# Patient Record
Sex: FEMALE | Race: WHITE | Employment: OTHER | URBAN - METROPOLITAN AREA
[De-identification: names, ages, dates, MRNs, and addresses within clinical notes are randomized per-mention and may not be internally consistent; named-entity substitution may affect disease eponyms.]

---

## 2017-02-21 DIAGNOSIS — E78.2 MIXED HYPERLIPIDEMIA: ICD-10-CM

## 2017-02-22 RX ORDER — ATORVASTATIN CALCIUM 40 MG/1
TABLET, FILM COATED ORAL
Qty: 90 TABLET | Refills: 1 | Status: SHIPPED | OUTPATIENT
Start: 2017-02-22 | End: 2017-09-02 | Stop reason: SDUPTHER

## 2017-04-06 ENCOUNTER — OFFICE VISIT (OUTPATIENT)
Dept: FAMILY MEDICINE CLINIC | Age: 67
End: 2017-04-06

## 2017-04-06 VITALS
TEMPERATURE: 98 F | RESPIRATION RATE: 16 BRPM | OXYGEN SATURATION: 96 % | HEART RATE: 75 BPM | SYSTOLIC BLOOD PRESSURE: 122 MMHG | HEIGHT: 67 IN | WEIGHT: 226 LBS | DIASTOLIC BLOOD PRESSURE: 80 MMHG | BODY MASS INDEX: 35.47 KG/M2

## 2017-04-06 DIAGNOSIS — I10 ESSENTIAL HYPERTENSION: ICD-10-CM

## 2017-04-06 DIAGNOSIS — D64.9 ANEMIA, UNSPECIFIED TYPE: ICD-10-CM

## 2017-04-06 DIAGNOSIS — F51.05 INSOMNIA DUE TO OTHER MENTAL DISORDER: ICD-10-CM

## 2017-04-06 DIAGNOSIS — E03.9 ACQUIRED HYPOTHYROIDISM: ICD-10-CM

## 2017-04-06 DIAGNOSIS — E11.40 TYPE 2 DIABETES MELLITUS WITH DIABETIC NEUROPATHY, WITHOUT LONG-TERM CURRENT USE OF INSULIN (HCC): ICD-10-CM

## 2017-04-06 DIAGNOSIS — F99 INSOMNIA DUE TO OTHER MENTAL DISORDER: ICD-10-CM

## 2017-04-06 DIAGNOSIS — E78.2 MIXED HYPERLIPIDEMIA: ICD-10-CM

## 2017-04-06 DIAGNOSIS — R73.9 HYPERGLYCEMIA: ICD-10-CM

## 2017-04-06 DIAGNOSIS — R53.83 FATIGUE, UNSPECIFIED TYPE: Primary | ICD-10-CM

## 2017-04-06 LAB
A/G RATIO: 1.8 (ref 1.1–2.2)
ALBUMIN SERPL-MCNC: 4.2 G/DL (ref 3.4–5)
ALP BLD-CCNC: 59 U/L (ref 40–129)
ALT SERPL-CCNC: 13 U/L (ref 10–40)
ANION GAP SERPL CALCULATED.3IONS-SCNC: 12 MMOL/L (ref 3–16)
AST SERPL-CCNC: 16 U/L (ref 15–37)
BILIRUB SERPL-MCNC: <0.2 MG/DL (ref 0–1)
BUN BLDV-MCNC: 14 MG/DL (ref 7–20)
CALCIUM SERPL-MCNC: 8.8 MG/DL (ref 8.3–10.6)
CHLORIDE BLD-SCNC: 100 MMOL/L (ref 99–110)
CHOLESTEROL, TOTAL: 148 MG/DL (ref 0–199)
CO2: 25 MMOL/L (ref 21–32)
CREAT SERPL-MCNC: 0.7 MG/DL (ref 0.6–1.2)
GFR AFRICAN AMERICAN: >60
GFR NON-AFRICAN AMERICAN: >60
GLOBULIN: 2.4 G/DL
GLUCOSE BLD-MCNC: 280 MG/DL (ref 70–99)
HBA1C MFR BLD: 9.7 %
HCT VFR BLD CALC: 35.1 % (ref 36–48)
HDLC SERPL-MCNC: 53 MG/DL (ref 40–60)
HEMOGLOBIN: 10.7 G/DL (ref 12–16)
LDL CHOLESTEROL CALCULATED: 62 MG/DL
MCH RBC QN AUTO: 21.6 PG (ref 26–34)
MCHC RBC AUTO-ENTMCNC: 30.5 G/DL (ref 31–36)
MCV RBC AUTO: 71 FL (ref 80–100)
PDW BLD-RTO: 16.5 % (ref 12.4–15.4)
PLATELET # BLD: 299 K/UL (ref 135–450)
PMV BLD AUTO: 9.3 FL (ref 5–10.5)
POTASSIUM SERPL-SCNC: 5.4 MMOL/L (ref 3.5–5.1)
RBC # BLD: 4.94 M/UL (ref 4–5.2)
SODIUM BLD-SCNC: 137 MMOL/L (ref 136–145)
T4 FREE: 1.1 NG/DL (ref 0.9–1.8)
TOTAL PROTEIN: 6.6 G/DL (ref 6.4–8.2)
TRIGL SERPL-MCNC: 164 MG/DL (ref 0–150)
TSH SERPL DL<=0.05 MIU/L-ACNC: 2.73 UIU/ML (ref 0.27–4.2)
VLDLC SERPL CALC-MCNC: 33 MG/DL
WBC # BLD: 6.5 K/UL (ref 4–11)

## 2017-04-06 PROCEDURE — 99214 OFFICE O/P EST MOD 30 MIN: CPT | Performed by: FAMILY MEDICINE

## 2017-04-06 PROCEDURE — 83036 HEMOGLOBIN GLYCOSYLATED A1C: CPT | Performed by: FAMILY MEDICINE

## 2017-04-06 PROCEDURE — 36415 COLL VENOUS BLD VENIPUNCTURE: CPT | Performed by: FAMILY MEDICINE

## 2017-04-06 RX ORDER — UBIDECARENONE 75 MG
50 CAPSULE ORAL DAILY
COMMUNITY
End: 2018-03-15 | Stop reason: ALTCHOICE

## 2017-04-06 RX ORDER — MAGNESIUM 30 MG
30 TABLET ORAL DAILY
Status: ON HOLD | COMMUNITY
End: 2021-01-01 | Stop reason: HOSPADM

## 2017-04-06 RX ORDER — LANCETS
EACH MISCELLANEOUS
Qty: 200 EACH | Refills: 5 | Status: ON HOLD | OUTPATIENT
Start: 2017-04-06 | End: 2021-01-01 | Stop reason: HOSPADM

## 2017-04-06 RX ORDER — ZOLPIDEM TARTRATE 10 MG/1
10 TABLET ORAL NIGHTLY PRN
Qty: 90 TABLET | Refills: 0 | Status: SHIPPED | OUTPATIENT
Start: 2017-04-06 | End: 2017-09-18

## 2017-04-06 ASSESSMENT — ENCOUNTER SYMPTOMS
ALLERGIC/IMMUNOLOGIC NEGATIVE: 1
RESPIRATORY NEGATIVE: 1
GASTROINTESTINAL NEGATIVE: 1
EYES NEGATIVE: 1
BACK PAIN: 0

## 2017-04-11 ENCOUNTER — TELEPHONE (OUTPATIENT)
Dept: FAMILY MEDICINE CLINIC | Age: 67
End: 2017-04-11

## 2017-04-11 DIAGNOSIS — D64.9 ANEMIA, UNSPECIFIED TYPE: Primary | ICD-10-CM

## 2017-04-12 ENCOUNTER — TELEPHONE (OUTPATIENT)
Dept: FAMILY MEDICINE CLINIC | Age: 67
End: 2017-04-12

## 2017-05-04 DIAGNOSIS — M85.80 OSTEOPENIA: ICD-10-CM

## 2017-05-04 RX ORDER — ALENDRONATE SODIUM 70 MG/1
TABLET ORAL
Qty: 12 TABLET | Refills: 1 | Status: SHIPPED | OUTPATIENT
Start: 2017-05-04 | End: 2017-10-13 | Stop reason: SDUPTHER

## 2017-05-23 ENCOUNTER — OFFICE VISIT (OUTPATIENT)
Dept: FAMILY MEDICINE CLINIC | Age: 67
End: 2017-05-23

## 2017-05-23 VITALS
TEMPERATURE: 98.5 F | BODY MASS INDEX: 35.47 KG/M2 | OXYGEN SATURATION: 98 % | HEART RATE: 84 BPM | SYSTOLIC BLOOD PRESSURE: 120 MMHG | DIASTOLIC BLOOD PRESSURE: 70 MMHG | RESPIRATION RATE: 16 BRPM | HEIGHT: 67 IN | WEIGHT: 226 LBS

## 2017-05-23 DIAGNOSIS — J06.9 UPPER RESPIRATORY TRACT INFECTION, UNSPECIFIED TYPE: Primary | ICD-10-CM

## 2017-05-23 DIAGNOSIS — H66.91 OM (OTITIS MEDIA), RECURRENT, RIGHT: ICD-10-CM

## 2017-05-23 PROCEDURE — 99213 OFFICE O/P EST LOW 20 MIN: CPT | Performed by: FAMILY MEDICINE

## 2017-05-23 RX ORDER — BENZONATATE 100 MG/1
100 CAPSULE ORAL 3 TIMES DAILY PRN
Qty: 60 CAPSULE | Refills: 1 | Status: SHIPPED | OUTPATIENT
Start: 2017-05-23 | End: 2017-09-18

## 2017-05-23 RX ORDER — CIPROFLOXACIN 500 MG/1
500 TABLET, FILM COATED ORAL 2 TIMES DAILY
Qty: 20 TABLET | Refills: 0 | Status: SHIPPED | OUTPATIENT
Start: 2017-05-23 | End: 2017-06-02

## 2017-05-23 RX ORDER — FLUTICASONE PROPIONATE 50 MCG
SPRAY, SUSPENSION (ML) NASAL
Qty: 1 BOTTLE | Refills: 1 | Status: SHIPPED | OUTPATIENT
Start: 2017-05-23 | End: 2017-09-18

## 2017-05-23 ASSESSMENT — ENCOUNTER SYMPTOMS
STRIDOR: 0
SINUS PRESSURE: 1
RHINORRHEA: 1
WHEEZING: 0
SORE THROAT: 1
APNEA: 0
SHORTNESS OF BREATH: 1
EYES NEGATIVE: 1
COUGH: 1
CHEST TIGHTNESS: 1
CHOKING: 0

## 2017-06-03 DIAGNOSIS — I10 ESSENTIAL HYPERTENSION: ICD-10-CM

## 2017-06-03 DIAGNOSIS — F41.9 ANXIETY AND DEPRESSION: ICD-10-CM

## 2017-06-03 DIAGNOSIS — F32.A ANXIETY AND DEPRESSION: ICD-10-CM

## 2017-06-05 RX ORDER — LISINOPRIL 5 MG/1
TABLET ORAL
Qty: 90 TABLET | Refills: 1 | Status: SHIPPED | OUTPATIENT
Start: 2017-06-05 | End: 2017-10-13 | Stop reason: SDUPTHER

## 2017-06-05 RX ORDER — BUPROPION HYDROCHLORIDE 150 MG/1
TABLET ORAL
Qty: 90 TABLET | Refills: 1 | Status: SHIPPED | OUTPATIENT
Start: 2017-06-05 | End: 2017-10-13 | Stop reason: SDUPTHER

## 2017-06-05 RX ORDER — GLIMEPIRIDE 4 MG/1
TABLET ORAL
Qty: 180 TABLET | Refills: 1 | Status: SHIPPED | OUTPATIENT
Start: 2017-06-05 | End: 2017-10-20 | Stop reason: SDUPTHER

## 2017-07-02 DIAGNOSIS — R25.1 OCCASIONAL TREMORS: ICD-10-CM

## 2017-07-03 RX ORDER — PRIMIDONE 50 MG/1
TABLET ORAL
Qty: 180 TABLET | Refills: 1 | Status: SHIPPED | OUTPATIENT
Start: 2017-07-03 | End: 2017-10-13 | Stop reason: SDUPTHER

## 2017-07-26 DIAGNOSIS — E11.9 TYPE 2 DIABETES MELLITUS WITHOUT COMPLICATION, WITHOUT LONG-TERM CURRENT USE OF INSULIN (HCC): ICD-10-CM

## 2017-07-27 RX ORDER — METFORMIN HYDROCHLORIDE 500 MG/1
TABLET, EXTENDED RELEASE ORAL
Qty: 180 TABLET | Refills: 1 | Status: SHIPPED | OUTPATIENT
Start: 2017-07-27 | End: 2017-10-20 | Stop reason: SDUPTHER

## 2017-08-22 DIAGNOSIS — E03.9 ACQUIRED HYPOTHYROIDISM: ICD-10-CM

## 2017-08-22 DIAGNOSIS — I10 ESSENTIAL HYPERTENSION: ICD-10-CM

## 2017-08-22 RX ORDER — LEVOTHYROXINE SODIUM 0.07 MG/1
TABLET ORAL
Qty: 90 TABLET | Refills: 1 | Status: SHIPPED | OUTPATIENT
Start: 2017-08-22 | End: 2017-10-20 | Stop reason: SDUPTHER

## 2017-09-02 DIAGNOSIS — E78.2 MIXED HYPERLIPIDEMIA: ICD-10-CM

## 2017-09-05 RX ORDER — ATORVASTATIN CALCIUM 40 MG/1
TABLET, FILM COATED ORAL
Qty: 90 TABLET | Refills: 1 | Status: SHIPPED | OUTPATIENT
Start: 2017-09-05 | End: 2017-10-20 | Stop reason: SDUPTHER

## 2017-09-18 ENCOUNTER — OFFICE VISIT (OUTPATIENT)
Dept: FAMILY MEDICINE CLINIC | Age: 67
End: 2017-09-18

## 2017-09-18 VITALS
BODY MASS INDEX: 33.74 KG/M2 | HEIGHT: 67 IN | RESPIRATION RATE: 16 BRPM | OXYGEN SATURATION: 97 % | HEART RATE: 83 BPM | SYSTOLIC BLOOD PRESSURE: 116 MMHG | WEIGHT: 215 LBS | TEMPERATURE: 98.9 F | DIASTOLIC BLOOD PRESSURE: 74 MMHG

## 2017-09-18 DIAGNOSIS — E11.65 TYPE 2 DIABETES MELLITUS WITH HYPERGLYCEMIA, WITHOUT LONG-TERM CURRENT USE OF INSULIN (HCC): Primary | ICD-10-CM

## 2017-09-18 DIAGNOSIS — I73.9 PAD (PERIPHERAL ARTERY DISEASE) (HCC): ICD-10-CM

## 2017-09-18 DIAGNOSIS — E03.9 HYPOTHYROIDISM, UNSPECIFIED TYPE: ICD-10-CM

## 2017-09-18 DIAGNOSIS — I10 ESSENTIAL HYPERTENSION: ICD-10-CM

## 2017-09-18 DIAGNOSIS — I25.10 CORONARY ARTERY DISEASE INVOLVING NATIVE CORONARY ARTERY OF NATIVE HEART WITHOUT ANGINA PECTORIS: ICD-10-CM

## 2017-09-18 DIAGNOSIS — F41.9 ANXIETY: ICD-10-CM

## 2017-09-18 DIAGNOSIS — I63.9 CEREBROVASCULAR ACCIDENT (CVA), UNSPECIFIED MECHANISM (HCC): ICD-10-CM

## 2017-09-18 DIAGNOSIS — E78.2 MIXED HYPERLIPIDEMIA: ICD-10-CM

## 2017-09-18 DIAGNOSIS — M85.89 OSTEOPENIA OF MULTIPLE SITES: ICD-10-CM

## 2017-09-18 DIAGNOSIS — D64.9 ANEMIA, UNSPECIFIED TYPE: ICD-10-CM

## 2017-09-18 LAB
A/G RATIO: 1.4 (ref 1.1–2.2)
ALBUMIN SERPL-MCNC: 4.4 G/DL (ref 3.4–5)
ALP BLD-CCNC: 73 U/L (ref 40–129)
ALT SERPL-CCNC: 21 U/L (ref 10–40)
ANION GAP SERPL CALCULATED.3IONS-SCNC: 20 MMOL/L (ref 3–16)
AST SERPL-CCNC: 22 U/L (ref 15–37)
BILIRUB SERPL-MCNC: 0.4 MG/DL (ref 0–1)
BUN BLDV-MCNC: 16 MG/DL (ref 7–20)
CALCIUM SERPL-MCNC: 9.7 MG/DL (ref 8.3–10.6)
CHLORIDE BLD-SCNC: 96 MMOL/L (ref 99–110)
CHOLESTEROL, TOTAL: 148 MG/DL (ref 0–199)
CO2: 22 MMOL/L (ref 21–32)
CREAT SERPL-MCNC: 0.7 MG/DL (ref 0.6–1.2)
CREATININE URINE: 148.5 MG/DL (ref 28–259)
GFR AFRICAN AMERICAN: >60
GFR NON-AFRICAN AMERICAN: >60
GLOBULIN: 3.1 G/DL
GLUCOSE BLD-MCNC: 309 MG/DL (ref 70–99)
HBA1C MFR BLD: 11.5 %
HCT VFR BLD CALC: 40.4 % (ref 36–48)
HDLC SERPL-MCNC: 56 MG/DL (ref 40–60)
HEMOGLOBIN: 12.5 G/DL (ref 12–16)
LDL CHOLESTEROL CALCULATED: 60 MG/DL
MCH RBC QN AUTO: 22.3 PG (ref 26–34)
MCHC RBC AUTO-ENTMCNC: 30.9 G/DL (ref 31–36)
MCV RBC AUTO: 72.1 FL (ref 80–100)
MICROALBUMIN UR-MCNC: 1.4 MG/DL
MICROALBUMIN/CREAT UR-RTO: 9.4 MG/G (ref 0–30)
PDW BLD-RTO: 16.8 % (ref 12.4–15.4)
PLATELET # BLD: 366 K/UL (ref 135–450)
PMV BLD AUTO: 9.3 FL (ref 5–10.5)
POTASSIUM SERPL-SCNC: 5.1 MMOL/L (ref 3.5–5.1)
RBC # BLD: 5.61 M/UL (ref 4–5.2)
SODIUM BLD-SCNC: 138 MMOL/L (ref 136–145)
T4 FREE: 1.2 NG/DL (ref 0.9–1.8)
TOTAL PROTEIN: 7.5 G/DL (ref 6.4–8.2)
TRIGL SERPL-MCNC: 158 MG/DL (ref 0–150)
TSH SERPL DL<=0.05 MIU/L-ACNC: 3.63 UIU/ML (ref 0.27–4.2)
VLDLC SERPL CALC-MCNC: 32 MG/DL
WBC # BLD: 9 K/UL (ref 4–11)

## 2017-09-18 PROCEDURE — 83036 HEMOGLOBIN GLYCOSYLATED A1C: CPT | Performed by: FAMILY MEDICINE

## 2017-09-18 PROCEDURE — 36415 COLL VENOUS BLD VENIPUNCTURE: CPT | Performed by: FAMILY MEDICINE

## 2017-09-18 PROCEDURE — 99214 OFFICE O/P EST MOD 30 MIN: CPT | Performed by: FAMILY MEDICINE

## 2017-09-20 ENCOUNTER — HOSPITAL ENCOUNTER (OUTPATIENT)
Dept: VASCULAR LAB | Age: 67
Discharge: HOME | End: 2017-09-20
Attending: FAMILY MEDICINE | Admitting: FAMILY MEDICINE

## 2017-10-13 DIAGNOSIS — F32.A ANXIETY AND DEPRESSION: ICD-10-CM

## 2017-10-13 DIAGNOSIS — M85.80 OSTEOPENIA: ICD-10-CM

## 2017-10-13 DIAGNOSIS — I10 ESSENTIAL HYPERTENSION: ICD-10-CM

## 2017-10-13 DIAGNOSIS — F41.9 ANXIETY AND DEPRESSION: ICD-10-CM

## 2017-10-13 DIAGNOSIS — R25.1 OCCASIONAL TREMORS: ICD-10-CM

## 2017-10-16 RX ORDER — BUPROPION HYDROCHLORIDE 150 MG/1
TABLET ORAL
Qty: 90 TABLET | Refills: 1 | Status: SHIPPED | OUTPATIENT
Start: 2017-10-16 | End: 2017-10-20 | Stop reason: SDUPTHER

## 2017-10-16 RX ORDER — ALENDRONATE SODIUM 70 MG/1
TABLET ORAL
Qty: 12 TABLET | Refills: 1 | Status: SHIPPED | OUTPATIENT
Start: 2017-10-16 | End: 2017-10-20 | Stop reason: SDUPTHER

## 2017-10-16 RX ORDER — DULOXETIN HYDROCHLORIDE 60 MG/1
CAPSULE, DELAYED RELEASE ORAL
Qty: 180 CAPSULE | Refills: 1 | Status: SHIPPED | OUTPATIENT
Start: 2017-10-16 | End: 2017-10-20 | Stop reason: SDUPTHER

## 2017-10-16 RX ORDER — PRIMIDONE 50 MG/1
TABLET ORAL
Qty: 180 TABLET | Refills: 1 | Status: SHIPPED | OUTPATIENT
Start: 2017-10-16 | End: 2017-10-20 | Stop reason: SDUPTHER

## 2017-10-16 RX ORDER — LISINOPRIL 5 MG/1
TABLET ORAL
Qty: 90 TABLET | Refills: 1 | Status: SHIPPED | OUTPATIENT
Start: 2017-10-16 | End: 2017-10-20 | Stop reason: SDUPTHER

## 2017-10-20 ENCOUNTER — OFFICE VISIT (OUTPATIENT)
Dept: FAMILY MEDICINE CLINIC | Age: 67
End: 2017-10-20

## 2017-10-20 VITALS
OXYGEN SATURATION: 97 % | DIASTOLIC BLOOD PRESSURE: 68 MMHG | HEART RATE: 76 BPM | RESPIRATION RATE: 18 BRPM | SYSTOLIC BLOOD PRESSURE: 122 MMHG | HEIGHT: 67 IN | BODY MASS INDEX: 34.37 KG/M2 | WEIGHT: 219 LBS | TEMPERATURE: 98.5 F

## 2017-10-20 DIAGNOSIS — I10 ESSENTIAL HYPERTENSION: ICD-10-CM

## 2017-10-20 DIAGNOSIS — F32.A ANXIETY AND DEPRESSION: ICD-10-CM

## 2017-10-20 DIAGNOSIS — I63.9 CEREBROVASCULAR ACCIDENT (CVA), UNSPECIFIED MECHANISM (HCC): Primary | ICD-10-CM

## 2017-10-20 DIAGNOSIS — E78.2 MIXED HYPERLIPIDEMIA: ICD-10-CM

## 2017-10-20 DIAGNOSIS — M85.80 OSTEOPENIA, UNSPECIFIED LOCATION: ICD-10-CM

## 2017-10-20 DIAGNOSIS — E11.9 TYPE 2 DIABETES MELLITUS WITHOUT COMPLICATION, WITHOUT LONG-TERM CURRENT USE OF INSULIN (HCC): ICD-10-CM

## 2017-10-20 DIAGNOSIS — E03.9 ACQUIRED HYPOTHYROIDISM: ICD-10-CM

## 2017-10-20 DIAGNOSIS — R25.1 OCCASIONAL TREMORS: ICD-10-CM

## 2017-10-20 DIAGNOSIS — F41.9 ANXIETY AND DEPRESSION: ICD-10-CM

## 2017-10-20 PROCEDURE — 99214 OFFICE O/P EST MOD 30 MIN: CPT | Performed by: FAMILY MEDICINE

## 2017-10-20 RX ORDER — DULOXETIN HYDROCHLORIDE 60 MG/1
CAPSULE, DELAYED RELEASE ORAL
Qty: 180 CAPSULE | Refills: 2 | Status: SHIPPED | OUTPATIENT
Start: 2017-10-20 | End: 2018-03-15 | Stop reason: SDUPTHER

## 2017-10-20 RX ORDER — ALENDRONATE SODIUM 70 MG/1
TABLET ORAL
Qty: 12 TABLET | Refills: 3 | Status: SHIPPED | OUTPATIENT
Start: 2017-10-20 | End: 2018-03-15 | Stop reason: SDUPTHER

## 2017-10-20 RX ORDER — METFORMIN HYDROCHLORIDE 500 MG/1
TABLET, EXTENDED RELEASE ORAL
Qty: 180 TABLET | Refills: 2 | Status: SHIPPED | OUTPATIENT
Start: 2017-10-20 | End: 2018-03-12 | Stop reason: SDUPTHER

## 2017-10-20 RX ORDER — ATORVASTATIN CALCIUM 40 MG/1
TABLET, FILM COATED ORAL
Qty: 90 TABLET | Refills: 2 | Status: SHIPPED | OUTPATIENT
Start: 2017-10-20 | End: 2018-12-11 | Stop reason: SDUPTHER

## 2017-10-20 RX ORDER — LEVOTHYROXINE SODIUM 0.07 MG/1
TABLET ORAL
Qty: 90 TABLET | Refills: 2 | Status: SHIPPED | OUTPATIENT
Start: 2017-10-20 | End: 2018-10-30 | Stop reason: SDUPTHER

## 2017-10-20 RX ORDER — LISINOPRIL 5 MG/1
TABLET ORAL
Qty: 90 TABLET | Refills: 2 | Status: SHIPPED | OUTPATIENT
Start: 2017-10-20 | End: 2018-10-30 | Stop reason: SDUPTHER

## 2017-10-20 RX ORDER — BUPROPION HYDROCHLORIDE 150 MG/1
TABLET ORAL
Qty: 90 TABLET | Refills: 2 | Status: SHIPPED | OUTPATIENT
Start: 2017-10-20 | End: 2018-10-30 | Stop reason: SDUPTHER

## 2017-10-20 RX ORDER — FUROSEMIDE 40 MG/1
40 TABLET ORAL DAILY
Qty: 90 TABLET | Refills: 2 | Status: SHIPPED | OUTPATIENT
Start: 2017-10-20 | End: 2018-08-23 | Stop reason: SDUPTHER

## 2017-10-20 RX ORDER — GLIMEPIRIDE 4 MG/1
TABLET ORAL
Qty: 180 TABLET | Refills: 2 | Status: SHIPPED | OUTPATIENT
Start: 2017-10-20 | End: 2018-03-20 | Stop reason: ALTCHOICE

## 2017-10-20 RX ORDER — PRIMIDONE 50 MG/1
TABLET ORAL
Qty: 180 TABLET | Refills: 2 | Status: SHIPPED | OUTPATIENT
Start: 2017-10-20 | End: 2018-10-08 | Stop reason: SDUPTHER

## 2017-10-20 NOTE — PROGRESS NOTES
1 tablet by mouth daily     Dispense:  90 tablet     Refill:  2    DULoxetine (CYMBALTA) 60 MG extended release capsule     Sig: TAKE 2 CAPSULES EVERY DAY     Dispense:  180 capsule     Refill:  2    buPROPion (WELLBUTRIN XL) 150 MG extended release tablet     Sig: TAKE 1 TABLET EVERY MORNING     Dispense:  90 tablet     Refill:  2    atorvastatin (LIPITOR) 40 MG tablet     Sig: TAKE 1 TABLET EVERY DAY     Dispense:  90 tablet     Refill:  2    alendronate (FOSAMAX) 70 MG tablet     Sig: TAKE 1 TABLET EVERY 7 (SEVEN) DAYS     Dispense:  12 tablet     Refill:  3   Better BS  Control discussed  FMLA form  Completed.

## 2017-11-27 ENCOUNTER — HOSPITAL ENCOUNTER (OUTPATIENT)
Dept: MAMMOGRAPHY | Age: 67
Discharge: HOME | End: 2017-11-27
Attending: OBSTETRICS & GYNECOLOGY | Admitting: OBSTETRICS & GYNECOLOGY

## 2017-11-27 DIAGNOSIS — Z12.31 ENCOUNTER FOR SCREENING MAMMOGRAM FOR BREAST CANCER: ICD-10-CM

## 2018-03-09 NOTE — TELEPHONE ENCOUNTER
Pt only has enough Metformin until this Saturday and wants to see if she can get it refilled? Pt was a Dr. Suellen Lopez pt and has apt to est with deepthi on 3/15/18. I talked to Ijeoma/she said to see if Dr. Luba aLne would see her? Please advise.

## 2018-03-12 DIAGNOSIS — E11.9 TYPE 2 DIABETES MELLITUS WITHOUT COMPLICATION, WITHOUT LONG-TERM CURRENT USE OF INSULIN (HCC): ICD-10-CM

## 2018-03-12 RX ORDER — METFORMIN HYDROCHLORIDE 500 MG/1
TABLET, EXTENDED RELEASE ORAL
Qty: 180 TABLET | Refills: 2 | Status: SHIPPED | OUTPATIENT
Start: 2018-03-12 | End: 2018-03-15 | Stop reason: SDUPTHER

## 2018-03-15 ENCOUNTER — OFFICE VISIT (OUTPATIENT)
Dept: FAMILY MEDICINE CLINIC | Age: 68
End: 2018-03-15

## 2018-03-15 VITALS
TEMPERATURE: 98.4 F | HEART RATE: 76 BPM | BODY MASS INDEX: 33.05 KG/M2 | WEIGHT: 211 LBS | SYSTOLIC BLOOD PRESSURE: 128 MMHG | DIASTOLIC BLOOD PRESSURE: 72 MMHG

## 2018-03-15 DIAGNOSIS — M85.80 OSTEOPENIA, UNSPECIFIED LOCATION: ICD-10-CM

## 2018-03-15 DIAGNOSIS — F32.A ANXIETY AND DEPRESSION: ICD-10-CM

## 2018-03-15 DIAGNOSIS — I50.32 CHRONIC DIASTOLIC HEART FAILURE (HCC): ICD-10-CM

## 2018-03-15 DIAGNOSIS — E55.9 VITAMIN D DEFICIENCY: ICD-10-CM

## 2018-03-15 DIAGNOSIS — F41.9 ANXIETY AND DEPRESSION: ICD-10-CM

## 2018-03-15 DIAGNOSIS — E11.9 TYPE 2 DIABETES MELLITUS WITHOUT COMPLICATION, WITHOUT LONG-TERM CURRENT USE OF INSULIN (HCC): Primary | ICD-10-CM

## 2018-03-15 DIAGNOSIS — D50.9 IRON DEFICIENCY ANEMIA, UNSPECIFIED IRON DEFICIENCY ANEMIA TYPE: ICD-10-CM

## 2018-03-15 LAB
A/G RATIO: 1.7 (ref 1.1–2.2)
ALBUMIN SERPL-MCNC: 4.4 G/DL (ref 3.4–5)
ALP BLD-CCNC: 70 U/L (ref 40–129)
ALT SERPL-CCNC: 16 U/L (ref 10–40)
ANION GAP SERPL CALCULATED.3IONS-SCNC: 16 MMOL/L (ref 3–16)
ANISOCYTOSIS: ABNORMAL
AST SERPL-CCNC: 19 U/L (ref 15–37)
BASOPHILS ABSOLUTE: 0.1 K/UL (ref 0–0.2)
BASOPHILS RELATIVE PERCENT: 1 %
BILIRUB SERPL-MCNC: 0.7 MG/DL (ref 0–1)
BUN BLDV-MCNC: 13 MG/DL (ref 7–20)
BURR CELLS: ABNORMAL
CALCIUM SERPL-MCNC: 9.1 MG/DL (ref 8.3–10.6)
CHLORIDE BLD-SCNC: 95 MMOL/L (ref 99–110)
CO2: 24 MMOL/L (ref 21–32)
CREAT SERPL-MCNC: 0.6 MG/DL (ref 0.6–1.2)
EOSINOPHILS ABSOLUTE: 0.2 K/UL (ref 0–0.6)
EOSINOPHILS RELATIVE PERCENT: 2.6 %
GFR AFRICAN AMERICAN: >60
GFR NON-AFRICAN AMERICAN: >60
GLOBULIN: 2.6 G/DL
GLUCOSE BLD-MCNC: 352 MG/DL (ref 70–99)
HCT VFR BLD CALC: 36.7 % (ref 36–48)
HEMATOLOGY PATH CONSULT: YES
HEMOGLOBIN: 11.6 G/DL (ref 12–16)
LYMPHOCYTES ABSOLUTE: 2.3 K/UL (ref 1–5.1)
LYMPHOCYTES RELATIVE PERCENT: 29.9 %
MCH RBC QN AUTO: 21.9 PG (ref 26–34)
MCHC RBC AUTO-ENTMCNC: 31.5 G/DL (ref 31–36)
MCV RBC AUTO: 69.6 FL (ref 80–100)
MICROCYTES: ABNORMAL
MONOCYTES ABSOLUTE: 0.5 K/UL (ref 0–1.3)
MONOCYTES RELATIVE PERCENT: 6.5 %
NEUTROPHILS ABSOLUTE: 4.5 K/UL (ref 1.7–7.7)
NEUTROPHILS RELATIVE PERCENT: 60 %
OVALOCYTES: ABNORMAL
PDW BLD-RTO: 17.6 % (ref 12.4–15.4)
PLATELET # BLD: 366 K/UL (ref 135–450)
PLATELET SLIDE REVIEW: ADEQUATE
PMV BLD AUTO: 9.3 FL (ref 5–10.5)
POTASSIUM SERPL-SCNC: 4.9 MMOL/L (ref 3.5–5.1)
RBC # BLD: 5.27 M/UL (ref 4–5.2)
SLIDE REVIEW: ABNORMAL
SODIUM BLD-SCNC: 135 MMOL/L (ref 136–145)
TOTAL PROTEIN: 7 G/DL (ref 6.4–8.2)
VITAMIN D 25-HYDROXY: 52.4 NG/ML
WBC # BLD: 7.6 K/UL (ref 4–11)

## 2018-03-15 PROCEDURE — 99214 OFFICE O/P EST MOD 30 MIN: CPT | Performed by: NURSE PRACTITIONER

## 2018-03-15 PROCEDURE — 36415 COLL VENOUS BLD VENIPUNCTURE: CPT | Performed by: NURSE PRACTITIONER

## 2018-03-15 RX ORDER — METFORMIN HYDROCHLORIDE 500 MG/1
TABLET, EXTENDED RELEASE ORAL
Qty: 30 TABLET | Refills: 0 | Status: SHIPPED | OUTPATIENT
Start: 2018-03-15 | End: 2019-06-03 | Stop reason: SDUPTHER

## 2018-03-15 RX ORDER — DULOXETIN HYDROCHLORIDE 60 MG/1
CAPSULE, DELAYED RELEASE ORAL
Qty: 180 CAPSULE | Refills: 2 | Status: SHIPPED | OUTPATIENT
Start: 2018-03-15 | End: 2018-10-19 | Stop reason: SDUPTHER

## 2018-03-15 RX ORDER — BLOOD-GLUCOSE METER
1 EACH MISCELLANEOUS 2 TIMES DAILY
Qty: 1 KIT | Refills: 0 | Status: ON HOLD | OUTPATIENT
Start: 2018-03-15 | End: 2021-01-01 | Stop reason: HOSPADM

## 2018-03-15 RX ORDER — METFORMIN HYDROCHLORIDE 500 MG/1
TABLET, EXTENDED RELEASE ORAL
Qty: 180 TABLET | Refills: 2 | Status: SHIPPED | OUTPATIENT
Start: 2018-03-15 | End: 2018-03-15 | Stop reason: SDUPTHER

## 2018-03-15 RX ORDER — ALENDRONATE SODIUM 70 MG/1
TABLET ORAL
Qty: 12 TABLET | Refills: 3 | Status: SHIPPED | OUTPATIENT
Start: 2018-03-15 | End: 2019-02-25 | Stop reason: SDUPTHER

## 2018-03-15 RX ORDER — BLACK COHOSH ROOT EXTRACT 80 MG
1 CAPSULE ORAL DAILY
COMMUNITY
End: 2019-10-21 | Stop reason: ALTCHOICE

## 2018-03-15 RX ORDER — QUINIDINE SULFATE 200 MG
1 TABLET ORAL 2 TIMES DAILY
COMMUNITY
End: 2018-08-23

## 2018-03-15 ASSESSMENT — ENCOUNTER SYMPTOMS
COUGH: 0
SHORTNESS OF BREATH: 0

## 2018-03-15 NOTE — PROGRESS NOTES
each by In Vitro route 4 times daily As needed. 400 each 5    primidone (MYSOLINE) 50 MG tablet TAKE 2 TABLETS EVERY NIGHT 180 tablet 2    metoprolol tartrate (LOPRESSOR) 25 MG tablet TAKE 1 TABLET TWICE DAILY 180 tablet 2    lisinopril (PRINIVIL;ZESTRIL) 5 MG tablet TAKE 1 TABLET EVERY DAY 90 tablet 2    levothyroxine (SYNTHROID) 75 MCG tablet TAKE 1 TABLET EVERY DAY 90 tablet 2    glimepiride (AMARYL) 4 MG tablet TAKE 1 TABLET TWICE DAILY 180 tablet 2    furosemide (LASIX) 40 MG tablet Take 1 tablet by mouth daily (Patient taking differently: Take 40 mg by mouth daily prn) 90 tablet 2    buPROPion (WELLBUTRIN XL) 150 MG extended release tablet TAKE 1 TABLET EVERY MORNING 90 tablet 2    atorvastatin (LIPITOR) 40 MG tablet TAKE 1 TABLET EVERY DAY 90 tablet 2    magnesium 30 MG tablet Take 30 mg by mouth daily      NONFORMULARY 1 tablet daily Digestive advantage 2      glucose blood VI test strips (ONE TOUCH ULTRA TEST) strip Test blood sugar 2 times a day      One touch Glucometer #1     For BS testing 200 strip 1    ONE TOUCH ULTRASOFT LANCETS MISC Test blood sugar 2 times a day 200 each 5    glucose blood VI test strips (ONE TOUCH ULTRA TEST) strip Test blood sugar 5 times a day 450 each 2    aspirin 325 MG tablet Take 1 tablet by mouth daily. 30 tablet 3     No current facility-administered medications for this visit. Patient's past medical history, surgical history, family history, medications,  and allergies  were all reviewed and updated as appropriate today. Review of Systems   Constitutional: Negative for chills and fever. Respiratory: Negative for cough and shortness of breath. Cardiovascular: Negative for chest pain, palpitations and leg swelling. Neurological: Negative for headaches. Dizziness: has felt light headed recently with elevated BS. Psychiatric/Behavioral: Positive for depression. Negative for substance abuse. The patient does not have insomnia. 12 tablet; Refill: 3    4. Vitamin D deficiency    - Vitamin D 25 Hydroxy    5. Iron deficiency anemia, unspecified iron deficiency anemia type    - CBC Auto Differential    6.  Chronic diastolic heart failure (HCC)    - Randi Hazel MD

## 2018-03-16 DIAGNOSIS — D50.9 IRON DEFICIENCY ANEMIA, UNSPECIFIED IRON DEFICIENCY ANEMIA TYPE: Primary | ICD-10-CM

## 2018-03-16 DIAGNOSIS — D64.9 ANEMIA, UNSPECIFIED TYPE: Primary | ICD-10-CM

## 2018-03-16 LAB
ESTIMATED AVERAGE GLUCOSE: 317.8 MG/DL
FERRITIN: 14.8 NG/ML (ref 15–150)
HBA1C MFR BLD: 12.7 %
HEMATOLOGY PATH CONSULT: NORMAL
IRON SATURATION: 11 % (ref 15–50)
IRON: 45 UG/DL (ref 37–145)
TOTAL IRON BINDING CAPACITY: 406 UG/DL (ref 260–445)

## 2018-03-16 RX ORDER — FERROUS SULFATE 325(65) MG
325 TABLET ORAL
Qty: 30 TABLET | Refills: 3 | Status: SHIPPED | OUTPATIENT
Start: 2018-03-16 | End: 2018-03-19 | Stop reason: SDUPTHER

## 2018-03-19 ENCOUNTER — TELEPHONE (OUTPATIENT)
Dept: FAMILY MEDICINE CLINIC | Age: 68
End: 2018-03-19

## 2018-03-19 DIAGNOSIS — D50.9 IRON DEFICIENCY ANEMIA, UNSPECIFIED IRON DEFICIENCY ANEMIA TYPE: ICD-10-CM

## 2018-03-19 RX ORDER — FERROUS SULFATE 325(65) MG
325 TABLET ORAL
Qty: 30 TABLET | Refills: 3 | Status: CANCELLED | OUTPATIENT
Start: 2018-03-19

## 2018-03-19 RX ORDER — FERROUS SULFATE 325(65) MG
325 TABLET ORAL
Qty: 30 TABLET | Refills: 3 | Status: SHIPPED | OUTPATIENT
Start: 2018-03-19 | End: 2019-10-21 | Stop reason: ALTCHOICE

## 2018-03-19 NOTE — TELEPHONE ENCOUNTER
Pt was told to get an iron supplement but it was sent to [facility] and she would like to get it at [facility] in [facility]. Please notify pt when it is switched, she will go to get it.

## 2018-03-20 ENCOUNTER — OFFICE VISIT (OUTPATIENT)
Dept: FAMILY MEDICINE CLINIC | Age: 68
End: 2018-03-20

## 2018-03-20 VITALS
OXYGEN SATURATION: 97 % | DIASTOLIC BLOOD PRESSURE: 60 MMHG | BODY MASS INDEX: 33.05 KG/M2 | WEIGHT: 211 LBS | TEMPERATURE: 98.1 F | SYSTOLIC BLOOD PRESSURE: 124 MMHG | HEART RATE: 76 BPM

## 2018-03-20 DIAGNOSIS — M54.50 ACUTE RIGHT-SIDED LOW BACK PAIN WITHOUT SCIATICA: ICD-10-CM

## 2018-03-20 DIAGNOSIS — E11.65 TYPE 2 DIABETES MELLITUS WITH HYPERGLYCEMIA, WITHOUT LONG-TERM CURRENT USE OF INSULIN (HCC): Primary | ICD-10-CM

## 2018-03-20 PROCEDURE — 99214 OFFICE O/P EST MOD 30 MIN: CPT | Performed by: NURSE PRACTITIONER

## 2018-03-20 RX ORDER — TIZANIDINE 2 MG/1
2 TABLET ORAL NIGHTLY PRN
Qty: 30 TABLET | Refills: 0 | Status: SHIPPED | OUTPATIENT
Start: 2018-03-20 | End: 2018-04-10 | Stop reason: SDUPTHER

## 2018-03-20 ASSESSMENT — ENCOUNTER SYMPTOMS: SHORTNESS OF BREATH: 0

## 2018-04-10 DIAGNOSIS — M54.50 ACUTE RIGHT-SIDED LOW BACK PAIN WITHOUT SCIATICA: ICD-10-CM

## 2018-04-10 RX ORDER — TIZANIDINE 2 MG/1
2 TABLET ORAL NIGHTLY PRN
Qty: 90 TABLET | Refills: 0 | Status: SHIPPED | OUTPATIENT
Start: 2018-04-10 | End: 2018-06-28 | Stop reason: SDUPTHER

## 2018-05-31 ENCOUNTER — OFFICE VISIT (OUTPATIENT)
Dept: FAMILY MEDICINE CLINIC | Age: 68
End: 2018-05-31

## 2018-05-31 VITALS
SYSTOLIC BLOOD PRESSURE: 116 MMHG | TEMPERATURE: 98.4 F | WEIGHT: 220 LBS | DIASTOLIC BLOOD PRESSURE: 68 MMHG | HEART RATE: 78 BPM | OXYGEN SATURATION: 98 % | BODY MASS INDEX: 34.46 KG/M2

## 2018-05-31 DIAGNOSIS — D50.9 IRON DEFICIENCY ANEMIA, UNSPECIFIED IRON DEFICIENCY ANEMIA TYPE: ICD-10-CM

## 2018-05-31 DIAGNOSIS — M54.42 CHRONIC MIDLINE LOW BACK PAIN WITH LEFT-SIDED SCIATICA: Primary | ICD-10-CM

## 2018-05-31 DIAGNOSIS — E03.9 HYPOTHYROIDISM, UNSPECIFIED TYPE: ICD-10-CM

## 2018-05-31 DIAGNOSIS — M79.644 PAIN OF FINGER OF RIGHT HAND: ICD-10-CM

## 2018-05-31 DIAGNOSIS — Z23 NEED FOR PNEUMOCOCCAL VACCINATION: ICD-10-CM

## 2018-05-31 DIAGNOSIS — E11.9 TYPE 2 DIABETES MELLITUS WITHOUT COMPLICATION, WITHOUT LONG-TERM CURRENT USE OF INSULIN (HCC): ICD-10-CM

## 2018-05-31 DIAGNOSIS — E78.2 MIXED HYPERLIPIDEMIA: ICD-10-CM

## 2018-05-31 DIAGNOSIS — G89.29 CHRONIC MIDLINE LOW BACK PAIN WITH LEFT-SIDED SCIATICA: Primary | ICD-10-CM

## 2018-05-31 LAB
A/G RATIO: 1.6 (ref 1.1–2.2)
ALBUMIN SERPL-MCNC: 4.1 G/DL (ref 3.4–5)
ALP BLD-CCNC: 58 U/L (ref 40–129)
ALT SERPL-CCNC: 14 U/L (ref 10–40)
ANION GAP SERPL CALCULATED.3IONS-SCNC: 11 MMOL/L (ref 3–16)
AST SERPL-CCNC: 16 U/L (ref 15–37)
BASOPHILS ABSOLUTE: 0.1 K/UL (ref 0–0.2)
BASOPHILS RELATIVE PERCENT: 1.1 %
BILIRUB SERPL-MCNC: 0.3 MG/DL (ref 0–1)
BUN BLDV-MCNC: 9 MG/DL (ref 7–20)
CALCIUM SERPL-MCNC: 8.7 MG/DL (ref 8.3–10.6)
CHLORIDE BLD-SCNC: 103 MMOL/L (ref 99–110)
CHOLESTEROL, TOTAL: 146 MG/DL (ref 0–199)
CO2: 26 MMOL/L (ref 21–32)
CREAT SERPL-MCNC: 0.6 MG/DL (ref 0.6–1.2)
EOSINOPHILS ABSOLUTE: 0.3 K/UL (ref 0–0.6)
EOSINOPHILS RELATIVE PERCENT: 5.9 %
FERRITIN: 19.5 NG/ML (ref 15–150)
GFR AFRICAN AMERICAN: >60
GFR NON-AFRICAN AMERICAN: >60
GLOBULIN: 2.5 G/DL
GLUCOSE BLD-MCNC: 108 MG/DL (ref 70–99)
HCT VFR BLD CALC: 41.2 % (ref 36–48)
HDLC SERPL-MCNC: 57 MG/DL (ref 40–60)
HEMOGLOBIN: 13.3 G/DL (ref 12–16)
IRON SATURATION: 13 % (ref 15–50)
IRON: 50 UG/DL (ref 37–145)
LDL CHOLESTEROL CALCULATED: 67 MG/DL
LYMPHOCYTES ABSOLUTE: 2.1 K/UL (ref 1–5.1)
LYMPHOCYTES RELATIVE PERCENT: 38.2 %
MCH RBC QN AUTO: 25.5 PG (ref 26–34)
MCHC RBC AUTO-ENTMCNC: 32.3 G/DL (ref 31–36)
MCV RBC AUTO: 78.9 FL (ref 80–100)
MONOCYTES ABSOLUTE: 0.4 K/UL (ref 0–1.3)
MONOCYTES RELATIVE PERCENT: 8.1 %
NEUTROPHILS ABSOLUTE: 2.5 K/UL (ref 1.7–7.7)
NEUTROPHILS RELATIVE PERCENT: 46.7 %
PDW BLD-RTO: 19.7 % (ref 12.4–15.4)
PLATELET # BLD: 279 K/UL (ref 135–450)
PMV BLD AUTO: 9 FL (ref 5–10.5)
POTASSIUM SERPL-SCNC: 4.6 MMOL/L (ref 3.5–5.1)
RBC # BLD: 5.22 M/UL (ref 4–5.2)
SODIUM BLD-SCNC: 140 MMOL/L (ref 136–145)
T4 FREE: 1.1 NG/DL (ref 0.9–1.8)
TOTAL IRON BINDING CAPACITY: 387 UG/DL (ref 260–445)
TOTAL PROTEIN: 6.6 G/DL (ref 6.4–8.2)
TRIGL SERPL-MCNC: 111 MG/DL (ref 0–150)
TSH SERPL DL<=0.05 MIU/L-ACNC: 3.34 UIU/ML (ref 0.27–4.2)
VLDLC SERPL CALC-MCNC: 22 MG/DL
WBC # BLD: 5.4 K/UL (ref 4–11)

## 2018-05-31 PROCEDURE — 90732 PPSV23 VACC 2 YRS+ SUBQ/IM: CPT | Performed by: NURSE PRACTITIONER

## 2018-05-31 PROCEDURE — 99214 OFFICE O/P EST MOD 30 MIN: CPT | Performed by: NURSE PRACTITIONER

## 2018-05-31 PROCEDURE — 36415 COLL VENOUS BLD VENIPUNCTURE: CPT | Performed by: NURSE PRACTITIONER

## 2018-05-31 PROCEDURE — 90471 IMMUNIZATION ADMIN: CPT | Performed by: NURSE PRACTITIONER

## 2018-05-31 RX ORDER — GLIMEPIRIDE 4 MG/1
TABLET ORAL
Qty: 180 TABLET | Refills: 2 | Status: SHIPPED | OUTPATIENT
Start: 2018-05-31 | End: 2019-06-01 | Stop reason: SDUPTHER

## 2018-05-31 ASSESSMENT — ENCOUNTER SYMPTOMS
SHORTNESS OF BREATH: 0
BACK PAIN: 1

## 2018-06-01 LAB
ESTIMATED AVERAGE GLUCOSE: 185.8 MG/DL
HBA1C MFR BLD: 8.1 %

## 2018-06-28 DIAGNOSIS — M54.50 ACUTE RIGHT-SIDED LOW BACK PAIN WITHOUT SCIATICA: ICD-10-CM

## 2018-06-28 RX ORDER — TIZANIDINE 2 MG/1
TABLET ORAL
Qty: 90 TABLET | Refills: 0 | Status: SHIPPED | OUTPATIENT
Start: 2018-06-28 | End: 2018-09-09 | Stop reason: SDUPTHER

## 2018-08-21 ENCOUNTER — TELEPHONE (OUTPATIENT)
Dept: FAMILY MEDICINE CLINIC | Age: 68
End: 2018-08-21

## 2018-08-23 ENCOUNTER — OFFICE VISIT (OUTPATIENT)
Dept: FAMILY MEDICINE CLINIC | Age: 68
End: 2018-08-23

## 2018-08-23 VITALS
HEART RATE: 78 BPM | DIASTOLIC BLOOD PRESSURE: 70 MMHG | SYSTOLIC BLOOD PRESSURE: 116 MMHG | OXYGEN SATURATION: 99 % | WEIGHT: 220.8 LBS | BODY MASS INDEX: 34.58 KG/M2

## 2018-08-23 DIAGNOSIS — E11.65 TYPE 2 DIABETES MELLITUS WITH HYPERGLYCEMIA, WITHOUT LONG-TERM CURRENT USE OF INSULIN (HCC): ICD-10-CM

## 2018-08-23 DIAGNOSIS — I10 ESSENTIAL HYPERTENSION: ICD-10-CM

## 2018-08-23 DIAGNOSIS — Z13.31 POSITIVE DEPRESSION SCREENING: ICD-10-CM

## 2018-08-23 DIAGNOSIS — Z91.81 AT HIGH RISK FOR FALLS: ICD-10-CM

## 2018-08-23 PROCEDURE — G0444 DEPRESSION SCREEN ANNUAL: HCPCS | Performed by: FAMILY MEDICINE

## 2018-08-23 PROCEDURE — G8431 POS CLIN DEPRES SCRN F/U DOC: HCPCS | Performed by: FAMILY MEDICINE

## 2018-08-23 PROCEDURE — 99214 OFFICE O/P EST MOD 30 MIN: CPT | Performed by: FAMILY MEDICINE

## 2018-08-23 ASSESSMENT — PATIENT HEALTH QUESTIONNAIRE - PHQ9
1. LITTLE INTEREST OR PLEASURE IN DOING THINGS: 3
6. FEELING BAD ABOUT YOURSELF - OR THAT YOU ARE A FAILURE OR HAVE LET YOURSELF OR YOUR FAMILY DOWN: 0
9. THOUGHTS THAT YOU WOULD BE BETTER OFF DEAD, OR OF HURTING YOURSELF: 0
SUM OF ALL RESPONSES TO PHQ9 QUESTIONS 1 & 2: 6
SUM OF ALL RESPONSES TO PHQ QUESTIONS 1-9: 16
4. FEELING TIRED OR HAVING LITTLE ENERGY: 3
3. TROUBLE FALLING OR STAYING ASLEEP: 2
8. MOVING OR SPEAKING SO SLOWLY THAT OTHER PEOPLE COULD HAVE NOTICED. OR THE OPPOSITE, BEING SO FIGETY OR RESTLESS THAT YOU HAVE BEEN MOVING AROUND A LOT MORE THAN USUAL: 0
5. POOR APPETITE OR OVEREATING: 2
SUM OF ALL RESPONSES TO PHQ QUESTIONS 1-9: 16
10. IF YOU CHECKED OFF ANY PROBLEMS, HOW DIFFICULT HAVE THESE PROBLEMS MADE IT FOR YOU TO DO YOUR WORK, TAKE CARE OF THINGS AT HOME, OR GET ALONG WITH OTHER PEOPLE: 2
2. FEELING DOWN, DEPRESSED OR HOPELESS: 3
7. TROUBLE CONCENTRATING ON THINGS, SUCH AS READING THE NEWSPAPER OR WATCHING TELEVISION: 3

## 2018-08-30 ENCOUNTER — TELEPHONE (OUTPATIENT)
Dept: FAMILY MEDICINE CLINIC | Age: 68
End: 2018-08-30

## 2018-09-02 RX ORDER — FUROSEMIDE 40 MG/1
40 TABLET ORAL DAILY
Qty: 270 TABLET | Refills: 1 | Status: SHIPPED | OUTPATIENT
Start: 2018-09-02 | End: 2019-08-07 | Stop reason: SDUPTHER

## 2018-09-02 NOTE — PROGRESS NOTES
(MYSOLINE) 50 MG tablet TAKE 2 TABLETS EVERY NIGHT Yes Louisa Vasques MD   metoprolol tartrate (LOPRESSOR) 25 MG tablet TAKE 1 TABLET TWICE DAILY Yes Louisa Vasques MD   lisinopril (PRINIVIL;ZESTRIL) 5 MG tablet TAKE 1 TABLET EVERY DAY Yes Louisa Vasques MD   levothyroxine (SYNTHROID) 75 MCG tablet TAKE 1 TABLET EVERY DAY Yes Louisa Vasques MD   furosemide (LASIX) 40 MG tablet Take 1 tablet by mouth daily  Patient taking differently: Take 40 mg by mouth daily prn Yes Louisa Vasques MD   buPROPion (WELLBUTRIN XL) 150 MG extended release tablet TAKE 1 TABLET EVERY MORNING Yes Louisa Vasques MD   atorvastatin (LIPITOR) 40 MG tablet TAKE 1 TABLET EVERY DAY Yes Louisa Vasques MD   magnesium 30 MG tablet Take 30 mg by mouth daily Yes Historical Provider, MD   NONFORMULARY 1 tablet daily Digestive advantage 2 Yes Historical Provider, MD   glucose blood VI test strips (ONE TOUCH ULTRA TEST) strip Test blood sugar 2 times a day      One touch Glucometer #1     For BS testing Yes Louisa Vasques MD   ONE TOUCH ULTRASOFT LANCETS MISC Test blood sugar 2 times a day Yes Louisa Vasques MD   aspirin 325 MG tablet Take 1 tablet by mouth daily. Yes Jordana Carrillo, DO   Blood Glucose Monitoring Suppl (MM EASY TOUCH GLUCOSE METER) w/Device KIT 1 kit by Does not apply route 2 times daily  Lynnette Hawk, APRN - CNP        Social History   Substance Use Topics    Smoking status: Former Smoker     Packs/day: 0.25     Years: 2.00     Types: Cigarettes     Quit date: 4/14/1991    Smokeless tobacco: Never Used    Alcohol use No        Vitals:    08/23/18 1358   BP: 116/70   Pulse: 78   SpO2: 99%   Weight: 220 lb 12.8 oz (100.2 kg)     Estimated body mass index is 34.58 kg/m² as calculated from the following:    Height as of 10/20/17: 5' 7\" (1.702 m). Weight as of this encounter: 220 lb 12.8 oz (100.2 kg). Physical Exam   Constitutional: She is oriented to person, place, and time.  She appears well-developed and well-nourished. HENT:   Head: Normocephalic and atraumatic. Right Ear: External ear normal.   Left Ear: External ear normal.   Nose: Nose normal.   Mouth/Throat: Oropharynx is clear and moist.   Eyes: Pupils are equal, round, and reactive to light. Conjunctivae are normal.   Neck: Normal range of motion. Neck supple. Cardiovascular: Normal rate, regular rhythm, normal heart sounds and intact distal pulses. Pulmonary/Chest: Effort normal and breath sounds normal.   Abdominal: Soft. Bowel sounds are normal.   Musculoskeletal: Normal range of motion. Neurological: She is alert and oriented to person, place, and time. She has normal reflexes. Skin: Skin is dry. Psychiatric: She has a normal mood and affect. Her behavior is normal. Judgment and thought content normal.   Nursing note and vitals reviewed. ASSESSMENT/PLAN:  1. Essential hypertension    - furosemide (LASIX) 40 MG tablet; Take 1 tablet by mouth daily prn  Dispense: 270 tablet; Refill: 1    2. Type 2 diabetes mellitus with hyperglycemia, without long-term current use of insulin ([facility])    - Insulin Pen Needle 31G X 8 MM MISC; 1 each by Does not apply route daily  Dispense: 100 each; Refill: 3      No Follow-up on file. An electronic signature was used to authenticate this note. --Deborah Del Angel MD on 9/2/2018 at 10:54 AM      On the basis of positive PHQ-9 screening (PHQ-9 Total Score: 16), the following plan was implemented: patient declines further evaluation/treatment for depression. Patient will follow-up in 1 month(s) with PCP. On the basis of positive falls risk screening, assessment and plan is as follows: home safety tips provided.

## 2018-09-09 DIAGNOSIS — M54.50 ACUTE RIGHT-SIDED LOW BACK PAIN WITHOUT SCIATICA: ICD-10-CM

## 2018-09-10 RX ORDER — TIZANIDINE 2 MG/1
TABLET ORAL
Qty: 90 TABLET | Refills: 0 | Status: SHIPPED | OUTPATIENT
Start: 2018-09-10 | End: 2018-11-23 | Stop reason: SDUPTHER

## 2018-10-08 DIAGNOSIS — R25.1 OCCASIONAL TREMORS: ICD-10-CM

## 2018-10-08 RX ORDER — PRIMIDONE 50 MG/1
TABLET ORAL
Qty: 180 TABLET | Refills: 2 | Status: SHIPPED | OUTPATIENT
Start: 2018-10-08 | End: 2018-12-11 | Stop reason: SDUPTHER

## 2018-10-17 ENCOUNTER — HOSPITAL ENCOUNTER (OUTPATIENT)
Dept: OCCUPATIONAL THERAPY | Age: 68
Setting detail: THERAPIES SERIES
Discharge: HOME | End: 2018-10-17
Payer: COMMERCIAL

## 2018-10-17 ENCOUNTER — OFFICE VISIT (OUTPATIENT)
Dept: ORTHOPEDIC SURGERY | Age: 68
End: 2018-10-17
Payer: COMMERCIAL

## 2018-10-17 VITALS — HEIGHT: 67 IN | WEIGHT: 220 LBS | BODY MASS INDEX: 34.53 KG/M2

## 2018-10-17 DIAGNOSIS — M19.049 ARTHRITIS OF FINGER: ICD-10-CM

## 2018-10-17 DIAGNOSIS — M79.641 RIGHT HAND PAIN: Primary | ICD-10-CM

## 2018-10-17 PROCEDURE — 99243 OFF/OP CNSLTJ NEW/EST LOW 30: CPT | Performed by: ORTHOPAEDIC SURGERY

## 2018-10-17 PROCEDURE — L3933 FO W/O JOINTS CF: HCPCS | Performed by: OCCUPATIONAL THERAPIST

## 2018-10-17 NOTE — PROGRESS NOTES
DAILY 180 tablet 2    lisinopril (PRINIVIL;ZESTRIL) 5 MG tablet TAKE 1 TABLET EVERY DAY 90 tablet 2    levothyroxine (SYNTHROID) 75 MCG tablet TAKE 1 TABLET EVERY DAY 90 tablet 2    buPROPion (WELLBUTRIN XL) 150 MG extended release tablet TAKE 1 TABLET EVERY MORNING 90 tablet 2    atorvastatin (LIPITOR) 40 MG tablet TAKE 1 TABLET EVERY DAY 90 tablet 2    magnesium 30 MG tablet Take 30 mg by mouth daily      NONFORMULARY 1 tablet daily Digestive advantage 2      glucose blood VI test strips (ONE TOUCH ULTRA TEST) strip Test blood sugar 2 times a day      One touch Glucometer #1     For BS testing 200 strip 1    ONE TOUCH ULTRASOFT LANCETS MISC Test blood sugar 2 times a day 200 each 5    aspirin 325 MG tablet Take 1 tablet by mouth daily. 30 tablet 3     No current facility-administered medications on file prior to visit. Allergies   Allergen Reactions    Codeine     Ultram [Tramadol]      vomiting    Percocet [Oxycodone-Acetaminophen] Nausea And Vomiting     [unfilled]    Family History   Problem Relation Age of Onset    Diabetes Mother     Stroke Mother     Heart Disease Father     Cancer Father         prostate    Heart Disease Brother         multiple stents    Heart Disease Brother     Heart Disease Sister         carotid artery surgery    Heart Disease Brother     Heart Attack Brother     Heart Disease Brother     Asthma Neg Hx     Emphysema Neg Hx     Heart Failure Neg Hx        Physical Exam  Constitutional:  Patient is well-nourished and demonstrates normal hygiene. Mental Status:  Patient is alert and oriented to person, place and time. Skin:  Intact, no rashes or lesions. Hand Examination: She has obvious ulnar deviation deformity of about 10° through the middle finger PIP joint. She lacks 20-30° of extension and only has about 25° of active flexion. The joint is moderately tender to the touch. Neurologic exam shows negative Tinel's and Phalen's.     Vascular exam shows

## 2018-10-17 NOTE — PLAN OF CARE
[facility], [address] [place] [address]  Phone: [phone]  Fax [phone]    Patient: Wanda Merchant   : 1950  MRN: [id number]  Referring Physician:  Dr. 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 Insurance requires the referring physician to review the treatment plan. Please review the attached evaluation and/or summary of the patient's plan of care, and verify that you agree by signing the attached document and sending it back to our office. Plan of Care/Treatment to date:  [x] Fabrication of custom orthosis-  finger orthosis  [x] Instruction on splint use, care and wearing schedule      [] Follow up as needed for splint modifications          Frequency/Duration:  [x] One time visit for splint fabrication and instructions. Follow up as needed for splint modifcations      [] Splint fabricated, patient to return for full evaluation. 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 Thea Grady       Next MD Appointment:PRN    Subjective  History of Injury/ Mechanism of Injury: Progression of pain in swelling in right middle PIP joint.   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

## 2018-10-19 DIAGNOSIS — F32.A ANXIETY AND DEPRESSION: ICD-10-CM

## 2018-10-19 DIAGNOSIS — F41.9 ANXIETY AND DEPRESSION: ICD-10-CM

## 2018-10-19 RX ORDER — DULOXETIN HYDROCHLORIDE 60 MG/1
CAPSULE, DELAYED RELEASE ORAL
Qty: 180 CAPSULE | Refills: 2 | Status: SHIPPED | OUTPATIENT
Start: 2018-10-19 | End: 2019-09-26 | Stop reason: SDUPTHER

## 2018-10-30 DIAGNOSIS — F32.A ANXIETY AND DEPRESSION: ICD-10-CM

## 2018-10-30 DIAGNOSIS — E03.9 ACQUIRED HYPOTHYROIDISM: ICD-10-CM

## 2018-10-30 DIAGNOSIS — F41.9 ANXIETY AND DEPRESSION: ICD-10-CM

## 2018-10-30 DIAGNOSIS — I10 ESSENTIAL HYPERTENSION: ICD-10-CM

## 2018-10-30 RX ORDER — LISINOPRIL 5 MG/1
TABLET ORAL
Qty: 90 TABLET | Refills: 1 | Status: SHIPPED | OUTPATIENT
Start: 2018-10-30 | End: 2019-04-17 | Stop reason: SDUPTHER

## 2018-10-30 RX ORDER — BUPROPION HYDROCHLORIDE 150 MG/1
TABLET ORAL
Qty: 90 TABLET | Refills: 1 | Status: SHIPPED | OUTPATIENT
Start: 2018-10-30 | End: 2019-04-17 | Stop reason: SDUPTHER

## 2018-10-30 RX ORDER — LEVOTHYROXINE SODIUM 0.07 MG/1
TABLET ORAL
Qty: 90 TABLET | Refills: 1 | Status: SHIPPED | OUTPATIENT
Start: 2018-10-30 | End: 2019-04-17 | Stop reason: SDUPTHER

## 2018-11-01 ENCOUNTER — HOSPITAL ENCOUNTER (OUTPATIENT)
Age: 68
Discharge: HOME | End: 2018-11-01
Payer: COMMERCIAL

## 2018-11-01 LAB
BASOPHILS ABSOLUTE: 0.1 K/UL (ref 0–0.2)
BASOPHILS RELATIVE PERCENT: 0.8 %
EOSINOPHILS ABSOLUTE: 0.2 K/UL (ref 0–0.6)
EOSINOPHILS RELATIVE PERCENT: 2.1 %
HCT VFR BLD CALC: 41.3 % (ref 36–48)
HEMOGLOBIN: 13.6 G/DL (ref 12–16)
LYMPHOCYTES ABSOLUTE: 2.7 K/UL (ref 1–5.1)
LYMPHOCYTES RELATIVE PERCENT: 33.5 %
MCH RBC QN AUTO: 27.4 PG (ref 26–34)
MCHC RBC AUTO-ENTMCNC: 33.1 G/DL (ref 31–36)
MCV RBC AUTO: 82.8 FL (ref 80–100)
MONOCYTES ABSOLUTE: 0.5 K/UL (ref 0–1.3)
MONOCYTES RELATIVE PERCENT: 6.3 %
NEUTROPHILS ABSOLUTE: 4.7 K/UL (ref 1.7–7.7)
NEUTROPHILS RELATIVE PERCENT: 57.3 %
PDW BLD-RTO: 13.3 % (ref 12.4–15.4)
PLATELET # BLD: 268 K/UL (ref 135–450)
PMV BLD AUTO: 9.4 FL (ref 5–10.5)
RBC # BLD: 4.99 M/UL (ref 4–5.2)
RHEUMATOID FACTOR: <10 IU/ML
SEDIMENTATION RATE, ERYTHROCYTE: 22 MM/HR (ref 0–30)
URIC ACID, SERUM: 3.8 MG/DL (ref 2.6–6)
WBC # BLD: 8.2 K/UL (ref 4–11)

## 2018-11-01 PROCEDURE — 84550 ASSAY OF BLOOD/URIC ACID: CPT

## 2018-11-01 PROCEDURE — 36415 COLL VENOUS BLD VENIPUNCTURE: CPT

## 2018-11-01 PROCEDURE — 85025 COMPLETE CBC W/AUTO DIFF WBC: CPT

## 2018-11-01 PROCEDURE — 86038 ANTINUCLEAR ANTIBODIES: CPT

## 2018-11-01 PROCEDURE — 85652 RBC SED RATE AUTOMATED: CPT

## 2018-11-01 PROCEDURE — 86039 ANTINUCLEAR ANTIBODIES (ANA): CPT

## 2018-11-01 PROCEDURE — 86431 RHEUMATOID FACTOR QUANT: CPT

## 2018-11-02 LAB
ANA INTERPRETATION: ABNORMAL
ANA PATTERN: ABNORMAL
ANA TITER: ABNORMAL
ANTI-NUCLEAR ANTIBODY (ANA): POSITIVE

## 2018-11-05 ENCOUNTER — TELEPHONE (OUTPATIENT)
Dept: FAMILY MEDICINE CLINIC | Age: 68
End: 2018-11-05

## 2018-11-05 NOTE — TELEPHONE ENCOUNTER
Pt had 2 steroid shots last week and it has caused her BS to go up. Around 2:00 today her reading was 460, after taking glymeperide it came down to 412. Pt increased her toujeo last night from 50 units to 503 units. Pt wants to know if she should increase toujeo again for tonights dose or is there something else she should do. Please call pt.

## 2018-11-23 DIAGNOSIS — M54.50 ACUTE RIGHT-SIDED LOW BACK PAIN WITHOUT SCIATICA: ICD-10-CM

## 2018-11-26 RX ORDER — TIZANIDINE 2 MG/1
TABLET ORAL
Qty: 90 TABLET | Refills: 0 | Status: SHIPPED | OUTPATIENT
Start: 2018-11-26 | End: 2019-02-06 | Stop reason: SDUPTHER

## 2018-11-27 DIAGNOSIS — E11.9 TYPE 2 DIABETES MELLITUS WITHOUT COMPLICATION, WITHOUT LONG-TERM CURRENT USE OF INSULIN (HCC): ICD-10-CM

## 2018-11-27 RX ORDER — METFORMIN HYDROCHLORIDE 500 MG/1
TABLET, EXTENDED RELEASE ORAL
Qty: 180 TABLET | Refills: 0 | Status: SHIPPED | OUTPATIENT
Start: 2018-11-27 | End: 2019-06-01 | Stop reason: SDUPTHER

## 2018-12-11 ENCOUNTER — OFFICE VISIT (OUTPATIENT)
Dept: FAMILY MEDICINE CLINIC | Age: 68
End: 2018-12-11
Payer: COMMERCIAL

## 2018-12-11 VITALS
SYSTOLIC BLOOD PRESSURE: 118 MMHG | DIASTOLIC BLOOD PRESSURE: 70 MMHG | HEART RATE: 90 BPM | BODY MASS INDEX: 35.87 KG/M2 | WEIGHT: 229 LBS | OXYGEN SATURATION: 97 %

## 2018-12-11 DIAGNOSIS — E78.2 MIXED HYPERLIPIDEMIA: Primary | ICD-10-CM

## 2018-12-11 DIAGNOSIS — I50.30 DIASTOLIC HEART FAILURE, UNSPECIFIED HF CHRONICITY (HCC): ICD-10-CM

## 2018-12-11 DIAGNOSIS — R25.1 OCCASIONAL TREMORS: ICD-10-CM

## 2018-12-11 DIAGNOSIS — E03.9 ACQUIRED HYPOTHYROIDISM: ICD-10-CM

## 2018-12-11 DIAGNOSIS — F32.A ANXIETY AND DEPRESSION: ICD-10-CM

## 2018-12-11 DIAGNOSIS — F41.9 ANXIETY AND DEPRESSION: ICD-10-CM

## 2018-12-11 DIAGNOSIS — E11.40 TYPE 2 DIABETES MELLITUS WITH DIABETIC NEUROPATHY, WITHOUT LONG-TERM CURRENT USE OF INSULIN (HCC): ICD-10-CM

## 2018-12-11 DIAGNOSIS — I10 ESSENTIAL HYPERTENSION: ICD-10-CM

## 2018-12-11 LAB
CREATININE URINE: 27.5 MG/DL (ref 28–259)
MICROALBUMIN UR-MCNC: <1.2 MG/DL
MICROALBUMIN/CREAT UR-RTO: ABNORMAL MG/G (ref 0–30)

## 2018-12-11 PROCEDURE — 99214 OFFICE O/P EST MOD 30 MIN: CPT | Performed by: FAMILY MEDICINE

## 2018-12-11 RX ORDER — PRIMIDONE 50 MG/1
TABLET ORAL
Qty: 180 TABLET | Refills: 2 | Status: SHIPPED | OUTPATIENT
Start: 2018-12-11 | End: 2019-06-26

## 2018-12-11 RX ORDER — ATORVASTATIN CALCIUM 40 MG/1
TABLET, FILM COATED ORAL
Qty: 90 TABLET | Refills: 1 | Status: SHIPPED | OUTPATIENT
Start: 2018-12-11 | End: 2019-06-01 | Stop reason: SDUPTHER

## 2018-12-11 ASSESSMENT — ENCOUNTER SYMPTOMS: SHORTNESS OF BREATH: 0

## 2018-12-11 NOTE — PROGRESS NOTES
[address] [place]  -     Hemoglobin A1C    Acquired hypothyroidism  Continue current regimen    Anxiety and depression  Continue current regimen    Essential hypertension-controlled  Continue current regimen    Diastolic heart failure, unspecified HF chronicity ([facility] [address]  She would like to establish care with cardiology previously saw Dr. Dylan Martin but he is since changed to [facility]. -     Adi Cleaning MD, Cardiology, [facility]    Occasional tremors  Advised to take 50 mg in the morning and 100 mg at night and see if she notices any improvement in her tremor  -     primidone (MYSOLINE) 50 MG tablet; Take 1 table in the morning and take take 2 tablets every night. Plan:   See orders and medications filed with this encounter. Return in about 3 months (around 3/11/2019).

## 2018-12-12 LAB
ESTIMATED AVERAGE GLUCOSE: 226 MG/DL
HBA1C MFR BLD: 9.5 %

## 2019-02-06 DIAGNOSIS — M54.50 ACUTE RIGHT-SIDED LOW BACK PAIN WITHOUT SCIATICA: ICD-10-CM

## 2019-02-07 RX ORDER — TIZANIDINE 2 MG/1
TABLET ORAL
Qty: 90 TABLET | Refills: 0 | Status: SHIPPED | OUTPATIENT
Start: 2019-02-07 | End: 2019-04-17 | Stop reason: SDUPTHER

## 2019-02-25 DIAGNOSIS — M85.80 OSTEOPENIA, UNSPECIFIED LOCATION: ICD-10-CM

## 2019-02-25 RX ORDER — ALENDRONATE SODIUM 70 MG/1
TABLET ORAL
Qty: 12 TABLET | Refills: 3 | Status: SHIPPED | OUTPATIENT
Start: 2019-02-25 | End: 2019-11-25 | Stop reason: SDUPTHER

## 2019-03-11 ENCOUNTER — OFFICE VISIT (OUTPATIENT)
Dept: FAMILY MEDICINE CLINIC | Age: 69
End: 2019-03-11
Payer: COMMERCIAL

## 2019-03-11 VITALS
SYSTOLIC BLOOD PRESSURE: 128 MMHG | BODY MASS INDEX: 35.87 KG/M2 | HEART RATE: 87 BPM | WEIGHT: 229 LBS | DIASTOLIC BLOOD PRESSURE: 60 MMHG | OXYGEN SATURATION: 96 % | TEMPERATURE: 97.9 F

## 2019-03-11 DIAGNOSIS — J01.00 ACUTE NON-RECURRENT MAXILLARY SINUSITIS: Primary | ICD-10-CM

## 2019-03-11 PROCEDURE — 99213 OFFICE O/P EST LOW 20 MIN: CPT | Performed by: NURSE PRACTITIONER

## 2019-03-11 RX ORDER — AMOXICILLIN AND CLAVULANATE POTASSIUM 875; 125 MG/1; MG/1
1 TABLET, FILM COATED ORAL 2 TIMES DAILY
Qty: 20 TABLET | Refills: 0 | Status: SHIPPED | OUTPATIENT
Start: 2019-03-11 | End: 2019-03-21

## 2019-03-11 ASSESSMENT — ENCOUNTER SYMPTOMS
SORE THROAT: 0
SINUS PRESSURE: 1
COUGH: 1
WHEEZING: 0
SHORTNESS OF BREATH: 0

## 2019-04-17 DIAGNOSIS — I10 ESSENTIAL HYPERTENSION: ICD-10-CM

## 2019-04-17 DIAGNOSIS — F32.A ANXIETY AND DEPRESSION: ICD-10-CM

## 2019-04-17 DIAGNOSIS — F41.9 ANXIETY AND DEPRESSION: ICD-10-CM

## 2019-04-17 DIAGNOSIS — E03.9 ACQUIRED HYPOTHYROIDISM: ICD-10-CM

## 2019-04-17 DIAGNOSIS — M54.50 ACUTE RIGHT-SIDED LOW BACK PAIN WITHOUT SCIATICA: ICD-10-CM

## 2019-04-17 RX ORDER — TIZANIDINE 2 MG/1
TABLET ORAL
Qty: 90 TABLET | Refills: 0 | Status: SHIPPED | OUTPATIENT
Start: 2019-04-17 | End: 2019-07-29 | Stop reason: SDUPTHER

## 2019-04-17 NOTE — TELEPHONE ENCOUNTER
LOV 03/11/2019  Last refill: Lisinopril 5mg 10/30/2018                  Levothyroxine 75mg 10/30/2018                  BuPropion 150mg 10/30/2018

## 2019-04-18 RX ORDER — LEVOTHYROXINE SODIUM 0.07 MG/1
TABLET ORAL
Qty: 90 TABLET | Refills: 1 | Status: SHIPPED | OUTPATIENT
Start: 2019-04-18 | End: 2019-08-16 | Stop reason: SDUPTHER

## 2019-04-18 RX ORDER — LISINOPRIL 5 MG/1
TABLET ORAL
Qty: 90 TABLET | Refills: 1 | Status: SHIPPED | OUTPATIENT
Start: 2019-04-18 | End: 2019-09-06 | Stop reason: SDUPTHER

## 2019-04-18 RX ORDER — BUPROPION HYDROCHLORIDE 150 MG/1
TABLET ORAL
Qty: 90 TABLET | Refills: 1 | Status: SHIPPED | OUTPATIENT
Start: 2019-04-18 | End: 2019-09-06 | Stop reason: SDUPTHER

## 2019-06-01 DIAGNOSIS — E78.2 MIXED HYPERLIPIDEMIA: ICD-10-CM

## 2019-06-01 DIAGNOSIS — E11.9 TYPE 2 DIABETES MELLITUS WITHOUT COMPLICATION, WITHOUT LONG-TERM CURRENT USE OF INSULIN (HCC): ICD-10-CM

## 2019-06-03 RX ORDER — GLIMEPIRIDE 4 MG/1
TABLET ORAL
Qty: 180 TABLET | Refills: 2 | Status: SHIPPED | OUTPATIENT
Start: 2019-06-03 | End: 2020-01-01 | Stop reason: SDUPTHER

## 2019-06-03 RX ORDER — METFORMIN HYDROCHLORIDE 500 MG/1
TABLET, EXTENDED RELEASE ORAL
Qty: 180 TABLET | Refills: 0 | Status: SHIPPED | OUTPATIENT
Start: 2019-06-03 | End: 2019-09-05 | Stop reason: SDUPTHER

## 2019-06-03 RX ORDER — ATORVASTATIN CALCIUM 40 MG/1
TABLET, FILM COATED ORAL
Qty: 90 TABLET | Refills: 1 | Status: SHIPPED | OUTPATIENT
Start: 2019-06-03 | End: 2019-09-05 | Stop reason: SDUPTHER

## 2019-06-26 ENCOUNTER — OFFICE VISIT (OUTPATIENT)
Dept: FAMILY MEDICINE CLINIC | Age: 69
End: 2019-06-26
Payer: COMMERCIAL

## 2019-06-26 ENCOUNTER — HOSPITAL ENCOUNTER (OUTPATIENT)
Dept: GENERAL RADIOLOGY | Age: 69
Discharge: HOME | End: 2019-06-26
Payer: COMMERCIAL

## 2019-06-26 VITALS
SYSTOLIC BLOOD PRESSURE: 102 MMHG | BODY MASS INDEX: 35.99 KG/M2 | DIASTOLIC BLOOD PRESSURE: 82 MMHG | RESPIRATION RATE: 16 BRPM | WEIGHT: 229.8 LBS | OXYGEN SATURATION: 95 % | HEART RATE: 74 BPM

## 2019-06-26 DIAGNOSIS — E78.2 MIXED HYPERLIPIDEMIA: ICD-10-CM

## 2019-06-26 DIAGNOSIS — E11.9 TYPE 2 DIABETES MELLITUS WITHOUT COMPLICATION, WITHOUT LONG-TERM CURRENT USE OF INSULIN (HCC): ICD-10-CM

## 2019-06-26 DIAGNOSIS — L08.9 SKIN INFECTION: ICD-10-CM

## 2019-06-26 DIAGNOSIS — R07.81 RIB PAIN ON LEFT SIDE: ICD-10-CM

## 2019-06-26 DIAGNOSIS — G25.0 ESSENTIAL TREMOR: ICD-10-CM
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
## 2019-06-26 DIAGNOSIS — Z00.00 ANNUAL PHYSICAL EXAM: Primary | ICD-10-CM

## 2019-06-26 DIAGNOSIS — I10 ESSENTIAL HYPERTENSION: ICD-10-CM

## 2019-06-26 DIAGNOSIS — E03.9 ACQUIRED HYPOTHYROIDISM: ICD-10-CM

## 2019-06-26 LAB
A/G RATIO: 1.7 (ref 1.1–2.2)
ALBUMIN SERPL-MCNC: 4.4 G/DL (ref 3.4–5)
ALP BLD-CCNC: 54 U/L (ref 40–129)
ALT SERPL-CCNC: 22 U/L (ref 10–40)
ANION GAP SERPL CALCULATED.3IONS-SCNC: 16 MMOL/L (ref 3–16)
AST SERPL-CCNC: 16 U/L (ref 15–37)
BILIRUB SERPL-MCNC: <0.2 MG/DL (ref 0–1)
BUN BLDV-MCNC: 19 MG/DL (ref 7–20)
CALCIUM SERPL-MCNC: 9.6 MG/DL (ref 8.3–10.6)
CHLORIDE BLD-SCNC: 104 MMOL/L (ref 99–110)
CHOLESTEROL, TOTAL: 169 MG/DL (ref 0–199)
CO2: 26 MMOL/L (ref 21–32)
CREAT SERPL-MCNC: 0.8 MG/DL (ref 0.6–1.2)
GFR AFRICAN AMERICAN: >60
GFR NON-AFRICAN AMERICAN: >60
GLOBULIN: 2.6 G/DL
GLUCOSE BLD-MCNC: 112 MG/DL (ref 70–99)
HDLC SERPL-MCNC: 73 MG/DL (ref 40–60)
LDL CHOLESTEROL CALCULATED: 72 MG/DL
POTASSIUM SERPL-SCNC: 4.9 MMOL/L (ref 3.5–5.1)
SODIUM BLD-SCNC: 146 MMOL/L (ref 136–145)
TOTAL PROTEIN: 7 G/DL (ref 6.4–8.2)
TRIGL SERPL-MCNC: 118 MG/DL (ref 0–150)
TSH SERPL DL<=0.05 MIU/L-ACNC: 3 UIU/ML (ref 0.27–4.2)
VLDLC SERPL CALC-MCNC: 24 MG/DL

## 2019-06-26 PROCEDURE — 99397 PER PM REEVAL EST PAT 65+ YR: CPT | Performed by: FAMILY MEDICINE

## 2019-06-26 PROCEDURE — 71100 X-RAY EXAM RIBS UNI 2 VIEWS: CPT

## 2019-06-26 PROCEDURE — 99214 OFFICE O/P EST MOD 30 MIN: CPT | Performed by: FAMILY MEDICINE

## 2019-06-26 PROCEDURE — 36415 COLL VENOUS BLD VENIPUNCTURE: CPT | Performed by: FAMILY MEDICINE

## 2019-06-26 RX ORDER — PRIMIDONE 50 MG/1
TABLET ORAL
Qty: 180 TABLET | Refills: 2
Start: 2019-06-26 | End: 2019-09-26 | Stop reason: ALTCHOICE

## 2019-06-26 RX ORDER — SULFAMETHOXAZOLE AND TRIMETHOPRIM 800; 160 MG/1; MG/1
1 TABLET ORAL 2 TIMES DAILY
Qty: 20 TABLET | Refills: 0 | Status: SHIPPED | OUTPATIENT
Start: 2019-06-26 | End: 2019-07-06

## 2019-06-26 ASSESSMENT — PATIENT HEALTH QUESTIONNAIRE - PHQ9
SUM OF ALL RESPONSES TO PHQ QUESTIONS 1-9: 0
SUM OF ALL RESPONSES TO PHQ9 QUESTIONS 1 & 2: 0
SUM OF ALL RESPONSES TO PHQ QUESTIONS 1-9: 0
2. FEELING DOWN, DEPRESSED OR HOPELESS: 0
1. LITTLE INTEREST OR PLEASURE IN DOING THINGS: 0

## 2019-06-26 ASSESSMENT — ENCOUNTER SYMPTOMS: SHORTNESS OF BREATH: 0

## 2019-06-26 NOTE — PATIENT INSTRUCTIONS
Advised to check blood sugars twice per day. Fasting and 2 hours after largest meal and bring readings to next visit. Patient Education        Well Visit, Over 72: Care Instructions  Your Care Instructions    Physical exams can help you stay healthy. Your doctor has checked your overall health and may have suggested ways to take good care of yourself. He or she also may have recommended tests. At home, you can help prevent illness with healthy eating, regular exercise, and other steps. Follow-up care is a key part of your treatment and safety. Be sure to make and go to all appointments, and call your doctor if you are having problems. It's also a good idea to know your test results and keep a list of the medicines you take. How can you care for yourself at home? · Reach and stay at a healthy weight. This will lower your risk for many problems, such as obesity, diabetes, heart disease, and high blood pressure. · Get at least 30 minutes of exercise on most days of the week. Walking is a good choice. You also may want to do other activities, such as running, swimming, cycling, or playing tennis or team sports. · Do not smoke. Smoking can make health problems worse. If you need help quitting, talk to your doctor about stop-smoking programs and medicines. These can increase your chances of quitting for good. · Protect your skin from too much sun. When you're outdoors from 10 a.m. to 4 p.m., stay in the shade or cover up with clothing and a hat with a wide brim. Wear sunglasses that block UV rays. Even when it's cloudy, put broad-spectrum sunscreen (SPF 30 or higher) on any exposed skin. · See a dentist one or two times a year for checkups and to have your teeth cleaned. · Wear a seat belt in the car. · Limit alcohol to 2 drinks a day for men and 1 drink a day for women. Too much alcohol can cause health problems. Follow your doctor's advice about when to have certain tests.  These tests can spot problems early.  For men and women  · Cholesterol. Your doctor will tell you how often to have this done based on your overall health and other things that can increase your risk for heart attack and stroke. · Blood pressure. Have your blood pressure checked during a routine doctor visit. Your doctor will tell you how often to check your blood pressure based on your age, your blood pressure results, and other factors. · Diabetes. Ask your doctor whether you should have tests for diabetes. · Vision. Experts recommend that you have yearly exams for glaucoma and other age-related eye problems. · Hearing. Tell your doctor if you notice any change in your hearing. You can have tests to find out how well you hear. · Colon cancer tests. Keep having colon cancer tests as your doctor recommends. You can have one of several types of tests. · Heart attack and stroke risk. At least every 4 to 6 years, you should have your risk for heart attack and stroke assessed. Your doctor uses factors such as your age, blood pressure, cholesterol, and whether you smoke or have diabetes to show what your risk for a heart attack or stroke is over the next 10 years. · Osteoporosis. Talk to your doctor about whether you should have a bone density test to find out whether you have thinning bones. Also ask your doctor about whether you should take calcium and vitamin D supplements. For women  · Pap test and pelvic exam. You may no longer need a Pap test. Talk with your doctor about whether to stop or continue to have Pap tests. · Breast exam and mammogram. Ask how often you should have a mammogram, which is an X-ray of your breasts. A mammogram can spot breast cancer before it can be felt and when it is easiest to treat. · Thyroid disease. Talk to your doctor about whether to have your thyroid checked as part of a regular physical exam. Women have an increased chance of a thyroid problem.   For men  · Prostate exam. Talk to your doctor about

## 2019-06-26 NOTE — PROGRESS NOTES
Chief Complaint   Patient presents with    Annual Exam         HPI      77 y.o. female presents today for annual exam and follow up of chronic medical conditions. Exercise: no regular exercise but does gardening  Diet: working on improving    Hx DM on metformin 500mg BID. Did not tolerate 1000mg. She is also on Amaryl 4 mg twice and Toujeo 50 units daily at bedtime. Patient states she has not been checking her blood sugars. Last A1c 9.5 12/2018       Hx of HLD reports compliance of medication without side effects     Hx of HTN was compliance of medication without side effects     Hx of osteopenia on Fosamax started in 2016. Reports compliance without medication side effects.     Hx of hypothyroidism reports compliance of medication without side effects. Last TSH 5/2018 within normal limits.     Hx of essential tremor on primidone. Feels like the tremor is getting worse over the years she has been on primidone for the past 15 years. Denies any medication side effects. At last office visit primidone was increased patient did not tolerate the increased dose and currently only takes 100 mg at night.     Hx of fibromyalgia and depression on cymbalta and wellbutrin. Doing well denies SI/HI or medication SE. Patient states she sustained a fall on Sunday 6/23/19. She states that it occurred while trying to go to a seated position on the ground she lost her balance and fell forward on her left side she is since having left rib pain. Patient states initially she had pain with breathing but that has improved. Patient also scraped her left lower leg.       Patient Active Problem List   Diagnosis    CAD (coronary artery disease)    Sleep apnea    Hyperlipidemia    Fatigue    Nonalcoholic steatohepatitis    Rectocele    Diabetes mellitus ([facility] [address]    HTN (hypertension)    Cerebral infarction (HCC)    Right shoulder pain    Insomnia    SUNIL (obstructive sleep apnea)    Coarse tremor     Past Medical History:   Diagnosis Date    Anemia     Anesthesia     PONV    Arthritis     CAD (coronary artery disease)     CVA (cerebral infarction)     Diabetes mellitus (HCC)     Diastolic heart failure (HCC)     Fibromyalgia     Hyperlipidemia     Hypertension     Hypothyroidism     LFT's abnormal     CHAMBERS (nonalcoholic steatohepatitis)     Nausea & vomiting     Pancreatic cyst     Pneumonia     Rectocele     Sleep apnea     does not use c-pap    Tremor     Vitamin D deficiency        Past Surgical History:   Procedure Laterality Date    CHOLECYSTECTOMY      COLONOSCOPY  01/10/2018    DIAGNOSTIC CARDIAC CATH LAB PROCEDURE      ECTOPIC PREGNANCY SURGERY  1985    EYE SURGERY Right     cataract    OTHER SURGICAL HISTORY  11-4-2011    Posterior repair    POSTERIOR CRUCIATE LIGAMENT REPAIR       Most Recent Immunizations   Administered Date(s) Administered    Influenza Virus Vaccine 10/22/2016    Pneumococcal Conjugate 13-valent ([id number]) 10/25/2016    Pneumococcal Polysaccharide ([id number]) 05/31/2018    Tdap (Boostrix, Adacel) 10/25/2016        Current Outpatient Medications   Medication Sig Dispense Refill    primidone (MYSOLINE) 50 MG tablet Take  2 tablets every night.  180 tablet 2    sulfamethoxazole-trimethoprim (BACTRIM DS) 800-160 MG per tablet Take 1 tablet by mouth 2 times daily for 10 days 20 tablet 0    atorvastatin (LIPITOR) 40 MG tablet TAKE 1 TABLET DAILY 90 tablet 1    glimepiride (AMARYL) 4 MG tablet TAKE 1 TABLET TWICE A  tablet 2    metFORMIN (GLUCOPHAGE-XR) 500 MG extended release tablet TAKE 1 TABLET TWICE A  tablet 0    buPROPion (WELLBUTRIN XL) 150 MG extended release tablet TAKE 1 TABLET EVERY MORNING 90 tablet 1    levothyroxine (SYNTHROID) 75 MCG tablet TAKE 1 TABLET DAILY 90 tablet 1    lisinopril (PRINIVIL;ZESTRIL) 5 MG tablet TAKE 1 TABLET DAILY 90 tablet 1    tiZANidine (ZANAFLEX) 2 MG tablet TAKE 1 TABLET NIGHTLY AS NEEDED FOR MUSCLE SPASMS 90 tablet 0    alendronate (FOSAMAX) 70 MG tablet TAKE 1 TABLET EVERY 7 DAYS 12 tablet 3    diclofenac sodium 1 % GEL       blood glucose test strips (ONE TOUCH ULTRA TEST) strip Test blood sugar 2 times a day      One touch Glucometer #1     For BS testing 200 strip 2    metoprolol tartrate (LOPRESSOR) 25 MG tablet TAKE 1 TABLET TWICE DAILY 180 tablet 1    DULoxetine (CYMBALTA) 60 MG extended release capsule TAKE 2 CAPSULES DAILY 180 capsule 2    insulin glargine (TOUJEO SOLOSTAR) 300 UNIT/ML injection pen Inject 50 Units into the skin nightly 15 pen 1    furosemide (LASIX) 40 MG tablet Take 1 tablet by mouth daily prn 270 tablet 1    Insulin Pen Needle 31G X 8 MM MISC 1 each by Does not apply route daily 100 each 3    Blood Glucose Monitoring Suppl (MM EASY TOUCH GLUCOSE METER) w/Device KIT 1 kit by Does not apply route 2 times daily 1 kit 0    vitamin D (CHOLECALCIFEROL) 1000 UNIT TABS tablet Take 4,000 Units by mouth daily      magnesium 30 MG tablet Take 30 mg by mouth daily      NONFORMULARY 1 tablet daily Digestive advantage 2      ONE TOUCH ULTRASOFT LANCETS MISC Test blood sugar 2 times a day 200 each 5    aspirin 325 MG tablet Take 1 tablet by mouth daily. 30 tablet 3    ferrous sulfate (TORI-DOMO) 325 (65 Fe) MG tablet Take 1 tablet by mouth daily (with breakfast) 30 tablet 3    Omega 3-6-9 Fatty Acids (OMEGA 3-6-9 COMPLEX) CAPS Take 1 capsule by mouth daily       No current facility-administered medications for this visit.       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   Social Needs    Financial resource strain: None    Food insecurity: Worry: None     Inability: None    Transportation needs:     Medical: None     Non-medical: None   Tobacco Use    Smoking status: Former Smoker     Packs/day: 0.25     Years: 2.00     Pack years: 0.50     Types: Cigarettes     Last attempt to quit: 1991     Years since quittin.2    Smokeless tobacco: Never Used   Substance and Sexual Activity    Alcohol use: No     Alcohol/week: 0.0 oz    Drug use: No    Sexual activity: Yes     Partners: Male     Comment:    Lifestyle    Physical activity:     Days per week: None     Minutes per session: None    Stress: None   Relationships    Social connections:     Talks on phone: None     Gets together: None     Attends Buddhist service: None     Active member of club or organization: None     Attends meetings of clubs or organizations: None     Relationship status: None    Intimate partner violence:     Fear of current or ex partner: None     Emotionally abused: None     Physically abused: None     Forced sexual activity: None   Other Topics Concern    None   Social History Narrative    None     Family History   Problem Relation Age of Onset    Diabetes Mother     Stroke Mother     Heart Disease Father     Cancer Father         prostate    Heart Disease Brother         multiple stents    Heart Disease Brother     Heart Disease Sister         carotid artery surgery    Heart Disease Brother     Heart Attack Brother     Heart Disease Brother     Asthma Neg Hx     Emphysema Neg Hx     Heart Failure Neg Hx                               Review Of Systems    Review of Systems   Constitutional: Negative for chills and fever. Respiratory: Negative for shortness of breath. Cardiovascular: Negative for chest pain. PHYSICAL EXAMINATION:    /82   Pulse 74   Resp 16   Wt 229 lb 12.8 oz (104.2 kg)   LMP  (LMP Unknown)   SpO2 95%   BMI 35.99 kg/m²      Physical Exam   Constitutional: She is oriented to person, place, and time. tablet by mouth 2 times daily for 10 days    Fall, initial encounter  -     XR RIBS LEFT (2 VIEWS); Future    Rib pain on left side  -     XR RIBS LEFT (2 VIEWS); Future          Plan:   See orders and medications filed with this encounter. The patient is advised to return for routine annual checkups. Encouraged healthy diet, regular exercise. Labs checked per orders. Optho visit q 1-2 years. Watch for skin mole changes. Colonoscopy if over age 48 or if family history was discussed. Vaccines ordered today per orders. Return in about 3 months (around 9/26/2019).

## 2019-06-27 LAB
ESTIMATED AVERAGE GLUCOSE: 180 MG/DL
HBA1C MFR BLD: 7.9 %

## 2019-07-29 DIAGNOSIS — R25.1 OCCASIONAL TREMORS: ICD-10-CM

## 2019-07-29 DIAGNOSIS — M54.50 ACUTE RIGHT-SIDED LOW BACK PAIN WITHOUT SCIATICA: ICD-10-CM

## 2019-07-29 DIAGNOSIS — E11.40 TYPE 2 DIABETES MELLITUS WITH DIABETIC NEUROPATHY, WITHOUT LONG-TERM CURRENT USE OF INSULIN (HCC): ICD-10-CM

## 2019-07-29 DIAGNOSIS — E11.65 TYPE 2 DIABETES MELLITUS WITH HYPERGLYCEMIA, WITHOUT LONG-TERM CURRENT USE OF INSULIN (HCC): ICD-10-CM

## 2019-07-30 RX ORDER — PRIMIDONE 50 MG/1
TABLET ORAL
Qty: 180 TABLET | Refills: 2 | Status: SHIPPED | OUTPATIENT
Start: 2019-07-30 | End: 2019-11-25 | Stop reason: SDUPTHER

## 2019-07-30 RX ORDER — TIZANIDINE 2 MG/1
TABLET ORAL
Qty: 90 TABLET | Refills: 0 | Status: SHIPPED | OUTPATIENT
Start: 2019-07-30 | End: 2019-10-21 | Stop reason: SDUPTHER

## 2019-07-30 RX ORDER — PEN NEEDLE, DIABETIC 31 GX5/16"
NEEDLE, DISPOSABLE MISCELLANEOUS
Qty: 90 EACH | Refills: 3 | Status: SHIPPED | OUTPATIENT
Start: 2019-07-30 | End: 2019-09-05 | Stop reason: SDUPTHER

## 2019-08-07 DIAGNOSIS — I10 ESSENTIAL HYPERTENSION: ICD-10-CM

## 2019-08-07 RX ORDER — FUROSEMIDE 40 MG/1
40 TABLET ORAL DAILY
Qty: 270 TABLET | Refills: 1 | Status: SHIPPED | OUTPATIENT
Start: 2019-08-07 | End: 2020-01-01 | Stop reason: ALTCHOICE

## 2019-08-16 DIAGNOSIS — E03.9 ACQUIRED HYPOTHYROIDISM: ICD-10-CM

## 2019-08-16 RX ORDER — LEVOTHYROXINE SODIUM 0.07 MG/1
TABLET ORAL
Qty: 90 TABLET | Refills: 1 | Status: SHIPPED | OUTPATIENT
Start: 2019-08-16 | End: 2019-12-03 | Stop reason: SDUPTHER

## 2019-09-05 ENCOUNTER — TELEPHONE (OUTPATIENT)
Dept: FAMILY MEDICINE CLINIC | Age: 69
End: 2019-09-05

## 2019-09-05 DIAGNOSIS — I10 ESSENTIAL HYPERTENSION: ICD-10-CM

## 2019-09-05 DIAGNOSIS — E11.65 TYPE 2 DIABETES MELLITUS WITH HYPERGLYCEMIA, WITHOUT LONG-TERM CURRENT USE OF INSULIN (HCC): ICD-10-CM

## 2019-09-05 DIAGNOSIS — E78.2 MIXED HYPERLIPIDEMIA: ICD-10-CM

## 2019-09-05 DIAGNOSIS — E11.9 TYPE 2 DIABETES MELLITUS WITHOUT COMPLICATION, WITHOUT LONG-TERM CURRENT USE OF INSULIN (HCC): ICD-10-CM

## 2019-09-05 RX ORDER — ATORVASTATIN CALCIUM 40 MG/1
TABLET, FILM COATED ORAL
Qty: 90 TABLET | Refills: 1 | Status: SHIPPED | OUTPATIENT
Start: 2019-09-05 | End: 2020-01-07 | Stop reason: SDUPTHER

## 2019-09-05 RX ORDER — METFORMIN HYDROCHLORIDE 500 MG/1
TABLET, EXTENDED RELEASE ORAL
Qty: 180 TABLET | Refills: 1 | Status: SHIPPED | OUTPATIENT
Start: 2019-09-05 | End: 2020-01-10

## 2019-09-05 NOTE — TELEPHONE ENCOUNTER
Pt requesting Refills. Send To:  [address] [place] [address] [facility] [phone] Poppy Cruz [phone]    Last Seen 6/26/19    B-D ULTRAFINE III SHORT PEN 31G X 8 MM MISC   metoprolol tartrate (LOPRESSOR) 25 MG tablet   metFORMIN (GLUCOPHAGE-XR) 500 MG extended release tablet

## 2019-09-06 DIAGNOSIS — F41.9 ANXIETY AND DEPRESSION: ICD-10-CM

## 2019-09-06 DIAGNOSIS — F32.A ANXIETY AND DEPRESSION: ICD-10-CM

## 2019-09-06 DIAGNOSIS — I10 ESSENTIAL HYPERTENSION: ICD-10-CM

## 2019-09-06 RX ORDER — BUPROPION HYDROCHLORIDE 150 MG/1
TABLET ORAL
Qty: 90 TABLET | Refills: 1 | Status: SHIPPED | OUTPATIENT
Start: 2019-09-06 | End: 2020-01-07 | Stop reason: SDUPTHER

## 2019-09-06 RX ORDER — LISINOPRIL 5 MG/1
TABLET ORAL
Qty: 90 TABLET | Refills: 1 | Status: SHIPPED | OUTPATIENT
Start: 2019-09-06 | End: 2020-03-04 | Stop reason: SDUPTHER

## 2019-09-26 ENCOUNTER — OFFICE VISIT (OUTPATIENT)
Dept: FAMILY MEDICINE CLINIC | Age: 69
End: 2019-09-26
Payer: COMMERCIAL

## 2019-09-26 VITALS
SYSTOLIC BLOOD PRESSURE: 118 MMHG | BODY MASS INDEX: 34.93 KG/M2 | HEART RATE: 67 BPM | TEMPERATURE: 98.4 F | WEIGHT: 223 LBS | DIASTOLIC BLOOD PRESSURE: 64 MMHG | OXYGEN SATURATION: 97 %

## 2019-09-26 DIAGNOSIS — E11.65 TYPE 2 DIABETES MELLITUS WITH HYPERGLYCEMIA, WITHOUT LONG-TERM CURRENT USE OF INSULIN (HCC): Primary | ICD-10-CM

## 2019-09-26 DIAGNOSIS — F32.A ANXIETY AND DEPRESSION: ICD-10-CM

## 2019-09-26 DIAGNOSIS — J01.11 ACUTE RECURRENT FRONTAL SINUSITIS: ICD-10-CM

## 2019-09-26 DIAGNOSIS — F41.9 ANXIETY AND DEPRESSION: ICD-10-CM

## 2019-09-26 LAB — HBA1C MFR BLD: 10.2 %

## 2019-09-26 PROCEDURE — 83036 HEMOGLOBIN GLYCOSYLATED A1C: CPT | Performed by: NURSE PRACTITIONER

## 2019-09-26 PROCEDURE — 99214 OFFICE O/P EST MOD 30 MIN: CPT | Performed by: NURSE PRACTITIONER

## 2019-09-26 RX ORDER — DULOXETIN HYDROCHLORIDE 60 MG/1
CAPSULE, DELAYED RELEASE ORAL
Qty: 90 CAPSULE | Refills: 2 | Status: SHIPPED | OUTPATIENT
Start: 2019-09-26 | End: 2020-03-17

## 2019-09-26 RX ORDER — AMOXICILLIN AND CLAVULANATE POTASSIUM 875; 125 MG/1; MG/1
1 TABLET, FILM COATED ORAL 2 TIMES DAILY
Qty: 20 TABLET | Refills: 0 | Status: SHIPPED | OUTPATIENT
Start: 2019-09-26 | End: 2019-10-06

## 2019-09-26 ASSESSMENT — ENCOUNTER SYMPTOMS
SINUS PRESSURE: 1
SINUS PAIN: 1

## 2019-09-30 ENCOUNTER — TELEPHONE (OUTPATIENT)
Dept: FAMILY MEDICINE CLINIC | Age: 69
End: 2019-09-30

## 2019-09-30 RX ORDER — ONDANSETRON 4 MG/1
4 TABLET, FILM COATED ORAL DAILY PRN
Qty: 30 TABLET | Refills: 0 | Status: SHIPPED | OUTPATIENT
Start: 2019-09-30 | End: 2021-01-01

## 2019-10-21 ENCOUNTER — OFFICE VISIT (OUTPATIENT)
Dept: FAMILY MEDICINE CLINIC | Age: 69
End: 2019-10-21
Payer: COMMERCIAL

## 2019-10-21 VITALS
DIASTOLIC BLOOD PRESSURE: 84 MMHG | OXYGEN SATURATION: 98 % | HEART RATE: 84 BPM | SYSTOLIC BLOOD PRESSURE: 138 MMHG | WEIGHT: 223.6 LBS | HEIGHT: 67 IN | TEMPERATURE: 98.5 F | BODY MASS INDEX: 35.09 KG/M2

## 2019-10-21 DIAGNOSIS — M54.50 ACUTE RIGHT-SIDED LOW BACK PAIN WITHOUT SCIATICA: ICD-10-CM

## 2019-10-21 DIAGNOSIS — E11.65 TYPE 2 DIABETES MELLITUS WITH HYPERGLYCEMIA, WITHOUT LONG-TERM CURRENT USE OF INSULIN (HCC): Primary | ICD-10-CM

## 2019-10-21 DIAGNOSIS — Z12.39 BREAST CANCER SCREENING: ICD-10-CM

## 2019-10-21 DIAGNOSIS — F41.9 ANXIETY AND DEPRESSION: ICD-10-CM

## 2019-10-21 DIAGNOSIS — Z91.81 AT HIGH RISK FOR FALLS: ICD-10-CM

## 2019-10-21 DIAGNOSIS — F32.A ANXIETY AND DEPRESSION: ICD-10-CM

## 2019-10-21 DIAGNOSIS — Z23 NEED FOR VACCINATION: ICD-10-CM

## 2019-10-21 DIAGNOSIS — E03.9 ACQUIRED HYPOTHYROIDISM: ICD-10-CM

## 2019-10-21 PROCEDURE — 99214 OFFICE O/P EST MOD 30 MIN: CPT | Performed by: FAMILY MEDICINE

## 2019-10-21 PROCEDURE — 90471 IMMUNIZATION ADMIN: CPT | Performed by: FAMILY MEDICINE

## 2019-10-21 PROCEDURE — 90653 IIV ADJUVANT VACCINE IM: CPT | Performed by: FAMILY MEDICINE

## 2019-10-21 RX ORDER — TIZANIDINE 2 MG/1
TABLET ORAL
Qty: 90 TABLET | Refills: 1 | Status: SHIPPED | OUTPATIENT
Start: 2019-10-21 | End: 2020-01-23

## 2019-10-22 ENCOUNTER — CARE COORDINATION (OUTPATIENT)
Dept: CARE COORDINATION | Age: 69
End: 2019-10-22

## 2019-10-22 LAB — TSH SERPL DL<=0.05 MIU/L-ACNC: 2.86 UIU/ML (ref 0.27–4.2)

## 2019-10-25 ENCOUNTER — TELEPHONE (OUTPATIENT)
Dept: FAMILY MEDICINE CLINIC | Age: 69
End: 2019-10-25

## 2019-10-25 NOTE — TELEPHONE ENCOUNTER
Patient is requesting a referral for a Neurologist for her tremors. Patient stopped at [facility] to  [facility] paperwork. There were no form at . Please call patient when forms are ready to be picked up.

## 2019-10-28 NOTE — TELEPHONE ENCOUNTER
Referral placed.   [id number] [facility] - [facility], [address], [address] [facility], [address] [facility]  Phone- [phone]    Form will be ready for pickup today after 4pm.

## 2019-10-29 ENCOUNTER — CARE COORDINATION (OUTPATIENT)
Dept: CARE COORDINATION | Age: 69
End: 2019-10-29

## 2019-11-05 ENCOUNTER — CARE COORDINATION (OUTPATIENT)
Dept: CARE COORDINATION | Age: 69
End: 2019-11-05

## 2019-11-06 ENCOUNTER — OFFICE VISIT (OUTPATIENT)
Dept: CARDIOLOGY CLINIC | Age: 69
End: 2019-11-06
Payer: COMMERCIAL

## 2019-11-06 ENCOUNTER — TELEPHONE (OUTPATIENT)
Dept: CARDIOLOGY CLINIC | Age: 69
End: 2019-11-06

## 2019-11-06 ENCOUNTER — TELEPHONE (OUTPATIENT)
Dept: FAMILY MEDICINE CLINIC | Age: 69
End: 2019-11-06

## 2019-11-06 ENCOUNTER — HOSPITAL ENCOUNTER (OUTPATIENT)
Age: 69
Discharge: HOME | End: 2019-11-06
Payer: COMMERCIAL

## 2019-11-06 VITALS
HEIGHT: 67 IN | DIASTOLIC BLOOD PRESSURE: 78 MMHG | RESPIRATION RATE: 16 BRPM | WEIGHT: 223.6 LBS | OXYGEN SATURATION: 95 % | HEART RATE: 84 BPM | SYSTOLIC BLOOD PRESSURE: 114 MMHG | BODY MASS INDEX: 35.09 KG/M2

## 2019-11-06 DIAGNOSIS — R00.2 PALPITATIONS: ICD-10-CM

## 2019-11-06 DIAGNOSIS — R53.83 FATIGUE, UNSPECIFIED TYPE: ICD-10-CM

## 2019-11-06 DIAGNOSIS — R42 DIZZINESS: ICD-10-CM

## 2019-11-06 DIAGNOSIS — I25.10 CORONARY ARTERY DISEASE INVOLVING NATIVE CORONARY ARTERY OF NATIVE HEART WITHOUT ANGINA PECTORIS: Primary | ICD-10-CM

## 2019-11-06 DIAGNOSIS — I25.10 CORONARY ARTERY DISEASE INVOLVING NATIVE CORONARY ARTERY OF NATIVE HEART WITHOUT ANGINA PECTORIS: ICD-10-CM

## 2019-11-06 DIAGNOSIS — Z86.73 HISTORY OF CVA (CEREBROVASCULAR ACCIDENT): ICD-10-CM

## 2019-11-06 LAB
C-REACTIVE PROTEIN: 3 MG/L (ref 0–5.1)
SEDIMENTATION RATE, ERYTHROCYTE: 22 MM/HR (ref 0–30)

## 2019-11-06 PROCEDURE — 85652 RBC SED RATE AUTOMATED: CPT

## 2019-11-06 PROCEDURE — 86038 ANTINUCLEAR ANTIBODIES: CPT

## 2019-11-06 PROCEDURE — 93000 ELECTROCARDIOGRAM COMPLETE: CPT | Performed by: INTERNAL MEDICINE

## 2019-11-06 PROCEDURE — 86140 C-REACTIVE PROTEIN: CPT

## 2019-11-06 PROCEDURE — 86255 FLUORESCENT ANTIBODY SCREEN: CPT

## 2019-11-06 PROCEDURE — 99204 OFFICE O/P NEW MOD 45 MIN: CPT | Performed by: INTERNAL MEDICINE

## 2019-11-06 PROCEDURE — 36415 COLL VENOUS BLD VENIPUNCTURE: CPT

## 2019-11-07 LAB — ANTI-NUCLEAR ANTIBODY (ANA): NEGATIVE

## 2019-11-08 ENCOUNTER — CARE COORDINATION (OUTPATIENT)
Dept: CARE COORDINATION | Age: 69
End: 2019-11-08

## 2019-11-08 ENCOUNTER — TELEPHONE (OUTPATIENT)
Dept: CARDIOLOGY CLINIC | Age: 69
End: 2019-11-08

## 2019-11-09 LAB — ANCA IFA: NORMAL

## 2019-11-11 ENCOUNTER — CARE COORDINATION (OUTPATIENT)
Dept: CARE COORDINATION | Age: 69
End: 2019-11-11

## 2019-11-12 ENCOUNTER — CARE COORDINATION (OUTPATIENT)
Dept: CARE COORDINATION | Age: 69
End: 2019-11-12

## 2019-11-13 ENCOUNTER — INITIAL CONSULT (OUTPATIENT)
Dept: NEUROLOGY | Age: 69
End: 2019-11-13
Payer: COMMERCIAL

## 2019-11-13 ENCOUNTER — TELEPHONE (OUTPATIENT)
Dept: CARDIOLOGY CLINIC | Age: 69
End: 2019-11-13

## 2019-11-13 VITALS
HEART RATE: 78 BPM | SYSTOLIC BLOOD PRESSURE: 141 MMHG | WEIGHT: 226 LBS | BODY MASS INDEX: 35.47 KG/M2 | DIASTOLIC BLOOD PRESSURE: 87 MMHG | OXYGEN SATURATION: 94 % | HEIGHT: 67 IN

## 2019-11-13 DIAGNOSIS — G25.0 ESSENTIAL TREMOR: Primary | ICD-10-CM

## 2019-11-13 DIAGNOSIS — I10 HTN (HYPERTENSION), BENIGN: ICD-10-CM

## 2019-11-13 DIAGNOSIS — E78.5 DYSLIPIDEMIA: ICD-10-CM

## 2019-11-13 PROCEDURE — 99204 OFFICE O/P NEW MOD 45 MIN: CPT | Performed by: PSYCHIATRY & NEUROLOGY

## 2019-11-14 ENCOUNTER — CARE COORDINATION (OUTPATIENT)
Dept: CARE COORDINATION | Age: 69
End: 2019-11-14

## 2019-11-18 ENCOUNTER — HOSPITAL ENCOUNTER (OUTPATIENT)
Dept: MRI IMAGING | Age: 69
Discharge: HOME | End: 2019-11-18
Payer: COMMERCIAL

## 2019-11-18 DIAGNOSIS — K86.2 CYST AND PSEUDOCYST OF PANCREAS: ICD-10-CM

## 2019-11-18 DIAGNOSIS — K86.3 CYST AND PSEUDOCYST OF PANCREAS: ICD-10-CM

## 2019-11-18 PROCEDURE — A9579 GAD-BASE MR CONTRAST NOS,1ML: HCPCS | Performed by: INTERNAL MEDICINE

## 2019-11-18 PROCEDURE — 6360000004 HC RX CONTRAST MEDICATION: Performed by: INTERNAL MEDICINE

## 2019-11-18 PROCEDURE — 74183 MRI ABD W/O CNTR FLWD CNTR: CPT

## 2019-11-18 RX ADMIN — GADOTERIDOL 20 ML: 279.3 INJECTION, SOLUTION INTRAVENOUS at 15:08

## 2019-11-20 ENCOUNTER — HOSPITAL ENCOUNTER (OUTPATIENT)
Dept: NON INVASIVE DIAGNOSTICS | Age: 69
Discharge: HOME | End: 2019-11-20
Payer: COMMERCIAL

## 2019-11-20 DIAGNOSIS — I25.10 CORONARY ARTERY DISEASE INVOLVING NATIVE CORONARY ARTERY OF NATIVE HEART WITHOUT ANGINA PECTORIS: ICD-10-CM

## 2019-11-20 DIAGNOSIS — R00.2 PALPITATIONS: ICD-10-CM

## 2019-11-20 DIAGNOSIS — R42 DIZZINESS: ICD-10-CM

## 2019-11-20 DIAGNOSIS — R53.83 FATIGUE, UNSPECIFIED TYPE: ICD-10-CM

## 2019-11-20 LAB
LV EF: 55 %
LVEF MODALITY: NORMAL

## 2019-11-20 PROCEDURE — C8929 TTE W OR WO FOL WCON,DOPPLER: HCPCS

## 2019-11-20 PROCEDURE — 6360000004 HC RX CONTRAST MEDICATION: Performed by: FAMILY MEDICINE

## 2019-11-20 RX ADMIN — PERFLUTREN 2.2 MG: 6.52 INJECTION, SUSPENSION INTRAVENOUS at 11:06

## 2019-11-21 ENCOUNTER — HOSPITAL ENCOUNTER (OUTPATIENT)
Dept: WOMENS IMAGING | Age: 69
Discharge: HOME | End: 2019-11-21
Payer: COMMERCIAL

## 2019-11-21 DIAGNOSIS — Z12.39 BREAST CANCER SCREENING: ICD-10-CM

## 2019-11-21 PROCEDURE — 77067 SCR MAMMO BI INCL CAD: CPT

## 2019-11-22 ENCOUNTER — CARE COORDINATION (OUTPATIENT)
Dept: CARE COORDINATION | Age: 69
End: 2019-11-22

## 2019-11-25 DIAGNOSIS — M85.80 OSTEOPENIA, UNSPECIFIED LOCATION: ICD-10-CM

## 2019-11-25 DIAGNOSIS — R25.1 OCCASIONAL TREMORS: ICD-10-CM

## 2019-11-25 RX ORDER — ALENDRONATE SODIUM 70 MG/1
TABLET ORAL
Qty: 12 TABLET | Refills: 3 | Status: SHIPPED | OUTPATIENT
Start: 2019-11-25 | End: 2020-01-01 | Stop reason: SDUPTHER

## 2019-11-25 RX ORDER — PRIMIDONE 50 MG/1
150 TABLET ORAL NIGHTLY
Qty: 90 TABLET | Refills: 1 | Status: SHIPPED | OUTPATIENT
Start: 2019-11-25 | End: 2020-01-23

## 2019-11-27 ENCOUNTER — OFFICE VISIT (OUTPATIENT)
Dept: FAMILY MEDICINE CLINIC | Age: 69
End: 2019-11-27
Payer: COMMERCIAL

## 2019-11-27 VITALS
HEART RATE: 78 BPM | WEIGHT: 217.8 LBS | DIASTOLIC BLOOD PRESSURE: 70 MMHG | OXYGEN SATURATION: 96 % | SYSTOLIC BLOOD PRESSURE: 112 MMHG | BODY MASS INDEX: 34.11 KG/M2 | RESPIRATION RATE: 16 BRPM

## 2019-11-27 DIAGNOSIS — E11.65 TYPE 2 DIABETES MELLITUS WITH HYPERGLYCEMIA, WITHOUT LONG-TERM CURRENT USE OF INSULIN (HCC): ICD-10-CM

## 2019-11-27 DIAGNOSIS — F32.A ANXIETY AND DEPRESSION: Primary | ICD-10-CM

## 2019-11-27 DIAGNOSIS — F41.9 ANXIETY AND DEPRESSION: Primary | ICD-10-CM

## 2019-11-27 PROCEDURE — 99213 OFFICE O/P EST LOW 20 MIN: CPT | Performed by: FAMILY MEDICINE

## 2019-11-27 RX ORDER — DULOXETIN HYDROCHLORIDE 30 MG/1
30 CAPSULE, DELAYED RELEASE ORAL DAILY
Qty: 90 CAPSULE | Refills: 0 | Status: SHIPPED | OUTPATIENT
Start: 2019-11-27 | End: 2020-04-16

## 2019-11-29 ENCOUNTER — CARE COORDINATION (OUTPATIENT)
Dept: CARE COORDINATION | Age: 69
End: 2019-11-29

## 2019-12-03 ENCOUNTER — TELEPHONE (OUTPATIENT)
Dept: FAMILY MEDICINE CLINIC | Age: 69
End: 2019-12-03

## 2019-12-03 RX ORDER — LEVOTHYROXINE SODIUM 0.07 MG/1
TABLET ORAL
Qty: 90 TABLET | Refills: 1 | Status: SHIPPED | OUTPATIENT
Start: 2019-12-03 | End: 2020-03-04

## 2019-12-03 NOTE — TELEPHONE ENCOUNTER
Pt requesting Refill. Send To:   [address] [facility] [phone] Samuel Amos [phone]    Last Seen  11/27/19  levothyroxine (SYNTHROID) 75 MCG tablet

## 2019-12-11 PROCEDURE — 93272 ECG/REVIEW INTERPRET ONLY: CPT | Performed by: INTERNAL MEDICINE

## 2019-12-12 ENCOUNTER — TELEPHONE (OUTPATIENT)
Dept: CARDIOLOGY CLINIC | Age: 69
End: 2019-12-12

## 2019-12-15 DIAGNOSIS — I10 ESSENTIAL HYPERTENSION: ICD-10-CM

## 2019-12-20 ENCOUNTER — CARE COORDINATION (OUTPATIENT)
Dept: CARE COORDINATION | Age: 69
End: 2019-12-20

## 2019-12-30 LAB
CATARACTS: POSITIVE
DIABETIC RETINOPATHY: NEGATIVE
GLAUCOMA: NEGATIVE
INTRAOCULAR PRESSURE EYE: NORMAL
VISUAL ACUITY DISTANCE LEFT EYE: NORMAL
VISUAL ACUITY DISTANCE RIGHT EYE: NORMAL

## 2020-01-01 ENCOUNTER — TELEPHONE (OUTPATIENT)
Dept: FAMILY MEDICINE CLINIC | Age: 70
End: 2020-01-01

## 2020-01-01 ENCOUNTER — OFFICE VISIT (OUTPATIENT)
Dept: FAMILY MEDICINE CLINIC | Age: 70
End: 2020-01-01
Payer: COMMERCIAL

## 2020-01-01 ENCOUNTER — HOSPITAL ENCOUNTER (OUTPATIENT)
Dept: WOMENS IMAGING | Age: 70
Discharge: HOME | End: 2020-12-11
Payer: COMMERCIAL

## 2020-01-01 ENCOUNTER — HOSPITAL ENCOUNTER (OUTPATIENT)
Age: 70
Discharge: HOME | End: 2020-08-25
Payer: COMMERCIAL

## 2020-01-01 VITALS
BODY MASS INDEX: 32.33 KG/M2 | TEMPERATURE: 97.1 F | DIASTOLIC BLOOD PRESSURE: 64 MMHG | SYSTOLIC BLOOD PRESSURE: 110 MMHG | HEART RATE: 75 BPM | WEIGHT: 206 LBS | HEIGHT: 67 IN | OXYGEN SATURATION: 96 %

## 2020-01-01 VITALS
BODY MASS INDEX: 32.61 KG/M2 | SYSTOLIC BLOOD PRESSURE: 102 MMHG | WEIGHT: 208.2 LBS | RESPIRATION RATE: 16 BRPM | OXYGEN SATURATION: 93 % | DIASTOLIC BLOOD PRESSURE: 64 MMHG | HEART RATE: 82 BPM | TEMPERATURE: 96.8 F

## 2020-01-01 VITALS
TEMPERATURE: 97.5 F | WEIGHT: 206.8 LBS | HEIGHT: 67 IN | HEART RATE: 91 BPM | BODY MASS INDEX: 32.46 KG/M2 | SYSTOLIC BLOOD PRESSURE: 124 MMHG | OXYGEN SATURATION: 97 % | DIASTOLIC BLOOD PRESSURE: 70 MMHG

## 2020-01-01 DIAGNOSIS — E11.9 TYPE 2 DIABETES MELLITUS WITHOUT COMPLICATION, WITHOUT LONG-TERM CURRENT USE OF INSULIN (HCC): ICD-10-CM

## 2020-01-01 LAB
A/G RATIO: 1.4 (ref 1.1–2.2)
A/G RATIO: 1.7 (ref 1.1–2.2)
ALBUMIN SERPL-MCNC: 4.1 G/DL (ref 3.4–5)
ALBUMIN SERPL-MCNC: 4.3 G/DL (ref 3.4–5)
ALP BLD-CCNC: 62 U/L (ref 40–129)
ALP BLD-CCNC: 94 U/L (ref 40–129)
ALT SERPL-CCNC: 18 U/L (ref 10–40)
ALT SERPL-CCNC: 34 U/L (ref 10–40)
ANION GAP SERPL CALCULATED.3IONS-SCNC: 13 MMOL/L (ref 3–16)
ANION GAP SERPL CALCULATED.3IONS-SCNC: 15 MMOL/L (ref 3–16)
ANTI-NUCLEAR ANTIBODY (ANA): NEGATIVE
AST SERPL-CCNC: 16 U/L (ref 15–37)
AST SERPL-CCNC: 34 U/L (ref 15–37)
BASOPHILS ABSOLUTE: 0.1 K/UL (ref 0–0.2)
BASOPHILS RELATIVE PERCENT: 0.8 %
BILIRUB SERPL-MCNC: 0.3 MG/DL (ref 0–1)
BILIRUB SERPL-MCNC: <0.2 MG/DL (ref 0–1)
BUN BLDV-MCNC: 15 MG/DL (ref 7–20)
BUN BLDV-MCNC: 15 MG/DL (ref 7–20)
C-REACTIVE PROTEIN: 2.7 MG/L (ref 0–5.1)
CALCIUM SERPL-MCNC: 8.9 MG/DL (ref 8.3–10.6)
CALCIUM SERPL-MCNC: 9.5 MG/DL (ref 8.3–10.6)
CHLORIDE BLD-SCNC: 95 MMOL/L (ref 99–110)
CHLORIDE BLD-SCNC: 99 MMOL/L (ref 99–110)
CHOLESTEROL, TOTAL: 135 MG/DL (ref 0–199)
CO2: 22 MMOL/L (ref 21–32)
CO2: 24 MMOL/L (ref 21–32)
CREAT SERPL-MCNC: 0.7 MG/DL (ref 0.6–1.2)
CREAT SERPL-MCNC: 0.8 MG/DL (ref 0.6–1.2)
EOSINOPHILS ABSOLUTE: 0.1 K/UL (ref 0–0.6)
EOSINOPHILS RELATIVE PERCENT: 1.3 %
ESTIMATED AVERAGE GLUCOSE: 292 MG/DL
GFR AFRICAN AMERICAN: >60
GFR AFRICAN AMERICAN: >60
GFR NON-AFRICAN AMERICAN: >60
GFR NON-AFRICAN AMERICAN: >60
GLOBULIN: 2.6 G/DL
GLOBULIN: 2.9 G/DL
GLUCOSE BLD-MCNC: 319 MG/DL (ref 70–99)
GLUCOSE BLD-MCNC: 441 MG/DL (ref 70–99)
HBA1C MFR BLD: 11 %
HBA1C MFR BLD: 11.8 %
HCT VFR BLD CALC: 43.6 % (ref 36–48)
HDLC SERPL-MCNC: 48 MG/DL (ref 40–60)
HEMOGLOBIN: 14.6 G/DL (ref 12–16)
LDL CHOLESTEROL CALCULATED: 59 MG/DL
LYMPHOCYTES ABSOLUTE: 2.8 K/UL (ref 1–5.1)
LYMPHOCYTES RELATIVE PERCENT: 29.6 %
MCH RBC QN AUTO: 27.3 PG (ref 26–34)
MCHC RBC AUTO-ENTMCNC: 33.4 G/DL (ref 31–36)
MCV RBC AUTO: 81.5 FL (ref 80–100)
MONOCYTES ABSOLUTE: 0.6 K/UL (ref 0–1.3)
MONOCYTES RELATIVE PERCENT: 6.6 %
NEUTROPHILS ABSOLUTE: 5.8 K/UL (ref 1.7–7.7)
NEUTROPHILS RELATIVE PERCENT: 61.7 %
PDW BLD-RTO: 13.6 % (ref 12.4–15.4)
PLATELET # BLD: 321 K/UL (ref 135–450)
PMV BLD AUTO: 10.3 FL (ref 5–10.5)
POTASSIUM SERPL-SCNC: 4.2 MMOL/L (ref 3.5–5.1)
POTASSIUM SERPL-SCNC: 4.6 MMOL/L (ref 3.5–5.1)
RBC # BLD: 5.35 M/UL (ref 4–5.2)
SEDIMENTATION RATE, ERYTHROCYTE: 18 MM/HR (ref 0–30)
SODIUM BLD-SCNC: 132 MMOL/L (ref 136–145)
SODIUM BLD-SCNC: 136 MMOL/L (ref 136–145)
TOTAL PROTEIN: 6.9 G/DL (ref 6.4–8.2)
TOTAL PROTEIN: 7 G/DL (ref 6.4–8.2)
TRIGL SERPL-MCNC: 141 MG/DL (ref 0–150)
VLDLC SERPL CALC-MCNC: 28 MG/DL
WBC # BLD: 9.5 K/UL (ref 4–11)

## 2020-01-01 PROCEDURE — 77067 SCR MAMMO BI INCL CAD: CPT

## 2020-01-01 PROCEDURE — 99214 OFFICE O/P EST MOD 30 MIN: CPT | Performed by: REGISTERED NURSE

## 2020-01-01 PROCEDURE — 99397 PER PM REEVAL EST PAT 65+ YR: CPT | Performed by: REGISTERED NURSE

## 2020-01-01 PROCEDURE — 3051F HG A1C>EQUAL 7.0%<8.0%: CPT | Performed by: FAMILY MEDICINE

## 2020-01-01 PROCEDURE — 36415 COLL VENOUS BLD VENIPUNCTURE: CPT

## 2020-01-01 PROCEDURE — 83036 HEMOGLOBIN GLYCOSYLATED A1C: CPT | Performed by: REGISTERED NURSE

## 2020-01-01 PROCEDURE — 80061 LIPID PANEL: CPT

## 2020-01-01 PROCEDURE — 80053 COMPREHEN METABOLIC PANEL: CPT

## 2020-01-01 PROCEDURE — 99215 OFFICE O/P EST HI 40 MIN: CPT | Performed by: FAMILY MEDICINE

## 2020-01-01 RX ORDER — PRIMIDONE 50 MG/1
TABLET ORAL
Qty: 270 TABLET | Refills: 1 | Status: SHIPPED | OUTPATIENT
Start: 2020-01-01 | End: 2021-01-01 | Stop reason: SDUPTHER

## 2020-01-01 RX ORDER — TIZANIDINE 2 MG/1
TABLET ORAL
Qty: 90 TABLET | Refills: 0 | Status: SHIPPED | OUTPATIENT
Start: 2020-01-01 | End: 2021-01-01

## 2020-01-01 RX ORDER — LISINOPRIL 5 MG/1
TABLET ORAL
Qty: 90 TABLET | Refills: 1 | Status: SHIPPED | OUTPATIENT
Start: 2020-01-01 | End: 2021-01-01 | Stop reason: SDUPTHER

## 2020-01-01 RX ORDER — INSULIN GLARGINE 300 U/ML
INJECTION, SOLUTION SUBCUTANEOUS
Qty: 18 ML | Refills: 0 | Status: SHIPPED | OUTPATIENT
Start: 2020-01-01 | End: 2020-01-01

## 2020-01-01 RX ORDER — TIZANIDINE 2 MG/1
TABLET ORAL
Qty: 90 TABLET | Refills: 1 | Status: SHIPPED | OUTPATIENT
Start: 2020-01-01 | End: 2020-01-01 | Stop reason: SDUPTHER

## 2020-01-01 RX ORDER — INSULIN GLARGINE 300 U/ML
INJECTION, SOLUTION SUBCUTANEOUS
Qty: 18 ML | Refills: 0 | Status: SHIPPED | OUTPATIENT
Start: 2020-01-01 | End: 2021-01-01

## 2020-01-01 RX ORDER — DULOXETIN HYDROCHLORIDE 60 MG/1
60 CAPSULE, DELAYED RELEASE ORAL 2 TIMES DAILY
Qty: 90 CAPSULE | Refills: 1 | Status: SHIPPED | OUTPATIENT
Start: 2020-01-01 | End: 2021-01-01 | Stop reason: SDUPTHER

## 2020-01-01 RX ORDER — INSULIN GLARGINE 300 U/ML
INJECTION, SOLUTION SUBCUTANEOUS
Qty: 18 ML | Refills: 0 | Status: SHIPPED | OUTPATIENT
Start: 2020-01-01 | End: 2020-01-01 | Stop reason: SDUPTHER

## 2020-01-01 RX ORDER — GLIMEPIRIDE 4 MG/1
TABLET ORAL
Qty: 180 TABLET | Refills: 0 | Status: SHIPPED | OUTPATIENT
Start: 2020-01-01 | End: 2021-01-01 | Stop reason: SDUPTHER

## 2020-01-01 RX ORDER — ATORVASTATIN CALCIUM 40 MG/1
TABLET, FILM COATED ORAL
Qty: 90 TABLET | Refills: 1 | Status: SHIPPED | OUTPATIENT
Start: 2020-01-01 | End: 2021-01-01

## 2020-01-01 RX ORDER — LEVOTHYROXINE SODIUM 0.07 MG/1
TABLET ORAL
Qty: 90 TABLET | Refills: 1 | Status: SHIPPED | OUTPATIENT
Start: 2020-01-01 | End: 2021-01-01 | Stop reason: SDUPTHER

## 2020-01-01 RX ORDER — BUPROPION HYDROCHLORIDE 150 MG/1
TABLET ORAL
Qty: 90 TABLET | Refills: 1 | Status: SHIPPED | OUTPATIENT
Start: 2020-01-01 | End: 2021-01-01

## 2020-01-01 RX ORDER — ALENDRONATE SODIUM 70 MG/1
TABLET ORAL
Qty: 12 TABLET | Refills: 0 | Status: SHIPPED | OUTPATIENT
Start: 2020-01-01 | End: 2021-01-01

## 2020-01-01 ASSESSMENT — ENCOUNTER SYMPTOMS
BACK PAIN: 0
NAUSEA: 0
ALLERGIC/IMMUNOLOGIC NEGATIVE: 1
COLOR CHANGE: 1
COUGH: 0
SHORTNESS OF BREATH: 0
PHOTOPHOBIA: 0
EYE PAIN: 0
EYES NEGATIVE: 1
SHORTNESS OF BREATH: 0
TROUBLE SWALLOWING: 0
NAUSEA: 0
CONSTIPATION: 1
DIARRHEA: 0
VOMITING: 0
ABDOMINAL PAIN: 0
ABDOMINAL PAIN: 0
SORE THROAT: 0
SORE THROAT: 0
DIARRHEA: 0
RHINORRHEA: 0
CONSTIPATION: 1
RHINORRHEA: 0
COUGH: 0
TROUBLE SWALLOWING: 0

## 2020-01-06 ENCOUNTER — NURSE TRIAGE (OUTPATIENT)
Dept: OTHER | Facility: CLINIC | Age: 70
End: 2020-01-06

## 2020-01-06 NOTE — TELEPHONE ENCOUNTER
Patient has an apopointment tomorrow. Verbal ok from Dr. Juliana Slater for patient to come tomorrow.

## 2020-01-06 NOTE — TELEPHONE ENCOUNTER
Reason for Disposition   Sinus congestion (pressure, fullness) present > 10 days    Protocols used: SINUS PAIN AND CONGESTION-ADULT-[place]    Patient called [facility]) to schedule appointment, with red flag complaint, transferred to RN access for triage. Patient reports having productive cough, which began around [place]. States phlegm was yellow in color but productive cough has now resolved. States having sinus pressure/congestion, which began shortly after productive cough. States is able to blow nose to clear nasal passages and is yellow in color. Reports having earache one night prior to bed but has since resolved. Denies any fever. Caller reports symptoms as documented above. Caller informed of disposition. Soft transfer to PCP office to schedule appointment as recommended. Care advice as documented. Please do not respond to the triage nurse through this encounter. Any subsequent communication should be directly with the patient.

## 2020-01-07 ENCOUNTER — OFFICE VISIT (OUTPATIENT)
Dept: FAMILY MEDICINE CLINIC | Age: 70
End: 2020-01-07
Payer: COMMERCIAL

## 2020-01-07 ENCOUNTER — CARE COORDINATION (OUTPATIENT)
Dept: CARE COORDINATION | Age: 70
End: 2020-01-07

## 2020-01-07 VITALS
OXYGEN SATURATION: 96 % | DIASTOLIC BLOOD PRESSURE: 70 MMHG | TEMPERATURE: 98.3 F | WEIGHT: 221.4 LBS | HEART RATE: 76 BPM | RESPIRATION RATE: 16 BRPM | SYSTOLIC BLOOD PRESSURE: 130 MMHG | BODY MASS INDEX: 34.68 KG/M2

## 2020-01-07 LAB
CREATININE URINE: 83.4 MG/DL (ref 28–259)
MICROALBUMIN UR-MCNC: <1.2 MG/DL
MICROALBUMIN/CREAT UR-RTO: NORMAL MG/G (ref 0–30)

## 2020-01-07 PROCEDURE — 99214 OFFICE O/P EST MOD 30 MIN: CPT | Performed by: FAMILY MEDICINE

## 2020-01-07 RX ORDER — BUPROPION HYDROCHLORIDE 150 MG/1
TABLET ORAL
Qty: 90 TABLET | Refills: 1 | Status: SHIPPED | OUTPATIENT
Start: 2020-01-07 | End: 2020-04-16

## 2020-01-07 RX ORDER — AMOXICILLIN AND CLAVULANATE POTASSIUM 875; 125 MG/1; MG/1
1 TABLET, FILM COATED ORAL 2 TIMES DAILY
Qty: 20 TABLET | Refills: 0 | Status: SHIPPED | OUTPATIENT
Start: 2020-01-07 | End: 2020-01-17

## 2020-01-07 RX ORDER — ATORVASTATIN CALCIUM 40 MG/1
TABLET, FILM COATED ORAL
Qty: 90 TABLET | Refills: 1 | Status: SHIPPED | OUTPATIENT
Start: 2020-01-07 | End: 2020-05-19

## 2020-01-07 ASSESSMENT — PATIENT HEALTH QUESTIONNAIRE - PHQ9: SUM OF ALL RESPONSES TO PHQ QUESTIONS 1-9: 14

## 2020-01-07 ASSESSMENT — ENCOUNTER SYMPTOMS
SORE THROAT: 0
COUGH: 1
SINUS PRESSURE: 1
SHORTNESS OF BREATH: 0
SINUS COMPLAINT: 1

## 2020-01-07 NOTE — PROGRESS NOTES
Chief Complaint   Patient presents with    Nasal Congestion     x2 weeks    Cough     was productive cough     Other     upper back hurts when she coughs          Sinus Problem   This is a new problem. Episode onset: 2 weeks ago. Progression since onset: got better then got worst again. There has been no fever. Associated symptoms include chills (occasional), congestion, coughing (non productive), ear pain (resolved), sinus pressure and sneezing. Pertinent negatives include no neck pain, shortness of breath or sore throat. Past treatments include nothing. 77 y.o. female presents today with above complaints. Hx DM on metformin 500mg BID. Did not tolerate 1000mg. She is also on Amaryl 4 mg twice per day and Toujeo 60 units daily at bedtime.    Last A1c 9/2019 10.2.    Patient was also started on Steglatro. Patient reports compliance without medication side effects. She has been more compliant with her diet has eliminated soda. She has not been checking her blood sugars.       Patient Active Problem List   Diagnosis    CAD (coronary artery disease)    Sleep apnea    Hyperlipidemia    Fatigue    Nonalcoholic steatohepatitis    Rectocele    DM (diabetes mellitus), type 2, uncontrolled with complications ([address]    HTN (hypertension), benign    Cerebral infarction ([address]    Right shoulder pain    Insomnia    SUNIL (obstructive sleep apnea)    Coarse tremor    Dizziness    Palpitations    Essential tremor    Dyslipidemia     Past Medical History:   Diagnosis Date    Anemia     Anesthesia     PONV    Arthritis     CAD (coronary artery disease)     CVA (cerebral infarction)     Diabetes mellitus (HCC)     Diastolic heart failure (HCC)     Fibromyalgia     Hyperlipidemia     Hypertension     Hypothyroidism     LFT's abnormal     CHAMBERS (nonalcoholic steatohepatitis)     Nausea & vomiting     Pancreatic cyst     Pneumonia     Rectocele     Sleep apnea     does not use c-pap    Tremor     Vitamin D deficiency        Past Surgical History:   Procedure Laterality Date    CHOLECYSTECTOMY      COLONOSCOPY  01/10/2018    DIAGNOSTIC CARDIAC CATH LAB PROCEDURE      ECTOPIC PREGNANCY SURGERY  1985    EYE SURGERY Right     cataract    OTHER SURGICAL HISTORY  11-4-2011    Posterior repair    POSTERIOR CRUCIATE LIGAMENT REPAIR       Most Recent Immunizations   Administered Date(s) Administered    Influenza A ([id number]) Vaccine PF IM 10/22/2009    Influenza Virus Vaccine 10/22/2016    Influenza Whole 10/12/2013    Influenza, Triv, inactivated, subunit, adjuvanted, IM (Fluad 65 yrs and older) 10/21/2019    Pneumococcal Conjugate 13-valent ([id number]) 10/25/2016    Pneumococcal Polysaccharide ([id number]) 05/31/2018    Tdap (Boostrix, Adacel) 10/25/2016        Current Outpatient Medications   Medication Sig Dispense Refill    amoxicillin-clavulanate (AUGMENTIN) 875-125 MG per tablet Take 1 tablet by mouth 2 times daily for 10 days 20 tablet 0    atorvastatin (LIPITOR) 40 MG tablet Take 1 tablet nightly by mouth.  90 tablet 1    buPROPion (WELLBUTRIN XL) 150 MG extended release tablet TAKE 1 TABLET EVERY MORNING 90 tablet 1    metoprolol tartrate (LOPRESSOR) 25 MG tablet TAKE 1 TABLET TWICE A  tablet 1    levothyroxine (SYNTHROID) 75 MCG tablet TAKE 1 TABLET DAILY 90 tablet 1    DULoxetine (CYMBALTA) 30 MG extended release capsule Take 1 capsule by mouth daily Take with 60mg total dose of 90mg 90 capsule 0    alendronate (FOSAMAX) 70 MG tablet TAKE 1 TABLET EVERY 7 DAYS 12 tablet 3    Insulin Glargine, 1 Unit Dial, (TOUJEO SOLOSTAR) 300 UNIT/ML SOPN Inject 60 Units into the skin nightly 15 pen 0    primidone (MYSOLINE) 50 MG tablet Take 3 tablets by mouth nightly 90 tablet 1    Cyanocobalamin (VITAMIN B 12 PO) Take by mouth      insulin glargine (TOUJEO SOLOSTAR) 300 UNIT/ML injection pen Inject 60 Units into the skin nightly 15 pen 1    tiZANidine (ZANAFLEX) 2 MG tablet Transportation needs:     Medical: None     Non-medical: None   Tobacco Use    Smoking status: Former Smoker     Packs/day: 0.25     Years: 2.00     Pack years: 0.50     Types: Cigarettes     Last attempt to quit: 1991     Years since quittin.7    Smokeless tobacco: Never Used   Substance and Sexual Activity    Alcohol use: No     Alcohol/week: 0.0 standard drinks    Drug use: No    Sexual activity: Yes     Partners: Male     Comment:    Lifestyle    Physical activity:     Days per week: None     Minutes per session: None    Stress: None   Relationships    Social connections:     Talks on phone: None     Gets together: None     Attends Congregational service: None     Active member of club or organization: None     Attends meetings of clubs or organizations: None     Relationship status: None    Intimate partner violence:     Fear of current or ex partner: None     Emotionally abused: None     Physically abused: None     Forced sexual activity: None   Other Topics Concern    None   Social History Narrative    None     Family History   Problem Relation Age of Onset    Diabetes Mother     Stroke Mother     Heart Disease Father     Cancer Father         prostate    Heart Disease Brother         multiple stents    Heart Disease Brother     Heart Disease Sister         carotid artery surgery    Heart Disease Brother     Heart Attack Brother     Heart Disease Brother     Asthma Neg Hx     Emphysema Neg Hx     Heart Failure Neg Hx                               Review Of Systems    Review of Systems   Constitutional: Positive for chills (occasional). HENT: Positive for congestion, ear pain (resolved), sinus pressure and sneezing. Negative for sore throat. Respiratory: Positive for cough (non productive). Negative for shortness of breath. Musculoskeletal: Negative for neck pain.        PHYSICAL EXAMINATION:    /70   Pulse 76   Temp 98.3 °F (36.8 °C)   Resp 16   Wt 221 lb

## 2020-01-07 NOTE — CARE COORDINATION
sugar?:  No       Other symptoms causing concern, No patient-reported symptoms       and   Congestive Heart Failure Assessment    Are you currently restricting fluids?:  No Restriction         Symptoms:

## 2020-01-07 NOTE — PATIENT INSTRUCTIONS
gently. Laquetta Pankaj your nose. · Put a hot, wet towel or a warm gel pack on your face 3 or 4 times a day for 5 to 10 minutes each time. · Try a decongestant nasal spray like oxymetazoline (Afrin). Do not use it for more than 3 days in a row. Using it for more than 3 days can make your congestion worse. When should you call for help? Call your doctor now or seek immediate medical care if:    · You have new or worse swelling or redness in your face or around your eyes.     · You have a new or higher fever.    Watch closely for changes in your health, and be sure to contact your doctor if:    · You have new or worse facial pain.     · The mucus from your nose becomes thicker (like pus) or has new blood in it.     · You are not getting better as expected. Where can you learn more? Go to https://ItrybeforeIbuy.Needcheck. org and sign in to your PeopleGoal account. Enter Q850 in the [employer] box to learn more about \"Sinusitis: Care Instructions. \"     If you do not have an account, please click on the \"Sign Up Now\" link. Current as of: October 21, 2018  Content Version: 12.1  © 2038-1223 Healthwise, Incorporated. Care instructions adapted under license by [facility] ([facility]). If you have questions about a medical condition or this instruction, always ask your healthcare professional. [place] [address] any warranty or liability for your use of this information.

## 2020-01-08 LAB
ESTIMATED AVERAGE GLUCOSE: 168.6 MG/DL
HBA1C MFR BLD: 7.5 %

## 2020-01-10 RX ORDER — METFORMIN HYDROCHLORIDE 500 MG/1
TABLET, EXTENDED RELEASE ORAL
Qty: 180 TABLET | Refills: 1 | Status: SHIPPED | OUTPATIENT
Start: 2020-01-10 | End: 2020-06-23 | Stop reason: SDUPTHER

## 2020-01-15 ENCOUNTER — CARE COORDINATION (OUTPATIENT)
Dept: FAMILY MEDICINE CLINIC | Age: 70
End: 2020-01-15

## 2020-01-23 RX ORDER — PRIMIDONE 50 MG/1
TABLET ORAL
Qty: 90 TABLET | Refills: 1 | Status: SHIPPED | OUTPATIENT
Start: 2020-01-23 | End: 2020-03-04

## 2020-01-23 RX ORDER — TIZANIDINE 2 MG/1
TABLET ORAL
Qty: 90 TABLET | Refills: 1 | Status: SHIPPED | OUTPATIENT
Start: 2020-01-23 | End: 2020-05-19 | Stop reason: SDUPTHER

## 2020-02-03 ENCOUNTER — CARE COORDINATION (OUTPATIENT)
Dept: FAMILY MEDICINE CLINIC | Age: 70
End: 2020-02-03

## 2020-02-03 NOTE — CARE COORDINATION
(LOPRESSOR) 25 MG tablet TAKE 1 TABLET TWICE A DAY 12/16/19   Poppy Lester MD   levothyroxine (SYNTHROID) 75 MCG tablet TAKE 1 TABLET DAILY 12/3/19   Poppy Lester MD   DULoxetine (CYMBALTA) 30 MG extended release capsule Take 1 capsule by mouth daily Take with 60mg total dose of 90mg 11/27/19   Poppy Lester MD   alendronate (FOSAMAX) 70 MG tablet TAKE 1 TABLET EVERY 7 DAYS 11/25/19   Poppy Lester MD   Insulin Glargine, 1 Unit Dial, (TOUJEO SOLOSTAR) 300 UNIT/ML SOPN Inject 60 Units into the skin nightly 11/25/19   Poppy Lester MD   Cyanocobalamin (VITAMIN B 12 PO) Take by mouth    Historical Provider, MD   insulin glargine (TOUJEO SOLOSTAR) 300 UNIT/ML injection pen Inject 60 Units into the skin nightly 10/21/19   Poppy Lester MD   Ertugliflozin L-PyroglutamicAc (STEGLATRO) 5 MG TABS Take 5 mg by mouth daily 10/21/19   Poppy Lester MD   ondansetron (ZOFRAN) 4 MG tablet Take 1 tablet by mouth daily as needed for Nausea or Vomiting 9/30/19   SUMA Bass CNP   UNABLE TO FIND Tumeric    Historical Provider, MD   DULoxetine (CYMBALTA) 60 MG extended release capsule TAKE 1 CAPSULE DAILY 9/26/19   Rayna December, APRN - CNP   lisinopril (PRINIVIL;ZESTRIL) 5 MG tablet TAKE 1 TABLET DAILY 9/6/19   Poppy Lester MD   Insulin Pen Needle (B-D ULTRAFINE III SHORT PEN) 31G X 8 MM MISC USE 1 DAILY 9/5/19   Poppy Lester MD   furosemide (LASIX) 40 MG tablet Take 1 tablet by mouth daily prn 8/7/19   Rayna December, SUMA Stanley CNP   blood glucose test strips (ONE TOUCH ULTRA TEST) strip TEST BLOOD SUGAR TWICE A DAY 7/30/19   SUMA Bass CNP   glimepiride (AMARYL) 4 MG tablet TAKE 1 TABLET TWICE A DAY 6/3/19   Poppy Lester MD   diclofenac sodium 1 % GEL  11/20/18   Historical Provider, MD   Blood Glucose Monitoring Suppl (MM EASY TOUCH GLUCOSE METER) w/Device KIT 1 kit by Does not apply route 2 times daily 3/15/18   SUMA Goldsmith - CNP

## 2020-02-06 RX ORDER — INSULIN GLARGINE 300 U/ML
INJECTION, SOLUTION SUBCUTANEOUS
Qty: 18 ML | Refills: 0 | Status: SHIPPED | OUTPATIENT
Start: 2020-02-06 | End: 2020-03-04

## 2020-02-07 ENCOUNTER — CARE COORDINATION (OUTPATIENT)
Dept: FAMILY MEDICINE CLINIC | Age: 70
End: 2020-02-07

## 2020-02-11 ENCOUNTER — TELEPHONE (OUTPATIENT)
Dept: FAMILY MEDICINE CLINIC | Age: 70
End: 2020-02-11

## 2020-02-14 ENCOUNTER — OFFICE VISIT (OUTPATIENT)
Dept: FAMILY MEDICINE CLINIC | Age: 70
End: 2020-02-14
Payer: COMMERCIAL

## 2020-02-14 VITALS
HEART RATE: 87 BPM | BODY MASS INDEX: 34.68 KG/M2 | DIASTOLIC BLOOD PRESSURE: 70 MMHG | OXYGEN SATURATION: 97 % | RESPIRATION RATE: 16 BRPM | SYSTOLIC BLOOD PRESSURE: 128 MMHG | WEIGHT: 221.4 LBS

## 2020-02-14 PROCEDURE — 99242 OFF/OP CONSLTJ NEW/EST SF 20: CPT | Performed by: FAMILY MEDICINE

## 2020-02-28 ENCOUNTER — CARE COORDINATION (OUTPATIENT)
Dept: FAMILY MEDICINE CLINIC | Age: 70
End: 2020-02-28

## 2020-02-28 NOTE — CARE COORDINATION
Call to patient to assess ongoing Care Coordination needs. HIPPA compliant message left with ACM contact information and request for call back.     Suzanne Thompson RN, MSN  Ambulatory Care Manager  [phone]

## 2020-03-04 RX ORDER — PRIMIDONE 50 MG/1
TABLET ORAL
Qty: 90 TABLET | Refills: 1 | Status: SHIPPED | OUTPATIENT
Start: 2020-03-04 | End: 2020-04-16

## 2020-03-04 RX ORDER — LEVOTHYROXINE SODIUM 0.07 MG/1
TABLET ORAL
Qty: 90 TABLET | Refills: 1 | Status: SHIPPED | OUTPATIENT
Start: 2020-03-04 | End: 2020-05-19 | Stop reason: SDUPTHER

## 2020-03-04 RX ORDER — INSULIN GLARGINE 300 U/ML
INJECTION, SOLUTION SUBCUTANEOUS
Qty: 18 ML | Refills: 0 | Status: SHIPPED | OUTPATIENT
Start: 2020-03-04 | End: 2020-04-16

## 2020-03-04 RX ORDER — LISINOPRIL 5 MG/1
TABLET ORAL
Qty: 90 TABLET | Refills: 1 | Status: SHIPPED | OUTPATIENT
Start: 2020-03-04 | End: 2020-05-19 | Stop reason: SDUPTHER

## 2020-03-06 ENCOUNTER — CARE COORDINATION (OUTPATIENT)
Dept: FAMILY MEDICINE CLINIC | Age: 70
End: 2020-03-06

## 2020-03-06 NOTE — CARE COORDINATION
Call to patient to assess ongoing Care Coordination needs. HIPPA compliant VM message left with request for call back and plan to discontinue outreach if no return call.     Audelia Sagastume RN, MSN  Ambulatory Care Manager  [phone]

## 2020-03-13 ENCOUNTER — CARE COORDINATION (OUTPATIENT)
Dept: FAMILY MEDICINE CLINIC | Age: 70
End: 2020-03-13

## 2020-03-17 RX ORDER — DULOXETIN HYDROCHLORIDE 60 MG/1
CAPSULE, DELAYED RELEASE ORAL
Qty: 90 CAPSULE | Refills: 2 | Status: SHIPPED | OUTPATIENT
Start: 2020-03-17 | End: 2020-01-01 | Stop reason: SDUPTHER

## 2020-03-25 PROBLEM — E78.5 HYPERLIPIDEMIA: Status: RESOLVED | Noted: 2020-03-25 | Resolved: 2020-03-24

## 2020-04-16 RX ORDER — PRIMIDONE 50 MG/1
TABLET ORAL
Qty: 90 TABLET | Refills: 1 | Status: SHIPPED | OUTPATIENT
Start: 2020-04-16 | End: 2020-06-24 | Stop reason: SDUPTHER

## 2020-04-16 RX ORDER — INSULIN GLARGINE 300 U/ML
INJECTION, SOLUTION SUBCUTANEOUS
Qty: 18 ML | Refills: 0 | Status: SHIPPED | OUTPATIENT
Start: 2020-04-16 | End: 2020-01-01 | Stop reason: SDUPTHER

## 2020-04-16 RX ORDER — DULOXETIN HYDROCHLORIDE 30 MG/1
CAPSULE, DELAYED RELEASE ORAL
Qty: 90 CAPSULE | Refills: 0 | Status: SHIPPED | OUTPATIENT
Start: 2020-04-16 | End: 2020-01-01

## 2020-04-16 RX ORDER — BUPROPION HYDROCHLORIDE 150 MG/1
TABLET ORAL
Qty: 90 TABLET | Refills: 1 | Status: SHIPPED | OUTPATIENT
Start: 2020-04-16 | End: 2020-05-19 | Stop reason: SDUPTHER

## 2020-05-19 RX ORDER — LISINOPRIL 5 MG/1
TABLET ORAL
Qty: 90 TABLET | Refills: 1 | Status: SHIPPED | OUTPATIENT
Start: 2020-05-19 | End: 2020-01-01 | Stop reason: SDUPTHER

## 2020-05-19 RX ORDER — TIZANIDINE 2 MG/1
TABLET ORAL
Qty: 90 TABLET | Refills: 1 | Status: SHIPPED | OUTPATIENT
Start: 2020-05-19 | End: 2020-06-11 | Stop reason: SDUPTHER

## 2020-05-19 RX ORDER — LEVOTHYROXINE SODIUM 0.07 MG/1
TABLET ORAL
Qty: 90 TABLET | Refills: 1 | Status: SHIPPED | OUTPATIENT
Start: 2020-05-19 | End: 2020-05-19 | Stop reason: SDUPTHER

## 2020-05-19 RX ORDER — LEVOTHYROXINE SODIUM 0.07 MG/1
TABLET ORAL
Qty: 90 TABLET | Refills: 1 | Status: SHIPPED | OUTPATIENT
Start: 2020-05-19 | End: 2020-01-01 | Stop reason: SDUPTHER

## 2020-05-19 RX ORDER — ATORVASTATIN CALCIUM 40 MG/1
TABLET, FILM COATED ORAL
Qty: 90 TABLET | Refills: 1 | Status: SHIPPED | OUTPATIENT
Start: 2020-05-19 | End: 2020-01-01 | Stop reason: SDUPTHER

## 2020-05-19 RX ORDER — BUPROPION HYDROCHLORIDE 150 MG/1
TABLET ORAL
Qty: 90 TABLET | Refills: 1 | Status: SHIPPED | OUTPATIENT
Start: 2020-05-19 | End: 2020-01-01 | Stop reason: SDUPTHER

## 2020-05-19 NOTE — TELEPHONE ENCOUNTER
Phar req refills on     Synthroid  Buprop 150mg   Lisinopril 5mg     Last visit 2/14/20  No future  [facility]

## 2020-06-10 ENCOUNTER — TELEPHONE (OUTPATIENT)
Dept: FAMILY MEDICINE CLINIC | Age: 70
End: 2020-06-10

## 2020-06-10 DIAGNOSIS — M54.50 ACUTE RIGHT-SIDED LOW BACK PAIN WITHOUT SCIATICA: ICD-10-CM

## 2020-06-10 DIAGNOSIS — I10 ESSENTIAL HYPERTENSION: ICD-10-CM

## 2020-06-11 RX ORDER — TIZANIDINE 2 MG/1
TABLET ORAL
Qty: 90 TABLET | Refills: 1 | Status: SHIPPED | OUTPATIENT
Start: 2020-06-11 | End: 2020-01-01 | Stop reason: SDUPTHER

## 2020-06-23 RX ORDER — METFORMIN HYDROCHLORIDE 500 MG/1
TABLET, EXTENDED RELEASE ORAL
Qty: 180 TABLET | Refills: 1 | Status: SHIPPED | OUTPATIENT
Start: 2020-06-23 | End: 2021-01-01 | Stop reason: SDUPTHER

## 2020-06-23 RX ORDER — PEN NEEDLE, DIABETIC 31 GX5/16"
NEEDLE, DISPOSABLE MISCELLANEOUS
Qty: 100 EACH | Refills: 3 | Status: ON HOLD | OUTPATIENT
Start: 2020-06-23 | End: 2021-01-01 | Stop reason: SDUPTHER

## 2020-06-24 RX ORDER — PRIMIDONE 50 MG/1
TABLET ORAL
Qty: 90 TABLET | Refills: 1 | Status: SHIPPED | OUTPATIENT
Start: 2020-06-24 | End: 2020-01-01 | Stop reason: SDUPTHER

## 2020-06-24 NOTE — TELEPHONE ENCOUNTER
Requested Prescriptions     Pending Prescriptions Disp Refills    primidone (MYSOLINE) 50 MG tablet 90 tablet 1   [place]  Last ov 02/14/20

## 2020-07-23 NOTE — TELEPHONE ENCOUNTER
----- Message from Ciro Patino sent at 7/23/2020 11:38 AM EDT -----  Subject: Appointment Request    QUESTIONS  Reason for appointment request? Other - NEEDS OFFICE VISIT TO GET   CIRCULATION IN FEET CHECKED  ---------------------------------------------------------------------------  --------------  CALL BACK INFO  What is the best way for the office to contact you? OK to leave message on   voicemail  Preferred Call Back Phone Number? [phone]  ---------------------------------------------------------------------------  --------------  SCRIPT ANSWERS  Relationship to Patient? Self  Appointment reason? Symptomatic  Select script based on patient symptoms? Adult No Script  (Patient requests to see the provider urgently  today or tomorrow. )? No  (Is the patient requesting to see the provider for a procedure?)? No  (Is the patient requesting to be seen routinely (not today or tomorrow)? Yes  Have you been diagnosed with   tested for   or told that you are suspected of having COVID-19 (Coronavirus)? No  Have you had a fever or taken medication to treat a fever within the past   3 days? No  Have you had a cough   shortness of breath or flu-like symptoms within the past 3 days? No  Do you currently have flu-like symptoms including fever or chills   cough   shortness of breath   or difficulty breathing   or new loss of taste or smell? No  (Service Expert  click yes below to proceed with [employer] As Usual   Scheduling)?  Yes

## 2020-08-11 NOTE — PROGRESS NOTES
tremor    Dizziness    Palpitations    Essential tremor    Dyslipidemia     Past Medical History:   Diagnosis Date    Anemia     Anesthesia     PONV    Arthritis     CAD (coronary artery disease)     CVA (cerebral infarction)     Diabetes mellitus (HCC)     Diastolic heart failure (HCC)     Fibromyalgia     Hyperlipidemia     Hypertension     Hypothyroidism     LFT's abnormal     CHAMBERS (nonalcoholic steatohepatitis)     Nausea & vomiting     Pancreatic cyst     Pneumonia     Rectocele     Sleep apnea     does not use c-pap    Tremor     Vitamin D deficiency        Past Surgical History:   Procedure Laterality Date    CHOLECYSTECTOMY      COLONOSCOPY  01/10/2018    DIAGNOSTIC CARDIAC CATH LAB PROCEDURE      ECTOPIC PREGNANCY SURGERY  1985    EYE SURGERY Right     cataract    OTHER SURGICAL HISTORY  11-4-2011    Posterior repair    POSTERIOR CRUCIATE LIGAMENT REPAIR       Most Recent Immunizations   Administered Date(s) Administered    Influenza A ([id number]) Vaccine PF IM 10/22/2009    Influenza Virus Vaccine 10/22/2016    Influenza Whole 10/12/2013    Influenza, Triv, inactivated, subunit, adjuvanted, IM (Fluad 65 yrs and older) 10/21/2019    Pneumococcal Conjugate 13-valent ([id number]) 10/25/2016    Pneumococcal Polysaccharide ([id number]) 05/31/2018    Tdap (Boostrix, Adacel) 10/25/2016        Current Outpatient Medications   Medication Sig Dispense Refill    DULoxetine (CYMBALTA) 60 MG extended release capsule Take 1 capsule by mouth 2 times daily 90 capsule 1    metoprolol tartrate (LOPRESSOR) 25 MG tablet TAKE 1 TABLET TWICE A  tablet 1    primidone (MYSOLINE) 50 MG tablet TAKE 3 TABLETS NIGHTLY 270 tablet 1    lisinopril (PRINIVIL;ZESTRIL) 5 MG tablet TAKE 1 TABLET DAILY 90 tablet 1    Insulin Pen Needle (B-D ULTRAFINE III SHORT PEN) 31G X 8 MM MISC USE AND DISCARD 1 PEN      NEEDLE DAILY 100 each 3    metFORMIN (GLUCOPHAGE-XR) 500 MG extended release tablet TAKE 1 TABLET TWICE A  tablet 1    tiZANidine (ZANAFLEX) 2 MG tablet Take 1 tablet nightly as needed for muscle spasms 90 tablet 1    atorvastatin (LIPITOR) 40 MG tablet TAKE 1 TABLET NIGHTLY 90 tablet 1    buPROPion (WELLBUTRIN XL) 150 MG extended release tablet TAKE 1 TABLET EVERY MORNING 90 tablet 1    levothyroxine (SYNTHROID) 75 MCG tablet TAKE 1 TABLET DAILY 90 tablet 1    TOUJEO SOLOSTAR 300 UNIT/ML SOPN INJECT 60 UNITS            SUBCUTANEOUSLY NIGHTLY 18 mL 0    alendronate (FOSAMAX) 70 MG tablet TAKE 1 TABLET EVERY 7 DAYS 12 tablet 3    Cyanocobalamin (VITAMIN B 12 PO) Take by mouth      insulin glargine (TOUJEO SOLOSTAR) 300 UNIT/ML injection pen Inject 60 Units into the skin nightly 15 pen 1    ondansetron (ZOFRAN) 4 MG tablet Take 1 tablet by mouth daily as needed for Nausea or Vomiting 30 tablet 0    UNABLE TO FIND Tumeric      blood glucose test strips (ONE TOUCH ULTRA TEST) strip TEST BLOOD SUGAR TWICE A  each 2    glimepiride (AMARYL) 4 MG tablet TAKE 1 TABLET TWICE A  tablet 2    diclofenac sodium 1 % GEL       Blood Glucose Monitoring Suppl (MM EASY TOUCH GLUCOSE METER) w/Device KIT 1 kit by Does not apply route 2 times daily 1 kit 0    vitamin D (CHOLECALCIFEROL) 1000 UNIT TABS tablet Take 4,000 Units by mouth daily      magnesium 30 MG tablet Take 30 mg by mouth daily      ONE TOUCH ULTRASOFT LANCETS MISC Test blood sugar 2 times a day 200 each 5    aspirin 325 MG tablet Take 1 tablet by mouth daily. 30 tablet 3     No current facility-administered medications for this visit.       Allergies   Allergen Reactions    Codeine     Ultram [Tramadol]      vomiting    Percocet [Oxycodone-Acetaminophen] Nausea And Vomiting       Social History     Socioeconomic History    Marital status:      Spouse name: Elvi Black Number of children: 2    Years of education: 15    Highest education level: None   Occupational History    Occupation: [employer]     Comment: school   Social Needs    Financial resource strain: None    Food insecurity     Worry: None     Inability: None    Transportation needs     Medical: None     Non-medical: None   Tobacco Use    Smoking status: Former Smoker     Packs/day: 0.25     Years: 2.00     Pack years: 0.50     Types: Cigarettes     Last attempt to quit: 1991     Years since quittin.3    Smokeless tobacco: Never Used   Substance and Sexual Activity    Alcohol use: No     Alcohol/week: 0.0 standard drinks    Drug use: No    Sexual activity: Yes     Partners: Male     Comment:    Lifestyle    Physical activity     Days per week: None     Minutes per session: None    Stress: None   Relationships    Social connections     Talks on phone: None     Gets together: None     Attends Restorationism service: None     Active member of club or organization: None     Attends meetings of clubs or organizations: None     Relationship status: None    Intimate partner violence     Fear of current or ex partner: None     Emotionally abused: None     Physically abused: None     Forced sexual activity: None   Other Topics Concern    None   Social History Narrative    None     Family History   Problem Relation Age of Onset    Diabetes Mother     Stroke Mother     Heart Disease Father     Cancer Father         prostate    Heart Disease Brother         multiple stents    Heart Disease Brother     Heart Disease Sister         carotid artery surgery    Heart Disease Brother     Heart Attack Brother     Heart Disease Brother     Asthma Neg Hx     Emphysema Neg Hx     Heart Failure Neg Hx                               Review Of Systems    Review of Systems    PHYSICAL EXAMINATION:    /64 (Site: Left Upper Arm, Position: Sitting, Cuff Size: Large Adult)   Pulse 82   Temp 96.8 °F (36 °C)   Resp 16   Wt 208 lb 3.2 oz (94.4 kg)   LMP  (LMP Unknown)   SpO2 93%   BMI 32.61 kg/m²      Physical Exam  Vitals signs reviewed. Constitutional:       Appearance: Normal appearance. HENT:      Head: Normocephalic and atraumatic. Eyes:      Extraocular Movements: Extraocular movements intact. Conjunctiva/sclera: Conjunctivae normal.   Cardiovascular:      Rate and Rhythm: Regular rhythm. Pulses: Normal pulses. Comments: Dorsalis pedis palpable bilaterally. Pulmonary:      Effort: Pulmonary effort is normal.      Breath sounds: Normal breath sounds. Musculoskeletal:      Comments: Sensory exam of the foot is normal, tested with the monofilament. Good pulses, no lesions or ulcers, good peripheral pulses. Skin:     General: Skin is warm and dry. Neurological:      General: No focal deficit present. Mental Status: She is alert and oriented to person, place, and time. ASSESSMENT:   Well Adult, See encounter diagnoses  Jose M Vogel was seen today for leg pain and knee pain. Diagnoses and all orders for this visit:    Type 2 diabetes mellitus without complication, without long-term current use of insulin (HCC)  -     Comprehensive Metabolic Panel  -      DIABETES FOOT EXAM    Essential hypertension  -     Comprehensive Metabolic Panel  -     metoprolol tartrate (LOPRESSOR) 25 MG tablet; TAKE 1 TABLET TWICE A DAY  -     lisinopril (PRINIVIL;ZESTRIL) 5 MG tablet; TAKE 1 TABLET DAILY    Mixed hyperlipidemia  -     Lipid Panel  -     Comprehensive Metabolic Panel    Anxiety and depression  -     DULoxetine (CYMBALTA) 60 MG extended release capsule; Take 1 capsule by mouth 2 times daily    Occasional tremors  -     primidone (MYSOLINE) 50 MG tablet; TAKE 3 TABLETS NIGHTLY    Osteopenia, unspecified location  Continue Fosamax will stop in 2021 after 5 years of treatment. Discussed with patient that her pulses are palpable distally. At this time no concern for PAD we will continue to monitor. Plan:   See orders and medications filed with this encounter. Return in about 6 months (around 2/11/2021).

## 2020-11-16 NOTE — TELEPHONE ENCOUNTER
Pt diabetes is not well controlled, needs appointment then will refill. Establish with someone at practice please.

## 2020-11-25 NOTE — PATIENT INSTRUCTIONS
[email or web address]. [facility]    Patient Education        Learning About Diabetes Food Guidelines  Your Care Instructions     Meal planning is important to manage diabetes. It helps keep your blood sugar at a target level (which you set with your doctor). You don't have to eat special foods. You can eat what your family eats, including sweets once in a while. But you do have to pay attention to how often you eat and how much you eat of certain foods. You may want to work with a dietitian or a certified diabetes educator (CDE) to help you plan meals and snacks. A dietitian or CDE can also help you lose weight if that is one of your goals. What should you know about eating carbs? Managing the amount of carbohydrate (carbs) you eat is an important part of healthy meals when you have diabetes. Carbohydrate is found in many foods. · Learn which foods have carbs. And learn the amounts of carbs in different foods. ? Bread, cereal, pasta, and rice have about 15 grams of carbs in a serving. A serving is 1 slice of bread (1 ounce), ½ cup of cooked cereal, or 1/3 cup of cooked pasta or rice. ? Fruits have 15 grams of carbs in a serving. A serving is 1 small fresh fruit, such as an apple or orange; ½ of a banana; ½ cup of cooked or canned fruit; ½ cup of fruit juice; 1 cup of melon or raspberries; or 2 tablespoons of dried fruit. ? Milk and no-sugar-added yogurt have 15 grams of carbs in a serving. A serving is 1 cup of milk or 2/3 cup of no-sugar-added yogurt. ? Starchy vegetables have 15 grams of carbs in a serving. A serving is ½ cup of mashed potatoes or sweet potato; 1 cup winter squash; ½ of a small baked potato; ½ cup of cooked beans; or ½ cup cooked corn or green peas. · Learn how much carbs to eat each day and at each meal. A dietitian or CDE can teach you how to keep track of the amount of carbs you eat. This is called carbohydrate counting.   · If you are not sure how to count carbohydrate grams, use the Plate Method to plan meals. It is a good, quick way to make sure that you have a balanced meal. It also helps you spread carbs throughout the day. ? Divide your plate by types of foods. Put non-starchy vegetables on half the plate, meat or other protein food on one-quarter of the plate, and a grain or starchy vegetable in the final quarter of the plate. To this you can add a small piece of fruit and 1 cup of milk or yogurt, depending on how many carbs you are supposed to eat at a meal.  · Try to eat about the same amount of carbs at each meal. Do not \"save up\" your daily allowance of carbs to eat at one meal.  · Proteins have very little or no carbs per serving. Examples of proteins are beef, chicken, turkey, fish, eggs, tofu, cheese, cottage cheese, and peanut butter. A serving size of meat is 3 ounces, which is about the size of a deck of cards. Examples of meat substitute serving sizes (equal to 1 ounce of meat) are 1/4 cup of cottage cheese, 1 egg, 1 tablespoon of peanut butter, and ½ cup of tofu. How can you eat out and still eat healthy? · Learn to estimate the serving sizes of foods that have carbohydrate. If you measure food at home, it will be easier to estimate the amount in a serving of restaurant food. · If the meal you order has too much carbohydrate (such as potatoes, corn, or baked beans), ask to have a low-carbohydrate food instead. Ask for a salad or green vegetables. · If you use insulin, check your blood sugar before and after eating out to help you plan how much to eat in the future. · If you eat more carbohydrate at a meal than you had planned, take a walk or do other exercise. This will help lower your blood sugar. What else should you know? · Limit saturated fat, such as the fat from meat and dairy products. This is a healthy choice because people who have diabetes are at higher risk of heart disease. So choose lean cuts of meat and nonfat or low-fat dairy products.  Use olive or canola oil instead of butter or shortening when cooking. · Don't skip meals. Your blood sugar may drop too low if you skip meals and take insulin or certain medicines for diabetes. · Check with your doctor before you drink alcohol. Alcohol can cause your blood sugar to drop too low. Alcohol can also cause a bad reaction if you take certain diabetes medicines. Follow-up care is a key part of your treatment and safety. Be sure to make and go to all appointments, and call your doctor if you are having problems. It's also a good idea to know your test results and keep a list of the medicines you take. Where can you learn more? Go to https://[employer]. org and sign in to your [employer] account. Enter M119 in the [employer] box to learn more about \"Learning About Diabetes Food Guidelines. \"     If you do not have an account, please click on the \"Sign Up Now\" link. Current as of: December 20, 2019               Content Version: 12.6  © 5782-7429 [employer]. Care instructions adapted under license by [facility] ([facility]). If you have questions about a medical condition or this instruction, always ask your healthcare professional. [place] [address] any warranty or liability for your use of this information.

## 2020-11-25 NOTE — PROGRESS NOTES
2020    Dorrine Dakins (:  1950) is a 72 y.o. female, here for evaluation of the following medical concerns:    HPI  Patient is here to establish care and for medication refills. Has fibromyalgia- random pain in different places over her body- Cymbalta does help but she thinks maybe she could have Lupus- She says she has had \"deanna cheeks and nose\" recently. Thinks the butterfly rash associated with lupus. Leg pain- ordered some compression stockings, she thinks this is due to neuropathy from her diabetes. They have gotten better since she started taking Turmeric. Diabetes-says that her A1c was elevated earlier this year around 12, but it did come down to 7.5. Review of Systems   Constitutional: Positive for fatigue. Negative for appetite change, fever and unexpected weight change. HENT: Negative for congestion, ear pain, rhinorrhea, sore throat and trouble swallowing. Eyes: Negative. Respiratory: Negative for cough and shortness of breath. Cardiovascular: Negative for chest pain and palpitations. Has had times when she has been stressed out and felt her heart rate increase   Gastrointestinal: Positive for constipation (Takes MiraLAX for this). Negative for abdominal pain, diarrhea and nausea. Endocrine: Negative for cold intolerance and heat intolerance. Genitourinary: Negative. Musculoskeletal: Positive for arthralgias and myalgias. Skin: Positive for color change ( Reddening of cheeks and nose. ). Allergic/Immunologic: Negative. Neurological: Negative for dizziness, light-headedness and headaches. Hematological: Negative for adenopathy. Psychiatric/Behavioral: Negative for dysphoric mood. The patient is not nervous/anxious. Prior to Visit Medications    Medication Sig Taking?  Authorizing Provider   tiZANidine (ZANAFLEX) 2 MG tablet Take 1 tablet nightly as needed for muscle spasms Yes Franci Thakkar, APRN - CNP   Insulin Glargine, 1 Unit Dial, (TOUJEO SOLOSTAR) 300 UNIT/ML SOPN INJECT 70 UNITS            SUBCUTANEOUSLY NIGHTLY Yes Franci SUMA De La Cruz CNP   alendronate (FOSAMAX) 70 MG tablet TAKE 1 TABLET EVERY 7 DAYS Yes SUMA Morley CNP   levothyroxine (SYNTHROID) 75 MCG tablet TAKE 1 TABLET DAILY Yes SUMA Morley CNP   DULoxetine (CYMBALTA) 60 MG extended release capsule Take 1 capsule by mouth 2 times daily Yes Frandy Baum MD   metoprolol tartrate (LOPRESSOR) 25 MG tablet TAKE 1 TABLET TWICE A DAY Yes Frandy Baum MD   primidone (MYSOLINE) 50 MG tablet TAKE 3 TABLETS NIGHTLY Yes Frandy Baum MD   lisinopril (PRINIVIL;ZESTRIL) 5 MG tablet TAKE 1 TABLET DAILY Yes Frandy Baum MD   Insulin Pen Needle (B-D ULTRAFINE III SHORT PEN) 31G X 8 MM MISC USE AND DISCARD 1 PEN      NEEDLE DAILY Yes Frandy Baum MD   metFORMIN (GLUCOPHAGE-XR) 500 MG extended release tablet TAKE 1 TABLET TWICE A DAY Yes Frandy Baum MD   atorvastatin (LIPITOR) 40 MG tablet TAKE 1 TABLET NIGHTLY Yes Frandy Baum MD   buPROPion (WELLBUTRIN XL) 150 MG extended release tablet TAKE 1 TABLET EVERY MORNING Yes Frandy Baum MD   Cyanocobalamin (VITAMIN B 12 PO) Take by mouth Yes Historical Provider, MD   UNABLE TO FIND Tumeric Yes Historical Provider, MD   glimepiride (AMARYL) 4 MG tablet TAKE 1 TABLET TWICE A DAY Yes Frandy Baum MD   vitamin D (CHOLECALCIFEROL) 1000 UNIT TABS tablet Take 4,000 Units by mouth daily Yes Historical Provider, MD   magnesium 30 MG tablet Take 30 mg by mouth daily Yes Historical Provider, MD   aspirin 325 MG tablet Take 1 tablet by mouth daily.  Yes Luis Angel Else, DO   blood glucose test strips (ONE TOUCH ULTRA TEST) strip TEST BLOOD SUGAR TWICE A DAY  Patient not taking: Reported on 11/25/2020  SUMA Vincent CNP   ondansetron (ZOFRAN) 4 MG tablet Take 1 tablet by mouth daily as needed for Nausea or Vomiting  Patient not taking: Reported on 11/25/2020  [facility] ADDICTION [facility] [place], APRN - CNP   diclofenac sodium 1 % GEL   Historical Provider, MD   Blood Glucose Monitoring Suppl (MM EASY TOUCH GLUCOSE METER) w/Device KIT 1 kit by Does not apply route 2 times daily  Patient not taking: Reported on 11/25/2020  SUMA Arias CNP   ONE TOUCH ULTRASOFT LANCETS MISC Test blood sugar 2 times a day  Patient not taking: Reported on 11/25/2020  Ace Hung MD        Allergies   Allergen Reactions    Codeine     Ultram [Tramadol]      vomiting    Percocet [Oxycodone-Acetaminophen] Nausea And Vomiting       Past Medical History:   Diagnosis Date    Anemia     Anesthesia     PONV    Arthritis     CAD (coronary artery disease)     CVA (cerebral infarction)     Diabetes mellitus ([address]     Diastolic heart failure (HCC)     Fibromyalgia     Hyperlipidemia     Hypertension     Hypothyroidism     LFT's abnormal     CHAMBERS (nonalcoholic steatohepatitis)     Nausea & vomiting     Pancreatic cyst     Pneumonia     Rectocele     Sleep apnea     does not use c-pap    Tremor     Vitamin D deficiency        Past Surgical History:   Procedure Laterality Date    CHOLECYSTECTOMY      COLONOSCOPY  01/10/2018    DIAGNOSTIC CARDIAC CATH LAB PROCEDURE      ECTOPIC PREGNANCY SURGERY  1985    EYE SURGERY Right     cataract    OTHER SURGICAL HISTORY  11-4-2011    Posterior repair    POSTERIOR CRUCIATE LIGAMENT REPAIR         Social History     Socioeconomic History    Marital status:      Spouse name: Arash Morrissey Number of children: 2    Years of education: 15    Highest education level: Not on file   Occupational History    Occupation: [employer]     Comment: school   Social Needs    Financial resource strain: Not on file    Food insecurity     Worry: Not on file     Inability: Not on file   [employer] needs     Medical: Not on file     Non-medical: Not on file   Tobacco Use    Smoking status: Former Smoker     Packs/day: 0.25     Years: 2.00     Pack years: 0.50     Types: Cigarettes     Last attempt to quit: 1991     Years since quittin.6    Smokeless tobacco: Never Used   Substance and Sexual Activity    Alcohol use: No     Alcohol/week: 0.0 standard drinks    Drug use: No    Sexual activity: Yes     Partners: Male     Comment:    Lifestyle    Physical activity     Days per week: Not on file     Minutes per session: Not on file    Stress: Not on file   Relationships    Social connections     Talks on phone: Not on file     Gets together: Not on file     Attends Congregation service: Not on file     Active member of club or organization: Not on file     Attends meetings of clubs or organizations: Not on file     Relationship status: Not on file    Intimate partner violence     Fear of current or ex partner: Not on file     Emotionally abused: Not on file     Physically abused: Not on file     Forced sexual activity: Not on file   Other Topics Concern    Not on file   Social History Narrative    Not on file        Family History   Problem Relation Age of Onset    Diabetes Mother     Stroke Mother     Heart Disease Father     Cancer Father         prostate    Heart Disease Brother         multiple stents    Heart Disease Brother     Heart Disease Sister         carotid artery surgery    Heart Disease Brother     Heart Attack Brother     Heart Disease Brother     Asthma Neg Hx     Emphysema Neg Hx     Heart Failure Neg Hx        Vitals:    20 1525   BP: 124/70   Pulse: 91   Temp: 97.5 °F (36.4 °C)   TempSrc: Temporal   SpO2: 97%   Weight: 206 lb 12.8 oz (93.8 kg)   Height: 5' 7\" (1.702 m)     Estimated body mass index is 32.39 kg/m² as calculated from the following:    Height as of this encounter: 5' 7\" (1.702 m). Weight as of this encounter: 206 lb 12.8 oz (93.8 kg). Physical Exam  Vitals signs and nursing note reviewed. Constitutional:       General: She is not in acute distress. Appearance: Normal appearance.  She is normal weight. HENT:      Head: Normocephalic and atraumatic. Right Ear: Tympanic membrane, ear canal and external ear normal. There is no impacted cerumen. Left Ear: Tympanic membrane, ear canal and external ear normal. There is no impacted cerumen. Nose: Nose normal. No congestion or rhinorrhea. Mouth/Throat:      Mouth: Mucous membranes are moist.      Pharynx: Oropharynx is clear. No oropharyngeal exudate or posterior oropharyngeal erythema. Eyes:      General:         Right eye: No discharge. Left eye: No discharge. Extraocular Movements: Extraocular movements intact. Conjunctiva/sclera: Conjunctivae normal.      Pupils: Pupils are equal, round, and reactive to light. Neck:      Musculoskeletal: Normal range of motion and neck supple. Cardiovascular:      Rate and Rhythm: Normal rate and regular rhythm. Pulses: Normal pulses. Heart sounds: Normal heart sounds. No murmur. No friction rub. No gallop. Pulmonary:      Effort: Pulmonary effort is normal.      Breath sounds: Normal breath sounds. No stridor. No wheezing, rhonchi or rales. Abdominal:      General: Abdomen is flat. There is no distension. Palpations: Abdomen is soft. There is no mass. Tenderness: There is no abdominal tenderness. There is no guarding. Hernia: No hernia is present. Musculoskeletal: Normal range of motion. General: No tenderness. Right lower leg: No edema. Left lower leg: No edema. Lymphadenopathy:      Cervical: No cervical adenopathy. Skin:     General: Skin is warm and dry. Coloration: Skin is not jaundiced or pale. Findings: Erythema ( Noted on bilateral cheeks and nose) present. No rash. Neurological:      General: No focal deficit present. Mental Status: She is alert and oriented to person, place, and time. Motor: No weakness.    Psychiatric:         Mood and Affect: Mood normal.         Behavior: Behavior normal.         Thought Content: Thought content normal.         Judgment: Judgment normal.         ASSESSMENT/PLAN:  1. Acute right-sided low back pain without sciatica    - tiZANidine (ZANAFLEX) 2 MG tablet; Take 1 tablet nightly as needed for muscle spasms  Dispense: 90 tablet; Refill: 1    2. DM (diabetes mellitus), type 2, uncontrolled with complications (HCC)  A8K remains elevated 11. Increase her insulin glargine 10 units nightly. States that at her Metformin due to intolerance, she does fine with the 500 mg dosage. I recommended that she visit www. diabetes. org and that she focus on nutrition goals. And agrees with this plan. - POCT glycosylated hemoglobin (Hb A1C)  - Insulin Glargine, 1 Unit Dial, (TOUJEO SOLOSTAR) 300 UNIT/ML SOPN; INJECT 70 UNITS            SUBCUTANEOUSLY NIGHTLY  Dispense: 18 mL; Refill: 0    3. Insomnia, unspecified type  Refill ordered  - tiZANidine (ZANAFLEX) 2 MG tablet; Take 1 tablet nightly as needed for muscle spasms  Dispense: 90 tablet; Refill: 1    4. Butterfly rash  Patient says that she has fibromyalgia and feels that some of these symptoms match the symptoms of lupus, she also notes that she has recently developed this on bilateral cheeks and over her nose which she feels could be a butterfly rash. Testing performed for evaluation of patient for lupus. - Comprehensive Metabolic Panel  - [facility]  - C-Reactive Protein  - CBC Auto Differential  - Sedimentation Rate    5. Weight loss    - Comprehensive Metabolic Panel  - [facility]  - C-Reactive Protein  - CBC Auto Differential  - Sedimentation Rate    6. Fatigue, unspecified type    - Comprehensive Metabolic Panel  - ROMEL  - C-Reactive Protein  - CBC Auto Differential  - Sedimentation Rate    Patient is a pleasant 79-year-old female who is here to establish care. She has multiple issues to discuss and has multiple complex issues. This visit is her diabetes and further examination of her concerns for a diagnosis of lupus.   Follow-up in 2 months for repeat A1c. Return in about 2 months (around 1/25/2021). An  electronic signature was used to authenticate this note.     --SUMA Quevedo - CNP on 11/25/2020 at 6:23 PM

## 2020-12-08 NOTE — PATIENT INSTRUCTIONS
Patient Education        Muscle Conditioning: Exercises  Introduction  Here are some examples of exercises for muscle conditioning. Start each exercise slowly. Ease off the exercise if you start to have pain. Your doctor or physical therapist will tell you when you can start these exercises and which ones will work best for you. How to do the exercises  Wall push-ups   When you can do this exercise against a wall comfortably (without your muscles feeling tired), you can try it against a counter. Start with 5 repetitions again and work up to 8 to 12. You can then slowly progress to the end of a couch or a sturdy chair, and finally to the floor. 1. Stand facing a wall, about 12 to 18 inches away. 2. Place your hands on the wall at shoulder height. 3. Slowly bend your elbows and bring your face toward the wall, moving your hips and shoulders forward together. 4. Push slowly back to the starting position. 5. Start with 5 repetitions and work up to 8 to 12. 6. Rest for a minute, and repeat the exercise. Knee extension   If this exercise becomes easy, you can add a light weight around your ankle or tie an elastic resistance band to a chair leg and one ankle. 1. While sitting in a chair, straighten one leg and hold while you slowly count to 5. Be sure you do not lock your knee. 2. Repeat 8 to 12 times. 3. Rest for a minute, and repeat the exercise. 4. Do the same exercise with the other leg. Side-lying leg lift   If this exercise becomes easy, you can add a light weight around your ankle or tie an elastic resistance band to both ankles. 1. Lie on your side, with your legs extended. Keep your hips straight up and down during this exercise. Do not let your top hip rock toward the back. Support your head with your hand, and place the other hand on the floor near your waist.  2. Slowly raise your upper leg until it is about in line with your shoulder. Keep your toes pointed forward.   3. Slowly lower your leg to the starting position. 4. Repeat 8 to 12 times. 5. Rest for a minute, and repeat the exercise. 6. Turn to your other side and do the same exercise with your other leg. Shallow standing knee bends   1. Stand with your hands lightly resting on a counter or chair in front of you with your feet shoulder-width apart. 2. Slowly bend your knees so that you squat down just like you were going to sit in a chair. Make sure your knees do not go in front of your toes. 3. Lower yourself about 6 inches. Your heels should remain on the floor at all times. 4. Rise slowly to a standing position. 5. Repeat 8 to 12 times. 6. Rest for a minute, and repeat the exercise. Follow-up care is a key part of your treatment and safety. Be sure to make and go to all appointments, and call your doctor if you are having problems. It's also a good idea to know your test results and keep a list of the medicines you take. Where can you learn more? Go to [email or web address] org and sign in to your [employer] account. Enter U873 in the Emergent Views box to learn more about \"Muscle Conditioning: Exercises. \"     If you do not have an account, please click on the \"Sign Up Now\" link. Current as of: January 16, 2020               Content Version: 12.6  © 2262-9601 [employer]. Care instructions adapted under license by [facility] ([facility]). If you have questions about a medical condition or this instruction, always ask your healthcare professional. [address]  any warranty or liability for your use of this information. [email or web address].[email or web address] org

## 2020-12-08 NOTE — PROGRESS NOTES
Patient ID: Kirstin Zarate is a 72 y.o. female who presents today for a Physical Exam.    Chief Complaint   Patient presents with    Annual Exam       HPI:   Patient is here for a physical.      Dexa- on Fosamax, would like to stop fosamax due to some dental work she needs completed. Due for DEXA. HTN-  Checks at home sometimes,  always has good readings. Diabetes- wants to cut down on diet soda, has not been checking blood sugar, eats fruit, does not like vegetables, does eat salads. Has increased her insulin glargine since last visit.     Exercise- no regular exercise    Vitals:    12/08/20 1406   BP: 110/64   Pulse: 75   Temp: 97.1 °F (36.2 °C)   TempSrc: Temporal   SpO2: 96%   Weight: 206 lb (93.4 kg)   Height: 5' 7\" (1.702 m)        Past Medical History:   Diagnosis Date    Anemia     Anesthesia     PONV    Arthritis     CAD (coronary artery disease)     CVA (cerebral infarction)     Diabetes mellitus (HCC)     Diastolic heart failure (HCC)     Fibromyalgia     Hyperlipidemia     Hypertension     Hypothyroidism     LFT's abnormal     CHAMBERS (nonalcoholic steatohepatitis)     Nausea & vomiting     Pancreatic cyst     Pneumonia     Rectocele     Sleep apnea     does not use c-pap    Tremor     Vitamin D deficiency        Past Surgical History:   Procedure Laterality Date    CHOLECYSTECTOMY      COLONOSCOPY  01/10/2018    DIAGNOSTIC CARDIAC CATH LAB PROCEDURE      ECTOPIC PREGNANCY SURGERY  1985    EYE SURGERY Right     cataract    OTHER SURGICAL HISTORY  11-4-2011    Posterior repair    POSTERIOR CRUCIATE LIGAMENT REPAIR         Family History   Problem Relation Age of Onset    Diabetes Mother     Stroke Mother     Heart Disease Father     Cancer Father         prostate    Heart Disease Brother         multiple stents    Heart Disease Brother     Heart Disease Sister         carotid artery surgery    Heart Disease Brother     Heart Attack Brother     Heart Disease Brother     Asthma Neg Hx     Emphysema Neg Hx     Heart Failure Neg Hx        Social History     Socioeconomic History    Marital status:      Spouse name: Brittany Gardner Number of children: 2    Years of education: 15    Highest education level: None   Occupational History    Occupation:      Comment: school   Social Needs    Financial resource strain: None    Food insecurity     Worry: None     Inability: None    Transportation needs     Medical: None     Non-medical: None   Tobacco Use    Smoking status: Former Smoker     Packs/day: 0.25     Years: 2.00     Pack years: 0.50     Types: Cigarettes     Last attempt to quit: 1991     Years since quittin.6    Smokeless tobacco: Never Used   Substance and Sexual Activity    Alcohol use: No     Alcohol/week: 0.0 standard drinks    Drug use: No    Sexual activity: Yes     Partners: Male     Comment:    Lifestyle    Physical activity     Days per week: None     Minutes per session: None    Stress: None   Relationships    Social connections     Talks on phone: None     Gets together: None     Attends Scientologist service: None     Active member of club or organization: None     Attends meetings of clubs or organizations: None     Relationship status: None    Intimate partner violence     Fear of current or ex partner: None     Emotionally abused: None     Physically abused: None     Forced sexual activity: None   Other Topics Concern    None   Social History Narrative    None       Allergies   Allergen Reactions    Codeine     Ultram [Tramadol]      vomiting    Percocet [Oxycodone-Acetaminophen] Nausea And Vomiting       Current Outpatient Medications   Medication Sig Dispense Refill    tiZANidine (ZANAFLEX) 2 MG tablet Take 1 tablet nightly as needed for muscle spasms 90 tablet 1    Insulin Glargine, 1 Unit Dial, (TOUJEO SOLOSTAR) 300 UNIT/ML SOPN INJECT 70 UNITS            SUBCUTANEOUSLY NIGHTLY 18 mL 0    alendronate (FOSAMAX) 70 MG tablet TAKE 1 TABLET EVERY 7 DAYS 12 tablet 0    levothyroxine (SYNTHROID) 75 MCG tablet TAKE 1 TABLET DAILY 90 tablet 1    blood glucose test strips (ONE TOUCH ULTRA TEST) strip TEST BLOOD SUGAR TWICE A  each 2    DULoxetine (CYMBALTA) 60 MG extended release capsule Take 1 capsule by mouth 2 times daily 90 capsule 1    metoprolol tartrate (LOPRESSOR) 25 MG tablet TAKE 1 TABLET TWICE A  tablet 1    primidone (MYSOLINE) 50 MG tablet TAKE 3 TABLETS NIGHTLY 270 tablet 1    lisinopril (PRINIVIL;ZESTRIL) 5 MG tablet TAKE 1 TABLET DAILY 90 tablet 1    Insulin Pen Needle (B-D ULTRAFINE III SHORT PEN) 31G X 8 MM MISC USE AND DISCARD 1 PEN      NEEDLE DAILY 100 each 3    metFORMIN (GLUCOPHAGE-XR) 500 MG extended release tablet TAKE 1 TABLET TWICE A  tablet 1    atorvastatin (LIPITOR) 40 MG tablet TAKE 1 TABLET NIGHTLY 90 tablet 1    buPROPion (WELLBUTRIN XL) 150 MG extended release tablet TAKE 1 TABLET EVERY MORNING 90 tablet 1    Cyanocobalamin (VITAMIN B 12 PO) Take by mouth      ondansetron (ZOFRAN) 4 MG tablet Take 1 tablet by mouth daily as needed for Nausea or Vomiting 30 tablet 0    UNABLE TO FIND Tumeric      glimepiride (AMARYL) 4 MG tablet TAKE 1 TABLET TWICE A  tablet 2    diclofenac sodium 1 % GEL       Blood Glucose Monitoring Suppl (MM EASY TOUCH GLUCOSE METER) w/Device KIT 1 kit by Does not apply route 2 times daily 1 kit 0    vitamin D (CHOLECALCIFEROL) 1000 UNIT TABS tablet Take 4,000 Units by mouth daily      magnesium 30 MG tablet Take 30 mg by mouth daily      ONE TOUCH ULTRASOFT LANCETS MISC Test blood sugar 2 times a day 200 each 5    aspirin 325 MG tablet Take 1 tablet by mouth daily. 30 tablet 3     No current facility-administered medications for this visit. The patient's past medical history, past surgical history, family history, medications, and allergies were all reviewed and updated as appropriate today.       Review of Systems   Constitutional: Negative for fatigue and fever. HENT: Negative for congestion, ear pain, rhinorrhea, sore throat and trouble swallowing. Eyes: Negative for photophobia, pain and visual disturbance. Respiratory: Negative for cough and shortness of breath. Cardiovascular: Negative for chest pain and palpitations. Gastrointestinal: Positive for constipation. Negative for abdominal pain, diarrhea, nausea and vomiting. Endocrine: Negative for polydipsia, polyphagia and polyuria. Genitourinary: Negative for difficulty urinating. Musculoskeletal: Positive for arthralgias. Negative for back pain, joint swelling, myalgias and neck pain. Skin: Negative. Allergic/Immunologic: Negative for environmental allergies. Neurological: Negative for light-headedness and headaches. Hematological: Negative for adenopathy. Psychiatric/Behavioral: Negative for dysphoric mood and sleep disturbance. The patient is not nervous/anxious. Physical Exam  Vitals signs and nursing note reviewed. Constitutional:       General: She is not in acute distress. Appearance: Normal appearance. She is normal weight. HENT:      Head: Normocephalic and atraumatic. Right Ear: Tympanic membrane, ear canal and external ear normal. There is no impacted cerumen. Left Ear: Tympanic membrane, ear canal and external ear normal. There is no impacted cerumen. Nose: Nose normal. No congestion or rhinorrhea. Mouth/Throat:      Mouth: Mucous membranes are moist.      Pharynx: Oropharynx is clear. No oropharyngeal exudate or posterior oropharyngeal erythema. Eyes:      General:         Right eye: No discharge. Left eye: No discharge. Extraocular Movements: Extraocular movements intact. Conjunctiva/sclera: Conjunctivae normal.      Pupils: Pupils are equal, round, and reactive to light. Neck:      Musculoskeletal: Normal range of motion and neck supple.    Cardiovascular: Rate and Rhythm: Normal rate and regular rhythm. Pulses: Normal pulses. Heart sounds: Normal heart sounds. No murmur. No friction rub. No gallop. Pulmonary:      Effort: Pulmonary effort is normal.      Breath sounds: Normal breath sounds. No stridor. No wheezing, rhonchi or rales. Abdominal:      General: Abdomen is flat. There is no distension. Palpations: Abdomen is soft. There is no mass. Tenderness: There is no abdominal tenderness. There is no guarding. Hernia: No hernia is present. Musculoskeletal: Normal range of motion. General: No swelling, tenderness or deformity. Right lower leg: No edema. Left lower leg: No edema. Lymphadenopathy:      Cervical: No cervical adenopathy. Skin:     General: Skin is warm and dry. Capillary Refill: Capillary refill takes less than 2 seconds. Coloration: Skin is not jaundiced or pale. Findings: No rash. Neurological:      General: No focal deficit present. Mental Status: She is alert and oriented to person, place, and time. Mental status is at baseline. Psychiatric:         Mood and Affect: Mood normal.         Behavior: Behavior normal.         Thought Content: Thought content normal.         Judgment: Judgment normal.         Assessment/Plan:  1. Physical exam      2. Osteopenia of hip, unspecified laterality    - DEXA BONE DENSITY AXIAL SKELETON; Future    Patient will return in 3 months for recheck of A1c. Provided www. diabetes. org and nutrition pamphlets for diabetes nutrition education. Also provided information about behavioral health consultants here in the office who would be able to meet with patient and work with her on her nutrition changes. Discussed importance of exercise and especially weightbearing exercise for patient. We will have her repeat DEXA scan. Discussed patient's chronic knee pain. Patient was receiving injections and was seen in orthopedic surgeon.   She says that she does not want to go to physical therapy and this was what was required of her prior to surgery. Recommended that if patient continues to have knee pain or any worsening symptoms related to her knee pain that she should follow-up with Ortho. Blood work was completed in August and in November. No need for any blood work today. Patient agrees to plan and will follow up as discussed. begin progressive daily aerobic exercise program, improve dietary compliance, continue current medications, continue current healthy lifestyle patterns and return for routine annual checkups       Return if symptoms worsen or fail to improve.

## 2021-01-01 ENCOUNTER — NURSE TRIAGE (OUTPATIENT)
Dept: OTHER | Facility: CLINIC | Age: 71
End: 2021-01-01

## 2021-01-01 ENCOUNTER — HOSPITAL ENCOUNTER (OUTPATIENT)
Dept: PHYSICAL THERAPY | Age: 71
Setting detail: THERAPIES SERIES
Discharge: HOME | End: 2021-04-20
Payer: COMMERCIAL

## 2021-01-01 ENCOUNTER — TELEPHONE (OUTPATIENT)
Dept: FAMILY MEDICINE CLINIC | Age: 71
End: 2021-01-01

## 2021-01-01 ENCOUNTER — ANESTHESIA (OUTPATIENT)
Dept: OPERATING ROOM | Age: 71
DRG: 987 | End: 2021-01-01
Payer: MEDICARE

## 2021-01-01 ENCOUNTER — TELEPHONE (OUTPATIENT)
Dept: OTHER | Facility: CLINIC | Age: 71
End: 2021-01-01

## 2021-01-01 ENCOUNTER — HOSPITAL ENCOUNTER (INPATIENT)
Age: 71
LOS: 2 days | Discharge: HOME | DRG: 871 | End: 2021-02-25
Attending: EMERGENCY MEDICINE | Admitting: INTERNAL MEDICINE
Payer: COMMERCIAL

## 2021-01-01 ENCOUNTER — ANESTHESIA EVENT (OUTPATIENT)
Dept: OPERATING ROOM | Age: 71
DRG: 987 | End: 2021-01-01
Payer: MEDICARE

## 2021-01-01 ENCOUNTER — ANESTHESIA (OUTPATIENT)
Dept: ENDOSCOPY | Age: 71
DRG: 987 | End: 2021-01-01
Payer: MEDICARE

## 2021-01-01 ENCOUNTER — APPOINTMENT (OUTPATIENT)
Dept: GENERAL RADIOLOGY | Age: 71
DRG: 987 | End: 2021-01-01
Payer: MEDICARE

## 2021-01-01 ENCOUNTER — HOSPITAL ENCOUNTER (EMERGENCY)
Age: 71
Discharge: HOME | End: 2021-05-25
Payer: COMMERCIAL

## 2021-01-01 ENCOUNTER — APPOINTMENT (OUTPATIENT)
Dept: MRI IMAGING | Age: 71
DRG: 987 | End: 2021-01-01
Payer: MEDICARE

## 2021-01-01 ENCOUNTER — OFFICE VISIT (OUTPATIENT)
Dept: FAMILY MEDICINE CLINIC | Age: 71
End: 2021-01-01
Payer: COMMERCIAL

## 2021-01-01 ENCOUNTER — APPOINTMENT (OUTPATIENT)
Dept: NUCLEAR MEDICINE | Age: 71
DRG: 987 | End: 2021-01-01
Payer: MEDICARE

## 2021-01-01 ENCOUNTER — ANESTHESIA EVENT (OUTPATIENT)
Dept: ENDOSCOPY | Age: 71
DRG: 987 | End: 2021-01-01
Payer: MEDICARE

## 2021-01-01 ENCOUNTER — HOSPITAL ENCOUNTER (OUTPATIENT)
Dept: CT IMAGING | Age: 71
Discharge: HOME | End: 2021-06-16
Payer: MEDICARE

## 2021-01-01 ENCOUNTER — HOSPITAL ENCOUNTER (OUTPATIENT)
Dept: MRI IMAGING | Age: 71
Discharge: HOME | End: 2021-06-16
Payer: MEDICARE

## 2021-01-01 ENCOUNTER — APPOINTMENT (OUTPATIENT)
Dept: CT IMAGING | Age: 71
End: 2021-01-01
Payer: COMMERCIAL

## 2021-01-01 ENCOUNTER — HOSPITAL ENCOUNTER (OUTPATIENT)
Age: 71
Discharge: HOME | End: 2021-06-16
Payer: MEDICARE

## 2021-01-01 ENCOUNTER — HOSPITAL ENCOUNTER (OUTPATIENT)
Dept: PHYSICAL THERAPY | Age: 71
Setting detail: THERAPIES SERIES
End: 2021-01-01
Payer: COMMERCIAL

## 2021-01-01 ENCOUNTER — CARE COORDINATION (OUTPATIENT)
Dept: CASE MANAGEMENT | Age: 71
End: 2021-01-01

## 2021-01-01 ENCOUNTER — APPOINTMENT (OUTPATIENT)
Dept: GENERAL RADIOLOGY | Age: 71
DRG: 871 | End: 2021-01-01
Payer: COMMERCIAL

## 2021-01-01 ENCOUNTER — APPOINTMENT (OUTPATIENT)
Dept: ULTRASOUND IMAGING | Age: 71
DRG: 987 | End: 2021-01-01
Payer: MEDICARE

## 2021-01-01 ENCOUNTER — APPOINTMENT (OUTPATIENT)
Dept: CT IMAGING | Age: 71
DRG: 294 | End: 2021-01-01
Payer: COMMERCIAL

## 2021-01-01 ENCOUNTER — HOSPITAL ENCOUNTER (INPATIENT)
Age: 71
LOS: 3 days | DRG: 294 | End: 2021-07-16
Attending: EMERGENCY MEDICINE | Admitting: INTERNAL MEDICINE
Payer: COMMERCIAL

## 2021-01-01 ENCOUNTER — APPOINTMENT (OUTPATIENT)
Dept: CT IMAGING | Age: 71
DRG: 987 | End: 2021-01-01
Payer: MEDICARE

## 2021-01-01 ENCOUNTER — HOSPITAL ENCOUNTER (OUTPATIENT)
Dept: WOMENS IMAGING | Age: 71
Discharge: HOME | End: 2021-01-25
Payer: COMMERCIAL

## 2021-01-01 ENCOUNTER — HOSPITAL ENCOUNTER (INPATIENT)
Age: 71
LOS: 14 days | Discharge: HOSPICE | DRG: 987 | End: 2021-07-11
Attending: EMERGENCY MEDICINE | Admitting: INTERNAL MEDICINE
Payer: MEDICARE

## 2021-01-01 ENCOUNTER — HOSPITAL ENCOUNTER (EMERGENCY)
Age: 71
Discharge: HOME | End: 2021-03-21
Attending: EMERGENCY MEDICINE
Payer: COMMERCIAL

## 2021-01-01 ENCOUNTER — OFFICE VISIT (OUTPATIENT)
Dept: ORTHOPEDIC SURGERY | Age: 71
End: 2021-01-01
Payer: COMMERCIAL

## 2021-01-01 VITALS
TEMPERATURE: 98.2 F | HEART RATE: 78 BPM | HEIGHT: 67 IN | OXYGEN SATURATION: 96 % | RESPIRATION RATE: 16 BRPM | WEIGHT: 200 LBS | DIASTOLIC BLOOD PRESSURE: 73 MMHG | BODY MASS INDEX: 31.39 KG/M2 | SYSTOLIC BLOOD PRESSURE: 147 MMHG

## 2021-01-01 VITALS
HEART RATE: 92 BPM | RESPIRATION RATE: 14 BRPM | SYSTOLIC BLOOD PRESSURE: 121 MMHG | TEMPERATURE: 97.9 F | DIASTOLIC BLOOD PRESSURE: 71 MMHG | WEIGHT: 201 LBS | BODY MASS INDEX: 32.3 KG/M2 | HEIGHT: 66 IN | OXYGEN SATURATION: 97 %

## 2021-01-01 VITALS
HEIGHT: 66 IN | BODY MASS INDEX: 32.3 KG/M2 | SYSTOLIC BLOOD PRESSURE: 102 MMHG | HEART RATE: 101 BPM | DIASTOLIC BLOOD PRESSURE: 68 MMHG | TEMPERATURE: 96 F | OXYGEN SATURATION: 94 % | WEIGHT: 201 LBS

## 2021-01-01 VITALS
OXYGEN SATURATION: 99 % | DIASTOLIC BLOOD PRESSURE: 88 MMHG | TEMPERATURE: 98 F | BODY MASS INDEX: 32.14 KG/M2 | WEIGHT: 200 LBS | SYSTOLIC BLOOD PRESSURE: 126 MMHG | HEIGHT: 66 IN | HEART RATE: 66 BPM | RESPIRATION RATE: 16 BRPM

## 2021-01-01 VITALS
OXYGEN SATURATION: 95 % | RESPIRATION RATE: 18 BRPM | DIASTOLIC BLOOD PRESSURE: 68 MMHG | WEIGHT: 181 LBS | HEART RATE: 73 BPM | BODY MASS INDEX: 28.41 KG/M2 | TEMPERATURE: 97.7 F | HEIGHT: 67 IN | SYSTOLIC BLOOD PRESSURE: 138 MMHG

## 2021-01-01 VITALS — WEIGHT: 200 LBS | BODY MASS INDEX: 32.14 KG/M2 | HEIGHT: 66 IN

## 2021-01-01 VITALS
TEMPERATURE: 97 F | SYSTOLIC BLOOD PRESSURE: 122 MMHG | RESPIRATION RATE: 8 BRPM | OXYGEN SATURATION: 98 % | DIASTOLIC BLOOD PRESSURE: 73 MMHG

## 2021-01-01 VITALS
RESPIRATION RATE: 2 BRPM | OXYGEN SATURATION: 100 % | DIASTOLIC BLOOD PRESSURE: 69 MMHG | SYSTOLIC BLOOD PRESSURE: 116 MMHG

## 2021-01-01 VITALS
RESPIRATION RATE: 20 BRPM | SYSTOLIC BLOOD PRESSURE: 110 MMHG | BODY MASS INDEX: 29.41 KG/M2 | HEART RATE: 127 BPM | OXYGEN SATURATION: 77 % | DIASTOLIC BLOOD PRESSURE: 75 MMHG | HEIGHT: 66 IN | TEMPERATURE: 99.6 F | WEIGHT: 183 LBS

## 2021-01-01 VITALS
WEIGHT: 201.8 LBS | OXYGEN SATURATION: 97 % | TEMPERATURE: 97.2 F | HEART RATE: 99 BPM | BODY MASS INDEX: 32.59 KG/M2 | SYSTOLIC BLOOD PRESSURE: 120 MMHG | DIASTOLIC BLOOD PRESSURE: 70 MMHG

## 2021-01-01 VITALS
OXYGEN SATURATION: 89 % | RESPIRATION RATE: 28 BRPM | SYSTOLIC BLOOD PRESSURE: 136 MMHG | DIASTOLIC BLOOD PRESSURE: 72 MMHG

## 2021-01-01 DIAGNOSIS — H30.90 POSTERIOR UVEITIS, UNSPECIFIED LATERALITY: ICD-10-CM

## 2021-01-01 DIAGNOSIS — E11.9 TYPE 2 DIABETES MELLITUS WITHOUT COMPLICATION, WITHOUT LONG-TERM CURRENT USE OF INSULIN (HCC): ICD-10-CM

## 2021-01-01 DIAGNOSIS — M85.80 OSTEOPENIA, UNSPECIFIED LOCATION: ICD-10-CM

## 2021-01-01 DIAGNOSIS — S39.012A STRAIN OF LUMBAR REGION, INITIAL ENCOUNTER: Primary | ICD-10-CM

## 2021-01-01 DIAGNOSIS — I10 ESSENTIAL HYPERTENSION: ICD-10-CM

## 2021-01-01 DIAGNOSIS — F32.A ANXIETY AND DEPRESSION: ICD-10-CM

## 2021-01-01 DIAGNOSIS — R53.83 FATIGUE, UNSPECIFIED TYPE: ICD-10-CM

## 2021-01-01 DIAGNOSIS — R17 ELEVATED BILIRUBIN: ICD-10-CM

## 2021-01-01 DIAGNOSIS — R53.1 GENERAL WEAKNESS: ICD-10-CM

## 2021-01-01 DIAGNOSIS — I80.202 PHLEGMASIA CERULEA DOLENS OF LEFT LOWER EXTREMITY (HCC): Primary | ICD-10-CM

## 2021-01-01 DIAGNOSIS — E11.40 TYPE 2 DIABETES MELLITUS WITH DIABETIC NEUROPATHY, WITHOUT LONG-TERM CURRENT USE OF INSULIN (HCC): ICD-10-CM

## 2021-01-01 DIAGNOSIS — F41.9 ANXIETY AND DEPRESSION: ICD-10-CM

## 2021-01-01 DIAGNOSIS — E03.9 ACQUIRED HYPOTHYROIDISM: ICD-10-CM

## 2021-01-01 DIAGNOSIS — N17.9 AKI (ACUTE KIDNEY INJURY) (HCC): ICD-10-CM

## 2021-01-01 DIAGNOSIS — D72.829 LEUKOCYTOSIS, UNSPECIFIED TYPE: ICD-10-CM

## 2021-01-01 DIAGNOSIS — E78.2 MIXED HYPERLIPIDEMIA: ICD-10-CM

## 2021-01-01 DIAGNOSIS — J18.9 PNEUMONIA DUE TO ORGANISM: ICD-10-CM

## 2021-01-01 DIAGNOSIS — E11.65 TYPE 2 DIABETES MELLITUS WITH HYPERGLYCEMIA, WITHOUT LONG-TERM CURRENT USE OF INSULIN (HCC): ICD-10-CM

## 2021-01-01 DIAGNOSIS — R25.1 OCCASIONAL TREMORS: ICD-10-CM

## 2021-01-01 DIAGNOSIS — J18.9 PNEUMONIA DUE TO INFECTIOUS ORGANISM, UNSPECIFIED LATERALITY, UNSPECIFIED PART OF LUNG: ICD-10-CM

## 2021-01-01 DIAGNOSIS — H30.90 POSTERIOR UVEITIS, UNSPECIFIED LATERALITY: Primary | ICD-10-CM

## 2021-01-01 DIAGNOSIS — R79.89 ELEVATED LACTIC ACID LEVEL: ICD-10-CM

## 2021-01-01 DIAGNOSIS — C79.9 METASTASIS OF UNKNOWN PRIMARY (HCC): Primary | ICD-10-CM

## 2021-01-01 DIAGNOSIS — E86.0 DEHYDRATION: Primary | ICD-10-CM

## 2021-01-01 DIAGNOSIS — R74.01 TRANSAMINITIS: ICD-10-CM

## 2021-01-01 DIAGNOSIS — M85.859 OSTEOPENIA OF HIP, UNSPECIFIED LATERALITY: ICD-10-CM

## 2021-01-01 DIAGNOSIS — R11.2 NAUSEA AND VOMITING, INTRACTABILITY OF VOMITING NOT SPECIFIED, UNSPECIFIED VOMITING TYPE: ICD-10-CM

## 2021-01-01 DIAGNOSIS — M47.816 LUMBAR SPONDYLOSIS: ICD-10-CM

## 2021-01-01 DIAGNOSIS — R53.1 GENERALIZED WEAKNESS: Primary | ICD-10-CM

## 2021-01-01 DIAGNOSIS — Z20.822 SUSPECTED COVID-19 VIRUS INFECTION: ICD-10-CM

## 2021-01-01 DIAGNOSIS — E87.8 ABNORMAL BLOOD ELECTROLYTE LEVEL: ICD-10-CM

## 2021-01-01 DIAGNOSIS — C79.9 METASTATIC MALIGNANT NEOPLASM, UNSPECIFIED SITE (HCC): ICD-10-CM

## 2021-01-01 DIAGNOSIS — R73.9 HYPERGLYCEMIA: Primary | ICD-10-CM

## 2021-01-01 LAB
A/G RATIO: 0.7 (ref 1.1–2.2)
A/G RATIO: 0.8 (ref 1.1–2.2)
A/G RATIO: 0.9 (ref 1.1–2.2)
A/G RATIO: 1 (ref 1.1–2.2)
A/G RATIO: 1.2 (ref 1.1–2.2)
A/G RATIO: 1.3 (ref 1.1–2.2)
AFP: 1.5 UG/L
ALBUMIN SERPL-MCNC: 2.2 G/DL (ref 3.1–4.9)
ALBUMIN SERPL-MCNC: 2.6 G/DL (ref 3.4–5)
ALBUMIN SERPL-MCNC: 2.8 G/DL (ref 3.4–5)
ALBUMIN SERPL-MCNC: 2.8 G/DL (ref 3.4–5)
ALBUMIN SERPL-MCNC: 2.9 G/DL (ref 3.4–5)
ALBUMIN SERPL-MCNC: 3 G/DL (ref 3.4–5)
ALBUMIN SERPL-MCNC: 3.1 G/DL (ref 3.4–5)
ALBUMIN SERPL-MCNC: 3.2 G/DL (ref 3.4–5)
ALBUMIN SERPL-MCNC: 3.2 G/DL (ref 3.4–5)
ALBUMIN SERPL-MCNC: 3.3 G/DL (ref 3.4–5)
ALBUMIN SERPL-MCNC: 3.6 G/DL (ref 3.4–5)
ALBUMIN SERPL-MCNC: 4.1 G/DL (ref 3.4–5)
ALP BLD-CCNC: 176 U/L (ref 40–129)
ALP BLD-CCNC: 290 U/L (ref 40–129)
ALP BLD-CCNC: 323 U/L (ref 40–129)
ALP BLD-CCNC: 344 U/L (ref 40–129)
ALP BLD-CCNC: 351 U/L (ref 40–129)
ALP BLD-CCNC: 351 U/L (ref 40–129)
ALP BLD-CCNC: 386 U/L (ref 40–129)
ALP BLD-CCNC: 467 U/L (ref 40–129)
ALP BLD-CCNC: 506 U/L (ref 40–129)
ALP BLD-CCNC: 508 U/L (ref 40–129)
ALP BLD-CCNC: 539 U/L (ref 40–129)
ALP BLD-CCNC: 577 U/L (ref 40–129)
ALP BLD-CCNC: 586 U/L (ref 40–129)
ALP BLD-CCNC: 652 U/L (ref 40–129)
ALP BLD-CCNC: 654 U/L (ref 40–129)
ALP BLD-CCNC: 667 U/L (ref 40–129)
ALP BLD-CCNC: 672 U/L (ref 40–129)
ALP BLD-CCNC: 679 U/L (ref 40–129)
ALP BLD-CCNC: 71 U/L (ref 40–129)
ALPHA-1-GLOBULIN: 0.4 G/DL (ref 0.2–0.4)
ALPHA-2-GLOBULIN: 1 G/DL (ref 0.4–1.1)
ALT SERPL-CCNC: 140 U/L (ref 10–40)
ALT SERPL-CCNC: 146 U/L (ref 10–40)
ALT SERPL-CCNC: 18 U/L (ref 10–40)
ALT SERPL-CCNC: 194 U/L (ref 10–40)
ALT SERPL-CCNC: 20 U/L (ref 10–40)
ALT SERPL-CCNC: 260 U/L (ref 10–40)
ALT SERPL-CCNC: 28 U/L (ref 10–40)
ALT SERPL-CCNC: 31 U/L (ref 10–40)
ALT SERPL-CCNC: 34 U/L (ref 10–40)
ALT SERPL-CCNC: 34 U/L (ref 10–40)
ALT SERPL-CCNC: 352 U/L (ref 10–40)
ALT SERPL-CCNC: 40 U/L (ref 10–40)
ALT SERPL-CCNC: 483 U/L (ref 10–40)
ALT SERPL-CCNC: 51 U/L (ref 10–40)
ALT SERPL-CCNC: 546 U/L (ref 10–40)
ALT SERPL-CCNC: 573 U/L (ref 10–40)
ALT SERPL-CCNC: 63 U/L (ref 10–40)
ALT SERPL-CCNC: 82 U/L (ref 10–40)
ALT SERPL-CCNC: 98 U/L (ref 10–40)
AMMONIA: 79 UMOL/L (ref 11–51)
ANION GAP SERPL CALCULATED.3IONS-SCNC: 11 MMOL/L (ref 3–16)
ANION GAP SERPL CALCULATED.3IONS-SCNC: 12 MMOL/L (ref 3–16)
ANION GAP SERPL CALCULATED.3IONS-SCNC: 12 MMOL/L (ref 3–16)
ANION GAP SERPL CALCULATED.3IONS-SCNC: 13 MMOL/L (ref 3–16)
ANION GAP SERPL CALCULATED.3IONS-SCNC: 13 MMOL/L (ref 3–16)
ANION GAP SERPL CALCULATED.3IONS-SCNC: 14 MMOL/L (ref 3–16)
ANION GAP SERPL CALCULATED.3IONS-SCNC: 15 MMOL/L (ref 3–16)
ANION GAP SERPL CALCULATED.3IONS-SCNC: 15 MMOL/L (ref 3–16)
ANION GAP SERPL CALCULATED.3IONS-SCNC: 7 MMOL/L (ref 3–16)
ANION GAP SERPL CALCULATED.3IONS-SCNC: 8 MMOL/L (ref 3–16)
ANION GAP SERPL CALCULATED.3IONS-SCNC: 9 MMOL/L (ref 3–16)
ANISOCYTOSIS: ABNORMAL
ANISOCYTOSIS: ABNORMAL
APTT: 164.1 SEC (ref 26.2–38.6)
APTT: 31.4 SEC (ref 26.2–38.6)
AST SERPL-CCNC: 111 U/L (ref 15–37)
AST SERPL-CCNC: 113 U/L (ref 15–37)
AST SERPL-CCNC: 16 U/L (ref 15–37)
AST SERPL-CCNC: 161 U/L (ref 15–37)
AST SERPL-CCNC: 24 U/L (ref 15–37)
AST SERPL-CCNC: 241 U/L (ref 15–37)
AST SERPL-CCNC: 25 U/L (ref 15–37)
AST SERPL-CCNC: 25 U/L (ref 15–37)
AST SERPL-CCNC: 27 U/L (ref 15–37)
AST SERPL-CCNC: 27 U/L (ref 15–37)
AST SERPL-CCNC: 29 U/L (ref 15–37)
AST SERPL-CCNC: 31 U/L (ref 15–37)
AST SERPL-CCNC: 342 U/L (ref 15–37)
AST SERPL-CCNC: 40 U/L (ref 15–37)
AST SERPL-CCNC: 40 U/L (ref 15–37)
AST SERPL-CCNC: 402 U/L (ref 15–37)
AST SERPL-CCNC: 42 U/L (ref 15–37)
AST SERPL-CCNC: 499 U/L (ref 15–37)
AST SERPL-CCNC: 87 U/L (ref 15–37)
BACTERIA: ABNORMAL /HPF
BACTERIA: ABNORMAL /HPF
BANDED NEUTROPHILS RELATIVE PERCENT: 3 % (ref 0–7)
BANDED NEUTROPHILS RELATIVE PERCENT: 6 % (ref 0–7)
BANDED NEUTROPHILS RELATIVE PERCENT: 9 % (ref 0–7)
BASE EXCESS ARTERIAL: -9 (ref -3–3)
BASOPHILIC STIPPLING: ABNORMAL
BASOPHILS ABSOLUTE: 0 K/UL (ref 0–0.2)
BASOPHILS ABSOLUTE: 0.1 K/UL (ref 0–0.2)
BASOPHILS RELATIVE PERCENT: 0 %
BASOPHILS RELATIVE PERCENT: 0 %
BASOPHILS RELATIVE PERCENT: 0.3 %
BASOPHILS RELATIVE PERCENT: 0.4 %
BASOPHILS RELATIVE PERCENT: 0.6 %
BASOPHILS RELATIVE PERCENT: 0.7 %
BASOPHILS RELATIVE PERCENT: 0.8 %
BASOPHILS RELATIVE PERCENT: 1 %
BASOPHILS RELATIVE PERCENT: 1.1 %
BETA GLOBULIN: 1.2 G/DL (ref 0.9–1.6)
BILIRUB SERPL-MCNC: 0.5 MG/DL (ref 0–1)
BILIRUB SERPL-MCNC: 0.8 MG/DL (ref 0–1)
BILIRUB SERPL-MCNC: 0.8 MG/DL (ref 0–1)
BILIRUB SERPL-MCNC: 0.9 MG/DL (ref 0–1)
BILIRUB SERPL-MCNC: 1 MG/DL (ref 0–1)
BILIRUB SERPL-MCNC: 1.1 MG/DL (ref 0–1)
BILIRUB SERPL-MCNC: 1.5 MG/DL (ref 0–1)
BILIRUB SERPL-MCNC: 1.6 MG/DL (ref 0–1)
BILIRUB SERPL-MCNC: 2.6 MG/DL (ref 0–1)
BILIRUB SERPL-MCNC: 2.9 MG/DL (ref 0–1)
BILIRUB SERPL-MCNC: 2.9 MG/DL (ref 0–1)
BILIRUB SERPL-MCNC: 3 MG/DL (ref 0–1)
BILIRUB SERPL-MCNC: 4.1 MG/DL (ref 0–1)
BILIRUB SERPL-MCNC: 4.6 MG/DL (ref 0–1)
BILIRUB SERPL-MCNC: 4.9 MG/DL (ref 0–1)
BILIRUB SERPL-MCNC: <0.2 MG/DL (ref 0–1)
BILIRUB SERPL-MCNC: <0.2 MG/DL (ref 0–1)
BILIRUBIN URINE: ABNORMAL
BILIRUBIN URINE: NEGATIVE
BLOOD CULTURE, ROUTINE: NORMAL
BLOOD, URINE: NEGATIVE
BLOOD, URINE: NEGATIVE
BUN BLDV-MCNC: 11 MG/DL (ref 7–20)
BUN BLDV-MCNC: 12 MG/DL (ref 7–20)
BUN BLDV-MCNC: 12 MG/DL (ref 7–20)
BUN BLDV-MCNC: 13 MG/DL (ref 7–20)
BUN BLDV-MCNC: 13 MG/DL (ref 7–20)
BUN BLDV-MCNC: 16 MG/DL (ref 7–20)
BUN BLDV-MCNC: 17 MG/DL (ref 7–20)
BUN BLDV-MCNC: 17 MG/DL (ref 7–20)
BUN BLDV-MCNC: 18 MG/DL (ref 7–20)
BUN BLDV-MCNC: 19 MG/DL (ref 7–20)
BUN BLDV-MCNC: 20 MG/DL (ref 7–20)
BUN BLDV-MCNC: 23 MG/DL (ref 7–20)
BUN BLDV-MCNC: 25 MG/DL (ref 7–20)
BUN BLDV-MCNC: 27 MG/DL (ref 7–20)
BUN BLDV-MCNC: 27 MG/DL (ref 7–20)
BUN BLDV-MCNC: 28 MG/DL (ref 7–20)
BUN BLDV-MCNC: 29 MG/DL (ref 7–20)
BUN BLDV-MCNC: 32 MG/DL (ref 7–20)
BUN BLDV-MCNC: 6 MG/DL (ref 7–20)
BUN BLDV-MCNC: 7 MG/DL (ref 7–20)
CA 125: 1018 U/ML (ref 0–35)
CA 15-3: 34 U/ML (ref 0–31)
CA 19-9: ABNORMAL U/ML (ref 0–35)
CA 19-9: ABNORMAL U/ML (ref 0–35)
CA 27-29: 87 U/ML (ref 0–38)
CALCIUM IONIZED: 1.18 MMOL/L (ref 1.12–1.32)
CALCIUM SERPL-MCNC: 8 MG/DL (ref 8.3–10.6)
CALCIUM SERPL-MCNC: 8.4 MG/DL (ref 8.3–10.6)
CALCIUM SERPL-MCNC: 8.6 MG/DL (ref 8.3–10.6)
CALCIUM SERPL-MCNC: 8.7 MG/DL (ref 8.3–10.6)
CALCIUM SERPL-MCNC: 8.8 MG/DL (ref 8.3–10.6)
CALCIUM SERPL-MCNC: 9 MG/DL (ref 8.3–10.6)
CALCIUM SERPL-MCNC: 9.1 MG/DL (ref 8.3–10.6)
CALCIUM SERPL-MCNC: 9.2 MG/DL (ref 8.3–10.6)
CALCIUM SERPL-MCNC: 9.3 MG/DL (ref 8.3–10.6)
CALCIUM SERPL-MCNC: 9.3 MG/DL (ref 8.3–10.6)
CALCIUM SERPL-MCNC: 9.4 MG/DL (ref 8.3–10.6)
CALCIUM SERPL-MCNC: 9.5 MG/DL (ref 8.3–10.6)
CALCIUM SERPL-MCNC: 9.7 MG/DL (ref 8.3–10.6)
CALCIUM SERPL-MCNC: 9.7 MG/DL (ref 8.3–10.6)
CEA: 13.3 NG/ML (ref 0–5)
CHLORIDE BLD-SCNC: 100 MMOL/L (ref 99–110)
CHLORIDE BLD-SCNC: 100 MMOL/L (ref 99–110)
CHLORIDE BLD-SCNC: 102 MMOL/L (ref 99–110)
CHLORIDE BLD-SCNC: 103 MMOL/L (ref 99–110)
CHLORIDE BLD-SCNC: 105 MMOL/L (ref 99–110)
CHLORIDE BLD-SCNC: 105 MMOL/L (ref 99–110)
CHLORIDE BLD-SCNC: 106 MMOL/L (ref 99–110)
CHLORIDE BLD-SCNC: 106 MMOL/L (ref 99–110)
CHLORIDE BLD-SCNC: 110 MMOL/L (ref 99–110)
CHLORIDE BLD-SCNC: 113 MMOL/L (ref 99–110)
CHLORIDE BLD-SCNC: 91 MMOL/L (ref 99–110)
CHLORIDE BLD-SCNC: 93 MMOL/L (ref 99–110)
CHLORIDE BLD-SCNC: 95 MMOL/L (ref 99–110)
CHLORIDE BLD-SCNC: 96 MMOL/L (ref 99–110)
CHLORIDE BLD-SCNC: 97 MMOL/L (ref 99–110)
CHLORIDE BLD-SCNC: 97 MMOL/L (ref 99–110)
CHLORIDE BLD-SCNC: 98 MMOL/L (ref 99–110)
CHLORIDE BLD-SCNC: 98 MMOL/L (ref 99–110)
CHLORIDE BLD-SCNC: 99 MMOL/L (ref 99–110)
CHLORIDE BLD-SCNC: 99 MMOL/L (ref 99–110)
CHP ED QC CHECK: ABNORMAL
CHP ED QC CHECK: ABNORMAL
CHP ED QC CHECK: NORMAL
CLARITY: CLEAR
CLARITY: CLEAR
CO2: 17 MMOL/L (ref 21–32)
CO2: 17 MMOL/L (ref 21–32)
CO2: 19 MMOL/L (ref 21–32)
CO2: 20 MMOL/L (ref 21–32)
CO2: 22 MMOL/L (ref 21–32)
CO2: 23 MMOL/L (ref 21–32)
CO2: 24 MMOL/L (ref 21–32)
CO2: 25 MMOL/L (ref 21–32)
CO2: 26 MMOL/L (ref 21–32)
CO2: 27 MMOL/L (ref 21–32)
CO2: 29 MMOL/L (ref 21–32)
CO2: 29 MMOL/L (ref 21–32)
CO2: 30 MMOL/L (ref 21–32)
COLOR: YELLOW
COLOR: YELLOW
CORTISOL FREE, SERUM: 1.46 UG/DL
CREAT SERPL-MCNC: 0.6 MG/DL (ref 0.6–1.2)
CREAT SERPL-MCNC: 0.8 MG/DL (ref 0.6–1.2)
CREAT SERPL-MCNC: 1 MG/DL (ref 0.6–1.2)
CREAT SERPL-MCNC: 1.1 MG/DL (ref 0.6–1.2)
CREAT SERPL-MCNC: 1.2 MG/DL (ref 0.6–1.2)
CREAT SERPL-MCNC: 1.2 MG/DL (ref 0.6–1.2)
CREAT SERPL-MCNC: 1.3 MG/DL (ref 0.6–1.2)
CREAT SERPL-MCNC: 2 MG/DL (ref 0.6–1.2)
CREAT SERPL-MCNC: 2.7 MG/DL (ref 0.6–1.2)
CREAT SERPL-MCNC: 2.7 MG/DL (ref 0.6–1.2)
CREAT SERPL-MCNC: 2.9 MG/DL (ref 0.6–1.2)
CREAT SERPL-MCNC: 3 MG/DL (ref 0.6–1.2)
CREAT SERPL-MCNC: <0.5 MG/DL (ref 0.6–1.2)
CREAT SERPL-MCNC: <0.5 MG/DL (ref 0.6–1.2)
CULTURE, BLOOD 2: NORMAL
DIABETIC RETINOPATHY: NEGATIVE
EKG ATRIAL RATE: 78 BPM
EKG ATRIAL RATE: 90 BPM
EKG DIAGNOSIS: NORMAL
EKG DIAGNOSIS: NORMAL
EKG P AXIS: 32 DEGREES
EKG P-R INTERVAL: 156 MS
EKG P-R INTERVAL: 184 MS
EKG Q-T INTERVAL: 382 MS
EKG Q-T INTERVAL: 384 MS
EKG QRS DURATION: 78 MS
EKG QRS DURATION: 88 MS
EKG QTC CALCULATION (BAZETT): 435 MS
EKG QTC CALCULATION (BAZETT): 469 MS
EKG R AXIS: -20 DEGREES
EKG R AXIS: -27 DEGREES
EKG T AXIS: 48 DEGREES
EKG T AXIS: 65 DEGREES
EKG VENTRICULAR RATE: 78 BPM
EKG VENTRICULAR RATE: 90 BPM
EOSINOPHILS ABSOLUTE: 0 K/UL (ref 0–0.6)
EOSINOPHILS ABSOLUTE: 0 K/UL (ref 0–0.6)
EOSINOPHILS ABSOLUTE: 0.1 K/UL (ref 0–0.6)
EOSINOPHILS ABSOLUTE: 0.2 K/UL (ref 0–0.6)
EOSINOPHILS ABSOLUTE: 0.3 K/UL (ref 0–0.6)
EOSINOPHILS ABSOLUTE: 0.5 K/UL (ref 0–0.6)
EOSINOPHILS RELATIVE PERCENT: 0.3 %
EOSINOPHILS RELATIVE PERCENT: 0.4 %
EOSINOPHILS RELATIVE PERCENT: 1 %
EOSINOPHILS RELATIVE PERCENT: 1.2 %
EOSINOPHILS RELATIVE PERCENT: 1.4 %
EOSINOPHILS RELATIVE PERCENT: 1.8 %
EOSINOPHILS RELATIVE PERCENT: 1.8 %
EOSINOPHILS RELATIVE PERCENT: 2 %
EOSINOPHILS RELATIVE PERCENT: 2.1 %
EOSINOPHILS RELATIVE PERCENT: 2.3 %
EOSINOPHILS RELATIVE PERCENT: 2.3 %
EOSINOPHILS RELATIVE PERCENT: 2.5 %
EOSINOPHILS RELATIVE PERCENT: 2.5 %
EOSINOPHILS RELATIVE PERCENT: 3 %
EOSINOPHILS RELATIVE PERCENT: 3.3 %
EOSINOPHILS RELATIVE PERCENT: 3.4 %
EOSINOPHILS RELATIVE PERCENT: 3.7 %
EOSINOPHILS RELATIVE PERCENT: 3.9 %
EOSINOPHILS RELATIVE PERCENT: 5 %
EPITHELIAL CELLS, UA: ABNORMAL /HPF (ref 0–5)
EPITHELIAL CELLS, UA: ABNORMAL /HPF (ref 0–5)
ERYTHROPOIETIN: 13 MU/ML (ref 4–27)
ESTIMATED AVERAGE GLUCOSE: 203 MG/DL
FERRITIN: 393.9 NG/ML (ref 15–150)
FOLATE: 17.59 NG/ML (ref 4.78–24.2)
GAMMA GLOBULIN: 0.6 G/DL (ref 0.6–1.8)
GFR AFRICAN AMERICAN: 19
GFR AFRICAN AMERICAN: 19
GFR AFRICAN AMERICAN: 21
GFR AFRICAN AMERICAN: 21
GFR AFRICAN AMERICAN: 30
GFR AFRICAN AMERICAN: 49
GFR AFRICAN AMERICAN: 54
GFR AFRICAN AMERICAN: 54
GFR AFRICAN AMERICAN: 59
GFR AFRICAN AMERICAN: >60
GFR NON-AFRICAN AMERICAN: 15
GFR NON-AFRICAN AMERICAN: 16
GFR NON-AFRICAN AMERICAN: 17
GFR NON-AFRICAN AMERICAN: 17
GFR NON-AFRICAN AMERICAN: 25
GFR NON-AFRICAN AMERICAN: 40
GFR NON-AFRICAN AMERICAN: 44
GFR NON-AFRICAN AMERICAN: 44
GFR NON-AFRICAN AMERICAN: 49
GFR NON-AFRICAN AMERICAN: 55
GFR NON-AFRICAN AMERICAN: >60
GLOBULIN: 3 G/DL
GLOBULIN: 3 G/DL
GLOBULIN: 3.2 G/DL
GLOBULIN: 3.3 G/DL
GLOBULIN: 3.4 G/DL
GLOBULIN: 3.5 G/DL
GLOBULIN: 3.5 G/DL
GLOBULIN: 3.6 G/DL
GLOBULIN: 3.8 G/DL
GLOBULIN: 3.9 G/DL
GLOBULIN: 3.9 G/DL
GLOBULIN: 4 G/DL
GLOBULIN: 4.1 G/DL
GLUCOSE BLD-MCNC: 101 MG/DL (ref 70–99)
GLUCOSE BLD-MCNC: 101 MG/DL (ref 70–99)
GLUCOSE BLD-MCNC: 103 MG/DL (ref 70–99)
GLUCOSE BLD-MCNC: 107 MG/DL (ref 70–99)
GLUCOSE BLD-MCNC: 110 MG/DL (ref 70–99)
GLUCOSE BLD-MCNC: 112 MG/DL (ref 70–99)
GLUCOSE BLD-MCNC: 115 MG/DL (ref 70–99)
GLUCOSE BLD-MCNC: 117 MG/DL (ref 70–99)
GLUCOSE BLD-MCNC: 120 MG/DL (ref 70–99)
GLUCOSE BLD-MCNC: 121 MG/DL (ref 70–99)
GLUCOSE BLD-MCNC: 122 MG/DL (ref 70–99)
GLUCOSE BLD-MCNC: 125 MG/DL (ref 70–99)
GLUCOSE BLD-MCNC: 127 MG/DL (ref 70–99)
GLUCOSE BLD-MCNC: 128 MG/DL (ref 70–99)
GLUCOSE BLD-MCNC: 133 MG/DL
GLUCOSE BLD-MCNC: 133 MG/DL (ref 70–99)
GLUCOSE BLD-MCNC: 133 MG/DL (ref 70–99)
GLUCOSE BLD-MCNC: 138 MG/DL (ref 70–99)
GLUCOSE BLD-MCNC: 139 MG/DL (ref 70–99)
GLUCOSE BLD-MCNC: 142 MG/DL (ref 70–99)
GLUCOSE BLD-MCNC: 143 MG/DL (ref 70–99)
GLUCOSE BLD-MCNC: 144 MG/DL (ref 70–99)
GLUCOSE BLD-MCNC: 145 MG/DL (ref 70–99)
GLUCOSE BLD-MCNC: 155 MG/DL (ref 70–99)
GLUCOSE BLD-MCNC: 157 MG/DL (ref 70–99)
GLUCOSE BLD-MCNC: 159 MG/DL (ref 70–99)
GLUCOSE BLD-MCNC: 162 MG/DL (ref 70–99)
GLUCOSE BLD-MCNC: 162 MG/DL (ref 70–99)
GLUCOSE BLD-MCNC: 165 MG/DL (ref 70–99)
GLUCOSE BLD-MCNC: 165 MG/DL (ref 70–99)
GLUCOSE BLD-MCNC: 170 MG/DL (ref 70–99)
GLUCOSE BLD-MCNC: 177 MG/DL (ref 70–99)
GLUCOSE BLD-MCNC: 178 MG/DL (ref 70–99)
GLUCOSE BLD-MCNC: 180 MG/DL (ref 70–99)
GLUCOSE BLD-MCNC: 180 MG/DL (ref 70–99)
GLUCOSE BLD-MCNC: 185 MG/DL (ref 70–99)
GLUCOSE BLD-MCNC: 190 MG/DL (ref 70–99)
GLUCOSE BLD-MCNC: 192 MG/DL (ref 70–99)
GLUCOSE BLD-MCNC: 199 MG/DL (ref 70–99)
GLUCOSE BLD-MCNC: 199 MG/DL (ref 70–99)
GLUCOSE BLD-MCNC: 200 MG/DL (ref 70–99)
GLUCOSE BLD-MCNC: 208 MG/DL (ref 70–99)
GLUCOSE BLD-MCNC: 217 MG/DL (ref 70–99)
GLUCOSE BLD-MCNC: 219 MG/DL (ref 70–99)
GLUCOSE BLD-MCNC: 222 MG/DL (ref 70–99)
GLUCOSE BLD-MCNC: 224 MG/DL (ref 70–99)
GLUCOSE BLD-MCNC: 226 MG/DL (ref 70–99)
GLUCOSE BLD-MCNC: 227 MG/DL (ref 70–99)
GLUCOSE BLD-MCNC: 228 MG/DL (ref 70–99)
GLUCOSE BLD-MCNC: 244 MG/DL (ref 70–99)
GLUCOSE BLD-MCNC: 253 MG/DL (ref 70–99)
GLUCOSE BLD-MCNC: 263 MG/DL (ref 70–99)
GLUCOSE BLD-MCNC: 263 MG/DL (ref 70–99)
GLUCOSE BLD-MCNC: 266 MG/DL (ref 70–99)
GLUCOSE BLD-MCNC: 270 MG/DL (ref 70–99)
GLUCOSE BLD-MCNC: 273 MG/DL (ref 70–99)
GLUCOSE BLD-MCNC: 273 MG/DL (ref 70–99)
GLUCOSE BLD-MCNC: 276 MG/DL (ref 70–99)
GLUCOSE BLD-MCNC: 280 MG/DL (ref 70–99)
GLUCOSE BLD-MCNC: 280 MG/DL (ref 70–99)
GLUCOSE BLD-MCNC: 295 MG/DL (ref 70–99)
GLUCOSE BLD-MCNC: 296 MG/DL (ref 70–99)
GLUCOSE BLD-MCNC: 304 MG/DL (ref 70–99)
GLUCOSE BLD-MCNC: 310 MG/DL (ref 70–99)
GLUCOSE BLD-MCNC: 310 MG/DL (ref 70–99)
GLUCOSE BLD-MCNC: 321 MG/DL (ref 70–99)
GLUCOSE BLD-MCNC: 322 MG/DL (ref 70–99)
GLUCOSE BLD-MCNC: 332 MG/DL (ref 70–99)
GLUCOSE BLD-MCNC: 337 MG/DL (ref 70–99)
GLUCOSE BLD-MCNC: 338 MG/DL (ref 70–99)
GLUCOSE BLD-MCNC: 342 MG/DL
GLUCOSE BLD-MCNC: 350 MG/DL (ref 70–99)
GLUCOSE BLD-MCNC: 353 MG/DL (ref 70–99)
GLUCOSE BLD-MCNC: 355 MG/DL (ref 70–99)
GLUCOSE BLD-MCNC: 360 MG/DL (ref 70–99)
GLUCOSE BLD-MCNC: 365 MG/DL (ref 70–99)
GLUCOSE BLD-MCNC: 366 MG/DL (ref 70–99)
GLUCOSE BLD-MCNC: 386 MG/DL (ref 70–99)
GLUCOSE BLD-MCNC: 388 MG/DL (ref 70–99)
GLUCOSE BLD-MCNC: 407 MG/DL (ref 70–99)
GLUCOSE BLD-MCNC: 417 MG/DL (ref 70–99)
GLUCOSE BLD-MCNC: 454 MG/DL
GLUCOSE BLD-MCNC: 455 MG/DL (ref 70–99)
GLUCOSE BLD-MCNC: 481 MG/DL (ref 70–99)
GLUCOSE BLD-MCNC: 481 MG/DL (ref 70–99)
GLUCOSE BLD-MCNC: 487 MG/DL (ref 70–99)
GLUCOSE BLD-MCNC: 491 MG/DL (ref 70–99)
GLUCOSE BLD-MCNC: 50 MG/DL (ref 70–99)
GLUCOSE BLD-MCNC: 51 MG/DL (ref 70–99)
GLUCOSE BLD-MCNC: 523 MG/DL (ref 70–99)
GLUCOSE BLD-MCNC: 56 MG/DL (ref 70–99)
GLUCOSE BLD-MCNC: 67 MG/DL (ref 70–99)
GLUCOSE BLD-MCNC: 78 MG/DL (ref 70–99)
GLUCOSE BLD-MCNC: 78 MG/DL (ref 70–99)
GLUCOSE BLD-MCNC: 82 MG/DL (ref 70–99)
GLUCOSE BLD-MCNC: 92 MG/DL (ref 70–99)
GLUCOSE BLD-MCNC: 93 MG/DL (ref 70–99)
GLUCOSE URINE: 500 MG/DL
GLUCOSE URINE: NEGATIVE MG/DL
HAV IGM SER IA-ACNC: NORMAL
HBA1C MFR BLD: 10.4 %
HBA1C MFR BLD: 8.7 %
HCO3 ARTERIAL: 17.6 MMOL/L (ref 21–29)
HCT VFR BLD CALC: 31.5 % (ref 36–48)
HCT VFR BLD CALC: 31.6 % (ref 36–48)
HCT VFR BLD CALC: 31.9 % (ref 36–48)
HCT VFR BLD CALC: 32.2 % (ref 36–48)
HCT VFR BLD CALC: 32.5 % (ref 36–48)
HCT VFR BLD CALC: 32.6 % (ref 36–48)
HCT VFR BLD CALC: 32.9 % (ref 36–48)
HCT VFR BLD CALC: 33 % (ref 36–48)
HCT VFR BLD CALC: 33 % (ref 36–48)
HCT VFR BLD CALC: 33.7 % (ref 36–48)
HCT VFR BLD CALC: 33.8 % (ref 36–48)
HCT VFR BLD CALC: 34 % (ref 36–48)
HCT VFR BLD CALC: 34.1 % (ref 36–48)
HCT VFR BLD CALC: 34.2 % (ref 36–48)
HCT VFR BLD CALC: 34.3 % (ref 36–48)
HCT VFR BLD CALC: 35.9 % (ref 36–48)
HCT VFR BLD CALC: 36.8 % (ref 36–48)
HCT VFR BLD CALC: 38.6 % (ref 36–48)
HCT VFR BLD CALC: 38.9 % (ref 36–48)
HCT VFR BLD CALC: 39 % (ref 36–48)
HEMOGLOBIN: 10.3 G/DL (ref 12–16)
HEMOGLOBIN: 10.6 G/DL (ref 12–16)
HEMOGLOBIN: 10.7 G/DL (ref 12–16)
HEMOGLOBIN: 10.7 G/DL (ref 12–16)
HEMOGLOBIN: 10.8 G/DL (ref 12–16)
HEMOGLOBIN: 10.8 GM/DL (ref 12–16)
HEMOGLOBIN: 10.9 G/DL (ref 12–16)
HEMOGLOBIN: 11 G/DL (ref 12–16)
HEMOGLOBIN: 11.1 G/DL (ref 12–16)
HEMOGLOBIN: 11.2 G/DL (ref 12–16)
HEMOGLOBIN: 11.3 G/DL (ref 12–16)
HEMOGLOBIN: 11.4 G/DL (ref 12–16)
HEMOGLOBIN: 11.5 G/DL (ref 12–16)
HEMOGLOBIN: 11.9 G/DL (ref 12–16)
HEMOGLOBIN: 11.9 G/DL (ref 12–16)
HEMOGLOBIN: 12.5 G/DL (ref 12–16)
HEMOGLOBIN: 12.7 G/DL (ref 12–16)
HEMOGLOBIN: 12.9 G/DL (ref 12–16)
HEPATITIS B CORE IGM ANTIBODY: NORMAL
HEPATITIS B SURFACE ANTIGEN INTERPRETATION: NORMAL
HEPATITIS C ANTIBODY INTERPRETATION: NORMAL
HYALINE CASTS: ABNORMAL /LPF (ref 0–2)
IGA: 135 MG/DL (ref 70–400)
IGG: 587 MG/DL (ref 700–1600)
IGM: 52 MG/DL (ref 40–230)
INR BLD: 1.03 (ref 0.86–1.14)
INR BLD: 1.09 (ref 0.88–1.12)
INR BLD: 1.1 (ref 0.86–1.14)
IRON SATURATION: 28 % (ref 15–50)
IRON: 63 UG/DL (ref 37–145)
KAPPA, FREE LIGHT CHAINS, SERUM: 18.13 MG/L (ref 3.3–19.4)
KAPPA/LAMBDA RATIO: 1.23 (ref 0.26–1.65)
KAPPA/LAMBDA TEST COMMENT: NORMAL
KETONES, URINE: 15 MG/DL
KETONES, URINE: NEGATIVE MG/DL
LACTATE: 0.67 MMOL/L (ref 0.4–2)
LACTIC ACID: 0.6 MMOL/L (ref 0.4–2)
LACTIC ACID: 3.8 MMOL/L (ref 0.4–2)
LAMBDA, FREE LIGHT CHAINS, SERUM: 14.75 MG/L (ref 5.71–26.3)
LEUKOCYTE ESTERASE, URINE: NEGATIVE
LEUKOCYTE ESTERASE, URINE: NEGATIVE
LIPASE: 114 U/L (ref 13–60)
LIPASE: 42 U/L (ref 13–60)
LYMPHOCYTES ABSOLUTE: 1 K/UL (ref 1–5.1)
LYMPHOCYTES ABSOLUTE: 1 K/UL (ref 1–5.1)
LYMPHOCYTES ABSOLUTE: 1.1 K/UL (ref 1–5.1)
LYMPHOCYTES ABSOLUTE: 1.1 K/UL (ref 1–5.1)
LYMPHOCYTES ABSOLUTE: 1.2 K/UL (ref 1–5.1)
LYMPHOCYTES ABSOLUTE: 1.3 K/UL (ref 1–5.1)
LYMPHOCYTES ABSOLUTE: 1.4 K/UL (ref 1–5.1)
LYMPHOCYTES ABSOLUTE: 1.5 K/UL (ref 1–5.1)
LYMPHOCYTES ABSOLUTE: 1.5 K/UL (ref 1–5.1)
LYMPHOCYTES ABSOLUTE: 2 K/UL (ref 1–5.1)
LYMPHOCYTES ABSOLUTE: 2 K/UL (ref 1–5.1)
LYMPHOCYTES ABSOLUTE: 2.1 K/UL (ref 1–5.1)
LYMPHOCYTES ABSOLUTE: 2.2 K/UL (ref 1–5.1)
LYMPHOCYTES RELATIVE PERCENT: 11.5 %
LYMPHOCYTES RELATIVE PERCENT: 12.4 %
LYMPHOCYTES RELATIVE PERCENT: 13.1 %
LYMPHOCYTES RELATIVE PERCENT: 14.2 %
LYMPHOCYTES RELATIVE PERCENT: 14.7 %
LYMPHOCYTES RELATIVE PERCENT: 15.1 %
LYMPHOCYTES RELATIVE PERCENT: 16 %
LYMPHOCYTES RELATIVE PERCENT: 16.2 %
LYMPHOCYTES RELATIVE PERCENT: 17.2 %
LYMPHOCYTES RELATIVE PERCENT: 17.3 %
LYMPHOCYTES RELATIVE PERCENT: 17.6 %
LYMPHOCYTES RELATIVE PERCENT: 17.6 %
LYMPHOCYTES RELATIVE PERCENT: 18.3 %
LYMPHOCYTES RELATIVE PERCENT: 18.7 %
LYMPHOCYTES RELATIVE PERCENT: 19.3 %
LYMPHOCYTES RELATIVE PERCENT: 21.5 %
LYMPHOCYTES RELATIVE PERCENT: 21.8 %
LYMPHOCYTES RELATIVE PERCENT: 22 %
LYMPHOCYTES RELATIVE PERCENT: 6 %
MACROCYTES: ABNORMAL
MACROCYTES: ABNORMAL
MAGNESIUM: 1.5 MG/DL (ref 1.8–2.4)
MAGNESIUM: 1.7 MG/DL (ref 1.8–2.4)
MAGNESIUM: 1.7 MG/DL (ref 1.8–2.4)
MAGNESIUM: 1.8 MG/DL (ref 1.8–2.4)
MAGNESIUM: 1.9 MG/DL (ref 1.8–2.4)
MAGNESIUM: 1.9 MG/DL (ref 1.8–2.4)
MAGNESIUM: 2 MG/DL (ref 1.8–2.4)
MAGNESIUM: 2 MG/DL (ref 1.8–2.4)
MAGNESIUM: 2.1 MG/DL (ref 1.8–2.4)
MAGNESIUM: 2.1 MG/DL (ref 1.8–2.4)
MAGNESIUM: 2.2 MG/DL (ref 1.8–2.4)
MAGNESIUM: 2.3 MG/DL (ref 1.8–2.4)
MCH RBC QN AUTO: 25.6 PG (ref 26–34)
MCH RBC QN AUTO: 25.9 PG (ref 26–34)
MCH RBC QN AUTO: 25.9 PG (ref 26–34)
MCH RBC QN AUTO: 26 PG (ref 26–34)
MCH RBC QN AUTO: 26.2 PG (ref 26–34)
MCH RBC QN AUTO: 26.3 PG (ref 26–34)
MCH RBC QN AUTO: 26.4 PG (ref 26–34)
MCH RBC QN AUTO: 26.6 PG (ref 26–34)
MCH RBC QN AUTO: 26.6 PG (ref 26–34)
MCH RBC QN AUTO: 26.7 PG (ref 26–34)
MCH RBC QN AUTO: 26.8 PG (ref 26–34)
MCH RBC QN AUTO: 27 PG (ref 26–34)
MCH RBC QN AUTO: 27.2 PG (ref 26–34)
MCHC RBC AUTO-ENTMCNC: 32.1 G/DL (ref 31–36)
MCHC RBC AUTO-ENTMCNC: 32.2 G/DL (ref 31–36)
MCHC RBC AUTO-ENTMCNC: 32.3 G/DL (ref 31–36)
MCHC RBC AUTO-ENTMCNC: 32.6 G/DL (ref 31–36)
MCHC RBC AUTO-ENTMCNC: 32.7 G/DL (ref 31–36)
MCHC RBC AUTO-ENTMCNC: 32.7 G/DL (ref 31–36)
MCHC RBC AUTO-ENTMCNC: 32.8 G/DL (ref 31–36)
MCHC RBC AUTO-ENTMCNC: 32.8 G/DL (ref 31–36)
MCHC RBC AUTO-ENTMCNC: 32.9 G/DL (ref 31–36)
MCHC RBC AUTO-ENTMCNC: 32.9 G/DL (ref 31–36)
MCHC RBC AUTO-ENTMCNC: 33.1 G/DL (ref 31–36)
MCHC RBC AUTO-ENTMCNC: 33.1 G/DL (ref 31–36)
MCHC RBC AUTO-ENTMCNC: 33.2 G/DL (ref 31–36)
MCHC RBC AUTO-ENTMCNC: 33.3 G/DL (ref 31–36)
MCHC RBC AUTO-ENTMCNC: 33.3 G/DL (ref 31–36)
MCHC RBC AUTO-ENTMCNC: 33.5 G/DL (ref 31–36)
MCHC RBC AUTO-ENTMCNC: 33.6 G/DL (ref 31–36)
MCHC RBC AUTO-ENTMCNC: 33.6 G/DL (ref 31–36)
MCHC RBC AUTO-ENTMCNC: 33.7 G/DL (ref 31–36)
MCHC RBC AUTO-ENTMCNC: 33.8 G/DL (ref 31–36)
MCV RBC AUTO: 78.9 FL (ref 80–100)
MCV RBC AUTO: 79.1 FL (ref 80–100)
MCV RBC AUTO: 79.1 FL (ref 80–100)
MCV RBC AUTO: 79.2 FL (ref 80–100)
MCV RBC AUTO: 79.2 FL (ref 80–100)
MCV RBC AUTO: 79.3 FL (ref 80–100)
MCV RBC AUTO: 79.4 FL (ref 80–100)
MCV RBC AUTO: 79.4 FL (ref 80–100)
MCV RBC AUTO: 79.5 FL (ref 80–100)
MCV RBC AUTO: 79.8 FL (ref 80–100)
MCV RBC AUTO: 80.1 FL (ref 80–100)
MCV RBC AUTO: 80.1 FL (ref 80–100)
MCV RBC AUTO: 80.2 FL (ref 80–100)
MCV RBC AUTO: 80.6 FL (ref 80–100)
MCV RBC AUTO: 81.3 FL (ref 80–100)
MCV RBC AUTO: 81.3 FL (ref 80–100)
MCV RBC AUTO: 81.4 FL (ref 80–100)
MCV RBC AUTO: 82.5 FL (ref 80–100)
METAMYELOCYTES RELATIVE PERCENT: 1 %
MICROSCOPIC EXAMINATION: YES
MICROSCOPIC EXAMINATION: YES
MONOCYTES ABSOLUTE: 0.5 K/UL (ref 0–1.3)
MONOCYTES ABSOLUTE: 0.5 K/UL (ref 0–1.3)
MONOCYTES ABSOLUTE: 0.6 K/UL (ref 0–1.3)
MONOCYTES ABSOLUTE: 0.7 K/UL (ref 0–1.3)
MONOCYTES ABSOLUTE: 0.7 K/UL (ref 0–1.3)
MONOCYTES ABSOLUTE: 0.8 K/UL (ref 0–1.3)
MONOCYTES ABSOLUTE: 0.9 K/UL (ref 0–1.3)
MONOCYTES ABSOLUTE: 1 K/UL (ref 0–1.3)
MONOCYTES ABSOLUTE: 1.3 K/UL (ref 0–1.3)
MONOCYTES RELATIVE PERCENT: 10.5 %
MONOCYTES RELATIVE PERCENT: 10.6 %
MONOCYTES RELATIVE PERCENT: 10.9 %
MONOCYTES RELATIVE PERCENT: 11 %
MONOCYTES RELATIVE PERCENT: 11.1 %
MONOCYTES RELATIVE PERCENT: 12.2 %
MONOCYTES RELATIVE PERCENT: 5 %
MONOCYTES RELATIVE PERCENT: 5.9 %
MONOCYTES RELATIVE PERCENT: 7.1 %
MONOCYTES RELATIVE PERCENT: 7.7 %
MONOCYTES RELATIVE PERCENT: 7.7 %
MONOCYTES RELATIVE PERCENT: 7.9 %
MONOCYTES RELATIVE PERCENT: 8 %
MONOCYTES RELATIVE PERCENT: 8.1 %
MONOCYTES RELATIVE PERCENT: 8.5 %
MONOCYTES RELATIVE PERCENT: 9.1 %
MONOCYTES RELATIVE PERCENT: 9.3 %
MONOCYTES RELATIVE PERCENT: 9.7 %
MONOCYTES RELATIVE PERCENT: 9.7 %
MUCUS: ABNORMAL /LPF
MYELOCYTE PERCENT: 1 %
NEUTROPHILS ABSOLUTE: 13.5 K/UL (ref 1.7–7.7)
NEUTROPHILS ABSOLUTE: 4.1 K/UL (ref 1.7–7.7)
NEUTROPHILS ABSOLUTE: 4.8 K/UL (ref 1.7–7.7)
NEUTROPHILS ABSOLUTE: 5.1 K/UL (ref 1.7–7.7)
NEUTROPHILS ABSOLUTE: 5.1 K/UL (ref 1.7–7.7)
NEUTROPHILS ABSOLUTE: 5.3 K/UL (ref 1.7–7.7)
NEUTROPHILS ABSOLUTE: 5.4 K/UL (ref 1.7–7.7)
NEUTROPHILS ABSOLUTE: 5.6 K/UL (ref 1.7–7.7)
NEUTROPHILS ABSOLUTE: 6 K/UL (ref 1.7–7.7)
NEUTROPHILS ABSOLUTE: 6.1 K/UL (ref 1.7–7.7)
NEUTROPHILS ABSOLUTE: 6.2 K/UL (ref 1.7–7.7)
NEUTROPHILS ABSOLUTE: 6.5 K/UL (ref 1.7–7.7)
NEUTROPHILS ABSOLUTE: 6.9 K/UL (ref 1.7–7.7)
NEUTROPHILS ABSOLUTE: 6.9 K/UL (ref 1.7–7.7)
NEUTROPHILS ABSOLUTE: 7 K/UL (ref 1.7–7.7)
NEUTROPHILS ABSOLUTE: 7 K/UL (ref 1.7–7.7)
NEUTROPHILS ABSOLUTE: 7.4 K/UL (ref 1.7–7.7)
NEUTROPHILS ABSOLUTE: 7.5 K/UL (ref 1.7–7.7)
NEUTROPHILS ABSOLUTE: 9.6 K/UL (ref 1.7–7.7)
NEUTROPHILS RELATIVE PERCENT: 61 %
NEUTROPHILS RELATIVE PERCENT: 64 %
NEUTROPHILS RELATIVE PERCENT: 64.8 %
NEUTROPHILS RELATIVE PERCENT: 66.2 %
NEUTROPHILS RELATIVE PERCENT: 66.9 %
NEUTROPHILS RELATIVE PERCENT: 67.7 %
NEUTROPHILS RELATIVE PERCENT: 68.3 %
NEUTROPHILS RELATIVE PERCENT: 69.9 %
NEUTROPHILS RELATIVE PERCENT: 70.7 %
NEUTROPHILS RELATIVE PERCENT: 71.3 %
NEUTROPHILS RELATIVE PERCENT: 71.3 %
NEUTROPHILS RELATIVE PERCENT: 72 %
NEUTROPHILS RELATIVE PERCENT: 72.6 %
NEUTROPHILS RELATIVE PERCENT: 74.9 %
NEUTROPHILS RELATIVE PERCENT: 75.1 %
NEUTROPHILS RELATIVE PERCENT: 75.2 %
NEUTROPHILS RELATIVE PERCENT: 75.8 %
NEUTROPHILS RELATIVE PERCENT: 77 %
NEUTROPHILS RELATIVE PERCENT: 79 %
NITRITE, URINE: NEGATIVE
NITRITE, URINE: NEGATIVE
O2 SAT, ARTERIAL: 93 % (ref 93–100)
OVALOCYTES: ABNORMAL
PCO2 ARTERIAL: 35.2 MM HG (ref 35–45)
PDW BLD-RTO: 13.4 % (ref 12.4–15.4)
PDW BLD-RTO: 13.4 % (ref 12.4–15.4)
PDW BLD-RTO: 13.8 % (ref 12.4–15.4)
PDW BLD-RTO: 14.6 % (ref 12.4–15.4)
PDW BLD-RTO: 16.4 % (ref 12.4–15.4)
PDW BLD-RTO: 16.5 % (ref 12.4–15.4)
PDW BLD-RTO: 16.5 % (ref 12.4–15.4)
PDW BLD-RTO: 16.6 % (ref 12.4–15.4)
PDW BLD-RTO: 16.6 % (ref 12.4–15.4)
PDW BLD-RTO: 16.7 % (ref 12.4–15.4)
PDW BLD-RTO: 16.8 % (ref 12.4–15.4)
PDW BLD-RTO: 16.8 % (ref 12.4–15.4)
PDW BLD-RTO: 16.9 % (ref 12.4–15.4)
PDW BLD-RTO: 16.9 % (ref 12.4–15.4)
PDW BLD-RTO: 17 % (ref 12.4–15.4)
PDW BLD-RTO: 17.1 % (ref 12.4–15.4)
PDW BLD-RTO: 17.3 % (ref 12.4–15.4)
PDW BLD-RTO: 17.6 % (ref 12.4–15.4)
PERFORMED ON: ABNORMAL
PERFORMED ON: NORMAL
PH ARTERIAL: 7.31 (ref 7.35–7.45)
PH UA: 6 (ref 5–8)
PH UA: 6 (ref 5–8)
PHOSPHORUS: 3.5 MG/DL (ref 2.5–4.9)
PLATELET # BLD: 240 K/UL (ref 135–450)
PLATELET # BLD: 249 K/UL (ref 135–450)
PLATELET # BLD: 260 K/UL (ref 135–450)
PLATELET # BLD: 263 K/UL (ref 135–450)
PLATELET # BLD: 264 K/UL (ref 135–450)
PLATELET # BLD: 266 K/UL (ref 135–450)
PLATELET # BLD: 266 K/UL (ref 135–450)
PLATELET # BLD: 269 K/UL (ref 135–450)
PLATELET # BLD: 279 K/UL (ref 135–450)
PLATELET # BLD: 281 K/UL (ref 135–450)
PLATELET # BLD: 285 K/UL (ref 135–450)
PLATELET # BLD: 298 K/UL (ref 135–450)
PLATELET # BLD: 299 K/UL (ref 135–450)
PLATELET # BLD: 305 K/UL (ref 135–450)
PLATELET # BLD: 309 K/UL (ref 135–450)
PLATELET # BLD: 321 K/UL (ref 135–450)
PLATELET # BLD: 325 K/UL (ref 135–450)
PLATELET # BLD: 348 K/UL (ref 135–450)
PLATELET # BLD: 350 K/UL (ref 135–450)
PLATELET # BLD: 402 K/UL (ref 135–450)
PMV BLD AUTO: 7.6 FL (ref 5–10.5)
PMV BLD AUTO: 7.7 FL (ref 5–10.5)
PMV BLD AUTO: 7.8 FL (ref 5–10.5)
PMV BLD AUTO: 7.8 FL (ref 5–10.5)
PMV BLD AUTO: 7.9 FL (ref 5–10.5)
PMV BLD AUTO: 8 FL (ref 5–10.5)
PMV BLD AUTO: 8.1 FL (ref 5–10.5)
PMV BLD AUTO: 8.2 FL (ref 5–10.5)
PMV BLD AUTO: 8.2 FL (ref 5–10.5)
PMV BLD AUTO: 8.3 FL (ref 5–10.5)
PMV BLD AUTO: 8.3 FL (ref 5–10.5)
PMV BLD AUTO: 8.4 FL (ref 5–10.5)
PMV BLD AUTO: 8.6 FL (ref 5–10.5)
PMV BLD AUTO: 8.6 FL (ref 5–10.5)
PMV BLD AUTO: 9.1 FL (ref 5–10.5)
PO2 ARTERIAL: 72.5 MM HG (ref 75–108)
POC HEMATOCRIT: 32 % (ref 36–48)
POC POTASSIUM: 5.4 MMOL/L (ref 3.5–5.1)
POC SAMPLE TYPE: ABNORMAL
POC SODIUM: 139 MMOL/L (ref 136–145)
POIKILOCYTES: ABNORMAL
POIKILOCYTES: ABNORMAL
POLYCHROMASIA: ABNORMAL
POLYCHROMASIA: ABNORMAL
POTASSIUM REFLEX MAGNESIUM: 3.6 MMOL/L (ref 3.5–5.1)
POTASSIUM REFLEX MAGNESIUM: 5.3 MMOL/L (ref 3.5–5.1)
POTASSIUM REFLEX MAGNESIUM: 5.5 MMOL/L (ref 3.5–5.1)
POTASSIUM SERPL-SCNC: 3.4 MMOL/L (ref 3.5–5.1)
POTASSIUM SERPL-SCNC: 3.9 MMOL/L (ref 3.5–5.1)
POTASSIUM SERPL-SCNC: 4 MMOL/L (ref 3.5–5.1)
POTASSIUM SERPL-SCNC: 4.6 MMOL/L (ref 3.5–5.1)
POTASSIUM SERPL-SCNC: 4.6 MMOL/L (ref 3.5–5.1)
POTASSIUM SERPL-SCNC: 4.8 MMOL/L (ref 3.5–5.1)
POTASSIUM SERPL-SCNC: 4.9 MMOL/L (ref 3.5–5.1)
POTASSIUM SERPL-SCNC: 5 MMOL/L (ref 3.5–5.1)
POTASSIUM SERPL-SCNC: 5.1 MMOL/L (ref 3.5–5.1)
POTASSIUM SERPL-SCNC: 5.3 MMOL/L (ref 3.5–5.1)
POTASSIUM SERPL-SCNC: 5.4 MMOL/L (ref 3.5–5.1)
POTASSIUM SERPL-SCNC: 5.4 MMOL/L (ref 3.5–5.1)
POTASSIUM SERPL-SCNC: 5.6 MMOL/L (ref 3.5–5.1)
POTASSIUM SERPL-SCNC: 5.8 MMOL/L (ref 3.5–5.1)
POTASSIUM SERPL-SCNC: 6 MMOL/L (ref 3.5–5.1)
PRO-BNP: 83 PG/ML (ref 0–124)
PROCALCITONIN: 0.05 NG/ML (ref 0–0.15)
PROTEIN UA: ABNORMAL MG/DL
PROTEIN UA: ABNORMAL MG/DL
PROTHROMBIN TIME: 11.9 SEC (ref 10–13.2)
PROTHROMBIN TIME: 12.4 SEC (ref 9.9–12.7)
PROTHROMBIN TIME: 12.8 SEC (ref 10–13.2)
RBC # BLD: 3.94 M/UL (ref 4–5.2)
RBC # BLD: 3.98 M/UL (ref 4–5.2)
RBC # BLD: 4.03 M/UL (ref 4–5.2)
RBC # BLD: 4.04 M/UL (ref 4–5.2)
RBC # BLD: 4.04 M/UL (ref 4–5.2)
RBC # BLD: 4.06 M/UL (ref 4–5.2)
RBC # BLD: 4.1 M/UL (ref 4–5.2)
RBC # BLD: 4.16 M/UL (ref 4–5.2)
RBC # BLD: 4.16 M/UL (ref 4–5.2)
RBC # BLD: 4.21 M/UL (ref 4–5.2)
RBC # BLD: 4.22 M/UL (ref 4–5.2)
RBC # BLD: 4.22 M/UL (ref 4–5.2)
RBC # BLD: 4.24 M/UL (ref 4–5.2)
RBC # BLD: 4.29 M/UL (ref 4–5.2)
RBC # BLD: 4.3 M/UL (ref 4–5.2)
RBC # BLD: 4.5 M/UL (ref 4–5.2)
RBC # BLD: 4.59 M/UL (ref 4–5.2)
RBC # BLD: 4.67 M/UL (ref 4–5.2)
RBC # BLD: 4.84 M/UL (ref 4–5.2)
RBC # BLD: 4.92 M/UL (ref 4–5.2)
RBC UA: ABNORMAL /HPF (ref 0–4)
RBC UA: ABNORMAL /HPF (ref 0–4)
REASON FOR REJECTION: NORMAL
REJECTED TEST: NORMAL
REPORT: NORMAL
SARS-COV-2, NAAT: NOT DETECTED
SARS-COV-2, NAAT: NOT DETECTED
SARS-COV-2, PCR: NOT DETECTED
SLIDE REVIEW: ABNORMAL
SODIUM BLD-SCNC: 127 MMOL/L (ref 136–145)
SODIUM BLD-SCNC: 129 MMOL/L (ref 136–145)
SODIUM BLD-SCNC: 131 MMOL/L (ref 136–145)
SODIUM BLD-SCNC: 131 MMOL/L (ref 136–145)
SODIUM BLD-SCNC: 133 MMOL/L (ref 136–145)
SODIUM BLD-SCNC: 134 MMOL/L (ref 136–145)
SODIUM BLD-SCNC: 135 MMOL/L (ref 136–145)
SODIUM BLD-SCNC: 135 MMOL/L (ref 136–145)
SODIUM BLD-SCNC: 136 MMOL/L (ref 136–145)
SODIUM BLD-SCNC: 137 MMOL/L (ref 136–145)
SODIUM BLD-SCNC: 138 MMOL/L (ref 136–145)
SODIUM BLD-SCNC: 139 MMOL/L (ref 136–145)
SODIUM BLD-SCNC: 139 MMOL/L (ref 136–145)
SODIUM BLD-SCNC: 143 MMOL/L (ref 136–145)
SOLUBLE TRANSFERRIN RECEPT: 2.7 MG/L (ref 1.9–4.4)
SPE/IFE INTERPRETATION: NORMAL
SPECIFIC GRAVITY UA: 1.02 (ref 1–1.03)
SPECIFIC GRAVITY UA: 1.02 (ref 1–1.03)
TCO2 ARTERIAL: 19 MMOL/L
TOTAL IRON BINDING CAPACITY: 227 UG/DL (ref 260–445)
TOTAL PROTEIN: 5.3 G/DL (ref 6.4–8.2)
TOTAL PROTEIN: 5.9 G/DL (ref 6.4–8.2)
TOTAL PROTEIN: 6.1 G/DL (ref 6.4–8.2)
TOTAL PROTEIN: 6.1 G/DL (ref 6.4–8.2)
TOTAL PROTEIN: 6.2 G/DL (ref 6.4–8.2)
TOTAL PROTEIN: 6.2 G/DL (ref 6.4–8.2)
TOTAL PROTEIN: 6.3 G/DL (ref 6.4–8.2)
TOTAL PROTEIN: 6.5 G/DL (ref 6.4–8.2)
TOTAL PROTEIN: 6.6 G/DL (ref 6.4–8.2)
TOTAL PROTEIN: 6.6 G/DL (ref 6.4–8.2)
TOTAL PROTEIN: 6.7 G/DL (ref 6.4–8.2)
TOTAL PROTEIN: 7 G/DL (ref 6.4–8.2)
TOTAL PROTEIN: 7.1 G/DL (ref 6.4–8.2)
TOTAL PROTEIN: 7.3 G/DL (ref 6.4–8.2)
TOTAL PROTEIN: 7.3 G/DL (ref 6.4–8.2)
TOTAL PROTEIN: 7.4 G/DL (ref 6.4–8.2)
TOTAL PROTEIN: 7.6 G/DL (ref 6.4–8.2)
TROPONIN: <0.01 NG/ML
TROPONIN: <0.01 NG/ML
URIC ACID, SERUM: 4.1 MG/DL (ref 2.6–6)
URINE CULTURE, ROUTINE: NORMAL
URINE REFLEX TO CULTURE: YES
URINE TYPE: ABNORMAL
URINE TYPE: ABNORMAL
UROBILINOGEN, URINE: 0.2 E.U./DL
UROBILINOGEN, URINE: 1 E.U./DL
VITAMIN B-12: >2000 PG/ML (ref 211–911)
WBC # BLD: 10.5 K/UL (ref 4–11)
WBC # BLD: 12.7 K/UL (ref 4–11)
WBC # BLD: 15.9 K/UL (ref 4–11)
WBC # BLD: 6.2 K/UL (ref 4–11)
WBC # BLD: 6.2 K/UL (ref 4–11)
WBC # BLD: 6.7 K/UL (ref 4–11)
WBC # BLD: 7.5 K/UL (ref 4–11)
WBC # BLD: 7.6 K/UL (ref 4–11)
WBC # BLD: 7.8 K/UL (ref 4–11)
WBC # BLD: 7.9 K/UL (ref 4–11)
WBC # BLD: 7.9 K/UL (ref 4–11)
WBC # BLD: 8.2 K/UL (ref 4–11)
WBC # BLD: 8.8 K/UL (ref 4–11)
WBC # BLD: 8.9 K/UL (ref 4–11)
WBC # BLD: 9.1 K/UL (ref 4–11)
WBC # BLD: 9.2 K/UL (ref 4–11)
WBC # BLD: 9.3 K/UL (ref 4–11)
WBC # BLD: 9.6 K/UL (ref 4–11)
WBC # BLD: 9.8 K/UL (ref 4–11)
WBC # BLD: 9.8 K/UL (ref 4–11)
WBC UA: ABNORMAL /HPF (ref 0–5)
WBC UA: ABNORMAL /HPF (ref 0–5)

## 2021-01-01 PROCEDURE — 6370000000 HC RX 637 (ALT 250 FOR IP): Performed by: INTERNAL MEDICINE

## 2021-01-01 PROCEDURE — 6360000002 HC RX W HCPCS: Performed by: UROLOGY

## 2021-01-01 PROCEDURE — 73552 X-RAY EXAM OF FEMUR 2/>: CPT

## 2021-01-01 PROCEDURE — 80053 COMPREHEN METABOLIC PANEL: CPT

## 2021-01-01 PROCEDURE — 85025 COMPLETE CBC W/AUTO DIFF WBC: CPT

## 2021-01-01 PROCEDURE — 99221 1ST HOSP IP/OBS SF/LOW 40: CPT | Performed by: INTERNAL MEDICINE

## 2021-01-01 PROCEDURE — 6370000000 HC RX 637 (ALT 250 FOR IP): Performed by: NURSE PRACTITIONER

## 2021-01-01 PROCEDURE — 96372 THER/PROPH/DIAG INJ SC/IM: CPT

## 2021-01-01 PROCEDURE — 6370000000 HC RX 637 (ALT 250 FOR IP): Performed by: UROLOGY

## 2021-01-01 PROCEDURE — 87635 SARS-COV-2 COVID-19 AMP PRB: CPT

## 2021-01-01 PROCEDURE — 96375 TX/PRO/DX INJ NEW DRUG ADDON: CPT

## 2021-01-01 PROCEDURE — 96361 HYDRATE IV INFUSION ADD-ON: CPT

## 2021-01-01 PROCEDURE — 84484 ASSAY OF TROPONIN QUANT: CPT

## 2021-01-01 PROCEDURE — 99232 SBSQ HOSP IP/OBS MODERATE 35: CPT | Performed by: HOSPITALIST

## 2021-01-01 PROCEDURE — 74330 X-RAY BILE/PANC ENDOSCOPY: CPT

## 2021-01-01 PROCEDURE — 83735 ASSAY OF MAGNESIUM: CPT

## 2021-01-01 PROCEDURE — 6370000000 HC RX 637 (ALT 250 FOR IP): Performed by: HOSPITALIST

## 2021-01-01 PROCEDURE — 82530 CORTISOL FREE: CPT

## 2021-01-01 PROCEDURE — 99232 SBSQ HOSP IP/OBS MODERATE 35: CPT | Performed by: INTERNAL MEDICINE

## 2021-01-01 PROCEDURE — 83550 IRON BINDING TEST: CPT

## 2021-01-01 PROCEDURE — 1200000000 HC SEMI PRIVATE

## 2021-01-01 PROCEDURE — 97530 THERAPEUTIC ACTIVITIES: CPT

## 2021-01-01 PROCEDURE — A9503 TC99M MEDRONATE: HCPCS | Performed by: NURSE PRACTITIONER

## 2021-01-01 PROCEDURE — 2580000003 HC RX 258: Performed by: UROLOGY

## 2021-01-01 PROCEDURE — 2580000003 HC RX 258: Performed by: NURSE ANESTHETIST, CERTIFIED REGISTERED

## 2021-01-01 PROCEDURE — 97110 THERAPEUTIC EXERCISES: CPT

## 2021-01-01 PROCEDURE — 2700000000 HC OXYGEN THERAPY PER DAY

## 2021-01-01 PROCEDURE — 2709999900 CT BIOPSY DEEP BONE PERCUTANEOUS

## 2021-01-01 PROCEDURE — 99213 OFFICE O/P EST LOW 20 MIN: CPT | Performed by: REGISTERED NURSE

## 2021-01-01 PROCEDURE — 74181 MRI ABDOMEN W/O CONTRAST: CPT

## 2021-01-01 PROCEDURE — 82962 GLUCOSE BLOOD TEST: CPT | Performed by: REGISTERED NURSE

## 2021-01-01 PROCEDURE — 6360000002 HC RX W HCPCS: Performed by: INTERNAL MEDICINE

## 2021-01-01 PROCEDURE — 83605 ASSAY OF LACTIC ACID: CPT

## 2021-01-01 PROCEDURE — 36415 COLL VENOUS BLD VENIPUNCTURE: CPT

## 2021-01-01 PROCEDURE — 84550 ASSAY OF BLOOD/URIC ACID: CPT

## 2021-01-01 PROCEDURE — 99284 EMERGENCY DEPT VISIT MOD MDM: CPT

## 2021-01-01 PROCEDURE — 85610 PROTHROMBIN TIME: CPT

## 2021-01-01 PROCEDURE — 2580000003 HC RX 258: Performed by: INTERNAL MEDICINE

## 2021-01-01 PROCEDURE — 2580000003 HC RX 258: Performed by: PHYSICIAN ASSISTANT

## 2021-01-01 PROCEDURE — 3609014900 HC ERCP W/SPHINCTEROTOMY &/OR PAPILLOTOMY: Performed by: INTERNAL MEDICINE

## 2021-01-01 PROCEDURE — 99233 SBSQ HOSP IP/OBS HIGH 50: CPT | Performed by: HOSPITALIST

## 2021-01-01 PROCEDURE — 36600 WITHDRAWAL OF ARTERIAL BLOOD: CPT

## 2021-01-01 PROCEDURE — 3600000005 HC SURGERY LEVEL 5 BASE: Performed by: UROLOGY

## 2021-01-01 PROCEDURE — 2709999900 HC NON-CHARGEABLE SUPPLY: Performed by: INTERNAL MEDICINE

## 2021-01-01 PROCEDURE — 2500000003 HC RX 250 WO HCPCS: Performed by: INTERNAL MEDICINE

## 2021-01-01 PROCEDURE — 96365 THER/PROPH/DIAG IV INF INIT: CPT

## 2021-01-01 PROCEDURE — 6360000002 HC RX W HCPCS: Performed by: PHYSICIAN ASSISTANT

## 2021-01-01 PROCEDURE — 3609015100 HC ERCP STENT PLACEMENT BILIARY/PANCREATIC DUCT: Performed by: INTERNAL MEDICINE

## 2021-01-01 PROCEDURE — 88307 TISSUE EXAM BY PATHOLOGIST: CPT

## 2021-01-01 PROCEDURE — 6360000002 HC RX W HCPCS

## 2021-01-01 PROCEDURE — G0378 HOSPITAL OBSERVATION PER HR: HCPCS

## 2021-01-01 PROCEDURE — 88342 IMHCHEM/IMCYTCHM 1ST ANTB: CPT

## 2021-01-01 PROCEDURE — 93005 ELECTROCARDIOGRAM TRACING: CPT | Performed by: EMERGENCY MEDICINE

## 2021-01-01 PROCEDURE — 2500000003 HC RX 250 WO HCPCS: Performed by: UROLOGY

## 2021-01-01 PROCEDURE — 83690 ASSAY OF LIPASE: CPT

## 2021-01-01 PROCEDURE — 92526 ORAL FUNCTION THERAPY: CPT

## 2021-01-01 PROCEDURE — 99283 EMERGENCY DEPT VISIT LOW MDM: CPT

## 2021-01-01 PROCEDURE — 86301 IMMUNOASSAY TUMOR CA 19-9: CPT

## 2021-01-01 PROCEDURE — 3700000001 HC ADD 15 MINUTES (ANESTHESIA): Performed by: INTERNAL MEDICINE

## 2021-01-01 PROCEDURE — 82668 ASSAY OF ERYTHROPOIETIN: CPT

## 2021-01-01 PROCEDURE — 3700000000 HC ANESTHESIA ATTENDED CARE: Performed by: UROLOGY

## 2021-01-01 PROCEDURE — 7100000001 HC PACU RECOVERY - ADDTL 15 MIN: Performed by: INTERNAL MEDICINE

## 2021-01-01 PROCEDURE — 6360000002 HC RX W HCPCS: Performed by: ANESTHESIOLOGY

## 2021-01-01 PROCEDURE — 99285 EMERGENCY DEPT VISIT HI MDM: CPT

## 2021-01-01 PROCEDURE — 85027 COMPLETE CBC AUTOMATED: CPT

## 2021-01-01 PROCEDURE — 6360000004 HC RX CONTRAST MEDICATION: Performed by: REGISTERED NURSE

## 2021-01-01 PROCEDURE — 3600000015 HC SURGERY LEVEL 5 ADDTL 15MIN: Performed by: UROLOGY

## 2021-01-01 PROCEDURE — 88185 FLOWCYTOMETRY/TC ADD-ON: CPT

## 2021-01-01 PROCEDURE — 82378 CARCINOEMBRYONIC ANTIGEN: CPT

## 2021-01-01 PROCEDURE — 82105 ALPHA-FETOPROTEIN SERUM: CPT

## 2021-01-01 PROCEDURE — C2625 STENT, NON-COR, TEM W/DEL SY: HCPCS | Performed by: INTERNAL MEDICINE

## 2021-01-01 PROCEDURE — 96374 THER/PROPH/DIAG INJ IV PUSH: CPT

## 2021-01-01 PROCEDURE — 97161 PT EVAL LOW COMPLEX 20 MIN: CPT

## 2021-01-01 PROCEDURE — 83883 ASSAY NEPHELOMETRY NOT SPEC: CPT

## 2021-01-01 PROCEDURE — 6360000002 HC RX W HCPCS: Performed by: HOSPITALIST

## 2021-01-01 PROCEDURE — 85014 HEMATOCRIT: CPT

## 2021-01-01 PROCEDURE — 0T788DZ DILATION OF BILATERAL URETERS WITH INTRALUMINAL DEVICE, VIA NATURAL OR ARTIFICIAL OPENING ENDOSCOPIC: ICD-10-PCS | Performed by: UROLOGY

## 2021-01-01 PROCEDURE — 83880 ASSAY OF NATRIURETIC PEPTIDE: CPT

## 2021-01-01 PROCEDURE — 70553 MRI BRAIN STEM W/O & W/DYE: CPT

## 2021-01-01 PROCEDURE — 7100000000 HC PACU RECOVERY - FIRST 15 MIN: Performed by: UROLOGY

## 2021-01-01 PROCEDURE — 80069 RENAL FUNCTION PANEL: CPT

## 2021-01-01 PROCEDURE — 96366 THER/PROPH/DIAG IV INF ADDON: CPT

## 2021-01-01 PROCEDURE — 71045 X-RAY EXAM CHEST 1 VIEW: CPT

## 2021-01-01 PROCEDURE — 86300 IMMUNOASSAY TUMOR CA 15-3: CPT

## 2021-01-01 PROCEDURE — 88184 FLOWCYTOMETRY/ TC 1 MARKER: CPT

## 2021-01-01 PROCEDURE — 70543 MRI ORBT/FAC/NCK W/O &W/DYE: CPT

## 2021-01-01 PROCEDURE — 94761 N-INVAS EAR/PLS OXIMETRY MLT: CPT

## 2021-01-01 PROCEDURE — 2709999900 HC NON-CHARGEABLE SUPPLY: Performed by: UROLOGY

## 2021-01-01 PROCEDURE — 6370000000 HC RX 637 (ALT 250 FOR IP): Performed by: PHYSICIAN ASSISTANT

## 2021-01-01 PROCEDURE — 87086 URINE CULTURE/COLONY COUNT: CPT

## 2021-01-01 PROCEDURE — 2580000003 HC RX 258: Performed by: NURSE PRACTITIONER

## 2021-01-01 PROCEDURE — 3609014200 HC ERCP W/BIOPSY SINGLE/MULTIPLE: Performed by: INTERNAL MEDICINE

## 2021-01-01 PROCEDURE — 6370000000 HC RX 637 (ALT 250 FOR IP): Performed by: EMERGENCY MEDICINE

## 2021-01-01 PROCEDURE — 97116 GAIT TRAINING THERAPY: CPT

## 2021-01-01 PROCEDURE — 0F7D8DZ DILATION OF PANCREATIC DUCT WITH INTRALUMINAL DEVICE, VIA NATURAL OR ARTIFICIAL OPENING ENDOSCOPIC: ICD-10-PCS | Performed by: INTERNAL MEDICINE

## 2021-01-01 PROCEDURE — C2617 STENT, NON-COR, TEM W/O DEL: HCPCS | Performed by: UROLOGY

## 2021-01-01 PROCEDURE — 97535 SELF CARE MNGMENT TRAINING: CPT

## 2021-01-01 PROCEDURE — 76705 ECHO EXAM OF ABDOMEN: CPT

## 2021-01-01 PROCEDURE — 93005 ELECTROCARDIOGRAM TRACING: CPT | Performed by: PHYSICIAN ASSISTANT

## 2021-01-01 PROCEDURE — 83540 ASSAY OF IRON: CPT

## 2021-01-01 PROCEDURE — 84238 ASSAY NONENDOCRINE RECEPTOR: CPT

## 2021-01-01 PROCEDURE — 2720000010 HC SURG SUPPLY STERILE: Performed by: INTERNAL MEDICINE

## 2021-01-01 PROCEDURE — 80048 BASIC METABOLIC PNL TOTAL CA: CPT

## 2021-01-01 PROCEDURE — 83036 HEMOGLOBIN GLYCOSYLATED A1C: CPT | Performed by: NURSE PRACTITIONER

## 2021-01-01 PROCEDURE — 99213 OFFICE O/P EST LOW 20 MIN: CPT | Performed by: PHYSICIAN ASSISTANT

## 2021-01-01 PROCEDURE — 7100000000 HC PACU RECOVERY - FIRST 15 MIN: Performed by: INTERNAL MEDICINE

## 2021-01-01 PROCEDURE — 84165 PROTEIN E-PHORESIS SERUM: CPT

## 2021-01-01 PROCEDURE — U0003 INFECTIOUS AGENT DETECTION BY NUCLEIC ACID (DNA OR RNA); SEVERE ACUTE RESPIRATORY SYNDROME CORONAVIRUS 2 (SARS-COV-2) (CORONAVIRUS DISEASE [COVID-19]), AMPLIFIED PROBE TECHNIQUE, MAKING USE OF HIGH THROUGHPUT TECHNOLOGIES AS DESCRIBED BY CMS-2020-01-R: HCPCS

## 2021-01-01 PROCEDURE — 82947 ASSAY GLUCOSE BLOOD QUANT: CPT

## 2021-01-01 PROCEDURE — 88305 TISSUE EXAM BY PATHOLOGIST: CPT

## 2021-01-01 PROCEDURE — 84132 ASSAY OF SERUM POTASSIUM: CPT

## 2021-01-01 PROCEDURE — 7100000001 HC PACU RECOVERY - ADDTL 15 MIN: Performed by: UROLOGY

## 2021-01-01 PROCEDURE — 2580000003 HC RX 258: Performed by: HOSPITALIST

## 2021-01-01 PROCEDURE — 84295 ASSAY OF SERUM SODIUM: CPT

## 2021-01-01 PROCEDURE — A9579 GAD-BASE MR CONTRAST NOS,1ML: HCPCS | Performed by: REGISTERED NURSE

## 2021-01-01 PROCEDURE — 87040 BLOOD CULTURE FOR BACTERIA: CPT

## 2021-01-01 PROCEDURE — 82607 VITAMIN B-12: CPT

## 2021-01-01 PROCEDURE — 2500000003 HC RX 250 WO HCPCS

## 2021-01-01 PROCEDURE — 82728 ASSAY OF FERRITIN: CPT

## 2021-01-01 PROCEDURE — 74018 RADEX ABDOMEN 1 VIEW: CPT

## 2021-01-01 PROCEDURE — C1769 GUIDE WIRE: HCPCS | Performed by: INTERNAL MEDICINE

## 2021-01-01 PROCEDURE — 71250 CT THORAX DX C-: CPT

## 2021-01-01 PROCEDURE — 80074 ACUTE HEPATITIS PANEL: CPT

## 2021-01-01 PROCEDURE — 78306 BONE IMAGING WHOLE BODY: CPT

## 2021-01-01 PROCEDURE — 2580000003 HC RX 258: Performed by: EMERGENCY MEDICINE

## 2021-01-01 PROCEDURE — 6360000002 HC RX W HCPCS: Performed by: NURSE ANESTHETIST, CERTIFIED REGISTERED

## 2021-01-01 PROCEDURE — 0F9C8ZX DRAINAGE OF AMPULLA OF VATER, VIA NATURAL OR ARTIFICIAL OPENING ENDOSCOPIC, DIAGNOSTIC: ICD-10-PCS | Performed by: INTERNAL MEDICINE

## 2021-01-01 PROCEDURE — 86304 IMMUNOASSAY TUMOR CA 125: CPT

## 2021-01-01 PROCEDURE — 77012 CT SCAN FOR NEEDLE BIOPSY: CPT

## 2021-01-01 PROCEDURE — 93010 ELECTROCARDIOGRAM REPORT: CPT | Performed by: INTERNAL MEDICINE

## 2021-01-01 PROCEDURE — 82330 ASSAY OF CALCIUM: CPT

## 2021-01-01 PROCEDURE — 85730 THROMBOPLASTIN TIME PARTIAL: CPT

## 2021-01-01 PROCEDURE — 88341 IMHCHEM/IMCYTCHM EA ADD ANTB: CPT

## 2021-01-01 PROCEDURE — 51798 US URINE CAPACITY MEASURE: CPT

## 2021-01-01 PROCEDURE — 81001 URINALYSIS AUTO W/SCOPE: CPT

## 2021-01-01 PROCEDURE — 3700000001 HC ADD 15 MINUTES (ANESTHESIA): Performed by: UROLOGY

## 2021-01-01 PROCEDURE — 82746 ASSAY OF FOLIC ACID SERUM: CPT

## 2021-01-01 PROCEDURE — 97112 NEUROMUSCULAR REEDUCATION: CPT

## 2021-01-01 PROCEDURE — 84145 PROCALCITONIN (PCT): CPT

## 2021-01-01 PROCEDURE — 6360000002 HC RX W HCPCS: Performed by: EMERGENCY MEDICINE

## 2021-01-01 PROCEDURE — 2500000003 HC RX 250 WO HCPCS: Performed by: NURSE ANESTHETIST, CERTIFIED REGISTERED

## 2021-01-01 PROCEDURE — 77080 DXA BONE DENSITY AXIAL: CPT

## 2021-01-01 PROCEDURE — 0QB33ZX EXCISION OF LEFT PELVIC BONE, PERCUTANEOUS APPROACH, DIAGNOSTIC: ICD-10-PCS | Performed by: RADIOLOGY

## 2021-01-01 PROCEDURE — 3430000000 HC RX DIAGNOSTIC RADIOPHARMACEUTICAL: Performed by: NURSE PRACTITIONER

## 2021-01-01 PROCEDURE — 3700000000 HC ANESTHESIA ATTENDED CARE: Performed by: INTERNAL MEDICINE

## 2021-01-01 PROCEDURE — 3609018800 HC ERCP DX COLLECTION SPECIMEN BRUSHING/WASHING: Performed by: INTERNAL MEDICINE

## 2021-01-01 PROCEDURE — 73560 X-RAY EXAM OF KNEE 1 OR 2: CPT

## 2021-01-01 PROCEDURE — 83036 HEMOGLOBIN GLYCOSYLATED A1C: CPT

## 2021-01-01 PROCEDURE — 72131 CT LUMBAR SPINE W/O DYE: CPT

## 2021-01-01 PROCEDURE — 82140 ASSAY OF AMMONIA: CPT

## 2021-01-01 PROCEDURE — 99223 1ST HOSP IP/OBS HIGH 75: CPT | Performed by: HOSPITALIST

## 2021-01-01 PROCEDURE — 3209999900 FLUORO FOR SURGICAL PROCEDURES

## 2021-01-01 PROCEDURE — 2500000003 HC RX 250 WO HCPCS: Performed by: ANESTHESIOLOGY

## 2021-01-01 PROCEDURE — 99214 OFFICE O/P EST MOD 30 MIN: CPT | Performed by: NURSE PRACTITIONER

## 2021-01-01 PROCEDURE — 96367 TX/PROPH/DG ADDL SEQ IV INF: CPT

## 2021-01-01 PROCEDURE — 92610 EVALUATE SWALLOWING FUNCTION: CPT

## 2021-01-01 PROCEDURE — 97166 OT EVAL MOD COMPLEX 45 MIN: CPT

## 2021-01-01 PROCEDURE — 82784 ASSAY IGA/IGD/IGG/IGM EACH: CPT

## 2021-01-01 PROCEDURE — 99231 SBSQ HOSP IP/OBS SF/LOW 25: CPT | Performed by: INTERNAL MEDICINE

## 2021-01-01 PROCEDURE — 6360000002 HC RX W HCPCS: Performed by: RADIOLOGY

## 2021-01-01 PROCEDURE — 97165 OT EVAL LOW COMPLEX 30 MIN: CPT

## 2021-01-01 PROCEDURE — 2580000003 HC RX 258

## 2021-01-01 PROCEDURE — 84155 ASSAY OF PROTEIN SERUM: CPT

## 2021-01-01 PROCEDURE — 82962 GLUCOSE BLOOD TEST: CPT | Performed by: NURSE PRACTITIONER

## 2021-01-01 PROCEDURE — BF111ZZ FLUOROSCOPY OF BILIARY AND PANCREATIC DUCTS USING LOW OSMOLAR CONTRAST: ICD-10-PCS | Performed by: INTERNAL MEDICINE

## 2021-01-01 PROCEDURE — C1769 GUIDE WIRE: HCPCS | Performed by: UROLOGY

## 2021-01-01 PROCEDURE — 97162 PT EVAL MOD COMPLEX 30 MIN: CPT

## 2021-01-01 PROCEDURE — 6360000002 HC RX W HCPCS: Performed by: NURSE PRACTITIONER

## 2021-01-01 PROCEDURE — 82803 BLOOD GASES ANY COMBINATION: CPT

## 2021-01-01 DEVICE — BILIARY STENT WITH NAVIFLEXTM RX DELIVERY SYSTEM
Type: IMPLANTABLE DEVICE | Site: BILE DUCT | Status: FUNCTIONAL
Brand: ADVANIX™ BILIARY

## 2021-01-01 DEVICE — URETERAL STENT
Type: IMPLANTABLE DEVICE | Site: URETER | Status: FUNCTIONAL
Brand: CONTOUR™

## 2021-01-01 DEVICE — PANCREATIC STENT KIT
Type: IMPLANTABLE DEVICE | Status: FUNCTIONAL
Brand: ADVANIX™ PANCREATIC STENT KIT

## 2021-01-01 RX ORDER — MIDAZOLAM HYDROCHLORIDE 5 MG/ML
INJECTION INTRAMUSCULAR; INTRAVENOUS
Status: COMPLETED | OUTPATIENT
Start: 2021-01-01 | End: 2021-01-01

## 2021-01-01 RX ORDER — ASPIRIN 325 MG
325 TABLET ORAL DAILY
Status: DISCONTINUED | OUTPATIENT
Start: 2021-01-01 | End: 2021-01-01 | Stop reason: HOSPADM

## 2021-01-01 RX ORDER — BLOOD SUGAR DIAGNOSTIC
STRIP MISCELLANEOUS
Qty: 200 STRIP | Refills: 2 | Status: ON HOLD | OUTPATIENT
Start: 2021-01-01 | End: 2021-01-01 | Stop reason: HOSPADM

## 2021-01-01 RX ORDER — MORPHINE SULFATE 4 MG/ML
4 INJECTION, SOLUTION INTRAMUSCULAR; INTRAVENOUS
Status: DISCONTINUED | OUTPATIENT
Start: 2021-01-01 | End: 2021-01-01 | Stop reason: HOSPADM

## 2021-01-01 RX ORDER — PROPOFOL 10 MG/ML
INJECTION, EMULSION INTRAVENOUS PRN
Status: DISCONTINUED | OUTPATIENT
Start: 2021-01-01 | End: 2021-01-01 | Stop reason: SDUPTHER

## 2021-01-01 RX ORDER — MORPHINE SULFATE 20 MG/ML
30 SOLUTION ORAL
Status: DISCONTINUED | OUTPATIENT
Start: 2021-01-01 | End: 2021-01-01

## 2021-01-01 RX ORDER — MEPERIDINE HYDROCHLORIDE 50 MG/ML
12.5 INJECTION INTRAMUSCULAR; INTRAVENOUS; SUBCUTANEOUS EVERY 5 MIN PRN
Status: DISCONTINUED | OUTPATIENT
Start: 2021-01-01 | End: 2021-01-01 | Stop reason: HOSPADM

## 2021-01-01 RX ORDER — DEXAMETHASONE SODIUM PHOSPHATE 4 MG/ML
INJECTION, SOLUTION INTRA-ARTICULAR; INTRALESIONAL; INTRAMUSCULAR; INTRAVENOUS; SOFT TISSUE PRN
Status: DISCONTINUED | OUTPATIENT
Start: 2021-01-01 | End: 2021-01-01 | Stop reason: SDUPTHER

## 2021-01-01 RX ORDER — HYDRALAZINE HYDROCHLORIDE 20 MG/ML
5 INJECTION INTRAMUSCULAR; INTRAVENOUS EVERY 10 MIN PRN
Status: DISCONTINUED | OUTPATIENT
Start: 2021-01-01 | End: 2021-01-01 | Stop reason: HOSPADM

## 2021-01-01 RX ORDER — METFORMIN HYDROCHLORIDE 500 MG/1
TABLET, EXTENDED RELEASE ORAL
Qty: 180 TABLET | Refills: 0 | Status: SHIPPED | OUTPATIENT
Start: 2021-01-01 | End: 2021-01-01 | Stop reason: SDUPTHER

## 2021-01-01 RX ORDER — FENTANYL CITRATE 50 UG/ML
INJECTION, SOLUTION INTRAMUSCULAR; INTRAVENOUS PRN
Status: DISCONTINUED | OUTPATIENT
Start: 2021-01-01 | End: 2021-01-01 | Stop reason: SDUPTHER

## 2021-01-01 RX ORDER — MORPHINE SULFATE 30 MG/1
30 TABLET, FILM COATED, EXTENDED RELEASE ORAL EVERY 12 HOURS SCHEDULED
Status: DISCONTINUED | OUTPATIENT
Start: 2021-01-01 | End: 2021-01-01 | Stop reason: HOSPADM

## 2021-01-01 RX ORDER — NICOTINE POLACRILEX 4 MG
15 LOZENGE BUCCAL PRN
Status: DISCONTINUED | OUTPATIENT
Start: 2021-01-01 | End: 2021-01-01 | Stop reason: HOSPADM

## 2021-01-01 RX ORDER — BUPROPION HYDROCHLORIDE 150 MG/1
150 TABLET ORAL DAILY
Status: DISCONTINUED | OUTPATIENT
Start: 2021-01-01 | End: 2021-01-01 | Stop reason: HOSPADM

## 2021-01-01 RX ORDER — ONDANSETRON 2 MG/ML
4 INJECTION INTRAMUSCULAR; INTRAVENOUS EVERY 6 HOURS PRN
Status: DISCONTINUED | OUTPATIENT
Start: 2021-01-01 | End: 2021-01-01 | Stop reason: HOSPADM

## 2021-01-01 RX ORDER — OXYCODONE HYDROCHLORIDE AND ACETAMINOPHEN 5; 325 MG/1; MG/1
2 TABLET ORAL PRN
Status: DISCONTINUED | OUTPATIENT
Start: 2021-01-01 | End: 2021-01-01

## 2021-01-01 RX ORDER — CYCLOBENZAPRINE HCL 10 MG
5 TABLET ORAL ONCE
Status: COMPLETED | OUTPATIENT
Start: 2021-01-01 | End: 2021-01-01

## 2021-01-01 RX ORDER — HYDROXYZINE HYDROCHLORIDE 10 MG/1
10 TABLET, FILM COATED ORAL 3 TIMES DAILY PRN
Status: DISCONTINUED | OUTPATIENT
Start: 2021-01-01 | End: 2021-01-01 | Stop reason: HOSPADM

## 2021-01-01 RX ORDER — ONDANSETRON 2 MG/ML
4 INJECTION INTRAMUSCULAR; INTRAVENOUS
Status: ACTIVE | OUTPATIENT
Start: 2021-01-01 | End: 2021-01-01

## 2021-01-01 RX ORDER — INSULIN GLARGINE 300 U/ML
INJECTION, SOLUTION SUBCUTANEOUS
Qty: 18 ML | Refills: 2 | Status: SHIPPED | OUTPATIENT
Start: 2021-01-01 | End: 2021-01-01 | Stop reason: HOSPADM

## 2021-01-01 RX ORDER — TIZANIDINE 4 MG/1
4 TABLET ORAL 3 TIMES DAILY
Qty: 60 TABLET | Refills: 0 | Status: SHIPPED | OUTPATIENT
Start: 2021-01-01 | End: 2021-01-01

## 2021-01-01 RX ORDER — AZITHROMYCIN 250 MG/1
250 TABLET, FILM COATED ORAL DAILY
Status: DISCONTINUED | OUTPATIENT
Start: 2021-01-01 | End: 2021-01-01 | Stop reason: HOSPADM

## 2021-01-01 RX ORDER — DULOXETIN HYDROCHLORIDE 60 MG/1
60 CAPSULE, DELAYED RELEASE ORAL DAILY
Status: DISCONTINUED | OUTPATIENT
Start: 2021-01-01 | End: 2021-01-01 | Stop reason: HOSPADM

## 2021-01-01 RX ORDER — DULOXETIN HYDROCHLORIDE 60 MG/1
60 CAPSULE, DELAYED RELEASE ORAL 2 TIMES DAILY
Qty: 180 CAPSULE | Refills: 1 | Status: ON HOLD | OUTPATIENT
Start: 2021-01-01 | End: 2021-01-01 | Stop reason: SDUPTHER

## 2021-01-01 RX ORDER — ASPIRIN 81 MG/1
324 TABLET, CHEWABLE ORAL DAILY
Status: DISCONTINUED | OUTPATIENT
Start: 2021-01-01 | End: 2021-01-01

## 2021-01-01 RX ORDER — ALENDRONATE SODIUM 70 MG/1
TABLET ORAL
Qty: 12 TABLET | Refills: 0 | Status: SHIPPED | OUTPATIENT
Start: 2021-01-01 | End: 2021-01-01

## 2021-01-01 RX ORDER — MAGNESIUM SULFATE IN WATER 40 MG/ML
2000 INJECTION, SOLUTION INTRAVENOUS ONCE
Status: COMPLETED | OUTPATIENT
Start: 2021-01-01 | End: 2021-01-01

## 2021-01-01 RX ORDER — DIPHENHYDRAMINE HYDROCHLORIDE 50 MG/ML
12.5 INJECTION INTRAMUSCULAR; INTRAVENOUS
Status: DISCONTINUED | OUTPATIENT
Start: 2021-01-01 | End: 2021-01-01 | Stop reason: HOSPADM

## 2021-01-01 RX ORDER — SODIUM CHLORIDE 9 MG/ML
INJECTION, SOLUTION INTRAVENOUS CONTINUOUS
Status: DISCONTINUED | OUTPATIENT
Start: 2021-01-01 | End: 2021-01-01

## 2021-01-01 RX ORDER — LORAZEPAM 1 MG/1
1 TABLET ORAL EVERY 4 HOURS PRN
Qty: 12 TABLET | Refills: 0 | Status: SHIPPED | OUTPATIENT
Start: 2021-01-01 | End: 2021-07-22

## 2021-01-01 RX ORDER — SUCCINYLCHOLINE CHLORIDE 20 MG/ML
INJECTION INTRAMUSCULAR; INTRAVENOUS PRN
Status: DISCONTINUED | OUTPATIENT
Start: 2021-01-01 | End: 2021-01-01 | Stop reason: SDUPTHER

## 2021-01-01 RX ORDER — ONDANSETRON 2 MG/ML
4 INJECTION INTRAMUSCULAR; INTRAVENOUS PRN
Status: DISCONTINUED | OUTPATIENT
Start: 2021-01-01 | End: 2021-01-01 | Stop reason: ALTCHOICE

## 2021-01-01 RX ORDER — HYDRALAZINE HYDROCHLORIDE 20 MG/ML
5 INJECTION INTRAMUSCULAR; INTRAVENOUS EVERY 10 MIN PRN
Status: DISCONTINUED | OUTPATIENT
Start: 2021-01-01 | End: 2021-01-01 | Stop reason: ALTCHOICE

## 2021-01-01 RX ORDER — 0.9 % SODIUM CHLORIDE 0.9 %
1721 INTRAVENOUS SOLUTION INTRAVENOUS ONCE
Status: COMPLETED | OUTPATIENT
Start: 2021-01-01 | End: 2021-01-01

## 2021-01-01 RX ORDER — LEVOTHYROXINE SODIUM 0.07 MG/1
TABLET ORAL
Qty: 90 TABLET | Refills: 0 | Status: ON HOLD | OUTPATIENT
Start: 2021-01-01 | End: 2021-01-01 | Stop reason: HOSPADM

## 2021-01-01 RX ORDER — ONDANSETRON 4 MG/1
4 TABLET, ORALLY DISINTEGRATING ORAL EVERY 4 HOURS PRN
Status: DISCONTINUED | OUTPATIENT
Start: 2021-01-01 | End: 2021-01-01 | Stop reason: HOSPADM

## 2021-01-01 RX ORDER — MORPHINE SULFATE 4 MG/ML
4 INJECTION, SOLUTION INTRAMUSCULAR; INTRAVENOUS ONCE
Status: COMPLETED | OUTPATIENT
Start: 2021-01-01 | End: 2021-01-01

## 2021-01-01 RX ORDER — DULOXETIN HYDROCHLORIDE 60 MG/1
60 CAPSULE, DELAYED RELEASE ORAL 2 TIMES DAILY
Qty: 90 CAPSULE | Refills: 1 | Status: SHIPPED | OUTPATIENT
Start: 2021-01-01 | End: 2021-01-01 | Stop reason: SDUPTHER

## 2021-01-01 RX ORDER — PROMETHAZINE HYDROCHLORIDE 25 MG/ML
6.25 INJECTION, SOLUTION INTRAMUSCULAR; INTRAVENOUS
Status: DISCONTINUED | OUTPATIENT
Start: 2021-01-01 | End: 2021-01-01 | Stop reason: ALTCHOICE

## 2021-01-01 RX ORDER — HYDRALAZINE HYDROCHLORIDE 20 MG/ML
20 INJECTION INTRAMUSCULAR; INTRAVENOUS EVERY 4 HOURS PRN
Status: DISCONTINUED | OUTPATIENT
Start: 2021-01-01 | End: 2021-01-01 | Stop reason: HOSPADM

## 2021-01-01 RX ORDER — LISINOPRIL 5 MG/1
5 TABLET ORAL DAILY
Status: DISCONTINUED | OUTPATIENT
Start: 2021-01-01 | End: 2021-01-01 | Stop reason: HOSPADM

## 2021-01-01 RX ORDER — ACETAMINOPHEN 650 MG/1
650 SUPPOSITORY RECTAL EVERY 6 HOURS PRN
Status: DISCONTINUED | OUTPATIENT
Start: 2021-01-01 | End: 2021-01-01 | Stop reason: HOSPADM

## 2021-01-01 RX ORDER — INSULIN GLARGINE 100 [IU]/ML
55 INJECTION, SOLUTION SUBCUTANEOUS NIGHTLY
Status: DISCONTINUED | OUTPATIENT
Start: 2021-01-01 | End: 2021-01-01

## 2021-01-01 RX ORDER — APREPITANT 40 MG/1
40 CAPSULE ORAL ONCE
Status: COMPLETED | OUTPATIENT
Start: 2021-01-01 | End: 2021-01-01

## 2021-01-01 RX ORDER — METOPROLOL TARTRATE 50 MG/1
50 TABLET, FILM COATED ORAL 2 TIMES DAILY
Status: DISCONTINUED | OUTPATIENT
Start: 2021-01-01 | End: 2021-01-01 | Stop reason: HOSPADM

## 2021-01-01 RX ORDER — 0.9 % SODIUM CHLORIDE 0.9 %
1000 INTRAVENOUS SOLUTION INTRAVENOUS ONCE
Status: COMPLETED | OUTPATIENT
Start: 2021-01-01 | End: 2021-01-01

## 2021-01-01 RX ORDER — ACETAMINOPHEN 325 MG/1
650 TABLET ORAL EVERY 6 HOURS PRN
Status: DISCONTINUED | OUTPATIENT
Start: 2021-01-01 | End: 2021-01-01 | Stop reason: HOSPADM

## 2021-01-01 RX ORDER — ONDANSETRON 2 MG/ML
4 INJECTION INTRAMUSCULAR; INTRAVENOUS PRN
Status: DISCONTINUED | OUTPATIENT
Start: 2021-01-01 | End: 2021-01-01 | Stop reason: HOSPADM

## 2021-01-01 RX ORDER — ATORVASTATIN CALCIUM 40 MG/1
40 TABLET, FILM COATED ORAL NIGHTLY
Status: DISCONTINUED | OUTPATIENT
Start: 2021-01-01 | End: 2021-01-01 | Stop reason: HOSPADM

## 2021-01-01 RX ORDER — OXYCODONE HYDROCHLORIDE AND ACETAMINOPHEN 5; 325 MG/1; MG/1
1 TABLET ORAL PRN
Status: DISCONTINUED | OUTPATIENT
Start: 2021-01-01 | End: 2021-01-01

## 2021-01-01 RX ORDER — SODIUM CHLORIDE 0.9 % (FLUSH) 0.9 %
5-40 SYRINGE (ML) INJECTION PRN
Status: DISCONTINUED | OUTPATIENT
Start: 2021-01-01 | End: 2021-01-01 | Stop reason: HOSPADM

## 2021-01-01 RX ORDER — MORPHINE SULFATE 20 MG/ML
15 SOLUTION ORAL
Qty: 90 ML | Refills: 0 | Status: SHIPPED | OUTPATIENT
Start: 2021-01-01 | End: 2021-07-18

## 2021-01-01 RX ORDER — PROMETHAZINE HYDROCHLORIDE 25 MG/1
12.5 TABLET ORAL EVERY 4 HOURS PRN
Status: DISCONTINUED | OUTPATIENT
Start: 2021-01-01 | End: 2021-01-01 | Stop reason: HOSPADM

## 2021-01-01 RX ORDER — SODIUM POLYSTYRENE SULFONATE 15 G/60ML
15 SUSPENSION ORAL; RECTAL ONCE
Status: COMPLETED | OUTPATIENT
Start: 2021-01-01 | End: 2021-01-01

## 2021-01-01 RX ORDER — ONDANSETRON 2 MG/ML
4 INJECTION INTRAMUSCULAR; INTRAVENOUS EVERY 4 HOURS PRN
Status: DISCONTINUED | OUTPATIENT
Start: 2021-01-01 | End: 2021-01-01 | Stop reason: HOSPADM

## 2021-01-01 RX ORDER — DEXTROSE MONOHYDRATE 50 MG/ML
100 INJECTION, SOLUTION INTRAVENOUS PRN
Status: DISCONTINUED | OUTPATIENT
Start: 2021-01-01 | End: 2021-01-01 | Stop reason: HOSPADM

## 2021-01-01 RX ORDER — OXYCODONE HYDROCHLORIDE AND ACETAMINOPHEN 5; 325 MG/1; MG/1
1 TABLET ORAL EVERY 6 HOURS PRN
Status: DISCONTINUED | OUTPATIENT
Start: 2021-01-01 | End: 2021-01-01 | Stop reason: HOSPADM

## 2021-01-01 RX ORDER — GLIMEPIRIDE 4 MG/1
TABLET ORAL
Qty: 180 TABLET | Refills: 0 | Status: ON HOLD | OUTPATIENT
Start: 2021-01-01 | End: 2021-01-01 | Stop reason: HOSPADM

## 2021-01-01 RX ORDER — HYDRALAZINE HYDROCHLORIDE 20 MG/ML
10 INJECTION INTRAMUSCULAR; INTRAVENOUS ONCE
Status: COMPLETED | OUTPATIENT
Start: 2021-01-01 | End: 2021-01-01

## 2021-01-01 RX ORDER — SODIUM CHLORIDE 9 MG/ML
25 INJECTION, SOLUTION INTRAVENOUS PRN
Status: DISCONTINUED | OUTPATIENT
Start: 2021-01-01 | End: 2021-01-01 | Stop reason: HOSPADM

## 2021-01-01 RX ORDER — LIDOCAINE HYDROCHLORIDE 20 MG/ML
INJECTION, SOLUTION INFILTRATION; PERINEURAL PRN
Status: DISCONTINUED | OUTPATIENT
Start: 2021-01-01 | End: 2021-01-01 | Stop reason: SDUPTHER

## 2021-01-01 RX ORDER — PRIMIDONE 50 MG/1
150 TABLET ORAL NIGHTLY
Status: DISCONTINUED | OUTPATIENT
Start: 2021-01-01 | End: 2021-01-01 | Stop reason: HOSPADM

## 2021-01-01 RX ORDER — LORAZEPAM 2 MG/ML
1 INJECTION INTRAMUSCULAR EVERY 4 HOURS PRN
Status: DISCONTINUED | OUTPATIENT
Start: 2021-01-01 | End: 2021-01-01 | Stop reason: HOSPADM

## 2021-01-01 RX ORDER — LORAZEPAM 2 MG/ML
0.5 INJECTION INTRAMUSCULAR EVERY 4 HOURS PRN
Status: DISCONTINUED | OUTPATIENT
Start: 2021-01-01 | End: 2021-01-01

## 2021-01-01 RX ORDER — DICYCLOMINE HYDROCHLORIDE 10 MG/1
10 CAPSULE ORAL 4 TIMES DAILY
Status: DISCONTINUED | OUTPATIENT
Start: 2021-01-01 | End: 2021-01-01 | Stop reason: HOSPADM

## 2021-01-01 RX ORDER — GLIMEPIRIDE 2 MG/1
4 TABLET ORAL 2 TIMES DAILY
Status: DISCONTINUED | OUTPATIENT
Start: 2021-01-01 | End: 2021-01-01 | Stop reason: HOSPADM

## 2021-01-01 RX ORDER — INSULIN GLARGINE 300 U/ML
INJECTION, SOLUTION SUBCUTANEOUS
Qty: 18 ML | Refills: 2 | Status: ON HOLD | OUTPATIENT
Start: 2021-01-01 | End: 2021-01-01 | Stop reason: SDUPTHER

## 2021-01-01 RX ORDER — ATROPINE SULFATE 10 MG/ML
1 SOLUTION/ DROPS OPHTHALMIC 3 TIMES DAILY PRN
Status: DISCONTINUED | OUTPATIENT
Start: 2021-01-01 | End: 2021-01-01 | Stop reason: HOSPADM

## 2021-01-01 RX ORDER — ONDANSETRON 2 MG/ML
4 INJECTION INTRAMUSCULAR; INTRAVENOUS ONCE
Status: COMPLETED | OUTPATIENT
Start: 2021-01-01 | End: 2021-01-01

## 2021-01-01 RX ORDER — MAGNESIUM SULFATE 1 G/100ML
1000 INJECTION INTRAVENOUS ONCE
Status: DISCONTINUED | OUTPATIENT
Start: 2021-01-01 | End: 2021-01-01 | Stop reason: ALTCHOICE

## 2021-01-01 RX ORDER — DIPHENHYDRAMINE HYDROCHLORIDE 50 MG/ML
12.5 INJECTION INTRAMUSCULAR; INTRAVENOUS
Status: DISCONTINUED | OUTPATIENT
Start: 2021-01-01 | End: 2021-01-01 | Stop reason: ALTCHOICE

## 2021-01-01 RX ORDER — HYDRALAZINE HYDROCHLORIDE 20 MG/ML
INJECTION INTRAMUSCULAR; INTRAVENOUS
Status: DISPENSED
Start: 2021-01-01 | End: 2021-01-01

## 2021-01-01 RX ORDER — HYDROXYZINE PAMOATE 25 MG/1
25 CAPSULE ORAL ONCE
Status: COMPLETED | OUTPATIENT
Start: 2021-01-01 | End: 2021-01-01

## 2021-01-01 RX ORDER — LIDOCAINE 4 G/G
1 PATCH TOPICAL DAILY
Status: DISCONTINUED | OUTPATIENT
Start: 2021-01-01 | End: 2021-01-01 | Stop reason: HOSPADM

## 2021-01-01 RX ORDER — METFORMIN HYDROCHLORIDE 500 MG/1
TABLET, EXTENDED RELEASE ORAL
Qty: 180 TABLET | Refills: 3 | Status: ON HOLD | OUTPATIENT
Start: 2021-01-01 | End: 2021-01-01 | Stop reason: HOSPADM

## 2021-01-01 RX ORDER — SODIUM CHLORIDE, SODIUM LACTATE, POTASSIUM CHLORIDE, CALCIUM CHLORIDE 600; 310; 30; 20 MG/100ML; MG/100ML; MG/100ML; MG/100ML
INJECTION, SOLUTION INTRAVENOUS CONTINUOUS
Status: DISCONTINUED | OUTPATIENT
Start: 2021-01-01 | End: 2021-01-01

## 2021-01-01 RX ORDER — OXYCODONE HYDROCHLORIDE AND ACETAMINOPHEN 5; 325 MG/1; MG/1
2 TABLET ORAL PRN
Status: DISCONTINUED | OUTPATIENT
Start: 2021-01-01 | End: 2021-01-01 | Stop reason: HOSPADM

## 2021-01-01 RX ORDER — BUPROPION HYDROCHLORIDE 150 MG/1
TABLET ORAL
Qty: 90 TABLET | Refills: 1 | Status: ON HOLD | OUTPATIENT
Start: 2021-01-01 | End: 2021-01-01 | Stop reason: HOSPADM

## 2021-01-01 RX ORDER — MORPHINE SULFATE 20 MG/ML
30 SOLUTION ORAL EVERY 4 HOURS PRN
Status: DISCONTINUED | OUTPATIENT
Start: 2021-01-01 | End: 2021-01-01

## 2021-01-01 RX ORDER — PRIMIDONE 50 MG/1
75 TABLET ORAL NIGHTLY
Status: DISCONTINUED | OUTPATIENT
Start: 2021-01-01 | End: 2021-01-01 | Stop reason: HOSPADM

## 2021-01-01 RX ORDER — METFORMIN HYDROCHLORIDE 500 MG/1
TABLET, EXTENDED RELEASE ORAL
Qty: 180 TABLET | Refills: 1 | Status: SHIPPED | OUTPATIENT
Start: 2021-01-01 | End: 2021-01-01 | Stop reason: SDUPTHER

## 2021-01-01 RX ORDER — ALENDRONATE SODIUM 70 MG/1
TABLET ORAL
Qty: 12 TABLET | Refills: 0 | Status: ON HOLD | OUTPATIENT
Start: 2021-01-01 | End: 2021-01-01 | Stop reason: HOSPADM

## 2021-01-01 RX ORDER — URSODIOL 300 MG/1
300 CAPSULE ORAL 2 TIMES DAILY
Status: DISCONTINUED | OUTPATIENT
Start: 2021-01-01 | End: 2021-01-01 | Stop reason: HOSPADM

## 2021-01-01 RX ORDER — HEPARIN SODIUM 10000 [USP'U]/100ML
1250 INJECTION, SOLUTION INTRAVENOUS CONTINUOUS
Status: DISCONTINUED | OUTPATIENT
Start: 2021-01-01 | End: 2021-01-01

## 2021-01-01 RX ORDER — INSULIN GLARGINE 100 [IU]/ML
15 INJECTION, SOLUTION SUBCUTANEOUS NIGHTLY
Status: DISCONTINUED | OUTPATIENT
Start: 2021-01-01 | End: 2021-01-01

## 2021-01-01 RX ORDER — MORPHINE SULFATE 2 MG/ML
1 INJECTION, SOLUTION INTRAMUSCULAR; INTRAVENOUS EVERY 5 MIN PRN
Status: DISCONTINUED | OUTPATIENT
Start: 2021-01-01 | End: 2021-01-01 | Stop reason: HOSPADM

## 2021-01-01 RX ORDER — ROCURONIUM BROMIDE 10 MG/ML
INJECTION, SOLUTION INTRAVENOUS PRN
Status: DISCONTINUED | OUTPATIENT
Start: 2021-01-01 | End: 2021-01-01 | Stop reason: SDUPTHER

## 2021-01-01 RX ORDER — LANOLIN ALCOHOL/MO/W.PET/CERES
3 CREAM (GRAM) TOPICAL NIGHTLY PRN
Status: DISCONTINUED | OUTPATIENT
Start: 2021-01-01 | End: 2021-01-01 | Stop reason: HOSPADM

## 2021-01-01 RX ORDER — VITAMIN B COMPLEX
4000 TABLET ORAL DAILY
Status: DISCONTINUED | OUTPATIENT
Start: 2021-01-01 | End: 2021-01-01 | Stop reason: HOSPADM

## 2021-01-01 RX ORDER — FENTANYL CITRATE 50 UG/ML
INJECTION, SOLUTION INTRAMUSCULAR; INTRAVENOUS
Status: COMPLETED | OUTPATIENT
Start: 2021-01-01 | End: 2021-01-01

## 2021-01-01 RX ORDER — TC 99M MEDRONATE 20 MG/10ML
23.9 INJECTION, POWDER, LYOPHILIZED, FOR SOLUTION INTRAVENOUS
Status: COMPLETED | OUTPATIENT
Start: 2021-01-01 | End: 2021-01-01

## 2021-01-01 RX ORDER — DEXTROSE MONOHYDRATE 25 G/50ML
12.5 INJECTION, SOLUTION INTRAVENOUS PRN
Status: DISCONTINUED | OUTPATIENT
Start: 2021-01-01 | End: 2021-01-01 | Stop reason: HOSPADM

## 2021-01-01 RX ORDER — CEFAZOLIN SODIUM 1 G/3ML
INJECTION, POWDER, FOR SOLUTION INTRAMUSCULAR; INTRAVENOUS PRN
Status: DISCONTINUED | OUTPATIENT
Start: 2021-01-01 | End: 2021-01-01 | Stop reason: SDUPTHER

## 2021-01-01 RX ORDER — LISINOPRIL 5 MG/1
TABLET ORAL
Qty: 90 TABLET | Refills: 0 | Status: CANCELLED | OUTPATIENT
Start: 2021-01-01

## 2021-01-01 RX ORDER — ONDANSETRON 2 MG/ML
INJECTION INTRAMUSCULAR; INTRAVENOUS PRN
Status: DISCONTINUED | OUTPATIENT
Start: 2021-01-01 | End: 2021-01-01 | Stop reason: SDUPTHER

## 2021-01-01 RX ORDER — PROMETHAZINE HYDROCHLORIDE 25 MG/ML
12.5 INJECTION, SOLUTION INTRAMUSCULAR; INTRAVENOUS EVERY 4 HOURS PRN
Status: DISCONTINUED | OUTPATIENT
Start: 2021-01-01 | End: 2021-01-01 | Stop reason: HOSPADM

## 2021-01-01 RX ORDER — ONDANSETRON 4 MG/1
4 TABLET, ORALLY DISINTEGRATING ORAL EVERY 8 HOURS PRN
Status: DISCONTINUED | OUTPATIENT
Start: 2021-01-01 | End: 2021-01-01 | Stop reason: HOSPADM

## 2021-01-01 RX ORDER — SODIUM CHLORIDE 0.9 % (FLUSH) 0.9 %
10 SYRINGE (ML) INJECTION EVERY 12 HOURS SCHEDULED
Status: DISCONTINUED | OUTPATIENT
Start: 2021-01-01 | End: 2021-01-01 | Stop reason: HOSPADM

## 2021-01-01 RX ORDER — LABETALOL HYDROCHLORIDE 5 MG/ML
10 INJECTION, SOLUTION INTRAVENOUS EVERY 6 HOURS PRN
Status: DISCONTINUED | OUTPATIENT
Start: 2021-01-01 | End: 2021-01-01 | Stop reason: HOSPADM

## 2021-01-01 RX ORDER — ATORVASTATIN CALCIUM 40 MG/1
TABLET, FILM COATED ORAL
Qty: 90 TABLET | Refills: 1 | Status: ON HOLD | OUTPATIENT
Start: 2021-01-01 | End: 2021-01-01 | Stop reason: HOSPADM

## 2021-01-01 RX ORDER — PROMETHAZINE HYDROCHLORIDE 25 MG/ML
6.25 INJECTION, SOLUTION INTRAMUSCULAR; INTRAVENOUS
Status: DISCONTINUED | OUTPATIENT
Start: 2021-01-01 | End: 2021-01-01 | Stop reason: HOSPADM

## 2021-01-01 RX ORDER — INSULIN GLARGINE 100 [IU]/ML
20 INJECTION, SOLUTION SUBCUTANEOUS ONCE
Status: COMPLETED | OUTPATIENT
Start: 2021-01-01 | End: 2021-01-01

## 2021-01-01 RX ORDER — KETOROLAC TROMETHAMINE 30 MG/ML
15 INJECTION, SOLUTION INTRAMUSCULAR; INTRAVENOUS ONCE
Status: COMPLETED | OUTPATIENT
Start: 2021-01-01 | End: 2021-01-01

## 2021-01-01 RX ORDER — LISINOPRIL 5 MG/1
TABLET ORAL
Qty: 90 TABLET | Refills: 1 | Status: SHIPPED | OUTPATIENT
Start: 2021-01-01 | End: 2021-01-01 | Stop reason: SDUPTHER

## 2021-01-01 RX ORDER — AZITHROMYCIN 250 MG/1
250 TABLET, FILM COATED ORAL DAILY
Qty: 3 TABLET | Refills: 0 | Status: SHIPPED | OUTPATIENT
Start: 2021-01-01 | End: 2021-01-01

## 2021-01-01 RX ORDER — DIPHENHYDRAMINE HCL 25 MG
25 TABLET ORAL EVERY 6 HOURS PRN
Status: DISCONTINUED | OUTPATIENT
Start: 2021-01-01 | End: 2021-01-01

## 2021-01-01 RX ORDER — FENTANYL 25 UG/H
1 PATCH TRANSDERMAL
Qty: 10 PATCH | Refills: 0 | Status: SHIPPED | OUTPATIENT
Start: 2021-01-01 | End: 2021-08-14

## 2021-01-01 RX ORDER — INSULIN GLARGINE 100 [IU]/ML
25 INJECTION, SOLUTION SUBCUTANEOUS NIGHTLY
Status: DISCONTINUED | OUTPATIENT
Start: 2021-01-01 | End: 2021-01-01 | Stop reason: HOSPADM

## 2021-01-01 RX ORDER — MORPHINE SULFATE 2 MG/ML
2 INJECTION, SOLUTION INTRAMUSCULAR; INTRAVENOUS
Status: DISCONTINUED | OUTPATIENT
Start: 2021-01-01 | End: 2021-01-01 | Stop reason: HOSPADM

## 2021-01-01 RX ORDER — PRIMIDONE 50 MG/1
TABLET ORAL
Qty: 270 TABLET | Refills: 1 | Status: SHIPPED | OUTPATIENT
Start: 2021-01-01 | End: 2021-01-01 | Stop reason: SDUPTHER

## 2021-01-01 RX ORDER — FENTANYL 25 UG/H
1 PATCH TRANSDERMAL
Status: DISCONTINUED | OUTPATIENT
Start: 2021-01-01 | End: 2021-01-01 | Stop reason: HOSPADM

## 2021-01-01 RX ORDER — SODIUM CHLORIDE, SODIUM LACTATE, POTASSIUM CHLORIDE, CALCIUM CHLORIDE 600; 310; 30; 20 MG/100ML; MG/100ML; MG/100ML; MG/100ML
INJECTION, SOLUTION INTRAVENOUS CONTINUOUS
Status: ACTIVE | OUTPATIENT
Start: 2021-01-01 | End: 2021-01-01

## 2021-01-01 RX ORDER — SODIUM CHLORIDE 0.9 % (FLUSH) 0.9 %
10 SYRINGE (ML) INJECTION PRN
Status: DISCONTINUED | OUTPATIENT
Start: 2021-01-01 | End: 2021-01-01 | Stop reason: HOSPADM

## 2021-01-01 RX ORDER — MAGNESIUM 30 MG
30 TABLET ORAL DAILY
Status: DISCONTINUED | OUTPATIENT
Start: 2021-01-01 | End: 2021-01-01 | Stop reason: RX

## 2021-01-01 RX ORDER — PEN NEEDLE, DIABETIC 31 GX5/16"
NEEDLE, DISPOSABLE MISCELLANEOUS
Qty: 100 EACH | Refills: 3 | Status: SHIPPED | OUTPATIENT
Start: 2021-01-01 | End: 2021-01-01 | Stop reason: HOSPADM

## 2021-01-01 RX ORDER — PROMETHAZINE HYDROCHLORIDE 25 MG/1
12.5 TABLET ORAL EVERY 6 HOURS PRN
Status: DISCONTINUED | OUTPATIENT
Start: 2021-01-01 | End: 2021-01-01 | Stop reason: HOSPADM

## 2021-01-01 RX ORDER — HEPARIN SODIUM 5000 [USP'U]/ML
5000 INJECTION, SOLUTION INTRAVENOUS; SUBCUTANEOUS EVERY 8 HOURS SCHEDULED
Status: DISCONTINUED | OUTPATIENT
Start: 2021-01-01 | End: 2021-01-01 | Stop reason: HOSPADM

## 2021-01-01 RX ORDER — LABETALOL HYDROCHLORIDE 5 MG/ML
5 INJECTION, SOLUTION INTRAVENOUS EVERY 10 MIN PRN
Status: DISCONTINUED | OUTPATIENT
Start: 2021-01-01 | End: 2021-01-01 | Stop reason: HOSPADM

## 2021-01-01 RX ORDER — HEPARIN SODIUM 1000 [USP'U]/ML
5500 INJECTION, SOLUTION INTRAVENOUS; SUBCUTANEOUS PRN
Status: DISCONTINUED | OUTPATIENT
Start: 2021-01-01 | End: 2021-01-01

## 2021-01-01 RX ORDER — INSULIN GLARGINE 300 U/ML
INJECTION, SOLUTION SUBCUTANEOUS
Qty: 18 ML | Refills: 0 | Status: SHIPPED | OUTPATIENT
Start: 2021-01-01 | End: 2021-01-01 | Stop reason: SDUPTHER

## 2021-01-01 RX ORDER — MAGNESIUM HYDROXIDE 1200 MG/15ML
LIQUID ORAL PRN
Status: DISCONTINUED | OUTPATIENT
Start: 2021-01-01 | End: 2021-01-01 | Stop reason: ALTCHOICE

## 2021-01-01 RX ORDER — CYCLOBENZAPRINE HCL 5 MG
5 TABLET ORAL 2 TIMES DAILY PRN
Qty: 10 TABLET | Refills: 0 | Status: SHIPPED | OUTPATIENT
Start: 2021-01-01 | End: 2021-01-01

## 2021-01-01 RX ORDER — DULOXETIN HYDROCHLORIDE 60 MG/1
60 CAPSULE, DELAYED RELEASE ORAL 2 TIMES DAILY
Qty: 180 CAPSULE | Refills: 0 | Status: SHIPPED | OUTPATIENT
Start: 2021-01-01 | End: 2021-01-01 | Stop reason: HOSPADM

## 2021-01-01 RX ORDER — OXYCODONE HYDROCHLORIDE AND ACETAMINOPHEN 5; 325 MG/1; MG/1
1 TABLET ORAL PRN
Status: DISCONTINUED | OUTPATIENT
Start: 2021-01-01 | End: 2021-01-01 | Stop reason: HOSPADM

## 2021-01-01 RX ORDER — PRIMIDONE 50 MG/1
TABLET ORAL
Qty: 270 TABLET | Refills: 1 | Status: ON HOLD | OUTPATIENT
Start: 2021-01-01 | End: 2021-01-01 | Stop reason: HOSPADM

## 2021-01-01 RX ORDER — SODIUM CHLORIDE, SODIUM LACTATE, POTASSIUM CHLORIDE, CALCIUM CHLORIDE 600; 310; 30; 20 MG/100ML; MG/100ML; MG/100ML; MG/100ML
INJECTION, SOLUTION INTRAVENOUS CONTINUOUS PRN
Status: DISCONTINUED | OUTPATIENT
Start: 2021-01-01 | End: 2021-01-01 | Stop reason: SDUPTHER

## 2021-01-01 RX ORDER — KETOROLAC TROMETHAMINE 10 MG/1
10 TABLET, FILM COATED ORAL EVERY 8 HOURS PRN
Qty: 20 TABLET | Refills: 0 | Status: SHIPPED | OUTPATIENT
Start: 2021-01-01 | End: 2021-01-01

## 2021-01-01 RX ORDER — METHOCARBAMOL 750 MG/1
TABLET, FILM COATED ORAL
COMMUNITY
Start: 2021-01-01 | End: 2021-01-01

## 2021-01-01 RX ORDER — PHENYLEPHRINE HCL IN 0.9% NACL 1 MG/10 ML
SYRINGE (ML) INTRAVENOUS PRN
Status: DISCONTINUED | OUTPATIENT
Start: 2021-01-01 | End: 2021-01-01 | Stop reason: SDUPTHER

## 2021-01-01 RX ORDER — MEPERIDINE HYDROCHLORIDE 50 MG/ML
12.5 INJECTION INTRAMUSCULAR; INTRAVENOUS; SUBCUTANEOUS EVERY 5 MIN PRN
Status: DISCONTINUED | OUTPATIENT
Start: 2021-01-01 | End: 2021-01-01 | Stop reason: ALTCHOICE

## 2021-01-01 RX ORDER — MIDAZOLAM HYDROCHLORIDE 1 MG/ML
INJECTION INTRAMUSCULAR; INTRAVENOUS PRN
Status: DISCONTINUED | OUTPATIENT
Start: 2021-01-01 | End: 2021-01-01 | Stop reason: SDUPTHER

## 2021-01-01 RX ORDER — MORPHINE SULFATE 20 MG/ML
15 SOLUTION ORAL
Status: DISCONTINUED | OUTPATIENT
Start: 2021-01-01 | End: 2021-01-01 | Stop reason: HOSPADM

## 2021-01-01 RX ORDER — SODIUM CHLORIDE 0.9 % (FLUSH) 0.9 %
5-40 SYRINGE (ML) INJECTION EVERY 12 HOURS SCHEDULED
Status: DISCONTINUED | OUTPATIENT
Start: 2021-01-01 | End: 2021-01-01 | Stop reason: HOSPADM

## 2021-01-01 RX ORDER — HEPARIN SODIUM 1000 [USP'U]/ML
5500 INJECTION, SOLUTION INTRAVENOUS; SUBCUTANEOUS ONCE
Status: COMPLETED | OUTPATIENT
Start: 2021-01-01 | End: 2021-01-01

## 2021-01-01 RX ORDER — INSULIN GLARGINE 100 [IU]/ML
20 INJECTION, SOLUTION SUBCUTANEOUS NIGHTLY
Status: DISCONTINUED | OUTPATIENT
Start: 2021-01-01 | End: 2021-01-01

## 2021-01-01 RX ORDER — POLYETHYLENE GLYCOL 3350 17 G/17G
17 POWDER, FOR SOLUTION ORAL DAILY PRN
Status: DISCONTINUED | OUTPATIENT
Start: 2021-01-01 | End: 2021-01-01 | Stop reason: HOSPADM

## 2021-01-01 RX ORDER — INSULIN GLARGINE 100 [IU]/ML
40 INJECTION, SOLUTION SUBCUTANEOUS NIGHTLY
Status: DISCONTINUED | OUTPATIENT
Start: 2021-01-01 | End: 2021-01-01

## 2021-01-01 RX ORDER — TIZANIDINE 4 MG/1
4 TABLET ORAL ONCE
Status: COMPLETED | OUTPATIENT
Start: 2021-01-01 | End: 2021-01-01

## 2021-01-01 RX ORDER — DIPHENHYDRAMINE HCL 25 MG
50 TABLET ORAL EVERY 6 HOURS PRN
Status: DISCONTINUED | OUTPATIENT
Start: 2021-01-01 | End: 2021-01-01 | Stop reason: HOSPADM

## 2021-01-01 RX ORDER — MORPHINE SULFATE 2 MG/ML
2 INJECTION, SOLUTION INTRAMUSCULAR; INTRAVENOUS EVERY 5 MIN PRN
Status: DISCONTINUED | OUTPATIENT
Start: 2021-01-01 | End: 2021-01-01 | Stop reason: HOSPADM

## 2021-01-01 RX ORDER — DULOXETIN HYDROCHLORIDE 60 MG/1
60 CAPSULE, DELAYED RELEASE ORAL 2 TIMES DAILY
Status: DISCONTINUED | OUTPATIENT
Start: 2021-01-01 | End: 2021-01-01 | Stop reason: HOSPADM

## 2021-01-01 RX ORDER — SODIUM CHLORIDE 9 MG/ML
1000 INJECTION, SOLUTION INTRAVENOUS CONTINUOUS
Status: DISCONTINUED | OUTPATIENT
Start: 2021-01-01 | End: 2021-01-01

## 2021-01-01 RX ORDER — PROMETHAZINE HYDROCHLORIDE 25 MG/ML
6.25 INJECTION, SOLUTION INTRAMUSCULAR; INTRAVENOUS
Status: ACTIVE | OUTPATIENT
Start: 2021-01-01 | End: 2021-01-01

## 2021-01-01 RX ORDER — LIDOCAINE HYDROCHLORIDE 20 MG/ML
INJECTION, SOLUTION EPIDURAL; INFILTRATION; INTRACAUDAL; PERINEURAL PRN
Status: DISCONTINUED | OUTPATIENT
Start: 2021-01-01 | End: 2021-01-01 | Stop reason: SDUPTHER

## 2021-01-01 RX ORDER — METFORMIN HYDROCHLORIDE 500 MG/1
TABLET, EXTENDED RELEASE ORAL
Qty: 60 TABLET | Refills: 0 | Status: SHIPPED | OUTPATIENT
Start: 2021-01-01 | End: 2021-01-01 | Stop reason: SDUPTHER

## 2021-01-01 RX ORDER — HEPARIN SODIUM 1000 [USP'U]/ML
2800 INJECTION, SOLUTION INTRAVENOUS; SUBCUTANEOUS PRN
Status: DISCONTINUED | OUTPATIENT
Start: 2021-01-01 | End: 2021-01-01

## 2021-01-01 RX ORDER — LISINOPRIL 5 MG/1
TABLET ORAL
Qty: 90 TABLET | Refills: 1 | Status: ON HOLD | OUTPATIENT
Start: 2021-01-01 | End: 2021-01-01 | Stop reason: HOSPADM

## 2021-01-01 RX ORDER — LACTULOSE 10 G/15ML
20 SOLUTION ORAL 3 TIMES DAILY
Status: DISCONTINUED | OUTPATIENT
Start: 2021-01-01 | End: 2021-01-01 | Stop reason: HOSPADM

## 2021-01-01 RX ORDER — MORPHINE SULFATE 20 MG/ML
30 SOLUTION ORAL
Qty: 120 ML | Refills: 0 | Status: SHIPPED | OUTPATIENT
Start: 2021-01-01 | End: 2021-01-01 | Stop reason: HOSPADM

## 2021-01-01 RX ORDER — ATORVASTATIN CALCIUM 40 MG/1
40 TABLET, FILM COATED ORAL NIGHTLY
Status: DISCONTINUED | OUTPATIENT
Start: 2021-01-01 | End: 2021-01-01

## 2021-01-01 RX ORDER — METFORMIN HYDROCHLORIDE 500 MG/1
TABLET, EXTENDED RELEASE ORAL
Qty: 60 TABLET | Refills: 0 | Status: SHIPPED | OUTPATIENT
Start: 2021-01-01 | End: 2021-01-01

## 2021-01-01 RX ORDER — APREPITANT 40 MG/1
CAPSULE ORAL
Status: COMPLETED
Start: 2021-01-01 | End: 2021-01-01

## 2021-01-01 RX ORDER — DIPHENHYDRAMINE HYDROCHLORIDE 50 MG/ML
12.5 INJECTION INTRAMUSCULAR; INTRAVENOUS
Status: ACTIVE | OUTPATIENT
Start: 2021-01-01 | End: 2021-01-01

## 2021-01-01 RX ORDER — INSULIN GLARGINE 100 [IU]/ML
60 INJECTION, SOLUTION SUBCUTANEOUS NIGHTLY
Status: DISCONTINUED | OUTPATIENT
Start: 2021-01-01 | End: 2021-01-01 | Stop reason: HOSPADM

## 2021-01-01 RX ORDER — LABETALOL HYDROCHLORIDE 5 MG/ML
5 INJECTION, SOLUTION INTRAVENOUS EVERY 10 MIN PRN
Status: DISCONTINUED | OUTPATIENT
Start: 2021-01-01 | End: 2021-01-01 | Stop reason: ALTCHOICE

## 2021-01-01 RX ORDER — ACETAMINOPHEN 325 MG/1
650 TABLET ORAL ONCE
Status: COMPLETED | OUTPATIENT
Start: 2021-01-01 | End: 2021-01-01

## 2021-01-01 RX ADMIN — ACETAMINOPHEN 650 MG: 325 TABLET ORAL at 09:49

## 2021-01-01 RX ADMIN — SODIUM CHLORIDE, POTASSIUM CHLORIDE, SODIUM LACTATE AND CALCIUM CHLORIDE: 600; 310; 30; 20 INJECTION, SOLUTION INTRAVENOUS at 10:57

## 2021-01-01 RX ADMIN — INSULIN LISPRO 2 UNITS: 100 INJECTION, SOLUTION INTRAVENOUS; SUBCUTANEOUS at 18:26

## 2021-01-01 RX ADMIN — HYDROMORPHONE HYDROCHLORIDE 0.5 MG: 1 INJECTION, SOLUTION INTRAMUSCULAR; INTRAVENOUS; SUBCUTANEOUS at 10:08

## 2021-01-01 RX ADMIN — PROPOFOL 150 MG: 10 INJECTION, EMULSION INTRAVENOUS at 09:31

## 2021-01-01 RX ADMIN — URSODIOL 300 MG: 300 CAPSULE ORAL at 22:15

## 2021-01-01 RX ADMIN — MORPHINE SULFATE 30 MG: 30 TABLET, FILM COATED, EXTENDED RELEASE ORAL at 08:27

## 2021-01-01 RX ADMIN — CEFTRIAXONE SODIUM 1000 MG: 1 INJECTION, POWDER, FOR SOLUTION INTRAMUSCULAR; INTRAVENOUS at 18:46

## 2021-01-01 RX ADMIN — INSULIN GLARGINE 20 UNITS: 100 INJECTION, SOLUTION SUBCUTANEOUS at 21:43

## 2021-01-01 RX ADMIN — GLUCAGON HYDROCHLORIDE 0.4 MG: KIT at 09:59

## 2021-01-01 RX ADMIN — DEXAMETHASONE SODIUM PHOSPHATE 8 MG: 4 INJECTION, SOLUTION INTRAMUSCULAR; INTRAVENOUS at 13:59

## 2021-01-01 RX ADMIN — HYDROMORPHONE HYDROCHLORIDE 1 MG: 1 INJECTION, SOLUTION INTRAMUSCULAR; INTRAVENOUS; SUBCUTANEOUS at 09:08

## 2021-01-01 RX ADMIN — HYDROMORPHONE HYDROCHLORIDE 1 MG: 1 INJECTION, SOLUTION INTRAMUSCULAR; INTRAVENOUS; SUBCUTANEOUS at 18:31

## 2021-01-01 RX ADMIN — INSULIN LISPRO 7 UNITS: 100 INJECTION, SOLUTION INTRAVENOUS; SUBCUTANEOUS at 09:05

## 2021-01-01 RX ADMIN — INSULIN LISPRO 5 UNITS: 100 INJECTION, SOLUTION INTRAVENOUS; SUBCUTANEOUS at 17:42

## 2021-01-01 RX ADMIN — Medication 15 MG: at 03:26

## 2021-01-01 RX ADMIN — HYDROMORPHONE HYDROCHLORIDE 1 MG: 1 INJECTION, SOLUTION INTRAMUSCULAR; INTRAVENOUS; SUBCUTANEOUS at 00:24

## 2021-01-01 RX ADMIN — BUPROPION HYDROCHLORIDE 150 MG: 150 TABLET, EXTENDED RELEASE ORAL at 08:48

## 2021-01-01 RX ADMIN — Medication 10 ML: at 09:24

## 2021-01-01 RX ADMIN — INSULIN LISPRO 6 UNITS: 100 INJECTION, SOLUTION INTRAVENOUS; SUBCUTANEOUS at 12:36

## 2021-01-01 RX ADMIN — DULOXETINE HYDROCHLORIDE 60 MG: 60 CAPSULE, DELAYED RELEASE ORAL at 11:55

## 2021-01-01 RX ADMIN — Medication 100 MCG: at 12:32

## 2021-01-01 RX ADMIN — DULOXETINE HYDROCHLORIDE 60 MG: 60 CAPSULE, DELAYED RELEASE ORAL at 00:08

## 2021-01-01 RX ADMIN — METOPROLOL TARTRATE 50 MG: 50 TABLET, FILM COATED ORAL at 10:39

## 2021-01-01 RX ADMIN — DICYCLOMINE HYDROCHLORIDE 10 MG: 10 CAPSULE ORAL at 07:48

## 2021-01-01 RX ADMIN — HYDROMORPHONE HYDROCHLORIDE 1 MG: 1 INJECTION, SOLUTION INTRAMUSCULAR; INTRAVENOUS; SUBCUTANEOUS at 05:59

## 2021-01-01 RX ADMIN — SUCCINYLCHOLINE CHLORIDE 40 MG: 20 INJECTION, SOLUTION INTRAMUSCULAR; INTRAVENOUS at 12:22

## 2021-01-01 RX ADMIN — HYDROMORPHONE HYDROCHLORIDE 1 MG: 1 INJECTION, SOLUTION INTRAMUSCULAR; INTRAVENOUS; SUBCUTANEOUS at 15:36

## 2021-01-01 RX ADMIN — INSULIN LISPRO 1 UNITS: 100 INJECTION, SOLUTION INTRAVENOUS; SUBCUTANEOUS at 22:33

## 2021-01-01 RX ADMIN — HYDROMORPHONE HYDROCHLORIDE 1 MG: 1 INJECTION, SOLUTION INTRAMUSCULAR; INTRAVENOUS; SUBCUTANEOUS at 00:49

## 2021-01-01 RX ADMIN — URSODIOL 300 MG: 300 CAPSULE ORAL at 08:51

## 2021-01-01 RX ADMIN — Medication 10 ML: at 18:32

## 2021-01-01 RX ADMIN — HYDROMORPHONE HYDROCHLORIDE 1 MG: 1 INJECTION, SOLUTION INTRAMUSCULAR; INTRAVENOUS; SUBCUTANEOUS at 06:16

## 2021-01-01 RX ADMIN — GLUCAGON HYDROCHLORIDE 0.4 MG: KIT at 10:42

## 2021-01-01 RX ADMIN — URSODIOL 300 MG: 300 CAPSULE ORAL at 21:25

## 2021-01-01 RX ADMIN — CEFTRIAXONE SODIUM 1000 MG: 1 INJECTION, POWDER, FOR SOLUTION INTRAMUSCULAR; INTRAVENOUS at 07:45

## 2021-01-01 RX ADMIN — METOPROLOL TARTRATE 50 MG: 50 TABLET, FILM COATED ORAL at 20:00

## 2021-01-01 RX ADMIN — INSULIN LISPRO 3 UNITS: 100 INJECTION, SOLUTION INTRAVENOUS; SUBCUTANEOUS at 09:14

## 2021-01-01 RX ADMIN — METOPROLOL TARTRATE 50 MG: 50 TABLET, FILM COATED ORAL at 21:03

## 2021-01-01 RX ADMIN — ONDANSETRON 4 MG: 2 INJECTION INTRAMUSCULAR; INTRAVENOUS at 19:52

## 2021-01-01 RX ADMIN — BUPROPION HYDROCHLORIDE 150 MG: 150 TABLET, EXTENDED RELEASE ORAL at 11:55

## 2021-01-01 RX ADMIN — SODIUM CHLORIDE, POTASSIUM CHLORIDE, SODIUM LACTATE AND CALCIUM CHLORIDE: 600; 310; 30; 20 INJECTION, SOLUTION INTRAVENOUS at 10:35

## 2021-01-01 RX ADMIN — HYDROMORPHONE HYDROCHLORIDE 1 MG: 1 INJECTION, SOLUTION INTRAMUSCULAR; INTRAVENOUS; SUBCUTANEOUS at 21:27

## 2021-01-01 RX ADMIN — INSULIN LISPRO 7 UNITS: 100 INJECTION, SOLUTION INTRAVENOUS; SUBCUTANEOUS at 11:59

## 2021-01-01 RX ADMIN — SODIUM CHLORIDE: 9 INJECTION, SOLUTION INTRAVENOUS at 20:54

## 2021-01-01 RX ADMIN — PRIMIDONE 150 MG: 50 TABLET ORAL at 20:25

## 2021-01-01 RX ADMIN — SODIUM CHLORIDE: 9 INJECTION, SOLUTION INTRAVENOUS at 00:22

## 2021-01-01 RX ADMIN — DICYCLOMINE HYDROCHLORIDE 10 MG: 10 CAPSULE ORAL at 23:02

## 2021-01-01 RX ADMIN — INSULIN LISPRO 7 UNITS: 100 INJECTION, SOLUTION INTRAVENOUS; SUBCUTANEOUS at 17:25

## 2021-01-01 RX ADMIN — DULOXETINE HYDROCHLORIDE 60 MG: 60 CAPSULE, DELAYED RELEASE ORAL at 08:49

## 2021-01-01 RX ADMIN — OXYCODONE AND ACETAMINOPHEN 1 TABLET: 5; 325 TABLET ORAL at 05:19

## 2021-01-01 RX ADMIN — AZITHROMYCIN MONOHYDRATE 500 MG: 500 INJECTION, POWDER, LYOPHILIZED, FOR SOLUTION INTRAVENOUS at 17:12

## 2021-01-01 RX ADMIN — Medication 4000 UNITS: at 10:05

## 2021-01-01 RX ADMIN — INSULIN LISPRO 2 UNITS: 100 INJECTION, SOLUTION INTRAVENOUS; SUBCUTANEOUS at 08:52

## 2021-01-01 RX ADMIN — DICYCLOMINE HYDROCHLORIDE 10 MG: 10 CAPSULE ORAL at 09:06

## 2021-01-01 RX ADMIN — INSULIN LISPRO 12 UNITS: 100 INJECTION, SOLUTION INTRAVENOUS; SUBCUTANEOUS at 18:30

## 2021-01-01 RX ADMIN — FENTANYL CITRATE 25 MCG: 50 INJECTION INTRAMUSCULAR; INTRAVENOUS at 11:53

## 2021-01-01 RX ADMIN — INSULIN GLARGINE 25 UNITS: 100 INJECTION, SOLUTION SUBCUTANEOUS at 22:33

## 2021-01-01 RX ADMIN — LEVOTHYROXINE SODIUM 75 MCG: 0.03 TABLET ORAL at 06:16

## 2021-01-01 RX ADMIN — APREPITANT 40 MG: 40 CAPSULE ORAL at 12:38

## 2021-01-01 RX ADMIN — HYDROMORPHONE HYDROCHLORIDE 1 MG: 1 INJECTION, SOLUTION INTRAMUSCULAR; INTRAVENOUS; SUBCUTANEOUS at 16:22

## 2021-01-01 RX ADMIN — ATORVASTATIN CALCIUM 40 MG: 40 TABLET, FILM COATED ORAL at 22:35

## 2021-01-01 RX ADMIN — INSULIN LISPRO 8 UNITS: 100 INJECTION, SOLUTION INTRAVENOUS; SUBCUTANEOUS at 12:40

## 2021-01-01 RX ADMIN — LACTULOSE 20 G: 20 SOLUTION ORAL at 15:16

## 2021-01-01 RX ADMIN — DIPHENHYDRAMINE HCL 50 MG: 25 TABLET ORAL at 21:45

## 2021-01-01 RX ADMIN — DICYCLOMINE HYDROCHLORIDE 10 MG: 10 CAPSULE ORAL at 11:58

## 2021-01-01 RX ADMIN — METOPROLOL TARTRATE 25 MG: 25 TABLET, FILM COATED ORAL at 21:42

## 2021-01-01 RX ADMIN — LEVOTHYROXINE SODIUM 75 MCG: 0.03 TABLET ORAL at 06:34

## 2021-01-01 RX ADMIN — DEXMEDETOMIDINE HYDROCHLORIDE 12 MCG: 100 INJECTION, SOLUTION INTRAVENOUS at 12:22

## 2021-01-01 RX ADMIN — METOPROLOL TARTRATE 50 MG: 50 TABLET, FILM COATED ORAL at 21:20

## 2021-01-01 RX ADMIN — Medication 100 MCG: at 14:00

## 2021-01-01 RX ADMIN — URSODIOL 300 MG: 300 CAPSULE ORAL at 11:39

## 2021-01-01 RX ADMIN — LACTULOSE 20 G: 20 SOLUTION ORAL at 20:25

## 2021-01-01 RX ADMIN — DICYCLOMINE HYDROCHLORIDE 10 MG: 10 CAPSULE ORAL at 22:23

## 2021-01-01 RX ADMIN — LISINOPRIL 5 MG: 5 TABLET ORAL at 08:49

## 2021-01-01 RX ADMIN — BUPROPION HYDROCHLORIDE 150 MG: 150 TABLET, EXTENDED RELEASE ORAL at 08:09

## 2021-01-01 RX ADMIN — INSULIN LISPRO 7 UNITS: 100 INJECTION, SOLUTION INTRAVENOUS; SUBCUTANEOUS at 08:27

## 2021-01-01 RX ADMIN — INSULIN LISPRO 8 UNITS: 100 INJECTION, SOLUTION INTRAVENOUS; SUBCUTANEOUS at 12:34

## 2021-01-01 RX ADMIN — SODIUM CHLORIDE 1000 ML: 9 INJECTION, SOLUTION INTRAVENOUS at 15:35

## 2021-01-01 RX ADMIN — SODIUM ZIRCONIUM CYCLOSILICATE 5 G: 5 POWDER, FOR SUSPENSION ORAL at 16:08

## 2021-01-01 RX ADMIN — PROPOFOL 50 MG: 10 INJECTION, EMULSION INTRAVENOUS at 09:35

## 2021-01-01 RX ADMIN — DULOXETINE HYDROCHLORIDE 60 MG: 60 CAPSULE, DELAYED RELEASE ORAL at 10:06

## 2021-01-01 RX ADMIN — LEVOTHYROXINE SODIUM 75 MCG: 0.03 TABLET ORAL at 07:10

## 2021-01-01 RX ADMIN — HYDROXYZINE HYDROCHLORIDE 10 MG: 10 TABLET ORAL at 17:57

## 2021-01-01 RX ADMIN — URSODIOL 300 MG: 300 CAPSULE ORAL at 22:48

## 2021-01-01 RX ADMIN — DULOXETINE HYDROCHLORIDE 60 MG: 60 CAPSULE, DELAYED RELEASE ORAL at 09:06

## 2021-01-01 RX ADMIN — HYDROXYZINE HYDROCHLORIDE 10 MG: 10 TABLET ORAL at 17:09

## 2021-01-01 RX ADMIN — URSODIOL 300 MG: 300 CAPSULE ORAL at 22:30

## 2021-01-01 RX ADMIN — SODIUM CHLORIDE: 9 INJECTION, SOLUTION INTRAVENOUS at 14:28

## 2021-01-01 RX ADMIN — DICYCLOMINE HYDROCHLORIDE 10 MG: 10 CAPSULE ORAL at 13:57

## 2021-01-01 RX ADMIN — HYDROMORPHONE HYDROCHLORIDE 1 MG: 1 INJECTION, SOLUTION INTRAMUSCULAR; INTRAVENOUS; SUBCUTANEOUS at 22:09

## 2021-01-01 RX ADMIN — SODIUM CHLORIDE: 9 INJECTION, SOLUTION INTRAVENOUS at 03:22

## 2021-01-01 RX ADMIN — LEVOTHYROXINE SODIUM 75 MCG: 0.03 TABLET ORAL at 07:02

## 2021-01-01 RX ADMIN — URSODIOL 300 MG: 300 CAPSULE ORAL at 21:26

## 2021-01-01 RX ADMIN — OXYCODONE AND ACETAMINOPHEN 1 TABLET: 5; 325 TABLET ORAL at 22:12

## 2021-01-01 RX ADMIN — METOPROLOL TARTRATE 25 MG: 25 TABLET, FILM COATED ORAL at 01:41

## 2021-01-01 RX ADMIN — METOPROLOL TARTRATE 25 MG: 25 TABLET, FILM COATED ORAL at 22:34

## 2021-01-01 RX ADMIN — SODIUM CHLORIDE 1000 ML: 9 INJECTION, SOLUTION INTRAVENOUS at 20:21

## 2021-01-01 RX ADMIN — HYDROMORPHONE HYDROCHLORIDE 1 MG: 1 INJECTION, SOLUTION INTRAMUSCULAR; INTRAVENOUS; SUBCUTANEOUS at 09:03

## 2021-01-01 RX ADMIN — Medication 4000 UNITS: at 11:54

## 2021-01-01 RX ADMIN — LIDOCAINE HYDROCHLORIDE 60 MG: 20 INJECTION, SOLUTION INFILTRATION; PERINEURAL at 09:31

## 2021-01-01 RX ADMIN — SODIUM CHLORIDE, POTASSIUM CHLORIDE, SODIUM LACTATE AND CALCIUM CHLORIDE: 600; 310; 30; 20 INJECTION, SOLUTION INTRAVENOUS at 01:40

## 2021-01-01 RX ADMIN — HYDROMORPHONE HYDROCHLORIDE 1 MG: 1 INJECTION, SOLUTION INTRAMUSCULAR; INTRAVENOUS; SUBCUTANEOUS at 19:09

## 2021-01-01 RX ADMIN — AZITHROMYCIN MONOHYDRATE 250 MG: 250 TABLET ORAL at 11:52

## 2021-01-01 RX ADMIN — LIDOCAINE HYDROCHLORIDE 60 MG: 20 INJECTION, SOLUTION INFILTRATION; PERINEURAL at 13:49

## 2021-01-01 RX ADMIN — MORPHINE SULFATE 4 MG: 4 INJECTION, SOLUTION INTRAMUSCULAR; INTRAVENOUS at 20:20

## 2021-01-01 RX ADMIN — CEFAZOLIN 2000 MG: 1 INJECTION, POWDER, FOR SOLUTION INTRAMUSCULAR; INTRAVENOUS at 13:54

## 2021-01-01 RX ADMIN — ROCURONIUM BROMIDE 40 MG: 10 SOLUTION INTRAVENOUS at 12:24

## 2021-01-01 RX ADMIN — SUGAMMADEX 200 MG: 100 INJECTION, SOLUTION INTRAVENOUS at 11:23

## 2021-01-01 RX ADMIN — LACTULOSE 20 G: 20 SOLUTION ORAL at 14:22

## 2021-01-01 RX ADMIN — Medication 10 ML: at 05:36

## 2021-01-01 RX ADMIN — ATORVASTATIN CALCIUM 40 MG: 40 TABLET, FILM COATED ORAL at 00:09

## 2021-01-01 RX ADMIN — INSULIN LISPRO 4 UNITS: 100 INJECTION, SOLUTION INTRAVENOUS; SUBCUTANEOUS at 17:25

## 2021-01-01 RX ADMIN — DICYCLOMINE HYDROCHLORIDE 10 MG: 10 CAPSULE ORAL at 11:55

## 2021-01-01 RX ADMIN — DULOXETINE HYDROCHLORIDE 60 MG: 60 CAPSULE, DELAYED RELEASE ORAL at 09:22

## 2021-01-01 RX ADMIN — ENOXAPARIN SODIUM 40 MG: 40 INJECTION SUBCUTANEOUS at 08:49

## 2021-01-01 RX ADMIN — PRIMIDONE 150 MG: 50 TABLET ORAL at 21:18

## 2021-01-01 RX ADMIN — PRIMIDONE 75 MG: 50 TABLET ORAL at 00:23

## 2021-01-01 RX ADMIN — Medication 10 ML: at 10:06

## 2021-01-01 RX ADMIN — Medication 10 ML: at 04:13

## 2021-01-01 RX ADMIN — INSULIN GLARGINE 40 UNITS: 100 INJECTION, SOLUTION SUBCUTANEOUS at 20:22

## 2021-01-01 RX ADMIN — ONDANSETRON 4 MG: 2 INJECTION INTRAMUSCULAR; INTRAVENOUS at 15:35

## 2021-01-01 RX ADMIN — Medication 4000 UNITS: at 09:22

## 2021-01-01 RX ADMIN — SODIUM ZIRCONIUM CYCLOSILICATE 5 G: 5 POWDER, FOR SUSPENSION ORAL at 14:51

## 2021-01-01 RX ADMIN — URSODIOL 300 MG: 300 CAPSULE ORAL at 10:06

## 2021-01-01 RX ADMIN — LACTULOSE 20 G: 20 SOLUTION ORAL at 10:37

## 2021-01-01 RX ADMIN — PRIMIDONE 150 MG: 50 TABLET ORAL at 20:12

## 2021-01-01 RX ADMIN — LEVOTHYROXINE SODIUM 75 MCG: 0.03 TABLET ORAL at 06:14

## 2021-01-01 RX ADMIN — INSULIN LISPRO 1 UNITS: 100 INJECTION, SOLUTION INTRAVENOUS; SUBCUTANEOUS at 21:09

## 2021-01-01 RX ADMIN — HYDROMORPHONE HYDROCHLORIDE 1 MG: 1 INJECTION, SOLUTION INTRAMUSCULAR; INTRAVENOUS; SUBCUTANEOUS at 20:07

## 2021-01-01 RX ADMIN — DICYCLOMINE HYDROCHLORIDE 10 MG: 10 CAPSULE ORAL at 18:26

## 2021-01-01 RX ADMIN — HEPARIN SODIUM 1250 UNITS/HR: 10000 INJECTION, SOLUTION INTRAVENOUS at 23:27

## 2021-01-01 RX ADMIN — HYDROXYZINE HYDROCHLORIDE 10 MG: 10 TABLET ORAL at 10:08

## 2021-01-01 RX ADMIN — ACETAMINOPHEN 650 MG: 325 TABLET ORAL at 06:14

## 2021-01-01 RX ADMIN — LACTULOSE 20 G: 20 SOLUTION ORAL at 20:45

## 2021-01-01 RX ADMIN — LABETALOL HYDROCHLORIDE 5 MG: 5 INJECTION INTRAVENOUS at 12:09

## 2021-01-01 RX ADMIN — DULOXETINE HYDROCHLORIDE 60 MG: 60 CAPSULE, DELAYED RELEASE ORAL at 09:03

## 2021-01-01 RX ADMIN — Medication 10 ML: at 00:24

## 2021-01-01 RX ADMIN — SODIUM ZIRCONIUM CYCLOSILICATE 5 G: 5 POWDER, FOR SUSPENSION ORAL at 22:48

## 2021-01-01 RX ADMIN — Medication 10 ML: at 20:14

## 2021-01-01 RX ADMIN — INSULIN GLARGINE 60 UNITS: 100 INJECTION, SOLUTION SUBCUTANEOUS at 21:18

## 2021-01-01 RX ADMIN — MAGNESIUM SULFATE HEPTAHYDRATE 2000 MG: 40 INJECTION, SOLUTION INTRAVENOUS at 10:42

## 2021-01-01 RX ADMIN — PRIMIDONE 150 MG: 50 TABLET ORAL at 21:20

## 2021-01-01 RX ADMIN — DIPHENHYDRAMINE HCL 50 MG: 25 TABLET ORAL at 05:19

## 2021-01-01 RX ADMIN — URSODIOL 300 MG: 300 CAPSULE ORAL at 22:11

## 2021-01-01 RX ADMIN — AZITHROMYCIN MONOHYDRATE 250 MG: 250 TABLET ORAL at 07:45

## 2021-01-01 RX ADMIN — ONDANSETRON 4 MG: 2 INJECTION INTRAMUSCULAR; INTRAVENOUS at 07:02

## 2021-01-01 RX ADMIN — HYDROMORPHONE HYDROCHLORIDE 1 MG: 1 INJECTION, SOLUTION INTRAMUSCULAR; INTRAVENOUS; SUBCUTANEOUS at 04:13

## 2021-01-01 RX ADMIN — Medication 10 ML: at 01:38

## 2021-01-01 RX ADMIN — SODIUM CHLORIDE 1000 ML: 9 INJECTION, SOLUTION INTRAVENOUS at 21:54

## 2021-01-01 RX ADMIN — ONDANSETRON 4 MG: 2 INJECTION INTRAMUSCULAR; INTRAVENOUS at 13:59

## 2021-01-01 RX ADMIN — Medication 10 ML: at 20:45

## 2021-01-01 RX ADMIN — HYDROMORPHONE HYDROCHLORIDE 1 MG: 1 INJECTION, SOLUTION INTRAMUSCULAR; INTRAVENOUS; SUBCUTANEOUS at 03:10

## 2021-01-01 RX ADMIN — GLUCAGON HYDROCHLORIDE 0.4 MG: KIT at 10:07

## 2021-01-01 RX ADMIN — ONDANSETRON 4 MG: 2 INJECTION INTRAMUSCULAR; INTRAVENOUS at 15:50

## 2021-01-01 RX ADMIN — INSULIN LISPRO 7 UNITS: 100 INJECTION, SOLUTION INTRAVENOUS; SUBCUTANEOUS at 17:58

## 2021-01-01 RX ADMIN — INSULIN LISPRO 1 UNITS: 100 INJECTION, SOLUTION INTRAVENOUS; SUBCUTANEOUS at 22:35

## 2021-01-01 RX ADMIN — BUPROPION HYDROCHLORIDE 150 MG: 150 TABLET, EXTENDED RELEASE ORAL at 09:02

## 2021-01-01 RX ADMIN — Medication 4000 UNITS: at 09:06

## 2021-01-01 RX ADMIN — Medication 10 ML: at 10:40

## 2021-01-01 RX ADMIN — METOPROLOL TARTRATE 50 MG: 50 TABLET, FILM COATED ORAL at 20:45

## 2021-01-01 RX ADMIN — PROPOFOL 150 MG: 10 INJECTION, EMULSION INTRAVENOUS at 13:49

## 2021-01-01 RX ADMIN — HYDROXYZINE HYDROCHLORIDE 10 MG: 10 TABLET ORAL at 10:39

## 2021-01-01 RX ADMIN — DIPHENHYDRAMINE HCL 50 MG: 25 TABLET ORAL at 21:18

## 2021-01-01 RX ADMIN — DICYCLOMINE HYDROCHLORIDE 10 MG: 10 CAPSULE ORAL at 18:30

## 2021-01-01 RX ADMIN — PRIMIDONE 150 MG: 50 TABLET ORAL at 20:29

## 2021-01-01 RX ADMIN — Medication 15 MG: at 15:16

## 2021-01-01 RX ADMIN — DICYCLOMINE HYDROCHLORIDE 10 MG: 10 CAPSULE ORAL at 22:12

## 2021-01-01 RX ADMIN — MIDAZOLAM HYDROCHLORIDE 0.5 MG: 5 INJECTION, SOLUTION INTRAMUSCULAR; INTRAVENOUS at 11:54

## 2021-01-01 RX ADMIN — URSODIOL 300 MG: 300 CAPSULE ORAL at 14:50

## 2021-01-01 RX ADMIN — HYDROMORPHONE HYDROCHLORIDE 1 MG: 1 INJECTION, SOLUTION INTRAMUSCULAR; INTRAVENOUS; SUBCUTANEOUS at 02:46

## 2021-01-01 RX ADMIN — LEVOTHYROXINE SODIUM 75 MCG: 0.03 TABLET ORAL at 05:59

## 2021-01-01 RX ADMIN — Medication 15 MG: at 18:48

## 2021-01-01 RX ADMIN — HYDROMORPHONE HYDROCHLORIDE 1 MG: 1 INJECTION, SOLUTION INTRAMUSCULAR; INTRAVENOUS; SUBCUTANEOUS at 12:38

## 2021-01-01 RX ADMIN — DIPHENHYDRAMINE HCL 50 MG: 25 TABLET ORAL at 02:49

## 2021-01-01 RX ADMIN — CEFTRIAXONE SODIUM 1000 MG: 1 INJECTION, POWDER, FOR SOLUTION INTRAMUSCULAR; INTRAVENOUS at 16:31

## 2021-01-01 RX ADMIN — Medication 10 ML: at 22:23

## 2021-01-01 RX ADMIN — MORPHINE SULFATE 4 MG: 4 INJECTION, SOLUTION INTRAMUSCULAR; INTRAVENOUS at 10:03

## 2021-01-01 RX ADMIN — ACETAMINOPHEN 650 MG: 325 TABLET ORAL at 17:08

## 2021-01-01 RX ADMIN — LORAZEPAM 1 MG: 2 INJECTION INTRAMUSCULAR; INTRAVENOUS at 20:08

## 2021-01-01 RX ADMIN — SODIUM CHLORIDE 1721 ML: 9 INJECTION, SOLUTION INTRAVENOUS at 16:45

## 2021-01-01 RX ADMIN — INSULIN GLARGINE 40 UNITS: 100 INJECTION, SOLUTION SUBCUTANEOUS at 20:20

## 2021-01-01 RX ADMIN — Medication 4000 UNITS: at 08:48

## 2021-01-01 RX ADMIN — URSODIOL 300 MG: 300 CAPSULE ORAL at 21:42

## 2021-01-01 RX ADMIN — LACTULOSE 20 G: 20 SOLUTION ORAL at 21:10

## 2021-01-01 RX ADMIN — URSODIOL 300 MG: 300 CAPSULE ORAL at 10:44

## 2021-01-01 RX ADMIN — Medication 4000 UNITS: at 09:02

## 2021-01-01 RX ADMIN — ENOXAPARIN SODIUM 40 MG: 40 INJECTION SUBCUTANEOUS at 07:45

## 2021-01-01 RX ADMIN — SUGAMMADEX 200 MG: 100 INJECTION, SOLUTION INTRAVENOUS at 14:09

## 2021-01-01 RX ADMIN — SODIUM CHLORIDE, PRESERVATIVE FREE 10 ML: 5 INJECTION INTRAVENOUS at 07:46

## 2021-01-01 RX ADMIN — ONDANSETRON 4 MG: 2 INJECTION INTRAMUSCULAR; INTRAVENOUS at 16:12

## 2021-01-01 RX ADMIN — LACTULOSE 20 G: 20 SOLUTION ORAL at 11:56

## 2021-01-01 RX ADMIN — HYDRALAZINE HYDROCHLORIDE 5 MG: 20 INJECTION INTRAMUSCULAR; INTRAVENOUS at 12:17

## 2021-01-01 RX ADMIN — INSULIN GLARGINE 60 UNITS: 100 INJECTION, SOLUTION SUBCUTANEOUS at 21:10

## 2021-01-01 RX ADMIN — HYDROMORPHONE HYDROCHLORIDE 1 MG: 1 INJECTION, SOLUTION INTRAMUSCULAR; INTRAVENOUS; SUBCUTANEOUS at 10:41

## 2021-01-01 RX ADMIN — METOPROLOL TARTRATE 25 MG: 25 TABLET, FILM COATED ORAL at 07:45

## 2021-01-01 RX ADMIN — HYDROMORPHONE HYDROCHLORIDE 1 MG: 1 INJECTION, SOLUTION INTRAMUSCULAR; INTRAVENOUS; SUBCUTANEOUS at 03:07

## 2021-01-01 RX ADMIN — ATORVASTATIN CALCIUM 40 MG: 40 TABLET, FILM COATED ORAL at 01:41

## 2021-01-01 RX ADMIN — HYDROMORPHONE HYDROCHLORIDE 1 MG: 1 INJECTION, SOLUTION INTRAMUSCULAR; INTRAVENOUS; SUBCUTANEOUS at 16:08

## 2021-01-01 RX ADMIN — ONDANSETRON 4 MG: 4 TABLET, ORALLY DISINTEGRATING ORAL at 15:01

## 2021-01-01 RX ADMIN — DIPHENHYDRAMINE HCL 50 MG: 25 TABLET ORAL at 23:09

## 2021-01-01 RX ADMIN — DICYCLOMINE HYDROCHLORIDE 10 MG: 10 CAPSULE ORAL at 17:57

## 2021-01-01 RX ADMIN — HYDROMORPHONE HYDROCHLORIDE 1 MG: 1 INJECTION, SOLUTION INTRAMUSCULAR; INTRAVENOUS; SUBCUTANEOUS at 11:55

## 2021-01-01 RX ADMIN — LEVOTHYROXINE SODIUM 75 MCG: 0.03 TABLET ORAL at 06:22

## 2021-01-01 RX ADMIN — LEVOTHYROXINE SODIUM 75 MCG: 0.03 TABLET ORAL at 06:21

## 2021-01-01 RX ADMIN — OXYCODONE AND ACETAMINOPHEN 1 TABLET: 5; 325 TABLET ORAL at 11:56

## 2021-01-01 RX ADMIN — Medication 10 ML: at 08:03

## 2021-01-01 RX ADMIN — ACETAMINOPHEN 650 MG: 325 TABLET ORAL at 12:27

## 2021-01-01 RX ADMIN — SODIUM CHLORIDE: 9 INJECTION, SOLUTION INTRAVENOUS at 01:26

## 2021-01-01 RX ADMIN — Medication 10 ML: at 21:23

## 2021-01-01 RX ADMIN — METOPROLOL TARTRATE 50 MG: 50 TABLET, FILM COATED ORAL at 22:12

## 2021-01-01 RX ADMIN — ONDANSETRON 4 MG: 2 INJECTION INTRAMUSCULAR; INTRAVENOUS at 14:33

## 2021-01-01 RX ADMIN — Medication 10 ML: at 19:52

## 2021-01-01 RX ADMIN — INSULIN GLARGINE 55 UNITS: 100 INJECTION, SOLUTION SUBCUTANEOUS at 02:18

## 2021-01-01 RX ADMIN — ONDANSETRON 4 MG: 2 INJECTION INTRAMUSCULAR; INTRAVENOUS at 09:43

## 2021-01-01 RX ADMIN — CEFTRIAXONE SODIUM 1000 MG: 1 INJECTION, POWDER, FOR SOLUTION INTRAMUSCULAR; INTRAVENOUS at 17:29

## 2021-01-01 RX ADMIN — HYDROMORPHONE HYDROCHLORIDE 1 MG: 1 INJECTION, SOLUTION INTRAMUSCULAR; INTRAVENOUS; SUBCUTANEOUS at 06:20

## 2021-01-01 RX ADMIN — URSODIOL 300 MG: 300 CAPSULE ORAL at 22:29

## 2021-01-01 RX ADMIN — INSULIN LISPRO 2 UNITS: 100 INJECTION, SOLUTION INTRAVENOUS; SUBCUTANEOUS at 20:20

## 2021-01-01 RX ADMIN — ONDANSETRON 4 MG: 2 INJECTION INTRAMUSCULAR; INTRAVENOUS at 01:40

## 2021-01-01 RX ADMIN — LEVOTHYROXINE SODIUM 75 MCG: 0.03 TABLET ORAL at 05:19

## 2021-01-01 RX ADMIN — HYDROMORPHONE HYDROCHLORIDE 1 MG: 1 INJECTION, SOLUTION INTRAMUSCULAR; INTRAVENOUS; SUBCUTANEOUS at 15:02

## 2021-01-01 RX ADMIN — DICYCLOMINE HYDROCHLORIDE 10 MG: 10 CAPSULE ORAL at 23:16

## 2021-01-01 RX ADMIN — ROCURONIUM BROMIDE 15 MG: 10 SOLUTION INTRAVENOUS at 10:18

## 2021-01-01 RX ADMIN — HYDROMORPHONE HYDROCHLORIDE 1 MG: 1 INJECTION, SOLUTION INTRAMUSCULAR; INTRAVENOUS; SUBCUTANEOUS at 20:21

## 2021-01-01 RX ADMIN — DEXAMETHASONE SODIUM PHOSPHATE 8 MG: 4 INJECTION, SOLUTION INTRAMUSCULAR; INTRAVENOUS at 12:22

## 2021-01-01 RX ADMIN — OXYCODONE AND ACETAMINOPHEN 1 TABLET: 5; 325 TABLET ORAL at 13:53

## 2021-01-01 RX ADMIN — HYDROMORPHONE HYDROCHLORIDE 1 MG: 1 INJECTION, SOLUTION INTRAMUSCULAR; INTRAVENOUS; SUBCUTANEOUS at 10:16

## 2021-01-01 RX ADMIN — HYDROMORPHONE HYDROCHLORIDE 1 MG: 1 INJECTION, SOLUTION INTRAMUSCULAR; INTRAVENOUS; SUBCUTANEOUS at 19:46

## 2021-01-01 RX ADMIN — HYDROXYZINE HYDROCHLORIDE 10 MG: 10 TABLET ORAL at 09:49

## 2021-01-01 RX ADMIN — DICYCLOMINE HYDROCHLORIDE 10 MG: 10 CAPSULE ORAL at 12:37

## 2021-01-01 RX ADMIN — HYDROMORPHONE HYDROCHLORIDE 1 MG: 1 INJECTION, SOLUTION INTRAMUSCULAR; INTRAVENOUS; SUBCUTANEOUS at 10:38

## 2021-01-01 RX ADMIN — MAGNESIUM SULFATE HEPTAHYDRATE 2000 MG: 40 INJECTION, SOLUTION INTRAVENOUS at 15:28

## 2021-01-01 RX ADMIN — DIPHENHYDRAMINE HCL 25 MG: 25 TABLET ORAL at 03:30

## 2021-01-01 RX ADMIN — LACTULOSE 20 G: 20 SOLUTION ORAL at 09:00

## 2021-01-01 RX ADMIN — HYDROMORPHONE HYDROCHLORIDE 1 MG: 1 INJECTION, SOLUTION INTRAMUSCULAR; INTRAVENOUS; SUBCUTANEOUS at 21:45

## 2021-01-01 RX ADMIN — URSODIOL 300 MG: 300 CAPSULE ORAL at 00:49

## 2021-01-01 RX ADMIN — Medication 200 MCG: at 12:20

## 2021-01-01 RX ADMIN — HYDROMORPHONE HYDROCHLORIDE 1 MG: 1 INJECTION, SOLUTION INTRAMUSCULAR; INTRAVENOUS; SUBCUTANEOUS at 19:57

## 2021-01-01 RX ADMIN — ATROPINE SULFATE 1 DROP: 10 SOLUTION/ DROPS OPHTHALMIC at 03:20

## 2021-01-01 RX ADMIN — INSULIN LISPRO 2 UNITS: 100 INJECTION, SOLUTION INTRAVENOUS; SUBCUTANEOUS at 21:02

## 2021-01-01 RX ADMIN — LORAZEPAM 1 MG: 2 INJECTION INTRAMUSCULAR; INTRAVENOUS at 21:12

## 2021-01-01 RX ADMIN — PRIMIDONE 75 MG: 50 TABLET ORAL at 22:34

## 2021-01-01 RX ADMIN — URSODIOL 300 MG: 300 CAPSULE ORAL at 21:22

## 2021-01-01 RX ADMIN — Medication 4000 UNITS: at 08:09

## 2021-01-01 RX ADMIN — MORPHINE SULFATE 30 MG: 30 TABLET, FILM COATED, EXTENDED RELEASE ORAL at 12:38

## 2021-01-01 RX ADMIN — DIPHENHYDRAMINE HCL 50 MG: 25 TABLET ORAL at 14:24

## 2021-01-01 RX ADMIN — METOPROLOL TARTRATE 50 MG: 50 TABLET, FILM COATED ORAL at 08:48

## 2021-01-01 RX ADMIN — INDOMETHACIN 100 MG: 50 SUPPOSITORY RECTAL at 08:04

## 2021-01-01 RX ADMIN — URSODIOL 300 MG: 300 CAPSULE ORAL at 20:12

## 2021-01-01 RX ADMIN — DICYCLOMINE HYDROCHLORIDE 10 MG: 10 CAPSULE ORAL at 21:19

## 2021-01-01 RX ADMIN — MORPHINE SULFATE 30 MG: 30 TABLET, FILM COATED, EXTENDED RELEASE ORAL at 22:23

## 2021-01-01 RX ADMIN — LACTULOSE 20 G: 20 SOLUTION ORAL at 22:11

## 2021-01-01 RX ADMIN — ONDANSETRON 4 MG: 2 INJECTION INTRAMUSCULAR; INTRAVENOUS at 07:52

## 2021-01-01 RX ADMIN — DEXAMETHASONE SODIUM PHOSPHATE 8 MG: 4 INJECTION, SOLUTION INTRAMUSCULAR; INTRAVENOUS at 09:43

## 2021-01-01 RX ADMIN — ASPIRIN 325 MG: 325 TABLET, FILM COATED ORAL at 08:48

## 2021-01-01 RX ADMIN — INSULIN LISPRO 10 UNITS: 100 INJECTION, SOLUTION INTRAVENOUS; SUBCUTANEOUS at 10:47

## 2021-01-01 RX ADMIN — METOPROLOL TARTRATE 50 MG: 50 TABLET, FILM COATED ORAL at 08:10

## 2021-01-01 RX ADMIN — LACTULOSE 20 G: 20 SOLUTION ORAL at 07:48

## 2021-01-01 RX ADMIN — DICYCLOMINE HYDROCHLORIDE 10 MG: 10 CAPSULE ORAL at 10:39

## 2021-01-01 RX ADMIN — LACTULOSE 20 G: 20 SOLUTION ORAL at 13:57

## 2021-01-01 RX ADMIN — LEVOTHYROXINE SODIUM 75 MCG: 0.03 TABLET ORAL at 07:01

## 2021-01-01 RX ADMIN — MORPHINE SULFATE 2 MG: 2 INJECTION, SOLUTION INTRAMUSCULAR; INTRAVENOUS at 18:54

## 2021-01-01 RX ADMIN — METOPROLOL TARTRATE 50 MG: 50 TABLET, FILM COATED ORAL at 10:06

## 2021-01-01 RX ADMIN — METOPROLOL TARTRATE 25 MG: 25 TABLET, FILM COATED ORAL at 08:44

## 2021-01-01 RX ADMIN — GLIMEPIRIDE 4 MG: 2 TABLET ORAL at 08:49

## 2021-01-01 RX ADMIN — Medication 10 ML: at 05:34

## 2021-01-01 RX ADMIN — HYDROMORPHONE HYDROCHLORIDE 1 MG: 1 INJECTION, SOLUTION INTRAMUSCULAR; INTRAVENOUS; SUBCUTANEOUS at 20:14

## 2021-01-01 RX ADMIN — PHENYLEPHRINE HYDROCHLORIDE 50 MCG: 10 INJECTION INTRAVENOUS at 10:33

## 2021-01-01 RX ADMIN — TC 99M MEDRONATE 23.9 MILLICURIE: 20 INJECTION, POWDER, LYOPHILIZED, FOR SOLUTION INTRAVENOUS at 08:30

## 2021-01-01 RX ADMIN — DICYCLOMINE HYDROCHLORIDE 10 MG: 10 CAPSULE ORAL at 17:24

## 2021-01-01 RX ADMIN — HYDROMORPHONE HYDROCHLORIDE 0.5 MG: 1 INJECTION, SOLUTION INTRAMUSCULAR; INTRAVENOUS; SUBCUTANEOUS at 01:40

## 2021-01-01 RX ADMIN — DICYCLOMINE HYDROCHLORIDE 10 MG: 10 CAPSULE ORAL at 22:30

## 2021-01-01 RX ADMIN — LACTULOSE 20 G: 20 SOLUTION ORAL at 20:11

## 2021-01-01 RX ADMIN — INSULIN LISPRO 2 UNITS: 100 INJECTION, SOLUTION INTRAVENOUS; SUBCUTANEOUS at 09:02

## 2021-01-01 RX ADMIN — LACTULOSE 20 G: 20 SOLUTION ORAL at 21:03

## 2021-01-01 RX ADMIN — Medication 30 MG: at 21:55

## 2021-01-01 RX ADMIN — Medication 4000 UNITS: at 08:03

## 2021-01-01 RX ADMIN — LACTULOSE 20 G: 20 SOLUTION ORAL at 14:58

## 2021-01-01 RX ADMIN — METOPROLOL TARTRATE 50 MG: 50 TABLET, FILM COATED ORAL at 21:18

## 2021-01-01 RX ADMIN — SODIUM CHLORIDE 25 ML: 9 INJECTION, SOLUTION INTRAVENOUS at 10:40

## 2021-01-01 RX ADMIN — ONDANSETRON 4 MG: 2 INJECTION INTRAMUSCULAR; INTRAVENOUS at 20:20

## 2021-01-01 RX ADMIN — SUGAMMADEX 200 MG: 100 INJECTION, SOLUTION INTRAVENOUS at 12:51

## 2021-01-01 RX ADMIN — DULOXETINE HYDROCHLORIDE 60 MG: 60 CAPSULE, DELAYED RELEASE ORAL at 22:35

## 2021-01-01 RX ADMIN — Medication 4000 UNITS: at 07:48

## 2021-01-01 RX ADMIN — LABETALOL HYDROCHLORIDE 10 MG: 5 INJECTION INTRAVENOUS at 22:56

## 2021-01-01 RX ADMIN — BUPROPION HYDROCHLORIDE 150 MG: 150 TABLET, EXTENDED RELEASE ORAL at 08:03

## 2021-01-01 RX ADMIN — INSULIN LISPRO 8 UNITS: 100 INJECTION, SOLUTION INTRAVENOUS; SUBCUTANEOUS at 08:27

## 2021-01-01 RX ADMIN — METOPROLOL TARTRATE 50 MG: 50 TABLET, FILM COATED ORAL at 22:29

## 2021-01-01 RX ADMIN — HYDROMORPHONE HYDROCHLORIDE 1 MG: 1 INJECTION, SOLUTION INTRAMUSCULAR; INTRAVENOUS; SUBCUTANEOUS at 09:48

## 2021-01-01 RX ADMIN — OXYCODONE AND ACETAMINOPHEN 1 TABLET: 5; 325 TABLET ORAL at 07:48

## 2021-01-01 RX ADMIN — INSULIN GLARGINE 60 UNITS: 100 INJECTION, SOLUTION SUBCUTANEOUS at 20:26

## 2021-01-01 RX ADMIN — GLIMEPIRIDE 4 MG: 2 TABLET ORAL at 22:34

## 2021-01-01 RX ADMIN — URSODIOL 300 MG: 300 CAPSULE ORAL at 11:15

## 2021-01-01 RX ADMIN — DICYCLOMINE HYDROCHLORIDE 10 MG: 10 CAPSULE ORAL at 15:41

## 2021-01-01 RX ADMIN — INSULIN LISPRO 3 UNITS: 100 INJECTION, SOLUTION INTRAVENOUS; SUBCUTANEOUS at 20:51

## 2021-01-01 RX ADMIN — INSULIN LISPRO 12 UNITS: 100 INJECTION, SOLUTION INTRAVENOUS; SUBCUTANEOUS at 17:25

## 2021-01-01 RX ADMIN — HYDROXYZINE HYDROCHLORIDE 10 MG: 10 TABLET ORAL at 07:24

## 2021-01-01 RX ADMIN — DULOXETINE HYDROCHLORIDE 60 MG: 60 CAPSULE, DELAYED RELEASE ORAL at 14:50

## 2021-01-01 RX ADMIN — METOPROLOL TARTRATE 50 MG: 50 TABLET, FILM COATED ORAL at 09:23

## 2021-01-01 RX ADMIN — HYDRALAZINE HYDROCHLORIDE 10 MG: 20 INJECTION INTRAMUSCULAR; INTRAVENOUS at 06:48

## 2021-01-01 RX ADMIN — ONDANSETRON 4 MG: 2 INJECTION INTRAMUSCULAR; INTRAVENOUS at 16:50

## 2021-01-01 RX ADMIN — Medication 10 ML: at 10:03

## 2021-01-01 RX ADMIN — GLUCAGON HYDROCHLORIDE 0.4 MG: KIT at 09:51

## 2021-01-01 RX ADMIN — METOPROLOL TARTRATE 50 MG: 50 TABLET, FILM COATED ORAL at 07:48

## 2021-01-01 RX ADMIN — Medication 10 ML: at 22:19

## 2021-01-01 RX ADMIN — ONDANSETRON 4 MG: 2 INJECTION INTRAMUSCULAR; INTRAVENOUS at 12:22

## 2021-01-01 RX ADMIN — PHYTONADIONE 10 MG: 10 INJECTION, EMULSION INTRAMUSCULAR; INTRAVENOUS; SUBCUTANEOUS at 09:47

## 2021-01-01 RX ADMIN — ACETAMINOPHEN 650 MG: 325 TABLET ORAL at 20:20

## 2021-01-01 RX ADMIN — HYDROMORPHONE HYDROCHLORIDE 1 MG: 1 INJECTION, SOLUTION INTRAMUSCULAR; INTRAVENOUS; SUBCUTANEOUS at 19:52

## 2021-01-01 RX ADMIN — DICYCLOMINE HYDROCHLORIDE 10 MG: 10 CAPSULE ORAL at 12:12

## 2021-01-01 RX ADMIN — SODIUM POLYSTYRENE SULFONATE 15 G: 15 SUSPENSION ORAL; RECTAL at 09:21

## 2021-01-01 RX ADMIN — HYDROMORPHONE HYDROCHLORIDE 0.5 MG: 1 INJECTION, SOLUTION INTRAMUSCULAR; INTRAVENOUS; SUBCUTANEOUS at 06:06

## 2021-01-01 RX ADMIN — URSODIOL 300 MG: 300 CAPSULE ORAL at 11:55

## 2021-01-01 RX ADMIN — HYDROMORPHONE HYDROCHLORIDE 1 MG: 1 INJECTION, SOLUTION INTRAMUSCULAR; INTRAVENOUS; SUBCUTANEOUS at 01:04

## 2021-01-01 RX ADMIN — Medication 100 MCG: at 09:31

## 2021-01-01 RX ADMIN — ONDANSETRON 4 MG: 2 INJECTION INTRAMUSCULAR; INTRAVENOUS at 05:24

## 2021-01-01 RX ADMIN — HYDROMORPHONE HYDROCHLORIDE 1 MG: 1 INJECTION, SOLUTION INTRAMUSCULAR; INTRAVENOUS; SUBCUTANEOUS at 07:09

## 2021-01-01 RX ADMIN — INSULIN LISPRO 3 UNITS: 100 INJECTION, SOLUTION INTRAVENOUS; SUBCUTANEOUS at 12:13

## 2021-01-01 RX ADMIN — DICYCLOMINE HYDROCHLORIDE 10 MG: 10 CAPSULE ORAL at 08:49

## 2021-01-01 RX ADMIN — DIPHENHYDRAMINE HCL 50 MG: 25 TABLET ORAL at 21:56

## 2021-01-01 RX ADMIN — LACTULOSE 20 G: 20 SOLUTION ORAL at 08:48

## 2021-01-01 RX ADMIN — METOPROLOL TARTRATE 25 MG: 25 TABLET, FILM COATED ORAL at 08:48

## 2021-01-01 RX ADMIN — DULOXETINE HYDROCHLORIDE 60 MG: 60 CAPSULE, DELAYED RELEASE ORAL at 07:48

## 2021-01-01 RX ADMIN — CYCLOBENZAPRINE 5 MG: 10 TABLET, FILM COATED ORAL at 16:36

## 2021-01-01 RX ADMIN — DICYCLOMINE HYDROCHLORIDE 10 MG: 10 CAPSULE ORAL at 15:52

## 2021-01-01 RX ADMIN — INSULIN LISPRO 1 UNITS: 100 INJECTION, SOLUTION INTRAVENOUS; SUBCUTANEOUS at 17:42

## 2021-01-01 RX ADMIN — LABETALOL HYDROCHLORIDE 5 MG: 5 INJECTION INTRAVENOUS at 11:53

## 2021-01-01 RX ADMIN — INSULIN LISPRO 4 UNITS: 100 INJECTION, SOLUTION INTRAVENOUS; SUBCUTANEOUS at 12:13

## 2021-01-01 RX ADMIN — URSODIOL 300 MG: 300 CAPSULE ORAL at 09:13

## 2021-01-01 RX ADMIN — INSULIN LISPRO 7 UNITS: 100 INJECTION, SOLUTION INTRAVENOUS; SUBCUTANEOUS at 11:57

## 2021-01-01 RX ADMIN — BUPROPION HYDROCHLORIDE 150 MG: 150 TABLET, EXTENDED RELEASE ORAL at 14:50

## 2021-01-01 RX ADMIN — URSODIOL 300 MG: 300 CAPSULE ORAL at 21:02

## 2021-01-01 RX ADMIN — METOPROLOL TARTRATE 50 MG: 50 TABLET, FILM COATED ORAL at 20:25

## 2021-01-01 RX ADMIN — URSODIOL 300 MG: 300 CAPSULE ORAL at 21:45

## 2021-01-01 RX ADMIN — LACTULOSE 20 G: 20 SOLUTION ORAL at 15:46

## 2021-01-01 RX ADMIN — HYDROXYZINE HYDROCHLORIDE 10 MG: 10 TABLET ORAL at 22:12

## 2021-01-01 RX ADMIN — OXYCODONE AND ACETAMINOPHEN 1 TABLET: 5; 325 TABLET ORAL at 14:59

## 2021-01-01 RX ADMIN — BUPROPION HYDROCHLORIDE 150 MG: 150 TABLET, EXTENDED RELEASE ORAL at 10:39

## 2021-01-01 RX ADMIN — DEXTROSE MONOHYDRATE 12.5 G: 25 INJECTION, SOLUTION INTRAVENOUS at 06:48

## 2021-01-01 RX ADMIN — LEVOTHYROXINE SODIUM 75 MCG: 0.03 TABLET ORAL at 08:10

## 2021-01-01 RX ADMIN — URSODIOL 300 MG: 300 CAPSULE ORAL at 08:11

## 2021-01-01 RX ADMIN — ACETAMINOPHEN 650 MG: 325 TABLET ORAL at 07:04

## 2021-01-01 RX ADMIN — FENTANYL CITRATE 100 MCG: 50 INJECTION INTRAMUSCULAR; INTRAVENOUS at 13:49

## 2021-01-01 RX ADMIN — LIDOCAINE HYDROCHLORIDE 80 MG: 20 INJECTION, SOLUTION EPIDURAL; INFILTRATION; INTRACAUDAL; PERINEURAL at 12:15

## 2021-01-01 RX ADMIN — MIDAZOLAM 2 MG: 1 INJECTION INTRAMUSCULAR; INTRAVENOUS at 12:15

## 2021-01-01 RX ADMIN — METOPROLOL TARTRATE 25 MG: 25 TABLET, FILM COATED ORAL at 21:45

## 2021-01-01 RX ADMIN — METOPROLOL TARTRATE 50 MG: 50 TABLET, FILM COATED ORAL at 20:29

## 2021-01-01 RX ADMIN — LEVOTHYROXINE SODIUM 75 MCG: 0.03 TABLET ORAL at 07:20

## 2021-01-01 RX ADMIN — Medication 10 ML: at 21:14

## 2021-01-01 RX ADMIN — URSODIOL 300 MG: 300 CAPSULE ORAL at 09:07

## 2021-01-01 RX ADMIN — DIPHENHYDRAMINE HCL 25 MG: 25 TABLET ORAL at 01:41

## 2021-01-01 RX ADMIN — INSULIN LISPRO 7 UNITS: 100 INJECTION, SOLUTION INTRAVENOUS; SUBCUTANEOUS at 12:14

## 2021-01-01 RX ADMIN — LISINOPRIL 5 MG: 5 TABLET ORAL at 07:45

## 2021-01-01 RX ADMIN — Medication 4000 UNITS: at 14:50

## 2021-01-01 RX ADMIN — PRIMIDONE 150 MG: 50 TABLET ORAL at 21:10

## 2021-01-01 RX ADMIN — SODIUM CHLORIDE: 9 INJECTION, SOLUTION INTRAVENOUS at 17:39

## 2021-01-01 RX ADMIN — HYDROMORPHONE HYDROCHLORIDE 1 MG: 1 INJECTION, SOLUTION INTRAMUSCULAR; INTRAVENOUS; SUBCUTANEOUS at 08:08

## 2021-01-01 RX ADMIN — INSULIN LISPRO 12 UNITS: 100 INJECTION, SOLUTION INTRAVENOUS; SUBCUTANEOUS at 11:45

## 2021-01-01 RX ADMIN — GLIMEPIRIDE 4 MG: 2 TABLET ORAL at 07:44

## 2021-01-01 RX ADMIN — HYDROMORPHONE HYDROCHLORIDE 1 MG: 1 INJECTION, SOLUTION INTRAMUSCULAR; INTRAVENOUS; SUBCUTANEOUS at 16:36

## 2021-01-01 RX ADMIN — INDOMETHACIN 100 MG: 50 SUPPOSITORY RECTAL at 12:56

## 2021-01-01 RX ADMIN — HEPARIN SODIUM 5000 UNITS: 5000 INJECTION INTRAVENOUS; SUBCUTANEOUS at 21:42

## 2021-01-01 RX ADMIN — MORPHINE SULFATE 4 MG: 4 INJECTION, SOLUTION INTRAMUSCULAR; INTRAVENOUS at 19:56

## 2021-01-01 RX ADMIN — GLIMEPIRIDE 4 MG: 2 TABLET ORAL at 00:08

## 2021-01-01 RX ADMIN — HYDROXYZINE PAMOATE 25 MG: 25 CAPSULE ORAL at 23:55

## 2021-01-01 RX ADMIN — INSULIN LISPRO 4 UNITS: 100 INJECTION, SOLUTION INTRAVENOUS; SUBCUTANEOUS at 18:10

## 2021-01-01 RX ADMIN — SODIUM CHLORIDE, SODIUM LACTATE, POTASSIUM CHLORIDE, AND CALCIUM CHLORIDE: .6; .31; .03; .02 INJECTION, SOLUTION INTRAVENOUS at 09:27

## 2021-01-01 RX ADMIN — DULOXETINE HYDROCHLORIDE 60 MG: 60 CAPSULE, DELAYED RELEASE ORAL at 08:10

## 2021-01-01 RX ADMIN — TIZANIDINE 4 MG: 4 TABLET ORAL at 08:11

## 2021-01-01 RX ADMIN — METOPROLOL TARTRATE 50 MG: 50 TABLET, FILM COATED ORAL at 11:55

## 2021-01-01 RX ADMIN — Medication 10 ML: at 15:36

## 2021-01-01 RX ADMIN — METOPROLOL TARTRATE 50 MG: 50 TABLET, FILM COATED ORAL at 09:02

## 2021-01-01 RX ADMIN — HYDROMORPHONE HYDROCHLORIDE 1 MG: 1 INJECTION, SOLUTION INTRAMUSCULAR; INTRAVENOUS; SUBCUTANEOUS at 05:34

## 2021-01-01 RX ADMIN — INSULIN GLARGINE 60 UNITS: 100 INJECTION, SOLUTION SUBCUTANEOUS at 22:15

## 2021-01-01 RX ADMIN — INSULIN LISPRO 5 UNITS: 100 INJECTION, SOLUTION INTRAVENOUS; SUBCUTANEOUS at 21:20

## 2021-01-01 RX ADMIN — Medication 10 ML: at 19:57

## 2021-01-01 RX ADMIN — DEXTROSE MONOHYDRATE 12.5 G: 25 INJECTION, SOLUTION INTRAVENOUS at 14:33

## 2021-01-01 RX ADMIN — SODIUM CHLORIDE 1000 ML: 9 INJECTION, SOLUTION INTRAVENOUS at 21:38

## 2021-01-01 RX ADMIN — OXYCODONE AND ACETAMINOPHEN 1 TABLET: 5; 325 TABLET ORAL at 07:23

## 2021-01-01 RX ADMIN — MORPHINE SULFATE 30 MG: 30 TABLET, FILM COATED, EXTENDED RELEASE ORAL at 10:40

## 2021-01-01 RX ADMIN — KETOROLAC TROMETHAMINE 15 MG: 30 INJECTION, SOLUTION INTRAMUSCULAR at 16:36

## 2021-01-01 RX ADMIN — OXYCODONE AND ACETAMINOPHEN 1 TABLET: 5; 325 TABLET ORAL at 08:10

## 2021-01-01 RX ADMIN — SODIUM CHLORIDE, PRESERVATIVE FREE 10 ML: 5 INJECTION INTRAVENOUS at 00:22

## 2021-01-01 RX ADMIN — BUPROPION HYDROCHLORIDE 150 MG: 150 TABLET, EXTENDED RELEASE ORAL at 08:49

## 2021-01-01 RX ADMIN — Medication 10 ML: at 20:07

## 2021-01-01 RX ADMIN — INSULIN LISPRO 7 UNITS: 100 INJECTION, SOLUTION INTRAVENOUS; SUBCUTANEOUS at 16:52

## 2021-01-01 RX ADMIN — DICYCLOMINE HYDROCHLORIDE 10 MG: 10 CAPSULE ORAL at 10:06

## 2021-01-01 RX ADMIN — LACTULOSE 20 G: 20 SOLUTION ORAL at 10:05

## 2021-01-01 RX ADMIN — HEPARIN SODIUM 5500 UNITS: 1000 INJECTION INTRAVENOUS; SUBCUTANEOUS at 23:23

## 2021-01-01 RX ADMIN — DICYCLOMINE HYDROCHLORIDE 10 MG: 10 CAPSULE ORAL at 21:10

## 2021-01-01 RX ADMIN — HYDROMORPHONE HYDROCHLORIDE 1 MG: 1 INJECTION, SOLUTION INTRAMUSCULAR; INTRAVENOUS; SUBCUTANEOUS at 00:57

## 2021-01-01 RX ADMIN — LACTULOSE 20 G: 20 SOLUTION ORAL at 21:20

## 2021-01-01 RX ADMIN — INSULIN GLARGINE 20 UNITS: 100 INJECTION, SOLUTION SUBCUTANEOUS at 23:02

## 2021-01-01 RX ADMIN — SUGAMMADEX 200 MG: 100 INJECTION, SOLUTION INTRAVENOUS at 14:15

## 2021-01-01 RX ADMIN — DULOXETINE HYDROCHLORIDE 60 MG: 60 CAPSULE, DELAYED RELEASE ORAL at 10:44

## 2021-01-01 RX ADMIN — MAGNESIUM SULFATE HEPTAHYDRATE 100 ML/HR: 500 INJECTION, SOLUTION INTRAMUSCULAR; INTRAVENOUS at 12:38

## 2021-01-01 RX ADMIN — HYDROMORPHONE HYDROCHLORIDE 1 MG: 1 INJECTION, SOLUTION INTRAMUSCULAR; INTRAVENOUS; SUBCUTANEOUS at 12:37

## 2021-01-01 RX ADMIN — SUCCINYLCHOLINE CHLORIDE 100 MG: 20 INJECTION, SOLUTION INTRAMUSCULAR; INTRAVENOUS at 12:15

## 2021-01-01 RX ADMIN — Medication 100 MCG: at 13:54

## 2021-01-01 RX ADMIN — METOPROLOL TARTRATE 25 MG: 25 TABLET, FILM COATED ORAL at 08:03

## 2021-01-01 RX ADMIN — Medication 10 ML: at 21:31

## 2021-01-01 RX ADMIN — DULOXETINE HYDROCHLORIDE 60 MG: 60 CAPSULE, DELAYED RELEASE ORAL at 10:39

## 2021-01-01 RX ADMIN — MAGNESIUM SULFATE HEPTAHYDRATE 2000 MG: 40 INJECTION, SOLUTION INTRAVENOUS at 17:42

## 2021-01-01 RX ADMIN — LACTULOSE 20 G: 20 SOLUTION ORAL at 20:28

## 2021-01-01 RX ADMIN — ASPIRIN 325 MG: 325 TABLET, FILM COATED ORAL at 07:44

## 2021-01-01 RX ADMIN — METOPROLOL TARTRATE 50 MG: 50 TABLET, FILM COATED ORAL at 20:12

## 2021-01-01 RX ADMIN — HEPARIN SODIUM 5000 UNITS: 5000 INJECTION INTRAVENOUS; SUBCUTANEOUS at 14:24

## 2021-01-01 RX ADMIN — HEPARIN SODIUM 5000 UNITS: 5000 INJECTION INTRAVENOUS; SUBCUTANEOUS at 07:09

## 2021-01-01 RX ADMIN — ROCURONIUM BROMIDE 40 MG: 10 SOLUTION INTRAVENOUS at 09:35

## 2021-01-01 RX ADMIN — INSULIN LISPRO 3 UNITS: 100 INJECTION, SOLUTION INTRAVENOUS; SUBCUTANEOUS at 16:57

## 2021-01-01 RX ADMIN — METOPROLOL TARTRATE 50 MG: 50 TABLET, FILM COATED ORAL at 09:06

## 2021-01-01 RX ADMIN — Medication 10 ML: at 08:08

## 2021-01-01 RX ADMIN — LORAZEPAM 1 MG: 2 INJECTION INTRAMUSCULAR; INTRAVENOUS at 04:14

## 2021-01-01 RX ADMIN — BUPROPION HYDROCHLORIDE 150 MG: 150 TABLET, EXTENDED RELEASE ORAL at 07:44

## 2021-01-01 RX ADMIN — Medication 4000 UNITS: at 10:38

## 2021-01-01 RX ADMIN — LACTULOSE 20 G: 20 SOLUTION ORAL at 10:45

## 2021-01-01 RX ADMIN — SODIUM CHLORIDE, PRESERVATIVE FREE 10 ML: 5 INJECTION INTRAVENOUS at 08:50

## 2021-01-01 RX ADMIN — LEVOTHYROXINE SODIUM 75 MCG: 0.03 TABLET ORAL at 06:26

## 2021-01-01 RX ADMIN — Medication 30 MG: at 03:07

## 2021-01-01 RX ADMIN — INSULIN LISPRO 4 UNITS: 100 INJECTION, SOLUTION INTRAVENOUS; SUBCUTANEOUS at 16:52

## 2021-01-01 RX ADMIN — Medication 4000 UNITS: at 10:40

## 2021-01-01 RX ADMIN — Medication 100 MCG: at 14:05

## 2021-01-01 RX ADMIN — INSULIN LISPRO 5 UNITS: 100 INJECTION, SOLUTION INTRAVENOUS; SUBCUTANEOUS at 20:22

## 2021-01-01 RX ADMIN — HYDRALAZINE HYDROCHLORIDE 20 MG: 20 INJECTION INTRAMUSCULAR; INTRAVENOUS at 01:29

## 2021-01-01 RX ADMIN — DULOXETINE HYDROCHLORIDE 60 MG: 60 CAPSULE, DELAYED RELEASE ORAL at 08:03

## 2021-01-01 RX ADMIN — ONDANSETRON 4 MG: 4 TABLET, ORALLY DISINTEGRATING ORAL at 22:29

## 2021-01-01 RX ADMIN — PRIMIDONE 150 MG: 50 TABLET ORAL at 22:11

## 2021-01-01 RX ADMIN — HEPARIN SODIUM 5000 UNITS: 5000 INJECTION INTRAVENOUS; SUBCUTANEOUS at 21:45

## 2021-01-01 RX ADMIN — ONDANSETRON 4 MG: 4 TABLET, ORALLY DISINTEGRATING ORAL at 19:50

## 2021-01-01 RX ADMIN — BUPROPION HYDROCHLORIDE 150 MG: 150 TABLET, EXTENDED RELEASE ORAL at 09:06

## 2021-01-01 RX ADMIN — METOPROLOL TARTRATE 50 MG: 50 TABLET, FILM COATED ORAL at 21:10

## 2021-01-01 RX ADMIN — METOPROLOL TARTRATE 25 MG: 25 TABLET, FILM COATED ORAL at 00:08

## 2021-01-01 RX ADMIN — HYDROMORPHONE HYDROCHLORIDE 1 MG: 1 INJECTION, SOLUTION INTRAMUSCULAR; INTRAVENOUS; SUBCUTANEOUS at 09:24

## 2021-01-01 RX ADMIN — SODIUM CHLORIDE: 9 INJECTION, SOLUTION INTRAVENOUS at 01:41

## 2021-01-01 RX ADMIN — Medication 3 MG: at 22:23

## 2021-01-01 RX ADMIN — PROPOFOL 120 MG: 10 INJECTION, EMULSION INTRAVENOUS at 12:15

## 2021-01-01 RX ADMIN — ROCURONIUM BROMIDE 40 MG: 10 SOLUTION INTRAVENOUS at 13:50

## 2021-01-01 RX ADMIN — INSULIN GLARGINE 25 UNITS: 100 INJECTION, SOLUTION SUBCUTANEOUS at 00:09

## 2021-01-01 RX ADMIN — DULOXETINE HYDROCHLORIDE 60 MG: 60 CAPSULE, DELAYED RELEASE ORAL at 07:44

## 2021-01-01 RX ADMIN — HYDROMORPHONE HYDROCHLORIDE 1 MG: 1 INJECTION, SOLUTION INTRAMUSCULAR; INTRAVENOUS; SUBCUTANEOUS at 09:33

## 2021-01-01 RX ADMIN — LACTULOSE 20 G: 20 SOLUTION ORAL at 13:53

## 2021-01-01 RX ADMIN — SODIUM CHLORIDE, SODIUM LACTATE, POTASSIUM CHLORIDE, AND CALCIUM CHLORIDE: .6; .31; .03; .02 INJECTION, SOLUTION INTRAVENOUS at 13:20

## 2021-01-01 RX ADMIN — URSODIOL 300 MG: 300 CAPSULE ORAL at 20:31

## 2021-01-01 RX ADMIN — BUPROPION HYDROCHLORIDE 150 MG: 150 TABLET, EXTENDED RELEASE ORAL at 07:48

## 2021-01-01 RX ADMIN — HYDROMORPHONE HYDROCHLORIDE 1 MG: 1 INJECTION, SOLUTION INTRAMUSCULAR; INTRAVENOUS; SUBCUTANEOUS at 23:02

## 2021-01-01 RX ADMIN — INSULIN GLARGINE 15 UNITS: 100 INJECTION, SOLUTION SUBCUTANEOUS at 03:07

## 2021-01-01 RX ADMIN — METOPROLOL TARTRATE 50 MG: 50 TABLET, FILM COATED ORAL at 07:57

## 2021-01-01 RX ADMIN — GADOTERIDOL 17 ML: 279.3 INJECTION, SOLUTION INTRAVENOUS at 08:25

## 2021-01-01 RX ADMIN — Medication 10 ML: at 08:49

## 2021-01-01 RX ADMIN — HYDROMORPHONE HYDROCHLORIDE 1 MG: 1 INJECTION, SOLUTION INTRAMUSCULAR; INTRAVENOUS; SUBCUTANEOUS at 14:12

## 2021-01-01 RX ADMIN — BUPROPION HYDROCHLORIDE 150 MG: 150 TABLET, EXTENDED RELEASE ORAL at 09:21

## 2021-01-01 RX ADMIN — PRIMIDONE 150 MG: 50 TABLET ORAL at 22:23

## 2021-01-01 RX ADMIN — INSULIN LISPRO 1 UNITS: 100 INJECTION, SOLUTION INTRAVENOUS; SUBCUTANEOUS at 21:47

## 2021-01-01 RX ADMIN — HYDROMORPHONE HYDROCHLORIDE 1 MG: 1 INJECTION, SOLUTION INTRAMUSCULAR; INTRAVENOUS; SUBCUTANEOUS at 15:41

## 2021-01-01 RX ADMIN — OXYCODONE AND ACETAMINOPHEN 1 TABLET: 5; 325 TABLET ORAL at 16:08

## 2021-01-01 RX ADMIN — CEFTRIAXONE SODIUM 1000 MG: 1 INJECTION, POWDER, FOR SOLUTION INTRAMUSCULAR; INTRAVENOUS at 11:52

## 2021-01-01 RX ADMIN — DICYCLOMINE HYDROCHLORIDE 10 MG: 10 CAPSULE ORAL at 17:25

## 2021-01-01 RX ADMIN — INSULIN LISPRO 6 UNITS: 100 INJECTION, SOLUTION INTRAVENOUS; SUBCUTANEOUS at 11:57

## 2021-01-01 RX ADMIN — BUPROPION HYDROCHLORIDE 150 MG: 150 TABLET, EXTENDED RELEASE ORAL at 10:06

## 2021-01-01 RX ADMIN — LEVOTHYROXINE SODIUM 75 MCG: 0.03 TABLET ORAL at 06:06

## 2021-01-01 RX ADMIN — INSULIN LISPRO 1 UNITS: 100 INJECTION, SOLUTION INTRAVENOUS; SUBCUTANEOUS at 20:25

## 2021-01-01 RX ADMIN — OXYCODONE AND ACETAMINOPHEN 1 TABLET: 5; 325 TABLET ORAL at 21:25

## 2021-01-01 RX ADMIN — LACTULOSE 20 G: 20 SOLUTION ORAL at 14:50

## 2021-01-01 RX ADMIN — INSULIN LISPRO 10 UNITS: 100 INJECTION, SOLUTION INTRAVENOUS; SUBCUTANEOUS at 11:59

## 2021-01-01 RX ADMIN — SODIUM ZIRCONIUM CYCLOSILICATE 5 G: 5 POWDER, FOR SUSPENSION ORAL at 10:38

## 2021-01-01 RX ADMIN — HYDROMORPHONE HYDROCHLORIDE 1 MG: 1 INJECTION, SOLUTION INTRAMUSCULAR; INTRAVENOUS; SUBCUTANEOUS at 03:59

## 2021-01-01 RX ADMIN — SODIUM CHLORIDE 1000 ML: 9 INJECTION, SOLUTION INTRAVENOUS at 16:04

## 2021-01-01 RX ADMIN — INSULIN GLARGINE 30 UNITS: 100 INJECTION, SOLUTION SUBCUTANEOUS at 23:01

## 2021-01-01 RX ADMIN — LACTULOSE 20 G: 20 SOLUTION ORAL at 22:23

## 2021-01-01 RX ADMIN — Medication 3 MG: at 21:10

## 2021-01-01 RX ADMIN — INSULIN LISPRO 1 UNITS: 100 INJECTION, SOLUTION INTRAVENOUS; SUBCUTANEOUS at 21:17

## 2021-01-01 RX ADMIN — HYDROXYZINE HYDROCHLORIDE 10 MG: 10 TABLET ORAL at 18:48

## 2021-01-01 RX ADMIN — LEVOTHYROXINE SODIUM 75 MCG: 0.03 TABLET ORAL at 10:39

## 2021-01-01 RX ADMIN — DICYCLOMINE HYDROCHLORIDE 10 MG: 10 CAPSULE ORAL at 16:50

## 2021-01-01 RX ADMIN — INSULIN LISPRO 7 UNITS: 100 INJECTION, SOLUTION INTRAVENOUS; SUBCUTANEOUS at 18:26

## 2021-01-01 ASSESSMENT — ENCOUNTER SYMPTOMS
DIARRHEA: 0
NAUSEA: 0
ABDOMINAL PAIN: 0
RHINORRHEA: 0
ABDOMINAL DISTENTION: 0
SHORTNESS OF BREATH: 0
DIARRHEA: 0
COUGH: 0
WHEEZING: 0
BACK PAIN: 1
SHORTNESS OF BREATH: 1
COLOR CHANGE: 0
COUGH: 0
CONSTIPATION: 0
VOMITING: 0
PHOTOPHOBIA: 0

## 2021-01-01 ASSESSMENT — PULMONARY FUNCTION TESTS
PIF_VALUE: 29
PIF_VALUE: 10
PIF_VALUE: 3
PIF_VALUE: 30
PIF_VALUE: 22
PIF_VALUE: 25
PIF_VALUE: 0
PIF_VALUE: 31
PIF_VALUE: 28
PIF_VALUE: 27
PIF_VALUE: 1
PIF_VALUE: 25
PIF_VALUE: 27
PIF_VALUE: 27
PIF_VALUE: 23
PIF_VALUE: 27
PIF_VALUE: 29
PIF_VALUE: 27
PIF_VALUE: 21
PIF_VALUE: 26
PIF_VALUE: 4
PIF_VALUE: 32
PIF_VALUE: 28
PIF_VALUE: 30
PIF_VALUE: 23
PIF_VALUE: 29
PIF_VALUE: 2
PIF_VALUE: 26
PIF_VALUE: 10
PIF_VALUE: 29
PIF_VALUE: 29
PIF_VALUE: 21
PIF_VALUE: 23
PIF_VALUE: 25
PIF_VALUE: 30
PIF_VALUE: 33
PIF_VALUE: 30
PIF_VALUE: 28
PIF_VALUE: 31
PIF_VALUE: 30
PIF_VALUE: 27
PIF_VALUE: 27
PIF_VALUE: 47
PIF_VALUE: 26
PIF_VALUE: 27
PIF_VALUE: 27
PIF_VALUE: 25
PIF_VALUE: 32
PIF_VALUE: 3
PIF_VALUE: 24
PIF_VALUE: 32
PIF_VALUE: 27
PIF_VALUE: 26
PIF_VALUE: 1
PIF_VALUE: 31
PIF_VALUE: 28
PIF_VALUE: 25
PIF_VALUE: 31
PIF_VALUE: 25
PIF_VALUE: 23
PIF_VALUE: 28
PIF_VALUE: 28
PIF_VALUE: 10
PIF_VALUE: 27
PIF_VALUE: 28
PIF_VALUE: 6
PIF_VALUE: 24
PIF_VALUE: 21
PIF_VALUE: 22
PIF_VALUE: 28
PIF_VALUE: 30
PIF_VALUE: 1
PIF_VALUE: 1
PIF_VALUE: 22
PIF_VALUE: 25
PIF_VALUE: 27
PIF_VALUE: 18
PIF_VALUE: 22
PIF_VALUE: 28
PIF_VALUE: 22
PIF_VALUE: 29
PIF_VALUE: 23
PIF_VALUE: 31
PIF_VALUE: 27
PIF_VALUE: 27
PIF_VALUE: 30
PIF_VALUE: 23
PIF_VALUE: 1
PIF_VALUE: 5
PIF_VALUE: 23
PIF_VALUE: 3
PIF_VALUE: 26
PIF_VALUE: 31
PIF_VALUE: 27
PIF_VALUE: 29
PIF_VALUE: 2
PIF_VALUE: 5
PIF_VALUE: 2
PIF_VALUE: 1
PIF_VALUE: 10
PIF_VALUE: 21
PIF_VALUE: 1
PIF_VALUE: 4
PIF_VALUE: 24
PIF_VALUE: 28
PIF_VALUE: 27
PIF_VALUE: 14
PIF_VALUE: 0
PIF_VALUE: 31
PIF_VALUE: 31
PIF_VALUE: 28
PIF_VALUE: 1
PIF_VALUE: 19
PIF_VALUE: 25
PIF_VALUE: 21
PIF_VALUE: 30
PIF_VALUE: 2
PIF_VALUE: 31
PIF_VALUE: 25
PIF_VALUE: 1
PIF_VALUE: 1
PIF_VALUE: 10
PIF_VALUE: 29
PIF_VALUE: 27
PIF_VALUE: 28
PIF_VALUE: 3
PIF_VALUE: 0
PIF_VALUE: 2
PIF_VALUE: 26
PIF_VALUE: 22
PIF_VALUE: 1
PIF_VALUE: 32
PIF_VALUE: 22
PIF_VALUE: 31
PIF_VALUE: 21
PIF_VALUE: 29
PIF_VALUE: 6
PIF_VALUE: 22
PIF_VALUE: 1
PIF_VALUE: 21
PIF_VALUE: 26
PIF_VALUE: 28
PIF_VALUE: 4
PIF_VALUE: 29
PIF_VALUE: 26
PIF_VALUE: 29
PIF_VALUE: 28
PIF_VALUE: 1
PIF_VALUE: 28
PIF_VALUE: 27
PIF_VALUE: 22
PIF_VALUE: 2
PIF_VALUE: 18
PIF_VALUE: 2
PIF_VALUE: 31
PIF_VALUE: 24
PIF_VALUE: 31
PIF_VALUE: 32
PIF_VALUE: 1
PIF_VALUE: 29
PIF_VALUE: 29
PIF_VALUE: 30
PIF_VALUE: 29
PIF_VALUE: 25
PIF_VALUE: 32
PIF_VALUE: 23
PIF_VALUE: 27
PIF_VALUE: 2
PIF_VALUE: 22
PIF_VALUE: 18
PIF_VALUE: 32
PIF_VALUE: 0
PIF_VALUE: 23
PIF_VALUE: 31
PIF_VALUE: 22
PIF_VALUE: 27
PIF_VALUE: 31
PIF_VALUE: 22
PIF_VALUE: 10
PIF_VALUE: 22
PIF_VALUE: 1
PIF_VALUE: 1
PIF_VALUE: 26
PIF_VALUE: 3
PIF_VALUE: 28
PIF_VALUE: 31
PIF_VALUE: 21
PIF_VALUE: 23
PIF_VALUE: 18
PIF_VALUE: 31
PIF_VALUE: 4
PIF_VALUE: 29
PIF_VALUE: 29
PIF_VALUE: 22
PIF_VALUE: 19
PIF_VALUE: 25
PIF_VALUE: 29
PIF_VALUE: 32
PIF_VALUE: 28
PIF_VALUE: 29
PIF_VALUE: 22
PIF_VALUE: 26
PIF_VALUE: 26
PIF_VALUE: 21
PIF_VALUE: 26

## 2021-01-01 ASSESSMENT — PAIN DESCRIPTION - ORIENTATION
ORIENTATION: LOWER
ORIENTATION: RIGHT
ORIENTATION: LEFT;POSTERIOR
ORIENTATION: RIGHT;LEFT
ORIENTATION: LOWER
ORIENTATION: RIGHT
ORIENTATION: MID;UPPER;LOWER
ORIENTATION: UPPER;MID
ORIENTATION: LOWER;MID
ORIENTATION: MID;UPPER
ORIENTATION: LOWER
ORIENTATION: RIGHT;LEFT
ORIENTATION: LOWER
ORIENTATION: UPPER;LOWER
ORIENTATION: MID;UPPER
ORIENTATION: POSTERIOR
ORIENTATION: MID
ORIENTATION: MID;UPPER

## 2021-01-01 ASSESSMENT — PAIN SCALES - GENERAL
PAINLEVEL_OUTOF10: 9
PAINLEVEL_OUTOF10: 2
PAINLEVEL_OUTOF10: 4
PAINLEVEL_OUTOF10: 8
PAINLEVEL_OUTOF10: 4
PAINLEVEL_OUTOF10: 8
PAINLEVEL_OUTOF10: 0
PAINLEVEL_OUTOF10: 0
PAINLEVEL_OUTOF10: 8
PAINLEVEL_OUTOF10: 8
PAINLEVEL_OUTOF10: 9
PAINLEVEL_OUTOF10: 8
PAINLEVEL_OUTOF10: 2
PAINLEVEL_OUTOF10: 10
PAINLEVEL_OUTOF10: 10
PAINLEVEL_OUTOF10: 9
PAINLEVEL_OUTOF10: 10
PAINLEVEL_OUTOF10: 9
PAINLEVEL_OUTOF10: 0
PAINLEVEL_OUTOF10: 7
PAINLEVEL_OUTOF10: 9
PAINLEVEL_OUTOF10: 10
PAINLEVEL_OUTOF10: 10
PAINLEVEL_OUTOF10: 7
PAINLEVEL_OUTOF10: 10
PAINLEVEL_OUTOF10: 6
PAINLEVEL_OUTOF10: 5
PAINLEVEL_OUTOF10: 10
PAINLEVEL_OUTOF10: 7
PAINLEVEL_OUTOF10: 8
PAINLEVEL_OUTOF10: 10
PAINLEVEL_OUTOF10: 7
PAINLEVEL_OUTOF10: 9
PAINLEVEL_OUTOF10: 5
PAINLEVEL_OUTOF10: 8
PAINLEVEL_OUTOF10: 10
PAINLEVEL_OUTOF10: 9
PAINLEVEL_OUTOF10: 9
PAINLEVEL_OUTOF10: 4
PAINLEVEL_OUTOF10: 8
PAINLEVEL_OUTOF10: 8
PAINLEVEL_OUTOF10: 10
PAINLEVEL_OUTOF10: 9
PAINLEVEL_OUTOF10: 8
PAINLEVEL_OUTOF10: 5
PAINLEVEL_OUTOF10: 8
PAINLEVEL_OUTOF10: 5
PAINLEVEL_OUTOF10: 10
PAINLEVEL_OUTOF10: 5
PAINLEVEL_OUTOF10: 10
PAINLEVEL_OUTOF10: 8
PAINLEVEL_OUTOF10: 4
PAINLEVEL_OUTOF10: 8
PAINLEVEL_OUTOF10: 0
PAINLEVEL_OUTOF10: 8
PAINLEVEL_OUTOF10: 6
PAINLEVEL_OUTOF10: 4
PAINLEVEL_OUTOF10: 7
PAINLEVEL_OUTOF10: 7
PAINLEVEL_OUTOF10: 4
PAINLEVEL_OUTOF10: 0
PAINLEVEL_OUTOF10: 10
PAINLEVEL_OUTOF10: 6
PAINLEVEL_OUTOF10: 7
PAINLEVEL_OUTOF10: 7
PAINLEVEL_OUTOF10: 5
PAINLEVEL_OUTOF10: 6
PAINLEVEL_OUTOF10: 9
PAINLEVEL_OUTOF10: 4
PAINLEVEL_OUTOF10: 8
PAINLEVEL_OUTOF10: 7
PAINLEVEL_OUTOF10: 7
PAINLEVEL_OUTOF10: 6
PAINLEVEL_OUTOF10: 5
PAINLEVEL_OUTOF10: 7
PAINLEVEL_OUTOF10: 9
PAINLEVEL_OUTOF10: 7
PAINLEVEL_OUTOF10: 7
PAINLEVEL_OUTOF10: 10
PAINLEVEL_OUTOF10: 0
PAINLEVEL_OUTOF10: 2
PAINLEVEL_OUTOF10: 7
PAINLEVEL_OUTOF10: 9
PAINLEVEL_OUTOF10: 3
PAINLEVEL_OUTOF10: 10
PAINLEVEL_OUTOF10: 8
PAINLEVEL_OUTOF10: 8
PAINLEVEL_OUTOF10: 7
PAINLEVEL_OUTOF10: 8
PAINLEVEL_OUTOF10: 1
PAINLEVEL_OUTOF10: 6
PAINLEVEL_OUTOF10: 7
PAINLEVEL_OUTOF10: 9
PAINLEVEL_OUTOF10: 9
PAINLEVEL_OUTOF10: 0
PAINLEVEL_OUTOF10: 3
PAINLEVEL_OUTOF10: 8
PAINLEVEL_OUTOF10: 7
PAINLEVEL_OUTOF10: 9
PAINLEVEL_OUTOF10: 0
PAINLEVEL_OUTOF10: 8
PAINLEVEL_OUTOF10: 9
PAINLEVEL_OUTOF10: 5
PAINLEVEL_OUTOF10: 7
PAINLEVEL_OUTOF10: 9
PAINLEVEL_OUTOF10: 7
PAINLEVEL_OUTOF10: 8
PAINLEVEL_OUTOF10: 10
PAINLEVEL_OUTOF10: 10
PAINLEVEL_OUTOF10: 8
PAINLEVEL_OUTOF10: 0
PAINLEVEL_OUTOF10: 8

## 2021-01-01 ASSESSMENT — PAIN SCALES - PAIN ASSESSMENT IN ADVANCED DEMENTIA (PAINAD)
BODYLANGUAGE: 1
FACIALEXPRESSION: 2
CONSOLABILITY: 0
NEGVOCALIZATION: 1
CONSOLABILITY: 0
TOTALSCORE: 8
BODYLANGUAGE: 1
BREATHING: 0
BODYLANGUAGE: 0
BODYLANGUAGE: 1
NEGVOCALIZATION: 0
TOTALSCORE: 3
BREATHING: 2
FACIALEXPRESSION: 0
TOTALSCORE: 1
FACIALEXPRESSION: 0
FACIALEXPRESSION: 0
BREATHING: 1
FACIALEXPRESSION: 0
BREATHING: 1
NEGVOCALIZATION: 1
BODYLANGUAGE: 1
CONSOLABILITY: 0
CONSOLABILITY: 0
TOTALSCORE: 8
NEGVOCALIZATION: 1
TOTALSCORE: 2
BREATHING: 0
CONSOLABILITY: 0
BREATHING: 0
BREATHING: 0
CONSOLABILITY: 0
BODYLANGUAGE: 2
CONSOLABILITY: 0
FACIALEXPRESSION: 2
NEGVOCALIZATION: 0
TOTALSCORE: 2
FACIALEXPRESSION: 0
TOTALSCORE: 2
BODYLANGUAGE: 1
BODYLANGUAGE: 1
TOTALSCORE: 2
NEGVOCALIZATION: 1
BREATHING: 0
TOTALSCORE: 2
CONSOLABILITY: 2
FACIALEXPRESSION: 0
NEGVOCALIZATION: 1
BODYLANGUAGE: 2
FACIALEXPRESSION: 2
BREATHING: 0
CONSOLABILITY: 1

## 2021-01-01 ASSESSMENT — PAIN DESCRIPTION - PAIN TYPE
TYPE: ACUTE PAIN
TYPE: CHRONIC PAIN
TYPE: ACUTE PAIN
TYPE: CHRONIC PAIN
TYPE: ACUTE PAIN
TYPE: ACUTE PAIN
TYPE: CHRONIC PAIN
TYPE: ACUTE PAIN
TYPE: CHRONIC PAIN
TYPE: ACUTE PAIN
TYPE: CHRONIC PAIN
TYPE: ACUTE PAIN
TYPE: CHRONIC PAIN
TYPE: ACUTE PAIN
TYPE: CHRONIC PAIN
TYPE: ACUTE PAIN
TYPE: CHRONIC PAIN
TYPE: ACUTE PAIN
TYPE: CHRONIC PAIN
TYPE: CHRONIC PAIN
TYPE: ACUTE PAIN

## 2021-01-01 ASSESSMENT — PAIN DESCRIPTION - LOCATION
LOCATION: BACK;ABDOMEN
LOCATION: BACK;ABDOMEN
LOCATION: HEAD
LOCATION: ABDOMEN
LOCATION: BACK
LOCATION: ABDOMEN
LOCATION: ABDOMEN
LOCATION: BACK
LOCATION: BACK;ABDOMEN
LOCATION: BACK
LOCATION: NECK
LOCATION: SHOULDER
LOCATION: BACK
LOCATION: GENERALIZED
LOCATION: ABDOMEN
LOCATION: KNEE
LOCATION: BACK
LOCATION: SHOULDER
LOCATION: ABDOMEN
LOCATION: SHOULDER
LOCATION: BACK
LOCATION: BACK

## 2021-01-01 ASSESSMENT — PAIN DESCRIPTION - DESCRIPTORS
DESCRIPTORS: ACHING
DESCRIPTORS: ACHING
DESCRIPTORS: DISCOMFORT
DESCRIPTORS: DISCOMFORT;ACHING
DESCRIPTORS: DISCOMFORT
DESCRIPTORS: ACHING

## 2021-01-01 ASSESSMENT — PAIN SCALES - WONG BAKER
WONGBAKER_NUMERICALRESPONSE: 6
WONGBAKER_NUMERICALRESPONSE: 6

## 2021-01-01 ASSESSMENT — PAIN DESCRIPTION - FREQUENCY
FREQUENCY: CONTINUOUS

## 2021-01-01 ASSESSMENT — PATIENT HEALTH QUESTIONNAIRE - PHQ9
2. FEELING DOWN, DEPRESSED OR HOPELESS: 3
7. TROUBLE CONCENTRATING ON THINGS, SUCH AS READING THE NEWSPAPER OR WATCHING TELEVISION: 1
5. POOR APPETITE OR OVEREATING: 1
SUM OF ALL RESPONSES TO PHQ QUESTIONS 1-9: 8
10. IF YOU CHECKED OFF ANY PROBLEMS, HOW DIFFICULT HAVE THESE PROBLEMS MADE IT FOR YOU TO DO YOUR WORK, TAKE CARE OF THINGS AT HOME, OR GET ALONG WITH OTHER PEOPLE: 1
DEPRESSION UNABLE TO ASSESS: URGENT/EMERGENT SITUATION
1. LITTLE INTEREST OR PLEASURE IN DOING THINGS: 0
8. MOVING OR SPEAKING SO SLOWLY THAT OTHER PEOPLE COULD HAVE NOTICED. OR THE OPPOSITE, BEING SO FIGETY OR RESTLESS THAT YOU HAVE BEEN MOVING AROUND A LOT MORE THAN USUAL: 0

## 2021-01-01 ASSESSMENT — PAIN - FUNCTIONAL ASSESSMENT
PAIN_FUNCTIONAL_ASSESSMENT: PREVENTS OR INTERFERES SOME ACTIVE ACTIVITIES AND ADLS
PAIN_FUNCTIONAL_ASSESSMENT: 0-10

## 2021-01-01 ASSESSMENT — PAIN DESCRIPTION - PROGRESSION
CLINICAL_PROGRESSION: GRADUALLY WORSENING
CLINICAL_PROGRESSION: NOT CHANGED

## 2021-01-01 ASSESSMENT — PAIN DESCRIPTION - ONSET
ONSET: ON-GOING
ONSET: PROGRESSIVE

## 2021-02-02 NOTE — TELEPHONE ENCOUNTER
[facility] called and stated that they had sent a refill request for the patient's Mysoline 50 mgs and Duloxetine 60. It looks like the refills were sent to [facility] on 01.15.2021. Please send rx to [facility].

## 2021-02-18 NOTE — TELEPHONE ENCOUNTER
Requested Prescriptions     Pending Prescriptions Disp Refills    metFORMIN (GLUCOPHAGE-XR) 500 MG extended release tablet 180 tablet 1   [facility]  Last ov 12/08/20

## 2021-02-23 PROBLEM — J18.9 PNA (PNEUMONIA): Status: ACTIVE | Noted: 2021-01-01

## 2021-02-23 NOTE — ED NOTES
Bed: 24  Expected date:   Expected time:   Means of arrival:   Comments:  Medic 53 (wife)     Chung Camejo RN  02/23/21 4565

## 2021-02-23 NOTE — H&P
Hospital Medicine History & Physical      PCP: Jannette Morrison, APRN - CNP    Date of Admission: 2/23/2021    Date of Service: Pt seen/examined on 2/23/2021 and Admitted to Inpatient with expected LOS greater than two midnights due to medical therapy. .    Chief Complaint: Generalized tiredness for 2 weeks and diarrhea for 1 to 2 days      History Of Present Illness:  (    80 y.o. female who presented to [facility] with above complaint. She has been having generalized weakness for last 2 weeks. She denies fever but appetite is not that great. She has cough with white sputum. .  No shortness of breath change in mental status nausea or vomiting. She has been having diarrhea for last 1 to 2 days. Is watery no blood no abdominal pain no abdominal distention.  got admitted with Covid today. Past Medical History:          Diagnosis Date    Anemia     Anesthesia     PONV    Arthritis     CAD (coronary artery disease)     CVA (cerebral infarction)     Diabetes mellitus (HCC)     Diastolic heart failure (HCC)     Fibromyalgia     Hyperlipidemia     Hypertension     Hypothyroidism     LFT's abnormal     CHAMBERS (nonalcoholic steatohepatitis)     Nausea & vomiting     Pancreatic cyst     PNA (pneumonia) 2/23/2021    Pneumonia     Rectocele     Sleep apnea     does not use c-pap    Tremor     Vitamin D deficiency        Past Surgical History:          Procedure Laterality Date    CHOLECYSTECTOMY      COLONOSCOPY  01/10/2018    DIAGNOSTIC CARDIAC CATH LAB PROCEDURE      ECTOPIC PREGNANCY SURGERY  1985    EYE SURGERY Right     cataract    OTHER SURGICAL HISTORY  11-4-2011    Posterior repair    POSTERIOR CRUCIATE LIGAMENT REPAIR         Medications Prior to Admission:      Prior to Admission medications    Medication Sig Start Date End Date Taking?  Authorizing Provider Blood Glucose Monitoring Suppl (MM EASY TOUCH GLUCOSE METER) w/Device KIT 1 kit by Does not apply route 2 times daily 3/15/18   Ishmael Garrett APRN - CNP   vitamin D (CHOLECALCIFEROL) 1000 UNIT TABS tablet Take 4,000 Units by mouth daily    Historical Provider, MD   magnesium 30 MG tablet Take 30 mg by mouth daily    Historical Provider, MD   ONE TOUCH ULTRASOFT LANCETS MISC Test blood sugar 2 times a day 4/6/17   Reji Hou MD   aspirin 325 MG tablet Take 1 tablet by mouth daily. 3/18/14   Va Cassidy DO       Allergies:  Codeine, Ultram [tramadol], and Percocet [oxycodone-acetaminophen]    Social History:      The patient currently lives at home with family    TOBACCO:   reports that she quit smoking about 29 years ago. Her smoking use included cigarettes. She has a 0.50 pack-year smoking history. She has never used smokeless tobacco.  ETOH:   reports no history of alcohol use. E-Cigarettes/Vaping Use     Questions Responses    E-Cigarette/Vaping Use Never User    Start Date     Passive Exposure     Quit Date     Counseling Given     Comments             Family History:      Reviewed in detail and negative for DM, CAD, Cancer, CVA. Positive as follows:        Problem Relation Age of Onset    Diabetes Mother     Stroke Mother     Heart Disease Father     Cancer Father         prostate    Heart Disease Brother         multiple stents    Heart Disease Brother     Heart Disease Sister         carotid artery surgery    Heart Disease Brother     Heart Attack Brother     Heart Disease Brother     Asthma Neg Hx     Emphysema Neg Hx     Heart Failure Neg Hx        REVIEW OF SYSTEMS:   Pertinent positives as noted in the HPI. All other systems reviewed and negative.     PHYSICAL EXAM PERFORMED:    /68   Pulse 87   Temp 98 °F (36.7 °C)   Resp 16   Ht 5' 6.5\" (1.689 m)   Wt 200 lb (90.7 kg)   LMP  (LMP Unknown)   SpO2 91%   BMI 31.80 kg/m² General appearance:  No apparent distress, appears stated age and cooperative. HEENT:  Normal cephalic, atraumatic without obvious deformity. Pupils equal, round, and reactive to light. Extra ocular muscles intact. Conjunctivae/corneas clear. Neck: Supple, with full range of motion. No jugular venous distention. Trachea midline. Respiratory:  Normal respiratory effort. Clear to auscultation, bilaterally without Rales/Wheezes/Rhonchi. Cardiovascular:  Regular rate and rhythm with normal S1/S2 without murmurs, rubs or gallops. Abdomen: Soft, non-tender, non-distended with normal bowel sounds. Musculoskeletal:  No clubbing, cyanosis or edema bilaterally. Mild  Tremor over the left upper extremity   skin: Skin color, texture, turgor normal.  No rashes or lesions. Neurologic:  Neurovascularly intact without any focal sensory/motor deficits. Cranial nerves: II-XII intact, grossly non-focal.  Psychiatric:  Alert and oriented, thought content appropriate, normal insight  Capillary Refill: Brisk,< 3 seconds   Peripheral Pulses: +2 palpable, equal bilaterally       Labs:     Recent Labs     02/23/21  1440   WBC 12.7*   HGB 12.7   HCT 38.6        Recent Labs     02/23/21  1440      K 5.1   CL 96*   CO2 25   BUN 11   CREATININE <0.5*   CALCIUM 9.2     Recent Labs     02/23/21  1440   AST 40*   ALT 20   BILITOT 0.5   ALKPHOS 71     No results for input(s): INR in the last 72 hours.   Recent Labs     02/23/21  1440   TROPONINI <0.01       Urinalysis:      Lab Results   Component Value Date    BLOODU Neg 10/25/2016    SPECGRAV 1.025 10/25/2016    GLUCOSEU Neg 10/25/2016       Radiology:     CXR: I have reviewed the CXR with the following interpretation:   EKG:  I have reviewed the EKG with the following interpretation: Normal sinus rhythmNormal ECGWhen compared with ECG of 16-MAR-2014 15:37,No significant change was foundConfirmed by Hazel Ruff MD, KIRTI      CHEST PORTABLE   Final Result

## 2021-02-23 NOTE — ED NOTES
Spoke to Jose L and gave update on patient and plan of care thus far.       Natalio Matute RN  02/23/21 [id number]

## 2021-02-23 NOTE — ED NOTES
Ambulated pt down the hallway on room air. Pt's O2 sat was 90-94% with -117. Pt tolerated well and walked w/o assistance with a steady gait.      Trish Calle  02/23/21 1559

## 2021-02-23 NOTE — ED PROVIDER NOTES
[facility] Emergency Department    CHIEF COMPLAINT  Fatigue (grandson with covid. Donna Rued grandson self quartined for 14 days. . pt and pt  now with fatique, diarrhea and nauseas) and Diarrhea      HISTORY OF PRESENT ILLNESS  Dale Gallegos is a 80 y.o. female who presents to the ED complaining of generalized weakness, fatigue, nonbloody diarrhea for the past 2 weeks. Patient reports that her grandson just got out of a 14-day quarantine for a COVID-19 exposure. Patient denies measurable fever, cough, hemoptysis, chest pain, shortness of breath, emesis, urinary complaints, leg swelling, calf pain, palpitations. Patient's  also has similar symptoms. No other complaints, modifying factors or associated symptoms. Nursing notes reviewed.    Past Medical History:   Diagnosis Date    Anemia     Anesthesia     PONV    Arthritis     CAD (coronary artery disease)     CVA (cerebral infarction)     Diabetes mellitus (HCC)     Diastolic heart failure (HCC)     Fibromyalgia     Hyperlipidemia     Hypertension     Hypothyroidism     LFT's abnormal     CHAMBERS (nonalcoholic steatohepatitis)     Nausea & vomiting     Pancreatic cyst     Pneumonia     Rectocele     Sleep apnea     does not use c-pap    Tremor     Vitamin D deficiency      Past Surgical History:   Procedure Laterality Date    CHOLECYSTECTOMY      COLONOSCOPY  01/10/2018    DIAGNOSTIC CARDIAC CATH LAB PROCEDURE      ECTOPIC PREGNANCY SURGERY  1985    EYE SURGERY Right     cataract    OTHER SURGICAL HISTORY  11-4-2011    Posterior repair    POSTERIOR CRUCIATE LIGAMENT REPAIR       Family History   Problem Relation Age of Onset    Diabetes Mother     Stroke Mother     Heart Disease Father     Cancer Father         prostate    Heart Disease Brother         multiple stents    Heart Disease Brother     Heart Disease Sister         carotid artery surgery    Heart Disease Brother  Heart Attack Brother     Heart Disease Brother     Asthma Neg Hx     Emphysema Neg Hx     Heart Failure Neg Hx      Social History     Socioeconomic History    Marital status:      Spouse name: Colleen Phan Number of children: 2    Years of education: 15    Highest education level: Not on file   Occupational History    Occupation: [employer]     Comment: school   Social Needs    Financial resource strain: Not on file    Food insecurity     Worry: Not on file     Inability: Not on file   [employer] needs     Medical: Not on file     Non-medical: Not on file   Tobacco Use    Smoking status: Former Smoker     Packs/day: 0.25     Years: 2.00     Pack years: 0.50     Types: Cigarettes     Quit date: 1991     Years since quittin.8    Smokeless tobacco: Never Used   Substance and Sexual Activity    Alcohol use: No     Alcohol/week: 0.0 standard drinks    Drug use: No    Sexual activity: Yes     Partners: Male     Comment:    Lifestyle    Physical activity     Days per week: Not on file     Minutes per session: Not on file    Stress: Not on file   Relationships    Social connections     Talks on phone: Not on file     Gets together: Not on file     Attends Protestant service: Not on file     Active member of club or organization: Not on file     Attends meetings of clubs or organizations: Not on file     Relationship status: Not on file    Intimate partner violence     Fear of current or ex partner: Not on file     Emotionally abused: Not on file     Physically abused: Not on file     Forced sexual activity: Not on file   Other Topics Concern    Not on file   Social History Narrative    Not on file     Current Facility-Administered Medications   Medication Dose Route Frequency Provider Last Rate Last Admin    0.9 % sodium chloride bolus  1,000 mL Intravenous Once SUMA Ford CNP 1,000 mL/hr at 21 1535 1,000 mL at 21 1535  Blood Glucose Monitoring Suppl (MM EASY TOUCH GLUCOSE METER) w/Device KIT 1 kit by Does not apply route 2 times daily 1 kit 0    vitamin D (CHOLECALCIFEROL) 1000 UNIT TABS tablet Take 4,000 Units by mouth daily      magnesium 30 MG tablet Take 30 mg by mouth daily      ONE TOUCH ULTRASOFT LANCETS MISC Test blood sugar 2 times a day 200 each 5    aspirin 325 MG tablet Take 1 tablet by mouth daily. 30 tablet 3     Allergies   Allergen Reactions    Codeine     Ultram [Tramadol]      vomiting    Percocet [Oxycodone-Acetaminophen] Nausea And Vomiting       REVIEW OF SYSTEMS  10 systems reviewed, pertinent positives per HPI otherwise noted to be negative    PHYSICAL EXAM  /68   Pulse 87   Temp 98 °F (36.7 °C)   Resp 16   Ht 5' 6.5\" (1.689 m)   Wt 200 lb (90.7 kg)   LMP  (LMP Unknown)   SpO2 91%   BMI 31.80 kg/m²   GENERAL APPEARANCE: Awake and alert. Cooperative. No acute distress. Vital signs are stable. HEAD: Normocephalic. Atraumatic. EYES: PERRL. EOM's grossly intact. ENT: Mucous membranes are dry. NECK: Supple. Normal ROM. HEART: RRR. Distal pulses are equal and intact. Cap refill less than 2 seconds. LUNGS: Respirations unlabored. Lung sounds diminished. Good air exchange. Speaking comfortably in full sentences. ABDOMEN: Soft. Non-distended. Non-tender. No guarding or rebound. No masses. No organomegaly. No rigidity. Bowel sounds are present. Negative avelar's. Negative McBurney's point. Negative CVA tenderness. EXTREMITIES: No peripheral edema. Moves all extremities equally. All extremities neurovascularly intact. SKIN: Warm and dry. No acute rashes. NEUROLOGICAL: Alert and oriented. No gross facial drooping. Strength 5/5, sensation intact. PSYCHIATRIC: Normal mood and affect.     RADIOLOGY      Xr Chest Portable    Result Date: 2/23/2021 EXAMINATION: ONE XRAY VIEW OF THE CHEST 2/23/2021 2:41 pm COMPARISON: Chest radiograph 06/26/2019 HISTORY: ORDERING SYSTEM PROVIDED HISTORY: candi TECHNOLOGIST PROVIDED HISTORY: Reason for exam:->candi FINDINGS: There are bibasilar opacities, left greater than right. Upper lungs are well aerated. No pneumothorax. No definite effusion. Cardiomediastinal silhouette is magnified. Upper abdomen is unremarkable. Bibasilar opacities are present, which could represent atelectasis versus pneumonia. CONSULTS  IP CONSULT TO HOSPITALIST    ED COURSE/MDM  Patient seen and evaluated. Old records reviewed. Diagnostic testing reviewed and results discussed. I have seen this patient in collaboration with supervising physician Dr. Roman Lyles. We thoroughly discussed the history, physical exam, diagnostic testing and emergency department course. Becky Luciano presented to the ED today with above noted complaints. CBC notable for leukocytosis of 12.7 no bandemia, no anemia. CMP unremarkable no acute kidney injury or electrolyte abnormality. Troponin less than 0.01. BNP 83. EKG interpreted by my attending physician. Rapid Covid negative. PCR Covid and obtained. High clinical suspicion for COVID-19. Lactic acid elevated at 3.8. Chest x-ray shows bibasilar opacities which could represent atelectasis versus pneumonia. Given chest x-ray findings, elevated white count, and elevated lactic acid we did initiate antibiotic therapy for possible bacterial pneumonia. Patient also admitted to the hospital for further treatment and evaluation.       While in ED patient received   Medications   0.9 % sodium chloride bolus (1,000 mLs Intravenous New Bag 2/23/21 1645)   0.9 % sodium chloride bolus (has no administration in time range)   cefTRIAXone (ROCEPHIN) 1000 mg IVPB in 50 mL D5W minibag (has no administration in time range) azithromycin (ZITHROMAX) 500 mg in D5W 250ml addavial (has no administration in time range)   ondansetron (ZOFRAN) injection 4 mg (4 mg Intravenous Given 2/23/21 1535)         A discussion was had with the patient and/or their surrogate regarding diagnosis, diagnostic testing results, treatment/ plan of care. There was shared decision-making between myself as well as the patient and/or their surrogate and we are all in agreement with hospital admission. There was an opportunity for questions and all questions were answered to the best of my ability and to the satisfaction of the patient and/or patient family.        Results for orders placed or performed during the hospital encounter of 02/23/21   COVID-19, Rapid   Result Value Ref Range    SARS-CoV-2, NAAT Not Detected Not Detected   CBC Auto Differential   Result Value Ref Range    WBC 12.7 (H) 4.0 - 11.0 K/uL    RBC 4.67 4.00 - 5.20 M/uL    Hemoglobin 12.7 12.0 - 16.0 g/dL    Hematocrit 38.6 36.0 - 48.0 %    MCV 82.5 80.0 - 100.0 fL    MCH 27.2 26.0 - 34.0 pg    MCHC 32.9 31.0 - 36.0 g/dL    RDW 13.8 12.4 - 15.4 %    Platelets 788 916 - 319 K/uL    MPV 8.6 5.0 - 10.5 fL    Neutrophils % 75.1 %    Lymphocytes % 16.2 %    Monocytes % 8.1 %    Eosinophils % 0.3 %    Basophils % 0.3 %    Neutrophils Absolute 9.6 (H) 1.7 - 7.7 K/uL    Lymphocytes Absolute 2.1 1.0 - 5.1 K/uL    Monocytes Absolute 1.0 0.0 - 1.3 K/uL    Eosinophils Absolute 0.0 0.0 - 0.6 K/uL    Basophils Absolute 0.0 0.0 - 0.2 K/uL   Comprehensive Metabolic Panel   Result Value Ref Range    Sodium 136 136 - 145 mmol/L    Potassium 5.1 3.5 - 5.1 mmol/L    Chloride 96 (L) 99 - 110 mmol/L    CO2 25 21 - 32 mmol/L    Anion Gap 15 3 - 16    Glucose 178 (H) 70 - 99 mg/dL    BUN 11 7 - 20 mg/dL    CREATININE <0.5 (L) 0.6 - 1.2 mg/dL    GFR Non-African American >60 >60    GFR African American >60 >60    Calcium 9.2 8.3 - 10.6 mg/dL    Total Protein 7.4 6.4 - 8.2 g/dL    Albumin 3.6 3.4 - 5.0 g/dL Albumin/Globulin Ratio 0.9 (L) 1.1 - 2.2    Total Bilirubin 0.5 0.0 - 1.0 mg/dL    Alkaline Phosphatase 71 40 - 129 U/L    ALT 20 10 - 40 U/L    AST 40 (H) 15 - 37 U/L    Globulin 3.8 g/dL   Troponin   Result Value Ref Range    Troponin <0.01 <0.01 ng/mL   Brain Natriuretic Peptide   Result Value Ref Range    Pro-BNP 83 0 - 124 pg/mL   Lactic Acid, Plasma   Result Value Ref Range    Lactic Acid 3.8 (H) 0.4 - 2.0 mmol/L   EKG 12 Lead   Result Value Ref Range    Ventricular Rate 90 BPM    Atrial Rate 90 BPM    P-R Interval 156 ms    QRS Duration 88 ms    Q-T Interval 384 ms    QTc Calculation (Bazett) 469 ms    R Axis -27 degrees    T Axis 65 degrees    Diagnosis       Normal sinus rhythmNormal ECGWhen compared with ECG of 16-MAR-2014 15:37,No significant change was found       I spoke with Dr. Eryn Mcclellan. We thoroughly discussed the history, physical exam, laboratory and imaging studies, as well as, emergency department course. Based upon that discussion, we've decided to admit Samuel Crisostomo for further observation and evaluation of Jordana Contreras's weakness. As I have deemed necessary from their history, physical, and studies, I have considered and evaluated Samuel Crisostomo for the following diagnoses:  Covid, pneumonia, sepsis      FINAL IMPRESSION  1. Dehydration    2. General weakness    3. Suspected COVID-19 virus infection    4. Pneumonia due to organism    5. Elevated lactic acid level    6. Leukocytosis, unspecified type        Vitals:  Blood pressure 128/68, pulse 87, temperature 98 °F (36.7 °C), resp. rate 16, height 5' 6.5\" (1.689 m), weight 200 lb (90.7 kg), SpO2 91 %, not currently breastfeeding. DISPOSITION  Patient was admitted to the hospital in stable condition. Comment: Please note this report has been produced using speech recognition software and may contain errors related to that system including errors in grammar, punctuation, and spelling, as well as words and phrases that may be inappropriate. If there are any questions or concerns please feel free to contact the dictating provider for clarification.             SUMA Garcia - CNP  02/23/21 6945

## 2021-02-24 PROBLEM — E66.9 OBESITY: Status: ACTIVE | Noted: 2021-01-01

## 2021-02-24 NOTE — PROGRESS NOTES
4 Eyes Skin Assessment     The patient is being assess for   Admission    I agree that 2 RN's have performed a thorough Head to Toe Skin Assessment on the patient. ALL assessment sites listed below have been assessed. Areas assessed by both nurses:   [x]   Head, Face, and Ears   [x]   Shoulders, Back, and Chest, Abdomen  [x]   Arms, Elbows, and Hands   [x]   Coccyx, Sacrum, and Ischium  [x]   Legs, Feet, and Heels            **SHARE this note so that the co-signing nurse is able to place an eSignature**    Co-signer eSignature: {Esignature:[id number]}    Does the Patient have Skin Breakdown?   No          Mook Prevention initiated:  No   Wound Care Orders initiated:  No      WOC nurse consulted for Pressure Injury (Stage 3,4, Unstageable, DTI, NWPT, Complex wounds)and New or Established Ostomies:  {YES/NO:22142}      Primary Nurse eSignature: Electronically signed by Caitlyn Monae RN on 2/24/21 at 2:54 AM EST

## 2021-02-24 NOTE — ED NOTES
Spoke with patient's son Julia Cushing to update on patient and plan of care.      Flakita Lara RN  02/23/21 2003

## 2021-02-24 NOTE — PROGRESS NOTES
Hospitalist Progress Note      PCP: SUMA Elliott - CNP    Date of Admission: 2/23/2021    Chief Complaint: SOB    Subjective: no new c/o       Medications:  Reviewed    Infusion Medications    sodium chloride 75 mL/hr at 02/24/21 0022     Scheduled Medications    aspirin  325 mg Oral Daily    atorvastatin  40 mg Oral Nightly    buPROPion  150 mg Oral Daily    DULoxetine  60 mg Oral BID    glimepiride  4 mg Oral BID    levothyroxine  75 mcg Oral Daily    lisinopril  5 mg Oral Daily    metoprolol tartrate  25 mg Oral BID    primidone  75 mg Oral Nightly    sodium chloride flush  10 mL Intravenous 2 times per day    enoxaparin  40 mg Subcutaneous Daily    azithromycin  500 mg Intravenous Q24H    And    cefTRIAXone (ROCEPHIN) IV  1,000 mg Intravenous Q24H    insulin glargine  25 Units Subcutaneous Nightly    insulin lispro  0-6 Units Subcutaneous TID WC    insulin lispro  0-3 Units Subcutaneous Nightly     PRN Meds: sodium chloride flush, promethazine **OR** ondansetron, polyethylene glycol, acetaminophen **OR** acetaminophen    No intake or output data in the 24 hours ending 02/24/21 0950    Physical Exam Performed:    /69   Pulse 84   Temp 99 °F (37.2 °C) (Oral)   Resp 16   Ht 5' 6.5\" (1.689 m)   Wt 200 lb (90.7 kg)   LMP  (LMP Unknown)   SpO2 93%   BMI 31.80 kg/m²     General appearance: No apparent distress, appears stated age and cooperative. HEENT: Pupils equal, round, and reactive to light. Conjunctivae/corneas clear. Neck: Supple, with full range of motion. No jugular venous distention. Trachea midline. Respiratory:  Normal respiratory effort. Clear to auscultation, bilaterally without Rales/Wheezes/Rhonchi. Cardiovascular: Regular rate and rhythm with normal S1/S2 without murmurs, rubs or gallops. Abdomen: Soft, non-tender, non-distended with normal bowel sounds. Musculoskeletal: No clubbing, cyanosis or edema bilaterally.   Full range of motion without deformity. Skin: Skin color, texture, turgor normal.  No rashes or lesions. Neurologic:  Neurovascularly intact without any focal sensory/motor deficits. Cranial nerves: II-XII intact, grossly non-focal.  Psychiatric: Alert and oriented, thought content appropriate, normal insight  Capillary Refill: Brisk,< 3 seconds   Peripheral Pulses: +2 palpable, equal bilaterally       Labs:   Recent Labs     02/23/21  1440 02/24/21  0604   WBC 12.7* 6.7   HGB 12.7 10.8*   HCT 38.6 33.0*    249     Recent Labs     02/23/21  1440 02/24/21  0604    139   K 5.1 3.6   CL 96* 105   CO2 25 23   BUN 11 7   CREATININE <0.5* <0.5*   CALCIUM 9.2 8.0*     Recent Labs     02/23/21  1440   AST 40*   ALT 20   BILITOT 0.5   ALKPHOS 71     No results for input(s): INR in the last 72 hours. Recent Labs     02/23/21  1440   TROPONINI <0.01       Urinalysis:      Lab Results   Component Value Date    BLOODU Neg 10/25/2016    SPECGRAV 1.025 10/25/2016    GLUCOSEU Neg 10/25/2016       Consults:    IP CONSULT TO HOSPITALIST      Assessment/Plan:    Active Hospital Problems    Diagnosis    Obesity [E66.9]    PNA (pneumonia) [J18.9]    Dyslipidemia [E78.5]    SUNIL (obstructive sleep apnea) [G47.33]    HTN (hypertension), benign [I10]    DM (diabetes mellitus) ([facility] [address] [E11.9]    CAD (coronary artery disease) [I25.10]       Generalized weakness diarrhea, contact history of Covid-possibly secondary to Covid-admit, droplet plus isolation, PCR Covid, encourage p.o. intake, monitor diarrhea, monitor electrolytes, stool work-up if persists     PNA - likely CAP w/ Strep Pneumonia. Started on empiric Rocephin/Azithro in ED on 23 Feb.  Changed to DAILY/PO respectively. Sepsis - w/ Leukocytosis/Tachycardia/Elevated Lactate POArrival 2nd to above infection. Continue IVF as appropriate and monitor clinical response w/ ABX as written. HTN - w/out known CAD and no evidence of active signs/sxs of ischemia/failure.  Currently controlled on

## 2021-02-24 NOTE — CARE COORDINATION
CASE MANAGEMENT INITIAL ASSESSMENT      Reviewed chart and completed assessment via telephone with:patient  Explained Case Management role/services. Primary contact information:see below  Health Care Decision Maker :   Primary Decision Maker: Kimberly Chowdhury - Child - [phone]     Can this person be reached and be able to respond quickly, such as within a few minutes or hours? Yes  Admit date/status:02/23/2021  Diagnosis:PNA   Is this a Readmission?:  No      Insurance:[facility] Medicare   Precert required for SNF: Yes       3 night stay required: No    Living arrangements, Adls, care needs, prior to admission:Pt IPTA, drives, working still;  Lives in a trilevel home w/2 steps to enter and 5 steps to bedroom and bathrooms. Lives w/spouse, but he is in the hospital at this time, also    [address] at home:  Walker__Cane__RTS__ BSC__Shower Chair_x_  02__ HHN__ CPAP__  BiPap__  Hospital Bed__ W/C___ Other__________    Services in the home and/or outpatient, prior to admission:none  PT/OT recs:none at this time    [address] Notification (HEN):not initiated    Barriers to discharge:none    Plan/comments:Pt plans to d/c home when medically stable. IPTA, has no DCP needs at this time and on room air. CM will continue to follow for needs.   Juanjose Lagos RN       ECOC on chart for MD rogel

## 2021-02-25 NOTE — CARE COORDINATION
CASE MANAGEMENT DISCHARGE SUMMARY      Discharge to: home w/no needs    Transportation: private   Confirmed discharge plan with:     Patient: yes     Family, name and contact number:[facility]   Facility/Agency, name:  [facility] faxed      RN, name: CATHI Spencer RN

## 2021-02-25 NOTE — PROGRESS NOTES
Hospitalist Progress Note      PCP: SUMA Waterman CNP    Date of Admission: 2/23/2021    Chief Complaint: SOB    Subjective: no new c/o       Medications:  Reviewed    Infusion Medications     Scheduled Medications    cefTRIAXone (ROCEPHIN) IV  1,000 mg Intravenous Daily    azithromycin  250 mg Oral Daily    aspirin  325 mg Oral Daily    atorvastatin  40 mg Oral Nightly    buPROPion  150 mg Oral Daily    DULoxetine  60 mg Oral BID    glimepiride  4 mg Oral BID    levothyroxine  75 mcg Oral Daily    lisinopril  5 mg Oral Daily    metoprolol tartrate  25 mg Oral BID    primidone  75 mg Oral Nightly    sodium chloride flush  10 mL Intravenous 2 times per day    enoxaparin  40 mg Subcutaneous Daily    insulin glargine  25 Units Subcutaneous Nightly    insulin lispro  0-6 Units Subcutaneous TID WC    insulin lispro  0-3 Units Subcutaneous Nightly     PRN Meds: sodium chloride flush, promethazine **OR** ondansetron, polyethylene glycol, acetaminophen **OR** acetaminophen      Intake/Output Summary (Last 24 hours) at 2/25/2021 0903  Last data filed at 2/25/2021 0238  Gross per 24 hour   Intake    Output 1075 ml   Net -1075 ml       Physical Exam Performed:    BP (!) 148/76   Pulse 75   Temp 98.3 °F (36.8 °C) (Oral)   Resp 16   Ht 5' 6.5\" (1.689 m)   Wt 200 lb (90.7 kg)   LMP  (LMP Unknown)   SpO2 92%   BMI 31.80 kg/m²     General appearance: No apparent distress, appears stated age and cooperative. HEENT: Pupils equal, round, and reactive to light. Conjunctivae/corneas clear. Neck: Supple, with full range of motion. No jugular venous distention. Trachea midline. Respiratory:  Normal respiratory effort. Clear to auscultation, bilaterally without Rales/Wheezes/Rhonchi. Cardiovascular: Regular rate and rhythm with normal S1/S2 without murmurs, rubs or gallops. Abdomen: Soft, non-tender, non-distended with normal bowel sounds. Musculoskeletal: No clubbing, cyanosis or edema bilaterally. Full range of motion without deformity. Skin: Skin color, texture, turgor normal.  No rashes or lesions. Neurologic:  Neurovascularly intact without any focal sensory/motor deficits. Cranial nerves: II-XII intact, grossly non-focal.  Psychiatric: Alert and oriented, thought content appropriate, normal insight  Capillary Refill: Brisk,< 3 seconds   Peripheral Pulses: +2 palpable, equal bilaterally       Labs:   Recent Labs     02/23/21  1440 02/24/21  0604 02/25/21  0547   WBC 12.7* 6.7 6.2   HGB 12.7 10.8* 11.4*   HCT 38.6 33.0* 34.2*    249 281     Recent Labs     02/23/21  1440 02/24/21  0604 02/25/21  0547    139 138   K 5.1 3.6 3.4*   CL 96* 105 105   CO2 25 23 25   BUN 11 7 6*   CREATININE <0.5* <0.5* 0.6   CALCIUM 9.2 8.0* 8.8   PHOS  --   --  3.5     Recent Labs     02/23/21  1440   AST 40*   ALT 20   BILITOT 0.5   ALKPHOS 71     No results for input(s): INR in the last 72 hours.   Recent Labs     02/23/21  1440   TROPONINI <0.01       Urinalysis:      Lab Results   Component Value Date    NITRU Negative 02/24/2021    WBCUA 10-20 02/24/2021    BACTERIA Rare 02/24/2021    RBCUA 3-4 02/24/2021    BLOODU Negative 02/24/2021    SPECGRAV 1.025 02/24/2021    GLUCOSEU Negative 02/24/2021       Consults:    IP CONSULT TO HOSPITALIST      Assessment/Plan:    Active Hospital Problems    Diagnosis    Obesity [E66.9]    PNA (pneumonia) [J18.9]    Dyslipidemia [E78.5]    SUNIL (obstructive sleep apnea) [G47.33]    HTN (hypertension), benign [I10]    [facility] [E11.9]    CAD (coronary artery disease) [I25.10]       Generalized weakness diarrhea, contact history of Covid-possibly secondary to Covid-admit, droplet plus isolation, PCR Covid pending, encourage p.o. intake, monitor diarrhea, monitor electrolytes, stool work-up if persists    PNA - likely CAP w/ Strep Pneumonia. Started on empiric Rocephin/Azithro in ED on 23 Feb.  Changed to DAILY/PO respectively. Sepsis - w/ Leukocytosis/Tachycardia/Elevated Lactate POArrival 2nd to above infection. Continue IVF as appropriate and monitor clinical response w/ ABX as written. HTN - w/out known CAD and no evidence of active signs/sxs of ischemia/failure. Currently controlled on home meds w/ vitals reviewed and documented as above. DM2 - controlled on home oral antiGlycemics/Insulin - Metformin held/ Amaryl/Lantus continued. Follow FSBS/SSI low regimen. Last HbA1c 11% dated Nov 2020. Anticipate resuming/continuing home regimen at discharge. HypoThyroid - clinically euthyroid on oral replacement therapy. Continue, w/ outpt monitoring as previously arranged. Obesity -  With Body mass index is 31.8 kg/m². Complicating assessment and treatment. Placing patient at risk for multiple co-morbidities as well as early death and contributing to the patient's presentation. Counseled on weight loss. SUNIL - likely 2nd to obesity. Controlled on home CPAP/BiPap - continued, w/ outpatient f/u as previously arranged. Tremor - controlled on home Mysoline - continued.          DVT Prophylaxis: LMWH  Diet: DIET GENERAL;  Code Status: Full Code      PT/OT Eval Status: ordered and pending. Dispo - Possibly Thurs/Friday 25/26 Feb at the earliest pending clinical course.      Karlee Goode MD

## 2021-02-25 NOTE — PROGRESS NOTES
Occupational Therapy   Occupational Therapy Initial Assessment/Treatment/Discharge Summary  Date: 2021   Patient Name: Henny Preciado  MRN: [id number]     : 1950    Date of Service: 2021    Discharge Recommendations:  24 hour supervision or assist       Assessment   Performance deficits / Impairments: Decreased endurance  Assessment: Patient admitted from home with PNA, negative for COVID. Today patient supervision to independent with mobility, transfers and aDLs. no further OT needs will sign off. Prognosis: Good  Decision Making: Low Complexity  OT Education: OT Role;Transfer Training;Plan of Care;ADL Adaptive Strategies; Energy Conservation  Disease specific: Patient educated on universal mask wearing and handwashing in order to decrease risk of COVID-19 transmission. Patient verbalized understanding of above education. No Skilled OT: No OT goals identified  REQUIRES OT FOLLOW UP: No  Safety Devices  Safety Devices in place: Yes  Type of devices: Gait belt;Call light within reach; Left in chair;Chair alarm in place;Nurse notified           Patient Diagnosis(es): The primary encounter diagnosis was Dehydration. Diagnoses of General weakness, Suspected COVID-19 virus infection, Pneumonia due to organism, Elevated lactic acid level, Leukocytosis, unspecified type, and Pneumonia due to infectious organism, unspecified laterality, unspecified part of lung were also pertinent to this visit. has a past medical history of Anemia, Anesthesia, Arthritis, CAD (coronary artery disease), CVA (cerebral infarction), Diabetes mellitus ([address] Diastolic heart failure ([address] Fibromyalgia, Hyperlipidemia, Hypertension, Hypothyroidism, LFT's abnormal, CHAMBERS (nonalcoholic steatohepatitis), Nausea & vomiting, Pancreatic cyst, PNA (pneumonia), Pneumonia, Rectocele, Sleep apnea, Tremor, and Vitamin D deficiency. has a past surgical history that includes Cholecystectomy; eye surgery (Right); Ectopic pregnancy surgery (1985); Posterior cruciate ligament repair; other surgical history (11-4-2011); Diagnostic Cardiac Cath Lab Procedure; and Colonoscopy (01/10/2018). Restrictions  Restrictions/Precautions  Restrictions/Precautions: General Precautions    Subjective   General  Chart Reviewed: Yes, Orders, Progress Notes  Patient assessed for rehabilitation services?: Yes  Family / Caregiver Present: No  Referring Practitioner: Dr. Wilton Alfaro  Diagnosis: PNA  Subjective  Subjective: Pt pleasant and agreeable to OT evaluation, states hoping to go home today.   Patient Currently in Pain: Denies  Vital Signs  Temp: 98.2 °F (36.8 °C)  Temp Source: Oral  Pulse: 78  Heart Rate Source: Monitor  Resp: 16  BP: (!) 147/73  BP Location: Left upper arm  MAP (mmHg): 98  Patient Position: Semi fowlers  Level of Consciousness: Alert (0)  MEWS Score: 1  Patient Currently in Pain: Denies  Oxygen Therapy  SpO2: 96 %  O2 Device: None (Room air)  Social/Functional History  Social/Functional History  Lives With: Spouse, Other (comment)(grandson (21 years old))  Type of Home: House  Home Layout: Multi-level(5 steps to bed and bathroom)  Home Access: Stairs to enter without rails  Entrance Stairs - Number of Steps: 1+1 WALE  Bathroom Shower/Tub: Walk-in shower  Bathroom Toilet: Standard  Home Equipment: (no home equipment)  ADL Assistance: Independent  Homemaking Responsibilities: Yes  Ambulation Assistance: Independent  Transfer Assistance: Independent  Active : Yes  Occupation: Full time employment  Type of occupation:  at school       Objective   Vision: Impaired  Vision Exceptions: Wears glasses for reading  Hearing: Within functional limits    Orientation  Overall Orientation Status: Within Functional Limits  Observation/Palpation  Posture: Fair     ADL  LE Dressing: Supervision  Toileting: Independent  Tone RUE RUE Tone: Normotonic  Tone LUE  LUE Tone: Normotonic  Coordination  Movements Are Fluid And Coordinated: Yes     Bed mobility  Supine to Sit: Independent  Sit to Supine: Independent  Transfers  Sit to stand: Supervision  Stand to sit: Supervision     Cognition  Overall Cognitive Status: WFL        LUE AROM (degrees)  LUE AROM : WFL  RUE AROM (degrees)  RUE AROM : WFL  LUE Strength  Gross LUE Strength: WFL  RUE Strength  Gross RUE Strength: WFL         Plan   Plan  Times per week: 1x only      AM-PAC Score        AM-[facility] Inpatient Daily Activity Raw Score: 24 (02/25/21 1249)  AM-PAC Inpatient ADL T-Scale Score : 57.54 (02/25/21 1249)  ADL Inpatient CMS 0-100% Score: 0 (02/25/21 1249)  ADL Inpatient CMS G-Code Modifier : [facility] (02/25/21 1249)    Goals  Short term goals  Time Frame for Short term goals: 1 session  Short term goal 1: Pt will complete LE dressing with supervision-STG met 2/25  Patient Goals   Patient goals : \"to go home\"       Therapy Time   Individual Concurrent Group Co-treatment   Time In 1230         Time Out 1303         Minutes 33         Timed Code Treatment Minutes: 23 Minutes       JOAN Maher/KENNEDI

## 2021-02-25 NOTE — ADT AUTH CERT
HypoThyroid - clinically euthyroid on oral replacement therapy. Continue, w/ outpt monitoring as previously arranged.        Obesity -  With Body mass index is 31.8 kg/m². Complicating assessment and treatment. Placing patient at risk for multiple co-morbidities as well as early death and contributing to the patient's presentation.  Counseled on weight loss.       SUNIL - likely 2nd to obesity.  Controlled on home CPAP/BiPap - continued, w/ outpatient f/u as previously arranged.       PA recommends Inpatient letter by Malachi Olivarez RN       Review Status Review Entered   In Primary 2021 16:23      Criteria Review   slr keep in     We recommend that the following pt's current hospitalization under Inpatient status  is APPROPRIATE         Name: Zully Washington   : 1950   CSN: [id number]      Clinical summary        Sepsis, Leukocytosis, Tachycardia, Elevated Lactate, PNA, on empiric abx  Vitals                           As above  Labs and Imaging       As above  UM criteria applies        Comments               This chart was reviewed at 4:16 PM 2021     Taylor Raines MD   Physician [facility]   CELL : [phone]

## 2021-02-25 NOTE — PROGRESS NOTES
Physical Therapy    Facility/Department: [place] [address] - MED SURG/ORTHO  Initial Assessment/Discharge Summary (1x only)    NAME: Samuel Crisostomo  : 1950  MRN: [id number]    Date of Service: 2021    Discharge Recommendations:  Home with assist PRN   PT Equipment Recommendations  Equipment Needed: No    Assessment   Assessment: Pt referred for PT evaluation during current hospital stay with dx of PNA. Pt currently functioning with adequate safety and (I) in order to return home today, demonstrating ability to transfer, ambulate, and navigate stairs with SBA/supervision or less. No further acute PT needs at this time; will sign off. Prognosis: Good  Decision Making: Low Complexity  PT Education: Goals; General Safety;Gait Training;PT Role;Plan of Care;Disease Specific Education; Functional Mobility Training;Precautions;Transfer Training  Patient Education: Disease-specific education: Pt educated on compensatory transfer/gait strategies in light of weakness, and in role of PT in acute care setting; pt verbalizes understanding. No Skilled PT: Safe to return home  REQUIRES PT FOLLOW UP: No  Activity Tolerance: Patient Tolerated treatment well       Patient Diagnosis(es): The primary encounter diagnosis was Dehydration. Diagnoses of General weakness, Suspected COVID-19 virus infection, Pneumonia due to organism, Elevated lactic acid level, Leukocytosis, unspecified type, and Pneumonia due to infectious organism, unspecified laterality, unspecified part of lung were also pertinent to this visit. has a past medical history of Anemia, Anesthesia, Arthritis, CAD (coronary artery disease), CVA (cerebral infarction), Diabetes mellitus ([address] Diastolic heart failure ([address] Fibromyalgia, Hyperlipidemia, Hypertension, Hypothyroidism, LFT's abnormal, CHAMBERS (nonalcoholic steatohepatitis), Nausea & vomiting, Pancreatic cyst, PNA (pneumonia), Pneumonia, Rectocele, Sleep apnea, Tremor, and Vitamin D deficiency. Timed Code Treatment Minutes: [address] [place], [place] #[id number]

## 2021-02-26 NOTE — CARE COORDINATION
COVID-Risk Monitoring Call: Attempted to reach patient via phone for initial post hospital transition call. VM left stating purpose of call along with my contact information requesting a return call.       Jalyn MARTINEZN, RN, [facility]  Care Transition Nurse   [phone] office  [phone] mobile
Stable

## 2021-02-26 NOTE — DISCHARGE SUMMARY
Hospital Medicine Discharge Summary    Patient ID: Kenneth Lu      Patient's PCP: SUMA Contreras - CNP    Admit Date: 2/23/2021     Discharge Date: 2/25/2021      Admitting Physician: Shabnam Kelley MD     Discharge Physician: Anni Arnett MD     Discharge Diagnoses: Active Hospital Problems    Diagnosis    Obesity [E66.9]    PNA (pneumonia) [J18.9]    Dyslipidemia [E78.5]    SUNIL (obstructive sleep apnea) [G47.33]    HTN (hypertension), benign [I10]    DM (diabetes mellitus) [address] [address] [E11.9]    CAD (coronary artery disease) [I25.10]       The patient was seen and examined on day of discharge and this discharge summary is in conjunction with any daily progress note from day of discharge. Hospital Course:        PNA - likely CAP w/ Strep Pneumonia. Started on empiric Rocephin/Azithro in ED on 23 Feb.  Changed to DAILY/PO respectively.     Sepsis - w/ Leukocytosis/Tachycardia/Elevated Lactate POArrival 2nd to above infection. Continue IVF as appropriate and monitor clinical response w/ ABX as written.      HTN - w/out known CAD and no evidence of active signs/sxs of ischemia/failure. Currently controlled on home meds      DM2 - controlled on home oral antiGlycemics/Insulin - Metformin held/ Amaryl/Lantus continued. Followed FSBS/SSI low regimen. Last HbA1c 11% dated Nov 2020. Resumed home regimen at discharge.      HypoThyroid - clinically euthyroid on oral replacement therapy. Continue, w/ outpt monitoring as previously arranged.      Obesity -  With Body mass index is 31.8 kg/m². Complicating assessment and treatment. Placing patient at risk for multiple co-morbidities as well as early death and contributing to the patient's presentation. Counseled on weight loss.     SUNIL - likely 2nd to obesity.   Controlled on home CPAP/BiPap - continued, w/ outpatient f/u as previously arranged.      Tremor - controlled on home Mysoline - continued.      Labs: For convenience and continuity at follow-up the following most recent labs are provided:      CBC:    Lab Results   Component Value Date    WBC 6.2 02/25/2021    HGB 11.4 02/25/2021    HCT 34.2 02/25/2021     02/25/2021       Renal:    Lab Results   Component Value Date     02/25/2021    K 3.4 02/25/2021    K 3.6 02/24/2021     02/25/2021    CO2 25 02/25/2021    BUN 6 02/25/2021    CREATININE 0.6 02/25/2021    CALCIUM 8.8 02/25/2021    PHOS 3.5 02/25/2021         Significant Diagnostic Studies    Radiology:   XR CHEST PORTABLE   Final Result   Bibasilar opacities are present, which could represent atelectasis versus   pneumonia. Consults:     IP CONSULT TO HOSPITALIST    Disposition: home    Condition at Discharge: Stable    Discharge Instructions/Follow-up:  w/ PCP 1-2 weeks and subspecialists as arranged.      Code Status:  Full code    Activity: activity as tolerated    Diet: regular diet      Discharge Medications:     Discharge Medication List as of 2/25/2021  3:50 PM           Details   azithromycin (ZITHROMAX) 250 MG tablet Take 1 tablet by mouth daily for 3 days, Disp-3 tablet, R-0Print              Details   metFORMIN (GLUCOPHAGE-XR) 500 MG extended release tablet TAKE 1 TABLET TWICE A DAY, Disp-180 tablet, R-1Normal      blood glucose test strips (ONETOUCH ULTRA) strip USE TO TEST BLOOD SUGAR TWOTIMES A DAY, Disp-200 strip, R-2Normal      primidone (MYSOLINE) 50 MG tablet TAKE 3 TABLETS NIGHTLY, Disp-270 tablet, R-1Normal      DULoxetine (CYMBALTA) 60 MG extended release capsule Take 1 capsule by mouth 2 times daily, Disp-180 capsule, R-1Dose increaseNormal      alendronate (FOSAMAX) 70 MG tablet TAKE 1 TABLET EVERY 7 DAYS, Disp-12 tablet, R-0Normal      TOUJEO SOLOSTAR 300 UNIT/ML SOPN INJECT 70 UNITS            SUBCUTANEOUSLY NIGHTLY, Disp-18 mL, R-0Normal      metoprolol tartrate (LOPRESSOR) 25 MG tablet TAKE 1 TABLET TWICE A DAY, Disp-180 tablet, R-1Normal Thank you SUMA Talavera CNP for the opportunity to be involved in this patient's care. If you have any questions or concerns please feel free to contact me at [phone].

## 2021-03-01 NOTE — CARE COORDINATION
COVID-Risk Call:    Second attempt made to reach patient for initial post hospital transition call. VM left stating purpose of call along with my contact information requesting a return call.           Rafiq MARTINEZN, RN, [facility]  Care Transition Nurse   [phone] office  [phone] mobile

## 2021-03-21 NOTE — ED PROVIDER NOTES
Emergency Department Provider Note  Location: [facility]  ED  3/21/2021     Patient Identification  Aleksandar Quevedo is a 80 y.o. female    Chief Complaint  Back Pain (fell 10days ago; twisted back getting up; increased lower back pain and spasms since. )          HPI  (History provided by patient)  Patient is a 80-year-old female reported history of osteopenia denies blood thinners or anticoagulants who presents with lower back pain ongoing for the past week and a half. Patient reports that she had a mechanical fall forward while standing up in her closet 11 days ago and fell forward caught herself, but then fell backwards and she twisted as she was falling landing on her shoulder. Since then she has had persistent progressive constant pain in the paraspinal lumbar area on the right side. Describes a severe spasm-like pain is exacerbated with any movement. There is no pain while at rest.  She denies any numbness weakness paralysis of the lower extremities no saddle anesthesia no urinary retention or stool incontinence. She presented to urgent care was given Robaxin with little relief. I have reviewed the following nursing documentation:  Allergies: Allergies   Allergen Reactions    Codeine     Ultram [Tramadol]      vomiting    Percocet [Oxycodone-Acetaminophen] Nausea And Vomiting       Past medical history:  has a past medical history of Anemia, Anesthesia, Arthritis, CAD (coronary artery disease), CVA (cerebral infarction), Diabetes mellitus ([address] Diastolic heart failure ([address] Fibromyalgia, Hyperlipidemia, Hypertension, Hypothyroidism, LFT's abnormal, CHAMBERS (nonalcoholic steatohepatitis), Nausea & vomiting, Pancreatic cyst, PNA (pneumonia) (2/23/2021), Pneumonia, Rectocele, Sleep apnea, Tremor, and Vitamin D deficiency. Past surgical history:  has a past surgical history that includes Cholecystectomy; eye surgery (Right); Ectopic pregnancy surgery (1985);  Posterior cruciate ligament repair; other surgical history (11-4-2011); Diagnostic Cardiac Cath Lab Procedure; and Colonoscopy (01/10/2018). Home medications:   Prior to Admission medications    Medication Sig Start Date End Date Taking?  Authorizing Provider   cyclobenzaprine (FLEXERIL) 5 MG tablet Take 1 tablet by mouth 2 times daily as needed for Muscle spasms 3/21/21 3/31/21 Yes Obdulio Garcia MD   ketorolac (TORADOL) 10 MG tablet Take 1 tablet by mouth every 8 hours as needed for Pain 3/21/21 3/21/22 Yes Jc Orta MD   metFORMIN (GLUCOPHAGE-XR) 500 MG extended release tablet TAKE 1 TABLET TWICE A DAY 2/18/21   SUMA Pham CNP   blood glucose test strips (ONETOUCH ULTRA) strip USE TO TEST BLOOD SUGAR TWOTIMES A DAY 2/8/21   SUMA Foster CNP   primidone (MYSOLINE) 50 MG tablet TAKE 3 TABLETS NIGHTLY 2/2/21   SUMA De Luna CNP   DULoxetine (CYMBALTA) 60 MG extended release capsule Take 1 capsule by mouth 2 times daily 2/2/21   SUMA De Luna CNP   alendronate (FOSAMAX) 70 MG tablet TAKE 1 TABLET EVERY 7 DAYS 1/29/21   SUMA Pham CNP   TOUJEO SOLOSTAR 300 UNIT/ML SOPN INJECT 70 UNITS            SUBCUTANEOUSLY NIGHTLY 1/29/21   SUMA De Luna CNP   metoprolol tartrate (LOPRESSOR) 25 MG tablet TAKE 1 TABLET TWICE A DAY 1/29/21   SUMA De Luna CNP   atorvastatin (LIPITOR) 40 MG tablet TAKE 1 TABLET NIGHTLY 12/21/20   SUMA Foster CNP   buPROPion (WELLBUTRIN XL) 150 MG extended release tablet TAKE 1 TABLET EVERY MORNING 12/21/20   SUMA Foster CNP   glimepiride (AMARYL) 4 MG tablet TAKE 1 TABLET TWICE A DAY 12/16/20   SUMA De Luna CNP   levothyroxine (SYNTHROID) 75 MCG tablet TAKE 1 TABLET DAILY 11/16/20   SUMA Foster CNP   lisinopril (PRINIVIL;ZESTRIL) 5 MG tablet TAKE 1 TABLET DAILY 8/11/20   Fabiola Sims MD   Insulin Pen Needle (B-D ULTRAFINE III SHORT PEN) 31G X 8 MM MISC USE AND DISCARD 1 PEN      NEEDLE DAILY 6/23/20   Nadiya Altman MD   Cyanocobalamin (VITAMIN B 12 PO) Take by mouth    Historical Provider, MD   ondansetron (ZOFRAN) 4 MG tablet Take 1 tablet by mouth daily as needed for Nausea or Vomiting 9/30/19   SUMA Zaidi CNP   UNABLE TO FIND Tumeric    Historical Provider, MD   diclofenac sodium 1 % GEL  11/20/18   Historical Provider, MD   Blood Glucose Monitoring Suppl (MM EASY TOUCH GLUCOSE METER) w/Device KIT 1 kit by Does not apply route 2 times daily 3/15/18   SUMA White - CNP   vitamin D (CHOLECALCIFEROL) 1000 UNIT TABS tablet Take 4,000 Units by mouth daily    Historical Provider, MD   magnesium 30 MG tablet Take 30 mg by mouth daily    Historical Provider, MD   ONE TOUCH ULTRASOFT LANCETS MISC Test blood sugar 2 times a day 4/6/17   Shai Vee MD   aspirin 325 MG tablet Take 1 tablet by mouth daily. 3/18/14   Karon Vann DO       Social history:  reports that she quit smoking about 29 years ago. Her smoking use included cigarettes. She has a 0.50 pack-year smoking history. She has never used smokeless tobacco. She reports that she does not drink alcohol or use drugs. Family history:    Family History   Problem Relation Age of Onset    Diabetes Mother     Stroke Mother     Heart Disease Father     Cancer Father         prostate    Heart Disease Brother         multiple stents    Heart Disease Brother     Heart Disease Sister         carotid artery surgery    Heart Disease Brother     Heart Attack Brother     Heart Disease Brother     Asthma Neg Hx     Emphysema Neg Hx     Heart Failure Neg Hx          ROS  Review of Systems   Constitutional: Negative for chills and fever. HENT: Negative for congestion and rhinorrhea. Eyes: Negative for photophobia and visual disturbance. Respiratory: Negative for cough, shortness of breath and wheezing. Cardiovascular: Negative for chest pain and palpitations. Gastrointestinal: Negative for abdominal distention, diarrhea, nausea and vomiting. Genitourinary: Negative for dysuria and hematuria. Musculoskeletal: Positive for back pain and myalgias. Negative for gait problem and neck pain. Skin: Negative for rash and wound. Neurological: Negative for syncope and weakness. Psychiatric/Behavioral: Negative for agitation and confusion. Exam  ED Triage Vitals [03/21/21 1422]   BP Temp Temp src Pulse Resp SpO2 Height Weight   119/78 98 °F (36.7 °C) -- 71 18 97 % 5' 6\" (1.676 m) 200 lb (90.7 kg)       Physical Exam  Vitals signs and nursing note reviewed. Constitutional:       General: She is not in acute distress. Appearance: She is well-developed. HENT:      Head: Normocephalic and atraumatic. Nose: Nose normal. No congestion. Eyes:      Extraocular Movements: Extraocular movements intact. Pupils: Pupils are equal, round, and reactive to light. Neck:      Musculoskeletal: Normal range of motion and neck supple. Cardiovascular:      Rate and Rhythm: Normal rate and regular rhythm. Heart sounds: No murmur. Pulmonary:      Effort: Pulmonary effort is normal.      Breath sounds: Normal breath sounds. Abdominal:      General: There is no distension. Palpations: Abdomen is soft. Tenderness: There is no abdominal tenderness. Musculoskeletal: Normal range of motion. General: No deformity. Comments: There is general tenderness throughout the lower back paraspinally as well as midline. There is no step-offs abrasions or objective evidence of trauma. +2 Patellar Reflexes Bilaterally, +2 Achilles Reflexes Bilaterally. Light touch in lower extremities bilaterally is intact. No overlying rashes. LE strength is 5/5. Straight leg test is not present on the bilateral.     Skin:     General: Skin is warm. Findings: No rash. Neurological:      Mental Status: She is alert and oriented to person, place, and time. Motor: No abnormal muscle tone. Coordination: Coordination normal.   Psychiatric:         Mood and Affect: Mood normal.         Behavior: Behavior normal.           ED Course    ED Medication Orders (From admission, onward)    Start Ordered     Status Ordering Provider    03/21/21 1615 03/21/21 1600  ketorolac (TORADOL) injection 15 mg  ONCE      Last MAR action: Given - by Rita Tan on 03/21/21 at [id number] [facility], GIOVANNY L    03/21/21 1615 03/21/21 1600  cyclobenzaprine (FLEXERIL) tablet 5 mg  ONCE      Last MAR action: Given - by Rita Tan on 03/21/21 at [address] [facility], [facility]            Radiology  Ct Lumbar Spine Wo Contrast    Result Date: 3/21/2021  EXAMINATION: CT OF THE LUMBAR SPINE WITHOUT CONTRAST  3/21/2021 TECHNIQUE: CT of the lumbar spine was performed without the administration of intravenous contrast. Multiplanar reformatted images are provided for review. Dose modulation, iterative reconstruction, and/or weight based adjustment of the mA/kV was utilized to reduce the radiation dose to as low as reasonably achievable. COMPARISON: None HISTORY: ORDERING SYSTEM PROVIDED HISTORY: TRAUMA TECHNOLOGIST PROVIDED HISTORY: Reason for exam:->trauma Decision Support Exception->Emergency Medical Condition (MA) Reason for Exam: patient fell 10 days ago; twisted back getting up; lower back pain Acuity: Acute Type of Exam: Initial FINDINGS: BONES/ALIGNMENT:   Bone mineralization is normal.  The vertebral bodies and posterior elements appear intact and appropriately aligned without acute fracture or subluxation. Vertebral body stature is maintained throughout as is the normal lumbar lordosis. DEGENERATIVE CHANGES: There is mild multilevel lumbar degenerative disc disease. No significant facet hypertrophic change or bony neural foraminal narrowing. SOFT TISSUES: No paraspinal soft tissue abnormality. Mild multilevel lumbar degenerative disc disease. No acute fracture or subluxation.          Labs No results found for this visit on 03/21/21. [facility]  Patient seen and evaluated. Relevant records reviewed. 69-year-old female who presents with paraspinal right-sided lower back pain for the past week and a half. On exam patient is uncomfortable but nontoxic, her vitals are reassuring. Her exam is notable for diffuse lower back tenderness mainly paraspinally to the right side but she also endorses some degree of tenderness in the midline. She has no red flag signs or symptoms otherwise for spinal cord pathology. No concern for cauda equina. He did obtain CT imaging given the fact she was endorsing tenderness over the spine which does not show any evidence of bony pathology. She was treated with muscle relaxers and analgesics in the emergency department with significant improvement of symptoms. She is now able to get up and ambulate with less distress. She states she feels comfortable going home. Discussed replacing her muscle relaxers, PCP follow-up and return precautions. Patient agreeable to plan expresses understanding of plan. Clinical Impression:  1. Strain of lumbar region, initial encounter          Disposition:  Discharge to home in good condition. Blood pressure 126/88, pulse 66, temperature 98 °F (36.7 °C), temperature source Oral, resp. rate 16, height 5' 6\" (1.676 m), weight 200 lb (90.7 kg), SpO2 99 %, not currently breastfeeding. Patient was given scripts for the following medications. I counseled patient how to take these medications.    Discharge Medication List as of 3/21/2021  7:18 PM      START taking these medications    Details   cyclobenzaprine (FLEXERIL) 5 MG tablet Take 1 tablet by mouth 2 times daily as needed for Muscle spasms, Disp-10 tablet, R-0Normal      ketorolac (TORADOL) 10 MG tablet Take 1 tablet by mouth every 8 hours as needed for Pain, Disp-20 tablet, R-0Normal             Disposition referral (if applicable):  Osmin Armstrong MD  [address] [place] [address] [address]    Schedule an appointment as soon as possible for a visit           Total critical care time is 0 minutes, which excludes separately billable procedures and updating family. Time spent is specifically for management of the presenting complaint and symptoms initially, direct bedside care, reevaluation, review of records, and consultation. There was a high probability of clinically significant life-threatening deterioration in the patient's condition, which required my urgent intervention. This chart was generated in part by using Dragon Dictation system and may contain errors related to that system including errors in grammar, punctuation, and spelling, as well as words and phrases that may be inappropriate. If there are any questions or concerns please feel free to contact the dictating provider for clarification.      Carlos Roche MD  [address] Espinoza MD  03/22/21 [id number]

## 2021-03-31 NOTE — PROGRESS NOTES
New Patient: LUMBAR SPINE    Referring Provider:  No ref. provider found    CHIEF COMPLAINT:    Chief Complaint   Patient presents with    Back Pain     fell at home 3 weeks ago, 7/10       HISTORY OF PRESENT ILLNESS:       Ms. Humberto Marcial  is a pleasant 80 y.o. female here for evaluation regarding her LBP. She states her pain began acutely after falling forward about 3 weeks ago. Patient states that she lost her balance and fell forward landing onto her hands and knees. She went to get up when she fell again twisting her low back. Her pain has steadily continued since then. She rates her back pain 9/10. She describes the pain as constant and increased with changing positions, prolonged standing and walking and improved some with rest.  She denies any leg pain, paresthesias, or weakness into either lower extremity. She denies bowel or bladder dysfunction. The pain at times disrupts her sleep due to positioning. Patient was evaluated in the ED on 3/21/2021 at which time a CT scan was obtained and the patient was given a Toradol injection and Flexeril. She states that the pain is somewhat improving. Patient has a past medical history of diabetes, essential tremors, and a recent history of pneumonia.   Pain Assessment  Location of Pain: Back  Severity of Pain: 7  Quality of Pain: Other (Comment)  Duration of Pain: Other (Comment)]    Current/Past Treatment:   · Physical Therapy: None  · Chiropractic: None  · Injection: None  · Medications: None at present    Past Medical History:   Past Medical History:   Diagnosis Date    Anemia     Anesthesia     [facility]    Arthritis     CAD (coronary artery disease)     CVA (cerebral infarction)     Diabetes mellitus (HCC)     Diastolic heart failure (HCC)     Fibromyalgia     Hyperlipidemia     Hypertension     Hypothyroidism     LFT's abnormal     CHAMBERS (nonalcoholic steatohepatitis)     Nausea & vomiting     Pancreatic cyst     PNA (pneumonia) 2/23/2021  Pneumonia     Rectocele     Sleep apnea     does not use c-pap    Tremor     Vitamin D deficiency         Past Surgical History:     Past Surgical History:   Procedure Laterality Date    CHOLECYSTECTOMY      COLONOSCOPY  01/10/2018    DIAGNOSTIC CARDIAC CATH LAB PROCEDURE      ECTOPIC PREGNANCY SURGERY  1985    EYE SURGERY Right     cataract    OTHER SURGICAL HISTORY  11-4-2011    Posterior repair    POSTERIOR CRUCIATE LIGAMENT REPAIR         Current Medications:     Current Outpatient Medications:     cyclobenzaprine (FLEXERIL) 5 MG tablet, Take 1 tablet by mouth 2 times daily as needed for Muscle spasms, Disp: 10 tablet, Rfl: 0    ketorolac (TORADOL) 10 MG tablet, Take 1 tablet by mouth every 8 hours as needed for Pain, Disp: 20 tablet, Rfl: 0    metFORMIN (GLUCOPHAGE-XR) 500 MG extended release tablet, TAKE 1 TABLET TWICE A DAY, Disp: 180 tablet, Rfl: 1    blood glucose test strips (ONETOUCH ULTRA) strip, USE TO TEST BLOOD SUGAR TWOTIMES A DAY, Disp: 200 strip, Rfl: 2    primidone (MYSOLINE) 50 MG tablet, TAKE 3 TABLETS NIGHTLY, Disp: 270 tablet, Rfl: 1    DULoxetine (CYMBALTA) 60 MG extended release capsule, Take 1 capsule by mouth 2 times daily, Disp: 180 capsule, Rfl: 1    alendronate (FOSAMAX) 70 MG tablet, TAKE 1 TABLET EVERY 7 DAYS, Disp: 12 tablet, Rfl: 0    TOUJEO SOLOSTAR 300 UNIT/ML SOPN, INJECT 70 UNITS            SUBCUTANEOUSLY NIGHTLY, Disp: 18 mL, Rfl: 0    metoprolol tartrate (LOPRESSOR) 25 MG tablet, TAKE 1 TABLET TWICE A DAY, Disp: 180 tablet, Rfl: 1    atorvastatin (LIPITOR) 40 MG tablet, TAKE 1 TABLET NIGHTLY, Disp: 90 tablet, Rfl: 1    buPROPion (WELLBUTRIN XL) 150 MG extended release tablet, TAKE 1 TABLET EVERY MORNING, Disp: 90 tablet, Rfl: 1    glimepiride (AMARYL) 4 MG tablet, TAKE 1 TABLET TWICE A DAY, Disp: 180 tablet, Rfl: 0    levothyroxine (SYNTHROID) 75 MCG tablet, TAKE 1 TABLET DAILY, Disp: 90 tablet, Rfl: 1    lisinopril (PRINIVIL;ZESTRIL) 5 MG tablet, TAKE 1 TABLET DAILY, Disp: 90 tablet, Rfl: 1    Insulin Pen Needle (B-D ULTRAFINE III SHORT PEN) 31G X 8 MM MISC, USE AND DISCARD 1 PEN      NEEDLE DAILY, Disp: 100 each, Rfl: 3    Cyanocobalamin (VITAMIN B 12 PO), Take by mouth, Disp: , Rfl:     ondansetron (ZOFRAN) 4 MG tablet, Take 1 tablet by mouth daily as needed for Nausea or Vomiting, Disp: 30 tablet, Rfl: 0    UNABLE TO FIND, Tumeric, Disp: , Rfl:     diclofenac sodium 1 % GEL, , Disp: , Rfl:     Blood Glucose Monitoring Suppl (MM EASY TOUCH GLUCOSE METER) w/Device KIT, 1 kit by Does not apply route 2 times daily, Disp: 1 kit, Rfl: 0    vitamin D (CHOLECALCIFEROL) 1000 UNIT TABS tablet, Take 4,000 Units by mouth daily, Disp: , Rfl:     magnesium 30 MG tablet, Take 30 mg by mouth daily, Disp: , Rfl:     ONE TOUCH ULTRASOFT LANCETS MISC, Test blood sugar 2 times a day, Disp: 200 each, Rfl: 5    aspirin 325 MG tablet, Take 1 tablet by mouth daily. , Disp: 30 tablet, Rfl: 3    Allergies:  Codeine, Ultram [tramadol], and Percocet [oxycodone-acetaminophen]    Social History:    reports that she quit smoking about 29 years ago. Her smoking use included cigarettes. She has a 0.50 pack-year smoking history. She has never used smokeless tobacco. She reports that she does not drink alcohol or use drugs.     Family History:   Family History   Problem Relation Age of Onset    Diabetes Mother     Stroke Mother     Heart Disease Father     Cancer Father         prostate    Heart Disease Brother         multiple stents    Heart Disease Brother     Heart Disease Sister         carotid artery surgery    Heart Disease Brother     Heart Attack Brother     Heart Disease Brother     Asthma Neg Hx     Emphysema Neg Hx     Heart Failure Neg Hx        REVIEW OF SYSTEMS: Full ROS noted & scanned   CONSTITUTIONAL: Denies unexplained weight loss, fevers, chills or fatigue  NEUROLOGICAL: Denies unsteady gait or progressive weakness  MUSCULOSKELETAL: Denies joint swelling or redness  PSYCHOLOGICAL: Denies anxiety, depression   SKIN: Denies skin changes, delayed healing, rash, itching   HEMATOLOGIC: Denies easy bleeding or bruising  ENDOCRINE: Denies excessive thirst, urination, heat/cold  RESPIRATORY: Denies current dyspnea, cough  GI: Denies nausea, vomiting, diarrhea   : Denies bowel or bladder issues      PHYSICAL EXAM:    Vitals: Height 5' 5.98\" (1.676 m), weight 200 lb (90.7 kg), not currently breastfeeding. GENERAL EXAM:  · General Apparence: Patient is adequately groomed with no evidence of malnutrition. · Orientation: The patient is oriented to time, place and person. · Mood & Affect:The patient's mood and affect are appropriate. · Vascular: Examination reveals no swelling tenderness in upper or lower extremities. Good capillary refill. · Lymphatic: The lymphatic examination bilaterally reveals all areas to be without enlargement or induration  · Sensation: Sensation is intact without deficit  · Coordination/Balance: Adequate coordination but patient has difficulty with tandem walking    LUMBAR/SACRAL EXAMINATION:  · Inspection: Local inspection shows no step-off or bruising. Lumbar alignment is normal.  Sagittal and Coronal balance is neutral.      · Palpation:   Patient is diffusely tender to palpation along the lumbar spine into both SI joints. no tenderness bilaterally at the paraspinal or trochanters. There is no step-off or paraspinal spasm. · Range of Motion: Lumbar flexion, extension and rotation are moderately limited due to pain. · Strength:   Strength testing is 5/5 in all muscle groups tested. · Special Tests:   Straight leg raise and crossed SLR negative. Leg length and pelvis level. · Skin: There are no rashes, ulcerations or lesions. · Reflexes: Reflexes are symmetrically 2+ at the patellar and ankle tendons. Clonus absent bilaterally at the feet.   · Gait & station: Patient ambulates with a forward flexed position without an assistive device    · Additional Examinations:   · RIGHT LOWER EXTREMITY: Inspection/examination of the right lower extremity does not show any tenderness, deformity or injury. Range of motion is unremarkable. There is no gross instability. There are no rashes, ulcerations or lesions. Strength and tone are normal.  · LEFT LOWER EXTREMITY:  Inspection/examination of the left lower extremity does not show any tenderness, deformity or injury. Range of motion is unremarkable. There is no gross instability. There are no rashes, ulcerations or lesions. Strength and tone are normal.    Diagnostic Testing:    CT scan of the lumbar spine that was obtained on 3/21/2021 was evaluated with the patient  FINDINGS:   BONES/ALIGNMENT:   Bone mineralization is normal.  The vertebral bodies and   posterior elements appear intact and appropriately aligned without acute   fracture or subluxation.  Vertebral body stature is maintained throughout as   is the normal lumbar lordosis.       DEGENERATIVE CHANGES: There is mild multilevel lumbar degenerative disc   disease.  No significant facet hypertrophic change or bony neural foraminal   narrowing.       SOFT TISSUES: No paraspinal soft tissue abnormality.           Impression:   Lumbar spondylosis  Lumbar strain    Plan:      · We discussed the diagnosis and treatment options including observation, additional oral steroids, physical therapy, epidural injections and additional imaging. She wishes to proceed with physical therapy and Zanaflex for muscle spasm. If she finds that she has had no significant improvement with the physical therapy she will contact the office for scheduling of an MRI of her lumbar spine. .    Follow up -as needed    Total evaluation time 25 minutes    Patient examined and note dictated by Meño Umaña PA-C.

## 2021-04-20 NOTE — PLAN OF CARE
[x]Intermittent  []Radiating []Localized []other:     Numbness/Tingling: Neuropathy    Occupation/School:      Living Status/Prior Level of Function: Independent with ADLs and IADLs. C-SSRS Triggered by Intake questionnaire (Past 2 wk assessment):   [x] No, Questionnaire did not trigger screening.   [] Yes, Patient intake triggered further evaluation      [] C-SSRS Screening completed  [] PCP notified via Plan of Care  [] Emergency services notified     OBJECTIVE:     ROM  Comments   Standing Lumbar Flexion To dial 1/3 tibia painful   Supine Lumbar flexion          Standing Lumbar Extension 75% limited painful   Prone Lumbar Extension       ROM RIGHT LEFT Comments   Lumbar Side Bend      Hip Flexion 110 110    Hip Abd 35 35                Hamstring Flex 150 150                  Strength RIGHT LEFT Comments   Seated Hip Abduction 5 4+       Myotomes RIGHT LEFT Comments   Hip flexion (L1-L2) 5 5    Knee extension (L2-L4) 5 5    Dorsiflexion (L4-L5) 5 5    Great Toe Ext (L5) 5 5    Ankle Eversion (S1-S2) 5 5    Ankle PF(S1-S2) 5 5        Neural dynamic tension testing Normal Abnormal Comments   Slump Test  - Degree of knee flexion:       SLR       0-30 x     30-70 x     Femoral nerve (L2-4)        Reflexes RIGHT LEFT Comments   S1-2 Seated achilles NT NT    L3-4 Patellar tendon NT NT      Joint mobility: - NT   []Normal    []Hypo   []Hyper    Palpation: no ttp    Functional Mobility/Transfers: Mod I- slowed    Posture: Rounded shoulders     Gait: (include devices/WB status)     Bandages/Dressings/Incisions: NA    Orthopedic Special Tests:    Normal Abnormal N/A Comments   Fwd Bend-aberrant   x     Stork       CHEMO/Jag x      FADIR x      Hip scour x      SLR x      Crossed SLR x          [x] Patient history, allergies, meds reviewed. Medical chart reviewed. See intake form.      Review Of Systems (ROS):  [x]Performed Review of systems (Integumentary, CardioPulmonary, Neurological) by intake and observation. Intake form has been scanned into medical record. Patient has been instructed to contact their primary care physician regarding ROS issues if not already being addressed at this time. Co-morbidities/Complexities (which will affect course of rehabilitation):   []None           Arthritic conditions   []Rheumatoid arthritis (M05.9)  [x]Osteoarthritis (M19.91)   Cardiovascular conditions   [x]Hypertension (I10)  []Hyperlipidemia (E78.5)  []Angina pectoris (I20)  []Atherosclerosis (I70)   Musculoskeletal conditions   []Disc pathology   []Congenital spine pathologies   []Prior surgical intervention  []Osteoporosis (M81.8)  [x]Osteopenia (M85.8)   Endocrine conditions   []Hypothyroid (E03.9)  []Hyperthyroid Gastrointestinal conditions   []Constipation (Z49.93)   Metabolic conditions   []Morbid obesity (E66.01)  []Diabetes type 1(E10.65) or 2 (E11.65)   []Neuropathy (G60.9)     Pulmonary conditions   []Asthma (J45)  []Coughing   []COPD (J44.9)   Psychological Disorders  [x]Anxiety (F41.9)  [x]Depression (F32.9)   []Other:   []Other:           Barriers to/and or personal factors that will affect rehab potential:              []Age  []Sex              []Motivation/Lack of Motivation                        [x]Co-Morbidities              []Cognitive Function, education/learning barriers              []Environmental, home barriers              []profession/work barriers  []past PT/medical experience  []other:  Justification:     Falls Risk Assessment (30 days):   [x] Falls Risk assessed and no intervention required. [] Falls Risk assessed and Patient requires intervention due to being higher risk   TUG score (>12s at risk):     [] Falls education provided, including      Functional Questionnaire: Oswestry 50%    ASSESSMENT: Reji Laurent is a 80 y.o. female reporting to OP PT with c/c of low back pain which has been occurring since early March after fall forward.  Pt is noted to have reduced ROM, with reduced functional mobiliy and increased difficulty completing tranfers. Functional Impairments:     []Noted lumbar/proximal hip hypomobility   []Noted lumbosacral and/or generalized hypermobility   [x]Decreased Lumbosacral/hip/LE functional ROM   [x]Decreased core/proximal hip strength and neuromuscular control    [x]Decreased LE functional strength    [x]Abnormal reflexes/sensation/myotomal/dermatomal deficits  [x]Reduced balance/proprioceptive control    []other:      Functional Activity Limitations (from functional questionnaire and intake)   [x]Reduced ability to tolerate prolonged functional positions   [x]Reduced ability or difficulty with changes of positions or transfers between positions   [x]Reduced ability to maintain good posture and demonstrate good body mechanics with sitting, bending, and lifting   [x]Reduced ability to sleep   [] Reduced ability or tolerance with driving and/or computer work   []Reduced ability to perform lifting, reaching, carrying tasks   [x]Reduced ability to squat   [x]Reduced ability to forward bend   [x]Reduced ability to ambulate prolonged functional periods/distances/surfaces   []Reduced ability to ascend/descend stairs   []other:       Participation Restrictions   [x]Reduced participation in self care activities   [x]Reduced participation in home management activities   [x]Reduced participation in work activities   [x]Reduced participation in social activities. []Reduced participation in sport/recreational activities. Classification:   []Signs/symptoms consistent with Lumbar instability/stabilization subgroup. []Signs/symptoms consistent with Lumbar mobilization/manipulation subgroup, myotomes and dermatomes intact. Meets manipulation criteria.     []Signs/symptoms consistent with Lumbar direction specific/centralization subgroup   []Signs/symptoms consistent with Lumbar traction subgroup     []Signs/symptoms consistent with lumbar facet dysfunction   []Signs/symptoms consistent with lumbar stenosis type dysfunction   []Signs/symptoms consistent with nerve root involvement including myotome & dermatome dysfunction   []Signs/symptoms consistent with post-surgical status including: decreased ROM, strength and function. []signs/symptoms consistent with pathology which may benefit from Dry needling     [x]other: Strain     Prognosis/Rehab Potential:      []Excellent   [x]Good    []Fair   []Poor    Tolerance of evaluation/treatment:    []Excellent   [x]Good    []Fair   []Poor     Physical Therapy Evaluation Complexity Justification  [] A history of present problem with:  [] no personal factors and/or comorbidities that impact the plan of care;  []1-2 personal factors and/or comorbidities that impact the plan of care  [x]3 personal factors and/or comorbidities that impact the plan of care  [] An examination of body systems using standardized tests and measures addressing any of the following: body structures and functions (impairments), activity limitations, and/or participation restrictions;:  [x] a total of 1-2 or more elements   [] a total of 3 or more elements   [] a total of 4 or more elements   [] A clinical presentation with:  [x] stable and/or uncomplicated characteristics   [] evolving clinical presentation with changing characteristics  [] unstable and unpredictable characteristics;   [] Clinical decision making of [] low, [] moderate, [] high complexity using standardized patient assessment instrument and/or measurable assessment of functional outcome.     [x] EVAL (LOW) 18777 (typically 20 minutes face-to-face)  [] EVAL (MOD) 88989 (typically 30 minutes face-to-face)  [] EVAL (HIGH) 95125 (typically 45 minutes face-to-face)  [] RE-EVAL     PLAN: Begin PT focusing on: proximal hip mobilizations, LB mobs, LB core activation, proximal hip activation, and HEP    Frequency/Duration:  2 days per week for 8 Weeks:  Interventions:  [x]  Therapeutic exercise including: strength training, ROM, for LE, Glutes and core   [x]  NMR activation and proprioception for glutes , LE and Core   [x]  Manual therapy as indicated for Hip complex, LE and spine to include: Dry Needling/IASTM, STM, PROM, Gr I-IV mobilizations. [x]  Modalities as needed that may include: thermal agents, E-stim, Biofeedback, US, iontophoresis as indicated  [x]  Patient education on joint protection, postural re-education, activity modification, progression of HEP. HEP instruction: [address] [facility] [place], [place],[address]   (see scanned forms)    GOALS:  Patient stated goal: Reduce pain    Therapist goals for Patient:   Short Term Goals: To be achieved in: 2 weeks  1. Independent in HEP and progression per patient tolerance, in order to prevent re-injury. [] Progressing: [] Met: [] Not Met: [] Adjusted  2. Patient will have a decrease in pain to facilitate improvement in movement, function, and ADLs as indicated by Functional Deficits. [] Progressing: [] Met: [] Not Met: [] Adjusted    Long Term Goals: To be achieved in: 8 weeks  1. Disability index score of 30% or less for the EUGENE to assist with reaching prior level of function. [] Progressing: [] Met: [] Not Met: [] Adjusted  2. Patient will demonstrate increased AROM to WNL, good LS mobility, good hip ROM to allow for proper joint functioning as indicated by patients Functional Deficits. [] Progressing: [] Met: [] Not Met: [] Adjusted  3. Patient will demonstrate an increase in Strength of hip abduction  to 5/5 to allow for proper functional mobility as indicated by patients Functional Deficits. [] Progressing: [] Met: [] Not Met: [] Adjusted  4. Patient will return to functional activities without increased symptoms or restriction. [] Progressing: [] Met: [] Not Met: [] Adjusted  5.  Pt will be able to complete transfers without hands.  (patient specific functional goal)    [] Progressing: [] Met: [] Not Met: [] Adjusted     Electronically signed by:  [employer] Noemi Alston

## 2021-04-20 NOTE — PROGRESS NOTES
[address] [facility], [address], [address] [place] [address] [address]  Phone: [phone]   Fax: [phone]    Date: 2021          Patient Name; :  Henny Preciado; 1950   Dx: Diagnosis: S39.012A (ICD-10-CM) - Strain of lumbar region, initial encounter      Physician: Referring Practitioner: Shayla Camacho PA-C        Total PT Visits:      Measures Previous Current   Pain (0-10)     Disability %           Assessment:          Plan & Recommendations:  [] Continue rehabilitation due to objective improvement and continued functional deficits with frequency and duration:   [] Progress toward  []GAP, []Work Conditioning, []Independent HEP   [] Discharge due to   [] All goals achieved, [] Maximized \"medical necessity\" [] No subjective or objective improvements      Electronically signed by:  Cristhian Tse, PT  Therapy Plan of Care Re-Certification  This patient has been re-evaluated for physical therapy services and for therapy to continue, Medicare, Medicaid and other insurances require periodic physician review of the treatment plan. Please review the above re-evaluation and verify that you agree with plan of care as established above by signing the attached document and return it to our office or note changes to established plan below  [] Follow treatment plan as above [] Discontinue physical therapy  [] Change plan to:                                 __________________________________________________    Physician Signature:____________________________________ Date:____________  By signing above, therapists plan is approved by physician    If you have any questions or concerns, please don't hesitate to call.   Thank you for your referral.

## 2021-04-20 NOTE — FLOWSHEET NOTE
[address] [facility], [address] [place] [address] [address]  Phone: [phone]   Fax:     [phone]      Physical Therapy Treatment Note/ Progress Report:           Date:  2021    Patient Name:  Clovis Spain    :  1950  MRN: [id number]  Restrictions/Precautions:    Medical/Treatment Diagnosis Information:  · Diagnosis: S39.012A (ICD-10-CM) - Strain of lumbar region, initial encounter  · Treatment Diagnosis: LBP, decreased ROM, functional mobility, weakness   Insurance/Certification information:  PT Insurance Information: Pavan Mackey 150  Physician Information:  Referring Practitioner: True Hanson PA-C  Has the plan of care been signed (Y/N):        []  Yes  [x]  No     Date of Patient follow up with Physician:     Assessment Summary: Rocio Hicks is a 80 y.o. female reporting to OP PT with c/c of low back pain which has been occurring since early March after fall forward. Pt is noted to have reduced ROM, with reduced functional mobiliy and increased difficulty completing tranfers. Is this a Progress Report:     []  Yes  [x]  No        If Yes:  Date Range for reporting period:  Beginning  21  Ending  21    Progress report will be due (10 Rx or 30 days whichever is less):  33       Recertification will be due (POC Duration  / 90 days whichever is less): 21        Visit # Insurance Allowable Auth Required   In Person 1 ?  []  Yes     []  No    Tele Health   []  Yes     []  No    Total 1           Functional Scale:  Oswestry 50%   Date assessed:       Latex Allergy:  [x]NO      []YES  Preferred Language for Healthcare:   [x]English       []other:      Pain level:  5/10     SUBJECTIVE:  See eval    OBJECTIVE: See eval      RESTRICTIONS/PRECAUTIONS: Fall risk    Exercises/Interventions: HEP code: [facility]   Therapeutic Ex (11390) HEP 21     Warm-up       Rec bike              TABLE       Lumbar rotations x 10x2     PPT x 10x2     90/90 HS stretch x x5 B, 10\"     DKTC x x15                   SEATED       Sit<>stand x 10x2                                 STANDING       Hip extension x 10x2 B     Hip abduction       Anterior step ups                              Manual: None    Therapeutic Exercise and NMR EXR  [x] (98965) Provided verbal/tactile cueing for activities related to strengthening, flexibility, endurance, ROM  for improvements in proximal hip and core control with self care, mobility, lifting and ambulation.  [] (03053) Provided verbal/tactile cueing for activities related to improving balance, coordination, kinesthetic sense, posture, motor skill, proprioception  to assist with core control in self care, mobility, lifting, and ambulation.      Therapeutic Activities:    [x] (61773 or 62214) Provided verbal/tactile cueing for activities related to improving balance, coordination, kinesthetic sense, posture, motor skill, proprioception and motor activation to allow for proper function  with self care and ADLs  [] (79430) Provided training and instruction to the patient for proper core and proximal hip recruitment and positioning with ambulation re-education     Home Exercise Program:    [x] (17846) Reviewed/Progressed HEP activities related to strengthening, flexibility, endurance, ROM of core, proximal hip and LE for functional self-care, mobility, lifting and ambulation   [] (39934) Reviewed/Progressed HEP activities related to improving balance, coordination, kinesthetic sense, posture, motor skill, proprioception of core, proximal hip and LE for self care, mobility, lifting, and ambulation      Manual Treatments:  PROM / STM / Oscillations-Mobs:  G-I, II, III, IV (PA's, Inf., Post.)  [x] (04566) Provided manual therapy to mobilize proximal hip and LS spine soft tissue/joints for the purpose of modulating pain, promoting relaxation,  increasing ROM, reducing/eliminating soft tissue swelling/inflammation/restriction, improving soft tissue extensibility and allowing for proper ROM for normal function with self care, mobility, lifting and ambulation. Modalities:     [] GAME READY (VASO)- for significant edema, swelling, pain control. Charges:  Timed Code Treatment Minutes: 25   Total Treatment Minutes:  45   BWC:  TE TIME:  NMR TIME:  MANUAL TIME:  UNTIMED MINUTES:  Medicare Total:   15  10    20          [x] EVAL (LOW) 27482 (typically 20 minutes face-to-face)  [] EVAL (MOD) 22284 (typically 30 minutes face-to-face)  [] EVAL (HIGH) 90765 (typically 45 minutes face-to-face)  [] RE-EVAL     [x] QZ(29011) x     [] IONTO  [x] NMR (97536) x     [] VASO  [] Manual (56249) x     [] Other:  [] TA x      [] Mech Traction (48038)  [] ES(attended) (85983)      [] ES (un) (40674):       GOALS:     Patient stated goal: Reduce pain    Therapist goals for Patient:   Short Term Goals: To be achieved in: 2 weeks  1. Independent in HEP and progression per patient tolerance, in order to prevent re-injury. [] Progressing: [] Met: [] Not Met: [] Adjusted  2. Patient will have a decrease in pain to facilitate improvement in movement, function, and ADLs as indicated by Functional Deficits. [] Progressing: [] Met: [] Not Met: [] Adjusted    Long Term Goals: To be achieved in: 8 weeks  1. Disability index score of 30% or less for the EUGENE to assist with reaching prior level of function. [] Progressing: [] Met: [] Not Met: [] Adjusted  2. Patient will demonstrate increased AROM to WNL, good LS mobility, good hip ROM to allow for proper joint functioning as indicated by patients Functional Deficits. [] Progressing: [] Met: [] Not Met: [] Adjusted  3. Patient will demonstrate an increase in Strength of hip abduction  to 5/5 to allow for proper functional mobility as indicated by patients Functional Deficits. [] Progressing: [] Met: [] Not Met: [] Adjusted  4.  Patient will return to functional activities without increased symptoms or restriction. [] Progressing: [] Met: [] Not Met: [] Adjusted  5. Pt will be able to complete transfers without hands.  (patient specific functional goal)    [] Progressing: [] Met: [] Not Met: [] Adjusted         ASSESSMENT:  See dulce maria        Patient received education on their current pathology and how their condition effects them with their functional activities. Patient understood discussion and questions were answered. Patient understands their activity limitations and understands rational for treatment progression. Pt educated on plan of care and HEP, if worsening symptoms to d/c that exercise. Overall Progression Towards Functional goals/ Treatment Progress Update:  [] Patient is progressing as expected towards functional goals listed. [] Progression is slowed due to complexities/Impairments listed. [] Progression has been slowed due to co-morbidities.   [x] Plan just implemented, too soon to assess goals progression <30days   [] Goals require adjustment due to lack of progress  [] Patient is not progressing as expected and requires additional follow up with physician  [] Other    Prognosis for POC: [x] Good [] Fair  [] Poor      Patient requires continued skilled intervention: [x] Yes  [] No    Treatment/Activity Tolerance:  [x] Patient able to complete treatment  [] Patient limited by fatigue  [] Patient limited by pain    [] Patient limited by other medical complications  [] Other:         Return to Play: (if applicable)   []  Stage 1: Intro to Strength   []  Stage 2: Return to Run and Strength   []  Stage 3: Return to Jump and Strength   []  Stage 4: Dynamic Strength and Agility   []  Stage 5: Sport Specific Training     []  Ready to Return to Play, Meets All Above Stages   []  Not Ready for Return to Sports   Comments:                               PLAN: See dulce maria  [x] Continue per plan of care [] Alter current plan (see comments above)  [] Plan of care initiated [] Hold pending MD visit [] Discharge      Electronically signed by:  Wilver Talbot PT    Note: If patient does not return for scheduled/ recommended follow up visits, this note will serve as a discharge from care along with most recent update on progress.

## 2021-05-03 PROBLEM — M17.0 BILATERAL PRIMARY OSTEOARTHRITIS OF KNEE: Status: ACTIVE | Noted: 2018-11-20

## 2021-05-03 PROBLEM — M51.36 DDD (DEGENERATIVE DISC DISEASE), LUMBAR: Status: ACTIVE | Noted: 2018-11-20

## 2021-05-03 PROBLEM — M54.50 CHRONIC BILATERAL LOW BACK PAIN WITHOUT SCIATICA: Status: ACTIVE | Noted: 2018-11-20

## 2021-05-03 PROBLEM — M17.11 PRIMARY OSTEOARTHRITIS OF RIGHT KNEE: Status: ACTIVE | Noted: 2019-05-16

## 2021-05-03 PROBLEM — M47.816 FACET ARTHRITIS OF LUMBAR REGION: Status: ACTIVE | Noted: 2018-11-20

## 2021-05-03 PROBLEM — G89.29 CHRONIC BILATERAL LOW BACK PAIN WITHOUT SCIATICA: Status: ACTIVE | Noted: 2018-11-20

## 2021-05-03 NOTE — TELEPHONE ENCOUNTER
Received call from BODØ at pre-service center [facility]) [address] with Red Flag Complaint. Brief description of triage: Out of her Metformin for about 3 weeks or more, blood sugars are running high, 444 was her last reading on  5/1/21, can't find her monitor to take her blood sugar now, has a headache, very thirsty, urinating frequently, feels really hungry     Triage indicates for patient to discuss with pcp and callback by nurse within one hour. Calling PCP office, per Gay White who advised of ER. Due to phone issues, lost the call, called pt back, she had already called and scheduled an appointment for 3pm today and is refusing the ER. Care advice provided, patient verbalizes understanding; denies any other questions or concerns; instructed to call back for any new or worsening symptoms. Attention Provider: Thank you for allowing me to participate in the care of your patient. The patient was connected to triage in response to information provided to the ECC. Please do not respond through this encounter as the response is not directed to a shared pool. Reason for Disposition   Blood glucose > 400 mg/dL (22.2 mmol/L)    Answer Assessment - Initial Assessment Questions  1. BLOOD GLUCOSE: \"What is your blood glucose level? \"       444 on 5/1/21 has not checked it since then     2. ONSET: \"When did you check the blood glucose? \"      5/1/21    3. USUAL RANGE: \"What is your glucose level usually? \" (e.g., usual fasting morning value, usual evening value)      Was not checking her blood sugar on a regular basis     4. KETONES: \"Do you check for ketones (urine or blood test strips)? \" If yes, ask: \"What does the test show now? \"       N/a     5. TYPE 1 or 2:  \"Do you know what type of diabetes you have? \"  (e.g., Type 1, Type 2, Gestational; doesn't know)       Type 3    6. INSULIN: \"Do you take insulin? \" \"What type of insulin(s) do you use?  What is the mode of delivery? (syringe, pen; injection or pump)?\"       Denies insulin use     7. DIABETES PILLS: \"Do you take any pills for your diabetes? \" If yes, ask: \"Have you missed taking any pills recently? \"      Metformin, has not taken it for 3 or more weeks, has been out of it     8. OTHER SYMPTOMS: \"Do you have any symptoms? \" (e.g., fever, frequent urination, difficulty breathing, dizziness, weakness, vomiting)      Frequent urination, dizziness upon standing, nausea, excessive thirst     9. PREGNANCY: \"Is there any chance you are pregnant? \" \"When was your last menstrual period? \"      N/a    Protocols used: DIABETES - HIGH BLOOD SUGAR-ADULT-OH

## 2021-05-03 NOTE — PROGRESS NOTES
external ear normal.      Left Ear: Tympanic membrane and external ear normal.      Nose: Nose normal.   Eyes:      Conjunctiva/sclera: Conjunctivae normal.   Neck:      Musculoskeletal: Normal range of motion and neck supple. Thyroid: No thyromegaly. Vascular: No carotid bruit. Cardiovascular:      Rate and Rhythm: Normal rate and regular rhythm. Heart sounds: Normal heart sounds. No murmur. Pulmonary:      Effort: Pulmonary effort is normal.      Breath sounds: Normal breath sounds. No wheezing. Abdominal:      General: Bowel sounds are normal.      Palpations: Abdomen is soft. There is no mass. Musculoskeletal: Normal range of motion. Lymphadenopathy:      Cervical: No cervical adenopathy. Skin:     General: Skin is warm and dry. Neurological:      General: No focal deficit present. Mental Status: She is alert and oriented to person, place, and time. Psychiatric:         Mood and Affect: Mood normal.         Behavior: Behavior normal.         Thought Content: Thought content normal.         Judgment: Judgment normal.       Vitals:    05/03/21 1526   BP: 120/70   Pulse: 99   Temp: 97.2 °F (36.2 °C)   SpO2: 97%       Assessment:  Encounter Diagnoses   Name Primary?  DM (diabetes mellitus), type 2, uncontrolled with complications ([facility] [address] Yes    Essential hypertension        Controlled SubstancesMonitoring:  NA    Plan:  1. DM (diabetes mellitus), type 2, uncontrolled with complications ([facility])  Problem  - POCT glycosylated hemoglobin (Hb A1C)  - POCT Glucose  - glimepiride (AMARYL) 4 MG tablet; TAKE 1 TABLET TWICE A DAY  Dispense: 180 tablet; Refill: 0  - Insulin Glargine, 1 Unit Dial, (TOUJEO SOLOSTAR) 300 UNIT/ML SOPN; INJECT 70 UNITS            SUBCUTANEOUSLY NIGHTLY  Dispense: 18 mL; Refill: 2  - metFORMIN (GLUCOPHAGE-XR) 500 MG extended release tablet; TAKE 1 TABLET TWICE A DAY  Dispense: 60 tablet;  Refill: 0  Educated her on the complications that can occur with not taking

## 2021-05-25 NOTE — ED NOTES
Educated pt on discharge paperwork as well as follow-up appointment. Pt verbalizes understanding of all instructions and denies questions. IV discontinued, catheter intact, minimal bleeding at IV site, 2x2 and tape applied, manual pressure held. Pt left ambulatory by self with all personal belongings, and discharge paperwork to private residence. Pt in no distress at this time.        Governor CATHI Hernandez  05/25/21 9812

## 2021-05-25 NOTE — PROGRESS NOTES
Patient: Rosy Mccain is a 80 y.o. female who presents today with the following Chief Complaint(s):  Chief Complaint   Patient presents with    1 Month Follow-Up     med check       Assessment/Plan:  1. DM (diabetes mellitus), type 2, uncontrolled with complications ([facility] [address]  POCT glucose in office is 454. Patient is clammy and slightly diaphoretic her blood pressure is low. She complains of shortness of breath. She states that she has not been feeling well for approximately 3 weeks and that her blood sugars have been very high. She did have her Covid vaccination today. Discussed concerns with patient, she is reluctant to go to the ED for evaluation. Educated patient on importance of managing her blood sugars and concern for her being symptomatic with elevated blood sugar readings for a long period of time. After further discussion patient agrees to go to the ED for evaluation.  - POCT Glucose  - metFORMIN (GLUCOPHAGE-XR) 500 MG extended release tablet; TAKE 1 TABLET TWICE A DAY  Dispense: 180 tablet; Refill: 3    Go to the ED. Keven Olivares HPI:     She says she has not been feeling well for the past three weeks. Her blood sugars have been abnormal. She says her blood sugar was 598 a couple of weeks ago. She says last week she went to the eye doctor for her annual exam and says her eye felt sore and she was having headaches. She says her eyesight started changing and so she went to the ophthalmologist on 5/19/21. She says her pupils were not the same size. She was told she has fluid under her retina. She was started on steroids, she took her last steroid today. Her eyesight has not improved. Returns to the eye doctor on 5/27/2021 for further evaluation. She says her head hurts which she says comes and goes. He says that she feels weak. She says she has not checked her blood sugar in a couple of days says approximately 2 days ago it was about 550.           No current facility-administered medications for this visit. Current Outpatient Medications   Medication Sig Dispense Refill    metFORMIN (GLUCOPHAGE-XR) 500 MG extended release tablet TAKE 1 TABLET TWICE A  tablet 3    lisinopril (PRINIVIL;ZESTRIL) 5 MG tablet TAKE 1 TABLET DAILY 90 tablet 1    alendronate (FOSAMAX) 70 MG tablet TAKE 1 TABLET EVERY 7 DAYS 12 tablet 0    glimepiride (AMARYL) 4 MG tablet TAKE 1 TABLET TWICE A  tablet 0    Insulin Glargine, 1 Unit Dial, (TOUJEO SOLOSTAR) 300 UNIT/ML SOPN INJECT 70 UNITS            SUBCUTANEOUSLY NIGHTLY 18 mL 2    metoprolol tartrate (LOPRESSOR) 25 MG tablet TAKE 1 TABLET TWICE A  tablet 1    levothyroxine (SYNTHROID) 75 MCG tablet TAKE 1 TABLET DAILY 90 tablet 0    blood glucose test strips (ONETOUCH ULTRA) strip USE TO TEST BLOOD SUGAR TWOTIMES A  strip 2    primidone (MYSOLINE) 50 MG tablet TAKE 3 TABLETS NIGHTLY 270 tablet 1    DULoxetine (CYMBALTA) 60 MG extended release capsule Take 1 capsule by mouth 2 times daily 180 capsule 1    atorvastatin (LIPITOR) 40 MG tablet TAKE 1 TABLET NIGHTLY 90 tablet 1    buPROPion (WELLBUTRIN XL) 150 MG extended release tablet TAKE 1 TABLET EVERY MORNING 90 tablet 1    Insulin Pen Needle (B-D ULTRAFINE III SHORT PEN) 31G X 8 MM MISC USE AND DISCARD 1 PEN      NEEDLE DAILY 100 each 3    Cyanocobalamin (VITAMIN B 12 PO) Take by mouth      UNABLE TO FIND Tumeric      Blood Glucose Monitoring Suppl (MM EASY TOUCH GLUCOSE METER) w/Device KIT 1 kit by Does not apply route 2 times daily 1 kit 0    vitamin D (CHOLECALCIFEROL) 1000 UNIT TABS tablet Take 4,000 Units by mouth daily      magnesium 30 MG tablet Take 30 mg by mouth daily      ONE TOUCH ULTRASOFT LANCETS MISC Test blood sugar 2 times a day 200 each 5    aspirin 325 MG tablet Take 1 tablet by mouth daily.  30 tablet 3     Facility-Administered Medications Ordered in Other Visits   Medication Dose Route Frequency Provider Last Rate Last Admin    0.9 % sodium chloride bolus 1,000 mL Intravenous Once Amie Born, APRN - CNP 1,000 mL/hr at 05/25/21 1604 1,000 mL at 05/25/21 1604       Review of Systems   Constitutional: Positive for activity change, diaphoresis and fatigue. Negative for fever. HENT: Negative. Eyes: Positive for visual disturbance ( She has been seen by ophthalmology for this). Respiratory: Positive for shortness of breath. Negative for cough. Cardiovascular: Negative for chest pain and palpitations. Gastrointestinal: Negative for abdominal pain, constipation and diarrhea. Endocrine: Negative for polydipsia, polyphagia and polyuria. Genitourinary: Negative for difficulty urinating. Musculoskeletal: Negative. Skin: Positive for pallor. Negative for color change and rash. Neurological: Negative for syncope, facial asymmetry, speech difficulty, light-headedness and headaches. Hematological: Negative for adenopathy. Does not bruise/bleed easily. Psychiatric/Behavioral: Negative for dysphoric mood. Vitals:    05/25/21 1417 05/25/21 1445   BP: (!) 98/52 102/68   Pulse: 93 101   Temp: 96 °F (35.6 °C)    SpO2: 94%    Weight: 201 lb (91.2 kg)    Height: 5' 6\" (1.676 m)      Physical Exam  Constitutional:       Appearance: Normal appearance. She is ill-appearing and diaphoretic. HENT:      Head: Normocephalic and atraumatic. Cardiovascular:      Rate and Rhythm: Tachycardia present. Pulses: Normal pulses. Heart sounds: Normal heart sounds. No murmur heard. No friction rub. No gallop. Comments: Patient is mildly tachycardic and blood pressure is low comparatively to her previous readings. Pulmonary:      Effort: Pulmonary effort is normal.      Breath sounds: Normal breath sounds. No stridor. No wheezing, rhonchi or rales. Musculoskeletal:         General: Normal range of motion. Cervical back: Normal range of motion. Right lower leg: No edema. Left lower leg: No edema.    Skin:     Capillary Refill: Capillary  OTHER SURGICAL HISTORY  11-4-2011    Posterior repair    POSTERIOR CRUCIATE LIGAMENT REPAIR         Family History   Problem Relation Age of Onset    Diabetes Mother     Stroke Mother     Heart Disease Father     Cancer Father         prostate    Heart Disease Brother         multiple stents    Heart Disease Brother     Heart Disease Sister         carotid artery surgery    Heart Disease Brother     Heart Attack Brother     Heart Disease Brother     Asthma Neg Hx     Emphysema Neg Hx     Heart Failure Neg Hx       Allergies   Allergen Reactions    Codeine     Ultram [Tramadol]      vomiting    Percocet [Oxycodone-Acetaminophen] Nausea And Vomiting       This chart was generated using the [address] [place] [address] dictation system. I created this record but it may contain dictation errors due to the limitation of the software.

## 2021-05-27 NOTE — ED PROVIDER NOTES
[address] [place] [address]  ED  EMERGENCY DEPARTMENT ENCOUNTER      This patient was not seen and evaluated by the attending physician. Pt Name: Taran Lee  MRN: [id number]  [place] 1950  Date of evaluation: 5/25/2021  Provider: SUMA Ochoa  PCP: SUMA Mendoza CNP      History provided by the patient. CHIEFCOMPLAINT:     Chief Complaint   Patient presents with    Hyperglycemia     Was sent by PCP for hyperglycemia and hypotension. Pt states that she has been taking steroids for 1 week for a headache. HISTORY OF PRESENT ILLNESS:      Taran Lee is a 80 y.o. female who presents to [address] [place] [address]  ED with complaints of hyperglycemia. Patient presented to the emergency room today with complaints of elevated glucose, was at her primary care physician's office and was told that her glucose was almost 500. Patient states that she checks her glucose regularly and that they have been elevated for a while, she told me her A1c was over 10 so I do suspect she has very low glucose control on a regular basis she cannot give me numbers as to what her glucose levels have been. She has been on steroids for some retinal issues in her left eye but she states that her glucoses were elevated well before then. She denies any pain, no difficulty breathing or shortness of breath, she does complain of vision changes in her left eye which again are not acute. Patient is seeing an ophthalmologist as well as an optometrist for this problem already but she states that she has not got any better. LOCATION:head  QUALITY:ache  SEVERITY:4  DURATION:several days  MODIFYING FACTORS:none noted    Nursing Notes were reviewed     REVIEW OF SYSTEMS:     Review of Systems  All systems, a total of 10, are reviewed and negative except for those that were just noted in history present illness.         PAST MEDICAL HISTORY:     Past Medical History:   Diagnosis Date    Anemia     Anesthesia     PONV    Arthritis     CAD (coronary artery disease)     CVA (cerebral infarction)     Diabetes mellitus (HCC)     Diastolic heart failure (HCC)     Fibromyalgia     Hyperlipidemia     Hypertension     Hypothyroidism     LFT's abnormal     CHAMBERS (nonalcoholic steatohepatitis)     Nausea & vomiting     Pancreatic cyst     PNA (pneumonia) 2/23/2021    Pneumonia     Rectocele     Sleep apnea     does not use c-pap    Tremor     Vitamin D deficiency          SURGICAL HISTORY:      Past Surgical History:   Procedure Laterality Date    CHOLECYSTECTOMY      COLONOSCOPY  01/10/2018    DIAGNOSTIC CARDIAC CATH LAB PROCEDURE      ECTOPIC PREGNANCY SURGERY  1985    EYE SURGERY Right     cataract    OTHER SURGICAL HISTORY  11-4-2011    Posterior repair    POSTERIOR CRUCIATE LIGAMENT REPAIR           CURRENT MEDICATIONS:       Discharge Medication List as of 5/25/2021  6:53 PM      CONTINUE these medications which have NOT CHANGED    Details   metFORMIN (GLUCOPHAGE-XR) 500 MG extended release tablet TAKE 1 TABLET TWICE A DAY, Disp-180 tablet, R-3Normal      lisinopril (PRINIVIL;ZESTRIL) 5 MG tablet TAKE 1 TABLET DAILY, Disp-90 tablet, R-1Normal      alendronate (FOSAMAX) 70 MG tablet TAKE 1 TABLET EVERY 7 DAYS, Disp-12 tablet, R-0Normal      glimepiride (AMARYL) 4 MG tablet TAKE 1 TABLET TWICE A DAY, Disp-180 tablet, R-0Normal      Insulin Glargine, 1 Unit Dial, (TOUJEO SOLOSTAR) 300 UNIT/ML SOPN INJECT 70 UNITS            SUBCUTANEOUSLY NIGHTLY, Disp-18 mL, R-2Normal      metoprolol tartrate (LOPRESSOR) 25 MG tablet TAKE 1 TABLET TWICE A DAY, Disp-180 tablet, R-1Normal      levothyroxine (SYNTHROID) 75 MCG tablet TAKE 1 TABLET DAILY, Disp-90 tablet, R-0Normal      blood glucose test strips (ONETOUCH ULTRA) strip USE TO TEST BLOOD SUGAR TWOTIMES A DAY, Disp-200 strip, R-2Normal      primidone (MYSOLINE) 50 MG tablet TAKE 3 TABLETS NIGHTLY, Disp-270 tablet, R-1Normal Comment: school   Tobacco Use    Smoking status: Former Smoker     Packs/day: 0.25     Years: 2.00     Pack years: 0.50     Types: Cigarettes     Quit date: 1991     Years since quittin.1    Smokeless tobacco: Never Used   Vaping Use    Vaping Use: Never used   Substance and Sexual Activity    Alcohol use: No     Alcohol/week: 0.0 standard drinks    Drug use: No    Sexual activity: Yes     Partners: Male     Comment:    Other Topics Concern    None   Social History Narrative    None     Social Determinants of Health     Financial Resource Strain:     Difficulty of Paying Living Expenses:    Food Insecurity:     Worried About Running Out of Food in the Last Year:     Ran Out of Food in the Last Year:    Transportation Needs:     Lack of Transportation (Medical):  Lack of Transportation (Non-Medical):    Physical Activity:     Days of Exercise per Week:     Minutes of Exercise per Session:    Stress:     Feeling of Stress :    Social Connections:     Frequency of Communication with Friends and Family:     Frequency of Social Gatherings with Friends and Family:     Attends Confucianist Services:     Active Member of Clubs or Organizations:     Attends Club or Organization Meetings:     Marital Status:    Intimate Partner Violence:     Fear of Current or Ex-Partner:     Emotionally Abused:     Physically Abused:     Sexually Abused:        SCREENINGS:    Jose Coma Scale  Eye Opening: Spontaneous  Best Verbal Response: Oriented  Best Motor Response: Obeys commands  [place] Coma Scale Score: 15        PHYSICAL EXAM:       ED Triage Vitals [21 1543]   BP Temp Temp Source Pulse Resp SpO2 Height Weight   (!) 145/77 97.9 °F (36.6 °C) Oral 95 14 98 % 5' 6\" (1.676 m) 201 lb (91.2 kg)       Physical Exam    CONSTITUTIONAL: Awake and alert. Cooperative. Well-developed. Well-nourished.    Vitals:    21 1744 21 1757 21 1813 21 1915   BP: 122/64  121/61 121/71   Pulse: 89 88 90 92   Resp: 11 10 10 14   Temp:       TempSrc:       SpO2: 100% 97% 98% 97%   Weight:       Height:         HENT: Normocephalic. Atraumatic. External ears normal, without discharge. TMs clear bilaterally. Nonasal discharge. Oropharynx clear, no erythema. Mucous membranes moist.  EYES: Conjunctiva non-injected, nolid abnormalities noted. No scleral icterus. PERRL. EOM's grossly intact. Anterior chambers clear. NECK: Supple. Normal ROM. No meningismus. No thyroid tenderness or swelling noted. CARDIOVASCULAR: RRR. No Murmer. No carotid bruits. PULMONARY/CHEST WALL: Effort normal. No tachypnea. Lungs clear to ausculation. ABDOMEN: Normal BS. Soft. Nondistended. No tenderness to palpation. No guarding. No hernias noted. No splenomegaly. Back: Spine is midline. No ecchymosis. No crepituson palpation. No obvious subluxation of vertebral column. No saddle anesthesia or evidence of cauda equina. /ANORECTAL: Not assessed  MUSKULOSKELETAL: Normal ROM. No acute deformities. No edema. No tenderness to palpate. SKIN: Warm and dry. NEUROLOGICAL:  GCS 15. CN II-XII grossly intact. Strength is 5/5 in all extremities and sensation is intact. PSYCHIATRIC: Normal affect, normal insight and judgement. Alert and oriented x 3.         DIAGNOSTIC RESULTS:     LABS:    Results for orders placed or performed during the hospital encounter of 05/25/21   CBC auto differential   Result Value Ref Range    WBC 9.3 4.0 - 11.0 K/uL    RBC 4.84 4.00 - 5.20 M/uL    Hemoglobin 12.5 12.0 - 16.0 g/dL    Hematocrit 39.0 36.0 - 48.0 %    MCV 80.6 80.0 - 100.0 fL    MCH 25.9 (L) 26.0 - 34.0 pg    MCHC 32.2 31.0 - 36.0 g/dL    RDW 14.6 12.4 - 15.4 %    Platelets 467 781 - 035 K/uL    MPV 9.1 5.0 - 10.5 fL    Neutrophils % 69.9 %    Lymphocytes % 21.5 %    Monocytes % 5.9 %    Eosinophils % 2.0 %    Basophils % 0.7 %    Neutrophils Absolute 6.5 1.7 - 7.7 K/uL    Lymphocytes Absolute 2.0 1.0 - 5.1 K/uL    Monocytes Absolute 0.6 0.0 - 1.3 K/uL    Eosinophils Absolute 0.2 0.0 - 0.6 K/uL    Basophils Absolute 0.1 0.0 - 0.2 K/uL   Comprehensive metabolic panel   Result Value Ref Range    Sodium 129 (L) 136 - 145 mmol/L    Potassium 4.8 3.5 - 5.1 mmol/L    Chloride 91 (L) 99 - 110 mmol/L    CO2 25 21 - 32 mmol/L    Anion Gap 13 3 - 16    Glucose 491 (H) 70 - 99 mg/dL    BUN 23 (H) 7 - 20 mg/dL    CREATININE 1.2 0.6 - 1.2 mg/dL    GFR Non- 44 (A) >60    GFR  54 (A) >60    Calcium 9.1 8.3 - 10.6 mg/dL    Total Protein 7.3 6.4 - 8.2 g/dL    Albumin 4.1 3.4 - 5.0 g/dL    Albumin/Globulin Ratio 1.3 1.1 - 2.2    Total Bilirubin <0.2 0.0 - 1.0 mg/dL    Alkaline Phosphatase 176 (H) 40 - 129 U/L    ALT 34 10 - 40 U/L    AST 27 15 - 37 U/L    Globulin 3.2 g/dL   POCT Glucose   Result Value Ref Range    POC Glucose 481 (H) 70 - 99 mg/dl    Performed on ACCU-CHEK    POCT Glucose   Result Value Ref Range    POC Glucose 365 (H) 70 - 99 mg/dl    Performed on ACCU-CHEK          RADIOLOGY:  All x-ray studies are viewed/reviewed by me. Formal interpretations per the radiologist are as follows: No orders to display           EKG:  See EKG interpretation by an attending physician. PROCEDURES:   N/A    CRITICAL CARE TIME:   N/A    CONSULTS:  None      EMERGENCY DEPARTMENT COURSE andDIFFERENTIAL DIAGNOSIS/MDM:   Vitals:    Vitals:    05/25/21 1744 05/25/21 1757 05/25/21 1813 05/25/21 1915   BP: 122/64  121/61 121/71   Pulse: 89 88 90 92   Resp: 11 10 10 14   Temp:       TempSrc:       SpO2: 100% 97% 98% 97%   Weight:       Height:           Patient wasgiven the following medications:  Medications   0.9 % sodium chloride bolus (0 mLs Intravenous Stopped 5/25/21 1709)   insulin lispro (HUMALOG) injection vial 5 Units (5 Units Subcutaneous Given 5/25/21 1742)         Patient was evaluated independently by myself with the attending physician available for consultation.   Patient presented to the emergency room today Discharge      PATIENT REFERRED TO:  SUMA Delgado CNP  [address] [place]  [phone]    Schedule an appointment as soon as possible for a visit   For follow up      DISCHARGE MEDICATIONS:  Discharge Medication List as of 5/25/2021  6:53 PM                     (Please note thatportions of this note were completed with a voice recognition program.  Efforts were made to edit the dictations, but occasionally words are mis-transcribed.)    SUMA Izquierdo CNP-C (electronicallysigned)        SUMA Izquierdo CNP  05/26/21 7395

## 2021-06-10 NOTE — TELEPHONE ENCOUNTER
Left message for pt to call back with what her blood sugar readings have been.  Or send a [employer] message with the info

## 2021-06-11 NOTE — TELEPHONE ENCOUNTER
Call was transferred from [facility] ([facility] Patient is experiencing elevated sugars. Patient stated that she is taking steroids which is elevating her BS. Patient is inquiring what she can do to lower her sugars. Fasting sugar this morning was 219  Last evening BS was 502, patient did say that she ate a bowl of cereal!    Nurse triage wanted patient to be seen today but patient did not want to come to office. Please advise.

## 2021-06-11 NOTE — TELEPHONE ENCOUNTER
Reason for Disposition   Patient wants to be seen    Answer Assessment - Initial Assessment Questions  1. BLOOD GLUCOSE: \"What is your blood glucose level? \"       Today 219 and fasting       502 last night      2. ONSET: \"When did you check the blood glucose? \"      This morning    3. USUAL RANGE: \"What is your glucose level usually? \" (e.g., usual fasting morning value, usual evening value)  I hadn't been taking blood sugar          4. KETONES: \"Do you check for ketones (urine or blood test strips)? \" If yes, ask: \"What does the test show now? \"       No    5. TYPE 1 or 2:  \"Do you know what type of diabetes you have? \"  (e.g., Type 1, Type 2, Gestational; doesn't know)       Type 2    6. INSULIN: \"Do you take insulin? \" \"What type of insulin(s) do you use? What is the mode of delivery? (syringe, pen; injection or pump)? \"       Yes,  toujeo    7. DIABETES PILLS: \"Do you take any pills for your diabetes? \" If yes, ask: \"Have you missed taking any pills recently? \"      Glyburide, metformin. 8. OTHER SYMPTOMS: \"Do you have any symptoms? \" (e.g., fever, frequent urination, difficulty breathing, dizziness, weakness, vomiting)      Some difficulty breathing, indigestion last night, weakness    9. PREGNANCY: \"Is there any chance you are pregnant? \" \"When was your last menstrual period? \"      No    Protocols used: DIABETES - HIGH BLOOD SUGAR-ADULT-OH  Received call from Rigoberto [place] at [facility] with Red Flag Complaint. Brief description of triage: elevated blood sugar, weakness    Triage indicates for patient to be seen today    Care advice provided, patient verbalizes understanding; denies any other questions or concerns; instructed to call back for any new or worsening symptoms. Writer provided warm transfer to Fabi Mcgill at [facility] for appointment scheduling. Attention Provider: Thank you for allowing me to participate in the care of your patient.   The patient was connected to triage in

## 2021-06-15 NOTE — TELEPHONE ENCOUNTER
[facility] MRI needing lab orders placed- STAT to check kidney function before MRI tomorrow    Please advise [facility] -[phone]

## 2021-06-15 NOTE — TELEPHONE ENCOUNTER
Received call from Dr. Yasmani Garcia from [facility]. 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 Patient has not responded well to treatment and continues to be fatigued despite being on 80 mg of prednisone daily. He is concerned for malignant neoplasm and would like patient to have an MRI of the brain and orbits with contrast as well as a CT of the chest.  Orders placed for these tests stat and patient contacted to assist with scheduling.

## 2021-06-16 NOTE — TELEPHONE ENCOUNTER
FYI- patient has been sent for extensive imaging and we are in contact with her regarding her blood sugar.

## 2021-06-25 NOTE — TELEPHONE ENCOUNTER
Please call Dr. Ildefonso Holm from  to discuss mutual patient. Dr. Aury Marcus is from the ophthalmology dept.  At [facility]

## 2021-06-25 NOTE — TELEPHONE ENCOUNTER
Please contact patient and have her make an appointment with oncology hematology care as soon as possible for follow-up.

## 2021-06-27 PROBLEM — N13.8 OBSTRUCTIVE NEPHROPATHY: Status: ACTIVE | Noted: 2021-01-01

## 2021-06-27 PROBLEM — N17.9 AKI (ACUTE KIDNEY INJURY) (HCC): Status: ACTIVE | Noted: 2021-01-01

## 2021-06-28 PROBLEM — C79.51 BONE METASTASES (HCC): Status: ACTIVE | Noted: 2021-01-01

## 2021-06-28 PROBLEM — E44.0 MODERATE MALNUTRITION (HCC): Status: ACTIVE | Noted: 2021-01-01

## 2021-06-28 PROBLEM — C79.9 METASTATIC MALIGNANT NEOPLASM (HCC): Status: ACTIVE | Noted: 2021-01-01

## 2021-06-28 PROBLEM — R59.0 ABDOMINAL LYMPHADENOPATHY: Status: ACTIVE | Noted: 2021-01-01

## 2021-06-28 PROBLEM — R74.01 TRANSAMINITIS: Status: ACTIVE | Noted: 2021-01-01

## 2021-06-28 NOTE — ED PROVIDER NOTES
[facility] Emergency Department    CHIEF COMPLAINT  Fatigue (Patient arrives via walk in with c/o fatigue and abd pain x1 week. Patient states dx with Eye CA 1 week ago but has been fatigued and with abdominal pain since then. Has not been for a PET scan. )      SHARED SERVICE VISIT  I have seen and evaluated this patient with my supervising physician, Dr. Becky Charles. HISTORY OF PRESENT ILLNESS  Francesca Peters is a 80 y.o. female who presents to the ED complaining of worsening abdominal pain and nausea. Patient accompanied by family who brought her in for evaluation. Reports that she has had some abdominal and back pain that has been ongoing for several weeks now. Recently diagnosed with eye cancer. States that she was also recently diagnosed for pneumonia. Reports that her abdominal pain has not been worked up. Today became nauseous. No vomiting. Was having some constipation which appeared to resolve. No diarrhea. No black or bloody stools. She denies fevers chills. No lightheadedness or dizziness. Denies neck pain. No chest pain shortness of breath. No cough or congestion. No urinary symptoms. No leg pain or swelling. No other complaints, modifying factors or associated symptoms. Nursing notes reviewed.    Past Medical History:   Diagnosis Date    Anemia     Anesthesia     PONV    Arthritis     CAD (coronary artery disease)     CVA (cerebral infarction)     Diabetes mellitus (HCC)     Diastolic heart failure (HCC)     Fibromyalgia     Hyperlipidemia     Hypertension     Hypothyroidism     LFT's abnormal     CHAMBERS (nonalcoholic steatohepatitis)     Nausea & vomiting     Pancreatic cyst     PNA (pneumonia) 2/23/2021    Pneumonia     Rectocele     Sleep apnea     does not use c-pap    Tremor     Vitamin D deficiency      Past Surgical History:   Procedure Laterality Date    CHOLECYSTECTOMY      [facility]  [facility]10/[facility]      ECTOPIC PREGNANCY SURGERY  1985    EYE SURGERY Right     cataract    OTHER SURGICAL HISTORY  2011    Posterior repair    POSTERIOR CRUCIATE LIGAMENT REPAIR       Family History   Problem Relation Age of Onset    Diabetes Mother     Stroke Mother     Heart Disease Father     Cancer Father         prostate    Heart Disease Brother         multiple stents    Heart Disease Brother     Heart Disease Sister         carotid artery surgery    Heart Disease Brother     Heart Attack Brother     Heart Disease Brother     Asthma Neg Hx     Emphysema Neg Hx     Heart Failure Neg Hx      Social History     Socioeconomic History    Marital status:      Spouse name: Whit Scanlon Number of children: 2    Years of education: 15    Highest education level: Not on file   Occupational History    Occupation: [employer]     Comment: school   Tobacco Use    Smoking status: Former Smoker     Packs/day: 0.25     Years: 2.00     Pack years: 0.50     Types: Cigarettes     Quit date: 1991     Years since quittin.2    Smokeless tobacco: Never Used   Vaping Use    Vaping Use: Never used   Substance and Sexual Activity    Alcohol use: No     Alcohol/week: 0.0 standard drinks    Drug use: No    Sexual activity: Yes     Partners: Male     Comment:    Other Topics Concern    Not on file   Social History Narrative    Not on file     Social Determinants of Health     Financial Resource Strain:     Difficulty of Paying Living Expenses:    Food Insecurity:     Worried About Running Out of Food in the Last Year:     Ran Out of Food in the Last Year:    Transportation Needs:     Lack of Transportation (Medical):      Lack of Transportation (Non-Medical):    Physical Activity:     Days of Exercise per Week:     Minutes of Exercise per Session:    Stress:     Feeling of Stress :    Social Connections:     Frequency of Communication with Friends and Family:     Frequency of Social Gatherings with Friends and Family:     Attends Baptism Services:     Active Member of Clubs or Organizations:     Attends Club or Organization Meetings:     Marital Status:    Intimate Partner Violence:     Fear of Current or Ex-Partner:     Emotionally Abused:     Physically Abused:     Sexually Abused:      Current Facility-Administered Medications   Medication Dose Route Frequency Provider Last Rate Last Admin    ondansetron (ZOFRAN) injection 4 mg  4 mg Intravenous Once [place], [place]        acetaminophen (TYLENOL) tablet 650 mg  650 mg Oral Once [place], [place]        0.9 % sodium chloride bolus  1,000 mL Intravenous Once [place], [place]        morphine (PF) injection 4 mg  4 mg Intravenous Once [place], [place]         Current Outpatient Medications   Medication Sig Dispense Refill    buPROPion (WELLBUTRIN XL) 150 MG extended release tablet TAKE 1 TABLET EVERY MORNING 90 tablet 1    atorvastatin (LIPITOR) 40 MG tablet TAKE 1 TABLET NIGHTLY 90 tablet 1    metFORMIN (GLUCOPHAGE-XR) 500 MG extended release tablet TAKE 1 TABLET TWICE A  tablet 3    lisinopril (PRINIVIL;ZESTRIL) 5 MG tablet TAKE 1 TABLET DAILY 90 tablet 1    alendronate (FOSAMAX) 70 MG tablet TAKE 1 TABLET EVERY 7 DAYS 12 tablet 0    glimepiride (AMARYL) 4 MG tablet TAKE 1 TABLET TWICE A  tablet 0    Insulin Glargine, 1 Unit Dial, (TOUJEO SOLOSTAR) 300 UNIT/ML SOPN INJECT 70 UNITS            SUBCUTANEOUSLY NIGHTLY 18 mL 2    metoprolol tartrate (LOPRESSOR) 25 MG tablet TAKE 1 TABLET TWICE A  tablet 1    levothyroxine (SYNTHROID) 75 MCG tablet TAKE 1 TABLET DAILY 90 tablet 0    blood glucose test strips (ONETOUCH ULTRA) strip USE TO TEST BLOOD SUGAR TWOTIMES A  strip 2    primidone (MYSOLINE) 50 MG tablet TAKE 3 TABLETS NIGHTLY 270 tablet 1    DULoxetine (CYMBALTA) 60 MG extended release capsule Take 1 capsule by mouth 2 times daily 180 capsule 1    Insulin Pen Needle (B-D ULTRAFINE III SHORT PEN) 31G X 8 MM MISC USE AND DISCARD 1 PEN      NEEDLE DAILY 100 each 3    Cyanocobalamin (VITAMIN B 12 PO) Take by mouth      Blood Glucose Monitoring Suppl (MM EASY TOUCH GLUCOSE METER) w/Device KIT 1 kit by Does not apply route 2 times daily 1 kit 0    vitamin D (CHOLECALCIFEROL) 1000 UNIT TABS tablet Take 4,000 Units by mouth daily      magnesium 30 MG tablet Take 30 mg by mouth daily      aspirin 325 MG tablet Take 1 tablet by mouth daily. 30 tablet 3    UNABLE TO FIND Tumeric      ONE TOUCH ULTRASOFT LANCETS MISC Test blood sugar 2 times a day 200 each 5     Allergies   Allergen Reactions    Codeine     Ultram [Tramadol]      vomiting    Percocet [Oxycodone-Acetaminophen] Nausea And Vomiting       REVIEW OF SYSTEMS  10 systems reviewed, pertinent positives per HPI otherwise noted to be negative    PHYSICAL EXAM  BP (!) 151/88   Pulse 85   Temp 97.8 °F (36.6 °C) (Tympanic)   Resp 18   LMP  (LMP Unknown)   SpO2 96%   GENERAL APPEARANCE: Awake and alert. Cooperative. No acute distress. HEAD: Normocephalic. Atraumatic. EYES: PERRL. EOM's grossly intact. ENT: Mucous membranes are moist.   NECK: Supple. HEART: RRR. No murmurs. LUNGS: Respirations unlabored. CTAB. Good air exchange. Speaking comfortably in full sentences. ABDOMEN: Soft. Non-distended. Generalized nonfocal abdominal tenderness with mild guarding and rebound. No rigidity. Negative Rodriguez's, McBurney's and Rovsing's. No fluid waves or ascites. No hernias or masses. Bowel sounds normal in all quadrants. No CVA tenderness. EXTREMITIES: No peripheral edema. Moves all extremities equally. All extremities neurovascularly intact. SKIN: Warm and dry. No acute rashes. NEUROLOGICAL: Alert and oriented. CN's 2-12 intact. No gross facial drooping. Strength 5/5, sensation intact. PSYCHIATRIC: Normal mood and affect.     RADIOLOGY  CT CHEST WO CONTRAST    Result Date: 6/16/2021  EXAMINATION: CT OF THE CHEST WITHOUT CONTRAST 6/16/2021 7:01 am TECHNIQUE: CT of the chest was performed without the administration of intravenous contrast. Multiplanar reformatted images are provided for review. Dose modulation, iterative reconstruction, and/or weight based adjustment of the mA/kV was utilized to reduce the radiation dose to as low as reasonably achievable. COMPARISON: 02/21/2007 CT HISTORY: ORDERING SYSTEM PROVIDED HISTORY: Fatigue, unspecified type TECHNOLOGIST PROVIDED HISTORY: Reason for Exam: pnuemonia in Feb, fatigue and sob FINDINGS: Mediastinum: Heart size normal.  Mild atherosclerotic calcification of the coronary arteries. The aorta and pulmonary arteries are normal.  Thyroid and esophagus unremarkable. Interval development of mild mediastinal lymph node enlargement. A new subcarinal lymph node measures 15 mm. Lungs/pleura: The airways are patent. There is a small left pleural effusion. No discrete pulmonary mass or nodule. However, there is bandlike opacification in the medial segment of the right middle lobe and in the superior segments of both lower lobes. Similar pattern in the inferior lingula. Scattered mild ground-glass opacities in the periphery of the left lower lobe near the pleural fluid. Upper Abdomen: Adrenal glands are normal.  No significant findings in the visualized upper abdomen. Soft Tissues/Bones: No skeletal abnormalities are appreciated within the chest.     1. Scattered bandlike opacities in the mid and lower lung zones bilaterally. In addition, there is a small left pleural effusion as well as scattered ground-glass opacities in the left lower lobe. Pattern is nonspecific and may represent a viral pattern of developing pneumonia. Correlate with any acute infectious or inflammatory process. 2. Mild mediastinal lymphadenopathy. XR CHEST PORTABLE    Result Date: 6/27/2021  EXAMINATION: ONE XRAY VIEW OF THE CHEST 6/27/2021 8:21 pm COMPARISON: None.  HISTORY: ORDERING SYSTEM PROVIDED HISTORY: fatigue TECHNOLOGIST PROVIDED HISTORY: Reason for exam:->fatigue Reason for Exam: fatigue, pt states \"pain and itching all over\" recent dx of eye cancer Acuity: Acute Type of Exam: Initial FINDINGS: The heart is normal.  The pulmonary vessels are normal.  The lungs are mildly hyperinflated and emphysematous. No consolidation or effusion is seen. The bones are intact. Stable chronic changes with no acute abnormality seen. MRI ORBITS FACE NECK W WO CONTRAST    Result Date: 6/16/2021  EXAMINATION: MRI OF THE BRAIN WITHOUT AND WITH CONTRAST; MRI OF THE BRAIN AND MRI OF THE ORBITS WITH AND WITHOUT CONTRAST 6/16/2021 7:35 am TECHNIQUE: Multiplanar multisequence MRI of the head/brain was performed without and with the administration of intravenous contrast.; Multiplanar multisequence MRI of the brain and MRI of the orbits was performed with and without intravenous contrast. COMPARISON: None. HISTORY: ORDERING SYSTEM PROVIDED HISTORY: Posterior uveitis, unspecified laterality TECHNOLOGIST PROVIDED HISTORY: Reason for Exam: Vision loss left eye for 4 weeks. ? basal cell CA. Headaches across frontal head. Dizziness. Acuity: Acute Type of Exam: Initial FINDINGS: MRI BRAIN: INTRACRANIAL STRUCTURES/VENTRICLES: There is no evidence of an acute infarct. A small likely subacute infarct is seen involving the left precentral gyrus (series 4, image 24). A small chronic infarct is seen involving the posterior right frontal lobe. There is a nonspecific 5 mm focus of enhancement along the left parietal/occipital lobe (series 16, image 17; series 17, image 27). No mass effect or midline shift. Otherwise, no abnormal enhancement seen in the brain. No evidence of an acute intracranial hemorrhage. Areas of T2 FLAIR hyperintensity are seen in the periventricular and subcortical white matter, which are nonspecific, but may represent chronic microvascular ischemic change.   There is prominence of the ventricles and images are provided for review. Dose modulation, iterative reconstruction, and/or weight based adjustment of the mA/kV was utilized to reduce the radiation dose to as low as reasonably achievable. COMPARISON: Abdominal CT study from September 15, 2015. Lumbar spine CT study from March 21, 2021. HISTORY: ORDERING SYSTEM PROVIDED HISTORY: back pain/sob TECHNOLOGIST PROVIDED HISTORY: Reason for exam:->back pain/sob Additional Contrast?->None Decision Support Exception - unselect if not a suspected or confirmed emergency medical condition->Emergency Medical Condition (MA) Reason for Exam: SOB, back pain (since Feb) and extremely itchy (since she stopped used cortisone) Acuity: Acute Type of Exam: Initial Relevant Medical/Surgical History: hx of gallbladder FINDINGS: Chest: Mediastinum: The heart size is normal.  No pericardial effusion. The thoracic aorta is normal in caliber. Numerous mildly enlarged lymph nodes are present throughout the mediastinum. No enlarged or suspicious axillary, hilar, or right supraclavicular lymphadenopathy. There is a small node within the left supraclavicular region. Lungs/pleura: The trachea and mainstem bronchi are widely patent. , The left supraclavicular region, the mesenteric, and the retroperitoneum the lungs are otherwise clear. No discrete pulmonary nodule or mass. No pleural effusion. Soft Tissues/Bones: Degenerative changes are present throughout the thoracic spine. There are numerous small sclerotic lesions throughout the thoracic spine, the left clavicle, and both scapulae as well as a lytic lesion within the upper aspect of the scapula on the right. No acute or healing rib fractures. .  A 1.3 cm soft tissue nodule within the subcutaneous aspect of the anterior left chest is noted which is of uncertain significance. Abdomen/Pelvis: Organs: The liver, spleen, and adrenal glands have an unremarkable unenhanced CT appearance. The gallbladder is surgically absent.   Cystic lesions throughout the proximal pancreas are unchanged. There is new moderate to severe bilateral hydronephrosis and hydroureter without renal, ureteral, or bladder calculus and without apparent obstructing mass or lymph node. [facility] GI/Bowel: The stomach and the small and large bowel loops are normal in caliber, contour, and morphology, without acute or significant abnormality. No dilated loops or areas of bowel wall thickening. No appreciable colonic or rectal mass. Peritoneum/Retroperitoneum: There is no free fluid, free air, or abnormal fluid collection. Mild diffuse mesenteric edema is present and there are several small lymph nodes present throughout the mesentery as well as throughout the retroperitoneum which are new from prior. The abdominal aorta and iliac arteries are patent and of normal caliber. Pelvis: No pelvic free fluid or enlarged or suspicious pelvic or inguinal lymphadenopathy. No appreciable uterine or adnexal abnormality. The urinary bladder and the pelvic bowel loops are unremarkable. Bones/Soft Tissues: Degenerative changes are present throughout the lumbar spine and hips. There are numerous small sclerotic lesions throughout the spine, pelvic bones, and sacrum including a healing nondisplaced fracture of the left L2 transverse process which is new from prior and may be pathologic. [facility] Somewhat limited study in the absence of contrast material. Numerous sclerotic metastases throughout all bones of the visualized axial and appendicular skeleton with a possible lytic lesion within the upper aspect of the right scapula and a small soft tissue nodule within the anterior left chest, possibly metastatic. These findings are new from prior. A healing nondisplaced subacute fracture of the left L2 transverse process is also new, possibly pathologic. There are numerous mildly enlarged lymph nodes throughout the mediastinum as well as the retroperitoneum in the abdominal mesentery.  Moderate to severe bilateral hydronephrosis and hydroureter, also new from prior, without appreciable obstructing stone. It is unclear whether this is related to metastatic cervical cancer or an underlying lymphoproliferative disorder such as lymphoma. Lymph node sampling is recommended. Minimal bibasilar atelectasis. Otherwise, clear lungs. MRI BRAIN W WO CONTRAST    Result Date: 6/16/2021  EXAMINATION: MRI OF THE BRAIN WITHOUT AND WITH CONTRAST; MRI OF THE BRAIN AND MRI OF THE ORBITS WITH AND WITHOUT CONTRAST 6/16/2021 7:35 am TECHNIQUE: Multiplanar multisequence MRI of the head/brain was performed without and with the administration of intravenous contrast.; Multiplanar multisequence MRI of the brain and MRI of the orbits was performed with and without intravenous contrast. COMPARISON: None. HISTORY: ORDERING SYSTEM PROVIDED HISTORY: Posterior uveitis, unspecified laterality TECHNOLOGIST PROVIDED HISTORY: Reason for Exam: Vision loss left eye for 4 weeks. ? basal cell CA. Headaches across frontal head. Dizziness. Acuity: Acute Type of Exam: Initial FINDINGS: MRI BRAIN: INTRACRANIAL STRUCTURES/VENTRICLES: There is no evidence of an acute infarct. A small likely subacute infarct is seen involving the left precentral gyrus (series 4, image 24). A small chronic infarct is seen involving the posterior right frontal lobe. There is a nonspecific 5 mm focus of enhancement along the left parietal/occipital lobe (series 16, image 17; series 17, image 27). No mass effect or midline shift. Otherwise, no abnormal enhancement seen in the brain. No evidence of an acute intracranial hemorrhage. Areas of T2 FLAIR hyperintensity are seen in the periventricular and subcortical white matter, which are nonspecific, but may represent chronic microvascular ischemic change. There is prominence of the ventricles and sulci due to global parenchymal volume loss. The sellar/suprasellar regions appear unremarkable.   The normal signal voids also with persistent transaminitis which appears fairly  unchanged although now patient does have an elevated bilirubin at 2.6. Lipase remains normal. INR within normal limits. Urinalysis showing 10-20 white blood cells with rare bacteria. Cultures pending. Troponin less than 0.01. Stable portable chest x-ray. EKG was a normal sinus rhythm without evidence for acute findings. CT chest, abdomen and pelvis showing metastatic disease along with severe hydronephrosis hydroureter without obvious etiology with suspicion for cervical mass. Right upper quadrant ultrasound pending. Will admit for LANA and transaminitis. A discussion was had with Ms. Rylee Mccurdy regarding ongoing abdominal pain, nausea and vomiting, ED findings and recommendations for admission. Risk management discussed and shared decision making had with patient and/or surrogate. All questions were answered. Patient in agreement. CRITICAL CARE TIME  30 Minutes of critical care time spent not including separately billable procedures.     MDM  Results for orders placed or performed during the hospital encounter of 06/27/21   CBC auto differential   Result Value Ref Range    WBC 8.8 4.0 - 11.0 K/uL    RBC 4.92 4.00 - 5.20 M/uL    Hemoglobin 12.9 12.0 - 16.0 g/dL    Hematocrit 38.9 36.0 - 48.0 %    MCV 79.2 (L) 80.0 - 100.0 fL    MCH 26.2 26.0 - 34.0 pg    MCHC 33.1 31.0 - 36.0 g/dL    RDW 16.6 (H) 12.4 - 15.4 %    Platelets 258 508 - 323 K/uL    MPV 8.6 5.0 - 10.5 fL    Neutrophils % 79.0 %    Lymphocytes % 11.5 %    Monocytes % 7.7 %    Eosinophils % 1.2 %    Basophils % 0.6 %    Neutrophils Absolute 7.0 1.7 - 7.7 K/uL    Lymphocytes Absolute 1.0 1.0 - 5.1 K/uL    Monocytes Absolute 0.7 0.0 - 1.3 K/uL    Eosinophils Absolute 0.1 0.0 - 0.6 K/uL    Basophils Absolute 0.0 0.0 - 0.2 K/uL   Comprehensive metabolic panel   Result Value Ref Range    Sodium 133 (L) 136 - 145 mmol/L    Potassium 5.1 3.5 - 5.1 mmol/L    Chloride 99 99 - 110 mmol/L    CO2 19 (L) 21 - 32 mmol/L    Anion Gap 15 3 - 16    Glucose 310 (H) 70 - 99 mg/dL    BUN 29 (H) 7 - 20 mg/dL    CREATININE 3.0 (H) 0.6 - 1.2 mg/dL    GFR Non-African American 15 (A) >60    GFR  19 (A) >60    Calcium 9.1 8.3 - 10.6 mg/dL    Total Protein 7.6 6.4 - 8.2 g/dL    Albumin 3.6 3.4 - 5.0 g/dL    Albumin/Globulin Ratio 0.9 (L) 1.1 - 2.2    Total Bilirubin 2.6 (H) 0.0 - 1.0 mg/dL    Alkaline Phosphatase 672 (H) 40 - 129 U/L     (H) 10 - 40 U/L     (H) 15 - 37 U/L    Globulin 4.0 g/dL   Troponin   Result Value Ref Range    Troponin <0.01 <0.01 ng/mL   Lipase   Result Value Ref Range    Lipase 42.0 13.0 - 60.0 U/L   Urinalysis   Result Value Ref Range    Color, UA Yellow Straw/Yellow    Clarity, UA Clear Clear    Glucose, Ur 500 (A) Negative mg/dL    Bilirubin Urine SMALL (A) Negative    Ketones, Urine Negative Negative mg/dL    Specific Gravity, UA 1.020 1.005 - 1.030    Blood, Urine Negative Negative    pH, UA 6.0 5.0 - 8.0    Protein, UA TRACE (A) Negative mg/dL    Urobilinogen, Urine 1.0 <2.0 E.U./dL    Nitrite, Urine Negative Negative    Leukocyte Esterase, Urine Negative Negative    Microscopic Examination YES     Urine Type NotGiven    Protime-INR   Result Value Ref Range    Protime 11.9 10.0 - 13.2 sec    INR 1.03 0.86 - 1.14   Microscopic Urinalysis   Result Value Ref Range    Hyaline Casts, UA 0-2 0 - 2 /LPF    WBC, UA 10-20 (A) 0 - 5 /HPF    RBC, UA None seen 0 - 4 /HPF    Epithelial Cells, UA 2-5 0 - 5 /HPF    Bacteria, UA Rare (A) None Seen /HPF   POCT Glucose   Result Value Ref Range    POC Glucose 133 (H) 70 - 99 mg/dl    Performed on ACCU-Cater to uK    EKG 12 Lead   Result Value Ref Range    Ventricular Rate 78 BPM    Atrial Rate 78 BPM    P-R Interval 184 ms    QRS Duration 78 ms    Q-T Interval 382 ms    QTc Calculation (Bazett) 435 ms    P Axis 32 degrees    R Axis -20 degrees    T Axis 48 degrees    Diagnosis       Normal sinus rhythmNormal ECGWhen compared with ECG of 23-FEB-2021 14: 43,No significant change was found     I spoke with Gonzalez Charles and Raquel. We thoroughly discussed the history, physical exam, laboratory and imaging studies, as well as, emergency department course. Based upon that discussion, we've decided to admit Francesca Peters to the hospital for further observation, evaluation, and treatment. Final Impression  1. Generalized weakness    2. Nausea and vomiting, intractability of vomiting not specified, unspecified vomiting type    3. Transaminitis    4. LANA (acute kidney injury) ([facility] [address]    5. Metastatic malignant neoplasm, unspecified site ([facility] [address]    6. Elevated bilirubin      Blood pressure (!) 146/72, pulse 80, temperature 98.2 °F (36.8 °C), temperature source Oral, resp. rate 16, height 5' 6.5\" (1.689 m), weight 200 lb 6.4 oz (90.9 kg), SpO2 98 %, not currently breastfeeding. DISPOSITION  Patient was admitted to the hospital in stable good condition.           Vonzell Holstein, [place]  06/28/21 0131

## 2021-06-28 NOTE — CONSULTS
Urology Attending Consult Note      Reason for Consultation: LANA, bilateral hydronephrosis    History: 81 y/o female is admitted with fatigue and abdominal pain. CT shows metastatic disease of unknown primary. She has had difficulty with urination in the past and had a chandra placed. CT shows bilateral hydro and she has renal failure. Family History, Social History, Review of Systems:  Reviewed and agreed to as per chart    Vitals:  BP (!) 155/72   Pulse 94   Temp 98 °F (36.7 °C) (Oral)   Resp 16   Ht 5' 6.5\" (1.689 m)   Wt 200 lb 6.4 oz (90.9 kg)   LMP  (LMP Unknown)   SpO2 93%   BMI 31.86 kg/m²   Temp  Av °F (36.7 °C)  Min: 97.7 °F (36.5 °C)  Max: 98.5 °F (36.9 °C)    Intake/Output Summary (Last 24 hours) at 2021 0810  Last data filed at 2021 1476  Gross per 24 hour   Intake --   Output 275 ml   Net -275 ml         Physical:   Well developed, well nourished in no acute distress   Mood indicates no abnormalities. Pt doesnt appear depressed   Orientated to time and place   Neck is supple, trachea is midline   Respiratory effort is normal   Cardiovascular show no extremity swelling   Abdomen is tender, no masses or hernias are palpated, there is no tenderness. Liver and Spleen appear normal.   Skin show no abnormal lesions   Lymph nodes are not palpated in the inguinal, neck, or axillary area.     Labs:  WBC:    Lab Results   Component Value Date    WBC 9.6 2021     Hemoglobin/Hematocrit:    Lab Results   Component Value Date    HGB 11.9 2021    HCT 36.8 2021     BMP:    Lab Results   Component Value Date     2021    K 5.6 2021    K 3.6 2021     2021    CO2 17 2021    BUN 25 2021    LABALBU 2.9 2021    CREATININE 2.9 2021    CALCIUM 8.7 2021    GFRAA 19 2021    GFRAA >60 2011    LABGLOM 16 2021     PT/INR:    Lab Results   Component Value Date    PROTIME 11.9 2021 INR 1.03 06/28/2021     PTT:    Lab Results   Component Value Date    APTT 27.8 06/03/2011   [APTT  Imaging: CT-   Numerous sclerotic metastases throughout all bones of the visualized axial   and appendicular skeleton with a possible lytic lesion within the upper   aspect of the right scapula and a small soft tissue nodule within the   anterior left chest, possibly metastatic.  These findings are new from prior. A healing nondisplaced subacute fracture of the left L2 transverse process is   also new, possibly pathologic.       There are numerous mildly enlarged lymph nodes throughout the mediastinum as   well as the retroperitoneum in the abdominal mesentery.       Moderate to severe bilateral hydronephrosis and hydroureter, also new from   prior, without appreciable obstructing stone.  It is unclear whether this is   related to metastatic cervical cancer or an underlying lymphoproliferative   disorder such as lymphoma.  Lymph node sampling is recommended. Impression/Plan: bilateral hydronephrosis, LANA  1. Labs not much improved with chandra, will add pt on for cysto bilateral stent placement  2.  Riaz Cain MD

## 2021-06-28 NOTE — ED NOTES
Bedside report given to Tania Quintero RN. Inserted 16F chandra cath, packed up personal belongings, went to floor with telemetry box 105.        Brian Medina RN  06/28/21 7343

## 2021-06-28 NOTE — ED NOTES
Patient identified as a positive fall risk on the ED triage fall screening. Patient placed in fall precautions which includes:  yellow fall risk bracelet on wrist,patient wearing shoes, \"Be Safe\" sign placed on patient's door, and bed alarm placed under patient/alarm turned on. Patient instructed on importance of not getting out of bed or ambulating without assistance for safety.              Negro Cedeno RN  06/27/21 3350

## 2021-06-28 NOTE — PROGRESS NOTES
Hospitalist Progress Note      PCP: SUMA Miller - CNP    Date of Admission: 6/27/2021    Chief Complaint: Fatigue    Hospital Course: Oscar Joshua is a 80 y.o. female.     She developed left eye soreness and headaches in May. She saw an ophthalmologist, Dr. Hanh Marcus at [facility], on 5/19/21. 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 She has been on a tapering regimen since then. She has had problems with glycemic control. She has also not responded as expected to treatment. Her ophthalmologist became concerned about an intracranial neoplasm. It was recommended that patient undergo additional imaging including MRI brain and maxillofacial/sinus w and w/o contrast.  She was also recommended to undergo CT chest w/o contrast.  The MRI did not reveal a tumor but was notable for lobular left retinal thickening and appparent left optic nerve compression. The CT chest was notable for mild mediastinal lymphadenopathy. She was referred to oncology but has not had an office visit yet. She abruptly stopped the prednisone several days ago. She had tapered by that time to 60mg daily.     She has been having abdominal pain for roughly a month. She though this was from taking prednisone so tried to ignore it or treat it with OTC remedies. She presents tonight for progressive pain and persistent nausea. She cannot say for how long though. She does relate she is having bowel movements and passing flatus although she may go 3 or 4 days between bowel movements. She denies any difficulty urinating, but relates that when she wakes up every morning she is leaking urine and leaks urine all the way to the restroom.   She recalls being hospitalized for pneumonia in February and having urinary retention at the time which was treated with a short term chandra catheter    Subjective:   Feels tired, no nausea vomiting or diarrhea  Has generalized pruritus  She has Chandra  Has chronic tremors      Medications:  Reviewed    Infusion Medications    dextrose      sodium chloride      lactated ringers 100 mL/hr at 06/28/21 0140     Scheduled Medications    atorvastatin  40 mg Oral Nightly    buPROPion  150 mg Oral Daily    DULoxetine  60 mg Oral Daily    levothyroxine  75 mcg Oral Daily    metoprolol tartrate  25 mg Oral BID    Vitamin D  4,000 Units Oral Daily    insulin glargine  55 Units Subcutaneous Nightly    insulin lispro  15 Units Subcutaneous TID WC    insulin lispro  0-18 Units Subcutaneous TID WC    insulin lispro  0-9 Units Subcutaneous Nightly     PRN Meds: glucose, dextrose, glucagon (rDNA), dextrose, sodium chloride flush, sodium chloride, acetaminophen **OR** acetaminophen, melatonin, HYDROmorphone, diphenhydrAMINE, ondansetron, ondansetron, promethazine, promethazine      Intake/Output Summary (Last 24 hours) at 6/28/2021 0848  Last data filed at 6/28/2021 0333  Gross per 24 hour   Intake --   Output 275 ml   Net -275 ml       Physical Exam Performed:    BP (!) 155/72   Pulse 94   Temp 98 °F (36.7 °C) (Oral)   Resp 16   Ht 5' 6.5\" (1.689 m)   Wt 200 lb 6.4 oz (90.9 kg)   LMP  (LMP Unknown)   SpO2 93%   BMI 31.86 kg/m²     General appearance: No apparent distress, appears stated age and cooperative. HEENT: Pupils equal, round, and reactive to light. Conjunctivae/corneas -mildly jaundiced. Neck: Supple, with full range of motion. No jugular venous distention. Trachea midline. Respiratory:  Normal respiratory effort. Clear to auscultation, bilaterally without Rales/Wheezes/Rhonchi. Cardiovascular: Regular rate and rhythm with normal S1/S2 without murmurs, rubs or gallops. Abdomen: Soft, non-tender, non-distended with normal bowel sounds. Obese  Musculoskeletal: No clubbing, cyanosis or edema bilaterally. Full range of motion without deformity.   Skin: Skin color, texture, turgor normal.  Left groin lymphadenopathy, subcutaneous lump chest and upper back  Neurologic:  Neurovascularly intact without any focal sensory/motor deficits. Cranial nerves: II-XII intact, grossly non-focal.  His upper extremity tremor  Psychiatric: Alert and oriented, thought content appropriate, normal insight  Capillary Refill: Brisk,3 seconds, normal   Peripheral Pulses: +2 palpable, equal bilaterally       Labs:   Recent Labs     06/27/21 2012 06/28/21  0654   WBC 8.8 9.6   HGB 12.9 11.9*   HCT 38.9 36.8    266     Recent Labs     06/27/21 2012 06/28/21  0654   * 138   K 5.1 5.6*   CL 99 110   CO2 19* 17*   BUN 29* 25*   CREATININE 3.0* 2.9*   CALCIUM 9.1 8.7     Recent Labs     06/27/21 2012 06/28/21  0654   * 342*   * 483*   BILITOT 2.6* 3.0*   ALKPHOS 672* 652*     Recent Labs     06/28/21  0000   INR 1.03     Recent Labs     06/27/21 2012   Dorlene Manual <0.01       Urinalysis:      Lab Results   Component Value Date    NITRU Negative 06/27/2021    WBCUA 10-20 06/27/2021    BACTERIA Rare 06/27/2021    RBCUA None seen 06/27/2021    BLOODU Negative 06/27/2021    SPECGRAV 1.020 06/27/2021    GLUCOSEU 500 06/27/2021       Radiology:  US GALLBLADDER RUQ   Final Result   1. Right-sided hydroureteronephrosis. 2. Common duct dilation status post cholecystectomy. CT CHEST ABDOMEN PELVIS WO CONTRAST   Final Result   Somewhat limited study in the absence of contrast material.      Numerous sclerotic metastases throughout all bones of the visualized axial   and appendicular skeleton with a possible lytic lesion within the upper   aspect of the right scapula and a small soft tissue nodule within the   anterior left chest, possibly metastatic. These findings are new from prior. A healing nondisplaced subacute fracture of the left L2 transverse process is   also new, possibly pathologic. There are numerous mildly enlarged lymph nodes throughout the mediastinum as   well as the retroperitoneum in the abdominal mesentery.       Moderate to severe per report. RUQ U/S performed. Shows dilated common bile duct at 2.2 cm. CT images were reviewed again. There is abrupt tapering of the CBD within the pancreatic head. No radio-opaque calculus is visible. -  Choledocholithiasis, CBD stricture/tumor, ampullary dysfunction/tumor. Will request help from GI.     DM2,  Possible iatrogenic adrenal insufficiency  -  Hold all oral antidiabetic agents. Start s.c. Insulin regimen based on home regimen. Adjust as needed. -  With patient now off steroids, insulin requirements are likely to drop precipitously. Vigilance for hypoglycemia and appropriate insulin adjustments are warranted. -  That patient took high dose steroids for roughly 1.5 months. Vigilance for signs of adrenal insufficiency is also warranted. Blood sugar has been running low, DC high sliding scale insulin coverage start low sliding scale coverage  -Cortisol level pending    Mild hyperkalemia-repeat stat and Kayexalate, DC Ringer lactate,  start normal saline     Chronic Conditions  HLD -hold statin. as of transaminitis  CAD, HTN - continue metoprolol. Hold ASA for procedures. Depression - Continue bupropion and duloxetine. Hypothyroid - Continue synthroid.     DVT Prophylaxis: Heparin subcu  Diet: Diet NPO Exceptions are: Ice Chips, Sips of Water with Meds, Sips of Clear Liquids  Code Status: Full Code    PT/OT Eval Status:     Dispo -pending improvement need 2 to 3 days    Chris Sanchez MD

## 2021-06-28 NOTE — CARE COORDINATION
CASE MANAGEMENT INITIAL ASSESSMENT      Reviewed chart and completed assessment with pt   Explained Case Management role/services. Health Care Decision Maker :   Primary Decision Maker: Stephenie Cleaning Child - [phone]        Admit date/status: inpt 6/27/21  Diagnosis: LANA  Is this a Readmission?:  No      Insurance: [employer] Medicare   Precert required for SNF: Yes       3 night stay required: No    Living arrangements, Adls, care needs, prior to admission: Pt lives at home with her grandson. She notes that her  has been in [facility] for almost 2 years. Transportation: Pt to arrange     Durable Medical Equipment at home:  Walker_x_Cane_x_RTS__ BSC__Shower Chair_x_  02__ HHN__ CPAP__  BiPap__  Hospital Bed__ W/C_x__ Other__________    Services in the home and/or outpatient, prior to admission: N/A    PT/OT recs: Sw will follow     Hospital Exemption Notification (HEN):N/A    Barriers to discharge:N/A    Plan/comments: Sw met with pt on this day. She is unclear on what her needs with be at discharge. SW will follow and assist her as able.       ECOC on chart for MD signature

## 2021-06-28 NOTE — PROGRESS NOTES
Comprehensive Nutrition Assessment    Type and Reason for Visit:  Initial, Positive Nutrition Screen    Nutrition Recommendations/Plan:   1. Continue carb control when appropriate  2. Encourage nutrition - pt declined ONS  3. Monitor po intakes, nutrition adequacy, weights, pertinent labs, BMs    Nutrition Assessment:  Positive nutrition screen for weight loss and decreased appetite. Pt admitted with nausea and vomiting. Currently on a carb control diet. Pt reports that PTA her appetite was not great and endorses weight loss of 20-25 lb over the past few months. No weight loss noted in EMR (based on stated weights). Pt denied having N/V at this time. Pt declined ONS, encouraged po intakes. Will monitor. Malnutrition Assessment:  Malnutrition Status: Moderate malnutrition    Context:  Acute Illness     Findings of the 6 clinical characteristics of malnutrition:  Energy Intake:  1 - 75% or less of estimated energy requirements for 7 or more days  Weight Loss:  Unable to assess     Body Fat Loss:  1 - Mild body fat loss Orbital   Muscle Mass Loss:  1 - Mild muscle mass loss Calf (gastrocnemius), Clavicles (pectoralis & deltoids)  Fluid Accumulation:  No significant fluid accumulation     Strength:  Not Performed    Estimated Daily Nutrient Needs:  Energy (kcal):  4542-0765; Weight Used for Energy Requirements:  Ideal     Protein (g):  60-73; Weight Used for Protein Requirements:  Ideal        Fluid (ml/day): 1 ml/kcal      Nutrition Related Findings: Tolerating diet currently      Wounds:  None       Current Nutrition Therapies:    Diet NPO Exceptions are: Ice Chips, Sips of Water with Meds    Anthropometric Measures:  · Height: 5' 6.5\" (168.9 cm)  · Current Body Weight: 200 lb 6.4 oz (90.9 kg)   · Ideal Body Weight: 133 lbs  · BMI: 31.9  · BMI Categories: Obese Class 1 (BMI 30.0-34. 9)       Nutrition Diagnosis:   · Inadequate oral intake related to inadequate protein-energy intake as evidenced by weight loss, poor intake prior to admission    Nutrition Interventions:   Food and/or Nutrient Delivery:  Continue Current Diet  Nutrition Education/Counseling:  No recommendation at this time   Coordination of Nutrition Care:  Continue to monitor while inpatient    Goals: Tolerate diet and have po intakes 50% or greater this admission       Nutrition Monitoring and Evaluation:   Behavioral-Environmental Outcomes:  None Identified   Food/Nutrient Intake Outcomes:  Food and Nutrient Intake, Diet Advancement/Tolerance  Physical Signs/Symptoms Outcomes:  Weight, Nausea or Vomiting     Discharge Planning:     Too soon to determine     Electronically signed by Nicolas Cartwright RD, LD on 6/28/21 at 3:02 PM EDT    Contact: [phone]

## 2021-06-28 NOTE — PLAN OF CARE
Problem: Falls - Risk of:  Goal: Will remain free from falls  Description: Will remain free from falls  6/28/2021 0926 by Jayro Srinivasan RN  Outcome: Ongoing  6/28/2021 0625 by Murphy Merchant RN  Note: Bed in lowest, locked position, side rails up X2, bed check in place, call light within reach. Instructed pt to call for assistance before getting out of bed, pt verbalized understanding. Will continue to monitor. Goal: Absence of physical injury  Description: Absence of physical injury  Outcome: Ongoing     Problem: Pain:  Goal: Pain level will decrease  Description: Pain level will decrease  Outcome: Ongoing  Goal: Control of acute pain  Description: Control of acute pain  Outcome: Ongoing  Goal: Control of chronic pain  Description: Control of chronic pain  Outcome: Ongoing     Problem: Activity:  Goal: Risk for activity intolerance will decrease  Description: Risk for activity intolerance will decrease  Outcome: Ongoing     Problem:  Bowel/Gastric:  Goal: Bowel function will improve  Description: Bowel function will improve  Outcome: Ongoing  Goal: Diagnostic test results will improve  Description: Diagnostic test results will improve  Outcome: Ongoing  Goal: Occurrences of nausea will decrease  Description: Occurrences of nausea will decrease  Outcome: Ongoing  Goal: Occurrences of vomiting will decrease  Description: Occurrences of vomiting will decrease  Outcome: Ongoing     Problem: Fluid Volume:  Goal: Maintenance of adequate hydration will improve  Description: Maintenance of adequate hydration will improve  Outcome: Ongoing     Problem: Health Behavior:  Goal: Ability to state signs and symptoms to report to health care provider will improve  Description: Ability to state signs and symptoms to report to health care provider will improve  Outcome: Ongoing     Problem: Physical Regulation:  Goal: Complications related to the disease process, condition or treatment will be avoided or minimized  Description: Complications related to the disease process, condition or treatment will be avoided or minimized  Outcome: Ongoing  Goal: Ability to maintain clinical measurements within normal limits will improve  Description: Ability to maintain clinical measurements within normal limits will improve  Outcome: Ongoing     Problem: Sensory:  Goal: Pain level will decrease  Description: Pain level will decrease  Outcome: Ongoing  Goal: Ability to identify factors that increase the pain will improve  Description: Ability to identify factors that increase the pain will improve  Outcome: Ongoing  Goal: Ability to notify healthcare provider of pain before it becomes unmanageable or unbearable will improve  Description: Ability to notify healthcare provider of pain before it becomes unmanageable or unbearable will improve  Outcome: Ongoing

## 2021-06-28 NOTE — PROGRESS NOTES
Speech Language Pathology  Attempt Note    SLP attempted to see pt for dysphagia evaluation. Per RN, pt is supposed to be taken for a procedure at 1600. Pt is currently eating and is not NPO, thus RN is unsure if pt will have procedure this date. RN to update SLP re: diet / NPO status. SLP to re-attempt as schedule allows and as pt's schedule allows. No charges.     Thank you,    Shania Sierra MA [facility]  Speech Language Pathologist

## 2021-06-28 NOTE — PLAN OF CARE
Problem: Falls - Risk of:  Goal: Will remain free from falls  Description: Will remain free from falls  Note: Bed in lowest, locked position, side rails up X2, bed check in place, call light within reach. Instructed pt to call for assistance before getting out of bed, pt verbalized understanding. Will continue to monitor.

## 2021-06-28 NOTE — CONSULTS
Hematology/Oncology Consultation         Patient:   Nadiya Escoto       Reason for Consult:  New metastatic cancer with unknown primary   Requesting Physician: No ref. provider found    Chief Complaint:     Chief Complaint   Patient presents with    Fatigue     Patient arrives via walk in with c/o fatigue and abd pain x1 week. Patient states dx with Eye CA 1 week ago but has been fatigued and with abdominal pain since then. Has not been for a PET scan. History Obtained from:     patient, electronic medical record    History of Present Illness:     Nadiya Escoto is a 80 y.o. female  with significant past medical history of new choroidal mass who presents with abd pain and fatigue with LANA and elevated liver enzymes. She was being seen at [facility] for new malignancy and awaiting systemic scans and work up. She is admitted now for LANA with creat 2.9 and bilateral hydro not responsive to chandra cath. Plans for stents today. She has diffuse bone mets with lesions to the R scapula and L2. She has noticed some skin \"lumps\" as well with a soft tissue lesion detected on CT to the anterior chest wall. We are asked to see the patient to guide the oncological work up. She complains of pruritis not responding to Benadryl. She noticed skin nodules spontaneously showing up about a week ago. 20 lbs weight loss in 2 months. No fevers. No drenching night sweats. Mostly complains of R knee pain. She also has a dilated bile duct with plans for MRCP per GI.       Past Medical History:         Diagnosis Date    Anemia     Anesthesia     PONV    Arthritis     CAD (coronary artery disease)     CVA (cerebral infarction)     Diabetes mellitus (HCC)     Diastolic heart failure (HCC)     Fibromyalgia     Hyperlipidemia     Hypertension     Hypothyroidism     LFT's abnormal     CHAMBERS (nonalcoholic steatohepatitis)     Nausea & vomiting     Pancreatic cyst     PNA (pneumonia) 2/23/2021    Pneumonia  Rectocele     Sleep apnea     does not use c-pap    Tremor     Vitamin D deficiency        Past Surgical History:         Procedure Laterality Date    CHOLECYSTECTOMY      COLONOSCOPY  01/10/2018    DIAGNOSTIC CARDIAC CATH LAB PROCEDURE      ECTOPIC PREGNANCY SURGERY  1985    EYE SURGERY Right     cataract    OTHER SURGICAL HISTORY  11-4-2011    Posterior repair    POSTERIOR CRUCIATE LIGAMENT REPAIR         Current Medications:     Current Facility-Administered Medications   Medication Dose Route Frequency Provider Last Rate Last Admin    atorvastatin (LIPITOR) tablet 40 mg  40 mg Oral Nightly James Oela MD   40 mg at 06/28/21 0141    buPROPion (WELLBUTRIN XL) extended release tablet 150 mg  150 mg Oral Daily James Olea MD   150 mg at 06/28/21 0803    DULoxetine (CYMBALTA) extended release capsule 60 mg  60 mg Oral Daily James Olea MD   60 mg at 06/28/21 0803    levothyroxine (SYNTHROID) tablet 75 mcg  75 mcg Oral Daily James Olea MD   75 mcg at 06/28/21 0606    metoprolol tartrate (LOPRESSOR) tablet 25 mg  25 mg Oral BID James Olea MD   25 mg at 06/28/21 0803    vitamin D (CHOLECALCIFEROL) tablet 4,000 Units  4,000 Units Oral Daily James Olea MD   4,000 Units at 06/28/21 0803    glucose (GLUTOSE) 40 % oral gel 15 g  15 g Oral PRN James Olea MD        dextrose 50 % IV solution  12.5 g Intravenous PRN James Olea MD        glucagon (rDNA) injection 1 mg  1 mg Intramuscular PRN James Olea MD        dextrose 5 % solution  100 mL/hr Intravenous PRN James Olea MD        sodium chloride flush 0.9 % injection 10 mL  10 mL Intravenous PRN James Olea MD   10 mL at 06/28/21 0803    0.9 % sodium chloride infusion  25 mL Intravenous PRN James Olea MD        acetaminophen (TYLENOL) tablet 650 mg  650 mg Oral Q6H PRN James Olea MD        Or    acetaminophen (TYLENOL) suppository 650 mg  650 mg Rectal Q6H PRN Alysia Anguiano MD        insulin glargine (LANTUS) injection vial 55 Units  55 Units Subcutaneous Nightly Alysia Anguiano MD   55 Units at 06/28/21 0218    insulin lispro (HUMALOG) injection vial 15 Units  15 Units Subcutaneous TID  Alysia Anguiano MD        insulin lispro (HUMALOG) injection vial 0-18 Units  0-18 Units Subcutaneous TID  Alysia Anguiano MD        insulin lispro (HUMALOG) injection vial 0-9 Units  0-9 Units Subcutaneous Nightly Alysia Anguiano MD        melatonin tablet 3 mg  3 mg Oral Nightly PRN Alysia Anguiano MD        lactated ringers infusion   Intravenous Continuous Alysia Anguiano  mL/hr at 06/28/21 0140 New Bag at 06/28/21 0140    HYDROmorphone (DILAUDID) injection 0.5 mg  0.5 mg Intravenous Q4H PRN Alysia Anguiano MD   0.5 mg at 06/28/21 0606    diphenhydrAMINE (BENADRYL) tablet 50 mg  50 mg Oral Q6H PRN Alysia Anguiano MD   25 mg at 06/28/21 0330    hydrOXYzine (ATARAX) tablet 10 mg  10 mg Oral TID PRN SUMA Little - CNP        ursodiol (ACTIGALL) capsule 300 mg  300 mg Oral BID Melchor Gutierrez MD        ondansetron (ZOFRAN-ODT) disintegrating tablet 4 mg  4 mg Oral Q4H PRN Alysia Anguiano MD        ondansetron [facility]) injection 4 mg  4 mg Intravenous Q4H PRN Alysia Anguiano MD   4 mg at 06/28/21 0140    promethazine (PHENERGAN) injection 12.5 mg  12.5 mg Intravenous Q4H PRN Alysia Anguiano MD        promethazine (PHENERGAN) tablet 12.5 mg  12.5 mg Oral Q4H PRN Alysia Anguiano MD           Allergies:      Allergies   Allergen Reactions    Codeine     Ultram [Tramadol]      vomiting    Percocet [Oxycodone-Acetaminophen] Nausea And Vomiting       Social History:     Social History     Socioeconomic History    Marital status:      Spouse name: Jm Person Number of children: 2    Years of education: 15    Highest education level: Not on file   Occupational History    Occupation: [employer]     Comment: school   Tobacco Use  Smoking status: Former Smoker     Packs/day: 0.25     Years: 2.00     Pack years: 0.50     Types: Cigarettes     Quit date: 1991     Years since quittin.2    Smokeless tobacco: Never Used   Vaping Use    Vaping Use: Never used   Substance and Sexual Activity    Alcohol use: No     Alcohol/week: 0.0 standard drinks    Drug use: No    Sexual activity: Yes     Partners: Male     Comment:    Other Topics Concern    Not on file   Social History Narrative    Not on file     Social Determinants of Health     Financial Resource Strain:     Difficulty of Paying Living Expenses:    Food Insecurity:     Worried About Running Out of Food in the Last Year:     Ran Out of Food in the Last Year:    Transportation Needs:     Lack of Transportation (Medical):      Lack of Transportation (Non-Medical):    Physical Activity:     Days of Exercise per Week:     Minutes of Exercise per Session:    Stress:     Feeling of Stress :    Social Connections:     Frequency of Communication with Friends and Family:     Frequency of Social Gatherings with Friends and Family:     Attends [employer] Services:     Active Member of Clubs or Organizations:     Attends Club or Organization Meetings:     Marital Status:    Intimate Partner Violence:     Fear of Current or Ex-Partner:     Emotionally Abused:     Physically Abused:     Sexually Abused:      Social History     Substance and Sexual Activity   Drug Use No     Social History     Substance and Sexual Activity   Alcohol Use No    Alcohol/week: 0.0 standard drinks     Social History     Substance and Sexual Activity   Sexual Activity Yes    Partners: Male    Comment:      Social History     Tobacco Use   Smoking Status Former Smoker    Packs/day: 0.25    Years: 2.00    Pack years: 0.50    Types: Cigarettes    Quit date: 1991    Years since quittin.2   Smokeless Tobacco Never Used       Family History:     Family History Problem Relation Age of Onset    Diabetes Mother     Stroke Mother     Heart Disease Father     Cancer Father         prostate    Heart Disease Brother         multiple stents    Heart Disease Brother     Heart Disease Sister         carotid artery surgery    Heart Disease Brother     Heart Attack Brother     Heart Disease Brother     Asthma Neg Hx     Emphysema Neg Hx     Heart Failure Neg Hx        Review of Systems:     Constitutional: Denies fever, sweats, weight loss. .     Eyes: No visual changes or diplopia. No scleral icterus. ENT: No Headaches, no hearing loss, no  vertigo. No mouth sores or sore throat. Cardiovascular: No chest pain, no dyspnea on exertion, no palpitations, no  loss of consciousness. Respiratory: No cough, no  wheezing, no dyspnea, no sputum production. No hemoptysis. .    Gastrointestinal: No abdominal pain, no appetite loss, no blood in stools. No change in bowel habits. Genitourinary: No dysuria, trouble voiding, or hematuria. Musculoskeletal:  Generalized weakness. No joint complaints. Integumentary: No rash or pruritis. Neurological: No headache, diplopia. No change in gait, balance, or coordination. No paresthesias. Endocrine: No temperature intolerance. No excessive thirst, fluid intake, or urination. Hematologic/Lymphatic: No abnormal bruising or ecchymoses, no blood clots or swollen lymph nodes. Allergic/Immunologic: No nasal congestion or hives. Physical Exam:     BP (!) 155/72   Pulse 94   Temp 98 °F (36.7 °C) (Oral)   Resp 16   Ht 5' 6.5\" (1.689 m)   Wt 200 lb 6.4 oz (90.9 kg)   LMP  (LMP Unknown)   SpO2 93%   BMI 31.86 kg/m²     CONSTITUTIONAL: awake, alert, cooperative, no apparent distress.   EYES:  Pupils equal, round and reactive to light, sclera non-icteric, conjunctiva normal  ENT:  Normocephalic, without obvious abnormality, atraumatic, sinuses nontender on palpation, external ears without lesions, oral pharynx with moist mucus membranes, no mucositis. NECK:  Supple, symmetrical, trachea midline, no adenopathy, thyroid symmetric, not enlarged and no tenderness, skin normal  HEMATOLOGIC/LYMPHATICS:  no cervical lymphadenopathy, no supraclavicular lymphadenopathy, no axillary lymphadenopathy and no inguinal lymphadenopathy  BACK:  Symmetric, no curvature, spinous processes are non-tender on palpation, paraspinous muscles are non-tender on palpation, no costal vertebral tenderness  LUNGS:  Clear to auscultation bilaterally, no crackles or wheezing  CARDIOVASCULAR:  Regular rate and rhythm, normal S1 and S2, no S3 or S4, and no murmur noted  ABDOMEN:  Normal bowel sounds, soft, non-distended, non-tender, no masses palpated, no hepatosplenomegally  MUSCULOSKELETAL:  There is no redness, warmth, or swelling of the joints  NEUROLOGIC:   No focal findings. SKIN:  1.3 cm hard mass to L chest wall, scattered 5 mm nodes noted to L groin and L supraclavicular area   EXT: without clubbing, cyanosis or edema. Data:     CBC:  Recent Labs     06/28/21  0654 06/27/21 2012   WBC 9.6 8.8   HGB 11.9* 12.9   HCT 36.8 38.9   MCV 80.2 79.2*    325       BMP:  Recent Labs     06/28/21  0654 06/27/21 2012 06/16/21  0710    133* 131*   K 5.6* 5.1 5.8*   CO2 17* 19* 24   BUN 25* 29* 27*   CREATININE 2.9* 3.0* 1.3*   MG 2.30  --   --        HEPATIC:  Recent Labs     06/28/21  0654 06/27/21 2012 06/16/21  0710   * 402* 499*   * 573* 546*   ALKPHOS 652* 672* 586*   PROT 6.2* 7.6 6.6   BILITOT 3.0* 2.6* <0.2       TUMOR MARKERS:  No results for input(s): PSA, CEA, , [id number],  in the last 720 hours.       MAGNESIUM:    Lab Results   Component Value Date    MG 2.30 06/28/2021       PT/INR:    No results found for: [employer]    Lab Results   Component Value Date    INR 1.03 06/28/2021       PTT:    Lab Results   Component Value Date    APTT 27.8 06/03/2011       U/A:    Lab Results   Component Value Date    NITRITE Neg 10/25/2016 COLORU Yellow 06/27/2021    PHUR 6.0 06/27/2021    WBCUA 10-20 06/27/2021    RBCUA None seen 06/27/2021    MUCUS 1+ 02/24/2021    BACTERIA Rare 06/27/2021    CLARITYU Clear 06/27/2021    SPECGRAV 1.020 06/27/2021    LEUKOCYTESUR Negative 06/27/2021    UROBILINOGEN 1.0 06/27/2021    BILIRUBINUR SMALL 06/27/2021    BILIRUBINUR Neg 10/25/2016    BLOODU Negative 06/27/2021    GLUCOSEU 500 06/27/2021       Imaging:     CT C/A/P     Somewhat limited study in the absence of contrast material.       Numerous sclerotic metastases throughout all bones of the visualized axial   and appendicular skeleton with a possible lytic lesion within the upper   aspect of the right scapula and a small soft tissue nodule within the   anterior left chest, possibly metastatic.  These findings are new from prior. A healing nondisplaced subacute fracture of the left L2 transverse process is   also new, possibly pathologic.       There are numerous mildly enlarged lymph nodes throughout the mediastinum as   well as the retroperitoneum in the abdominal mesentery.       Moderate to severe bilateral hydronephrosis and hydroureter, also new from   prior, without appreciable obstructing stone.  It is unclear whether this is   related to metastatic cervical cancer or an underlying lymphoproliferative   disorder such as lymphoma.  Lymph node sampling is recommended.       Minimal bibasilar atelectasis.  Otherwise, clear lungs. Impression:     LANA   Liver injury   Hydronephrosis   Bone mets   New cancer unknown origin ; likely choroidal mets  L eye exudative retinal detachment- treated at [facility] Dr. Kelly PAT eye vitreous detachment- seen and treated at [facility] as above    Diffuse skin nodules   Pruritis   Diffuse LAD   Dilated bile duct   CHAMBERS   Remote smoking hx       Plan:     Get bone scan to further eval ischum- discussed with IR- likely the best place for potential bx.    Send MM labs and tumor markers   Ureter stents today   Check micronutrients and replace PRN   Add Atarax PRN for itching      She does have diffuse skin nodules- noted to L groin/ R scapula and L chest wall. Lesions are firm, fixed, non-tender and about the size of a pencil eraser head. Lymphoma/amyloid is on the differential given hx of itching and skin nodules with LANA, send flow cytometry and await bx   Increase dilaudid to 1 mg PRN   Dysphagia- speech eval   Can eat today- no bx until tomorrow after bone scan reviewed  GI to perform MRCP for dilated bile duct     Discussed with Dr. Phuc Rollins. He will see in the AM.        Thank you very much for allowing me to participate in the care of this patient.     Natalia Valadez CNP   Medical Oncology/Hematology    [facility] - [address],  [place]  Phone: [phone]  Fax: [phone]

## 2021-06-28 NOTE — ACP (ADVANCE CARE PLANNING)
.Advance Care Planning     Advance Care Planning Inpatient Note  [facility] Department    Today's Date: 6/28/2021  Unit: [facility] C5 - MED SURG/ORTHO    Received request from [employer] Upon review of chart and communication with care team, patient's decision making abilities are not in question. Patientwas/were present in the room during visit. Goals of ACP Conversation:  Discuss advance care planning documents    Health Care Decision Makers:      Primary Decision Maker: Zoey Gunderson - Child - [phone]  Click here to complete [employer] including selection of the Healthcare Decision Maker Relationship (ie \"Primary\")     Today we:  Next of Kin, per patient report    Advance Care Planning Documents (Patient Wishes):  None     Assessment:  Patient states she does not know who she would pick for her health care decision maker. She is , but still legally . She has 2 adult children. Interventions:  Provided education on documents for clarity and greater understanding. Discussed and provided education on state decision maker hierarchy. Encouraged ongoing ACP conversation with future decision makers and loved ones. Reviewed but did not complete ACP document.     Outcomes/Plan:  ACP Discussion: Completed    Electronically signed by Iraj Rios [address] on 6/28/2021 at 11:13 AM

## 2021-06-28 NOTE — PROGRESS NOTES
Called RN Jennie Randall for report on patient prior to transfer to PACU for PreOP. Patient is currently eating lunch and drinking. Anesthesia made aware.

## 2021-06-28 NOTE — PROGRESS NOTES
4 Eyes Skin Assessment     The patient is being assess for   Admission    I agree that 2 RN's have performed a thorough Head to Toe Skin Assessment on the patient. ALL assessment sites listed below have been assessed. Areas assessed for pressure by both nurses:   [x]   Head, Face, and Ears   [x]   Shoulders, Back, and Chest, Abdomen  [x]   Arms, Elbows, and Hands   [x]   Coccyx, Sacrum, and Ischium  [x]   Legs, Feet, and Heels        Skin Assessed Under all Medical Devices by both nurses:  none              All Mepilex Borders were peeled back and area peeked at by both nurses:  No: n/a  Please list where Mepilex Borders are located:  n/a             **SHARE this note so that the co-signing nurse is able to place an eSignature**    Co-signer eSignature: Electronically signed by Blake Stone RN on 6/28/21 at 4:22 PM EDT    Does the Patient have Skin Breakdown related to pressure?   No              Mook Prevention initiated:  No   Wound Care Orders initiated:  No      [facility] nurse consulted for Pressure Injury (Stage 3,4, Unstageable, DTI, NWPT, Complex wounds)and New or Established Ostomies:  No      Primary Nurse eSignature: Electronically signed by Bela Alvarado RN on 6/28/21 at 4:21 AM EDT

## 2021-06-28 NOTE — CONSULTS
[id number] [facility],  [address] [place] [address] Ave  [place], [facility]  Phone: [phone] She is a  [2] White [1] 80 y.o. Jerry Ra female      Main Problems/Chief Complaint     Itching, abdominal pain, nausea and vomiting    Clinical Summary      This S/P charles patient is admitted with nausea and vomiting. She is found to have mild elevation of transaminases and T. Bili (2.6). She reports intermittent post-prandial LUQ pain for weeks. Nausea got worse yesterday. Her main complaint is that her itching is not controlled with Benadryl. She has lost about 20 pounds in last few months. There is no overt bleeding or diarrhea. She has had worsening constipation. The work up has revealed several abnormalities as described below.     PAST MEDICAL HISTORY     Past Medical History:   Diagnosis Date    Anemia     Anesthesia     PONV    Arthritis     CAD (coronary artery disease)     CVA (cerebral infarction)     Diabetes mellitus ([address]     Diastolic heart failure (HCC)     Fibromyalgia     Hyperlipidemia     Hypertension     Hypothyroidism     LFT's abnormal     CHAMBERS (nonalcoholic steatohepatitis)     Nausea & vomiting     Pancreatic cyst     PNA (pneumonia) 2/23/2021    Pneumonia     Rectocele     Sleep apnea     does not use c-pap    Tremor     Vitamin D deficiency      FAMILY HISTORY     Family History   Problem Relation Age of Onset    Diabetes Mother     Stroke Mother     Heart Disease Father     Cancer Father         prostate    Heart Disease Brother         multiple stents    Heart Disease Brother     Heart Disease Sister         carotid artery surgery    Heart Disease Brother     Heart Attack Brother     Heart Disease Brother     Asthma Neg Hx     Emphysema Neg Hx     Heart Failure Neg Hx        SURGICAL HISTORY     Past Surgical History:   Procedure Laterality Date    CHOLECYSTECTOMY      COLONOSCOPY  01/10/2018    DIAGNOSTIC CARDIAC CATH LAB PROCEDURE      ECTOPIC PREGNANCY SURGERY  1985    EYE SURGERY Right     cataract    OTHER SURGICAL HISTORY  11-4-2011    Posterior repair    POSTERIOR CRUCIATE LIGAMENT REPAIR       CURRENT MEDICATIONS   (This list may include medications prescribed during this encounter as epic can not insert only the list prior to this encounter.)      ALLERGIES     Allergies   Allergen Reactions    Codeine     Ultram [Tramadol]      vomiting    Percocet [Oxycodone-Acetaminophen] Nausea And Vomiting       REVIEW OF SYSTEMS   No chest pain suspicious for cardiac origin  No SOB    PHYSICAL EXAM   Vitals as per nursing record. Mildly jaundiced. During the eval, the patient was repeatedly scratching her skin of the forearms. Heart: WNL  Lungs: Clear to A&P  Abd: soft, non-tender, no masses are felt. No ascites could be detected. BS: +  Neurologic: Alert & oriented x 3. Psych: Good mood and memory. Pt is able to make appropriate decisions. FINAL IMPRESSION AND RECOMMENDATIONS     The dilation of bile duct to 2.2 cm on CT is suggestive of obstruction of the bile duct even though the patient is S/P charles. This can account for nausea, vomiting and possibly the abdominal pain. A stone or pancreatic CA not detectable by CT is a possibility. She has metastatic sclerotic lesions from unknown primary. She has obstructive uropathy. MRCP, CA 19-9. Further plan to follow.     The total time spent face-to-face, review of the record and on the bower during this visit was 30 minutes with more than 50% of the time spent in review of the electronic record and its independent analysis and counseling the patient about the work up to find out why she has jaundice, abd pain and itching and plan for helping her with constipation and for itching with interventions such as enema and diagnostic testing including the possibility of an endoscopic test.              Rhiannon Silva MD 6/28/21 8:55 AM EDT    CC:  Aury Pinedo Janis Otreo - CNP      IMPORTANT: Please note that some portions of this note may have been created using Dragon voice recognition software. Some \"sound-alike\" and totally wrong word substitutions may have taken place due to known inherent limitations of any such software, including this voice recognition software. In spite of efforts to eliminate such errors, some may not have been corrected. So please read the note with this in mind and recognize such mistakes and understand the correct version using the  context. Thanks.

## 2021-06-28 NOTE — H&P
[facility] Medicine History & Physical      Patient:  Zully Shelley  :   1950  MRN:   [id number]  Date of Service: 21    CHIEF COMPLAINT:  Fatigue (Patient arrives via walk in with c/o fatigue and abd pain x1 week. Patient states dx with Eye CA 1 week ago but has been fatigued and with abdominal pain since then. Has not been for a PET scan. )       HISTORY OF PRESENT ILLNESS:    Zully Shelley is a 80 y.o. female. She developed left eye soreness and headaches in May. She saw an ophthalmologist, Dr. Deya Nevarez at [facility], on 21. 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 She has been on a tapering regimen since then. She has had problems with glycemic control. She has also not responded as expected to treatment. Her ophthalmologist became concerned about an intracranial neoplasm. It was recommended that patient undergo additional imaging including MRI brain and maxillofacial/sinus w and w/o contrast.  She was also recommended to undergo CT chest w/o contrast.  The MRI did not reveal a tumor but was notable for lobular left retinal thickening and appparent left optic nerve compression. The CT chest was notable for mild mediastinal lymphadenopathy. She was referred to oncology but has not had an office visit yet. She abruptly stopped the prednisone several days ago. She had tapered by that time to 60mg daily. She has been having abdominal pain for roughly a month. She though this was from taking prednisone so tried to ignore it or treat it with OTC remedies. She presents tonight for progressive pain and persistent nausea. She cannot say for how long though. She does relate she is having bowel movements and passing flatus although she may go 3 or 4 days between bowel movements. She denies any difficulty urinating, but relates that when she wakes up every morning she is leaking urine and leaks urine all the way to the restroom.   She recalls being hospitalized for pneumonia in February and having urinary retention at the time which was treated with a short term chandra catheter. Review of Systems:  All pertinent positives and negatives are as noted in the HPI section. All other systems were reviewed and are negative. Past Medical History:   Diagnosis Date    Anemia     Anesthesia     PONV    Arthritis     CAD (coronary artery disease)     CVA (cerebral infarction)     Diabetes mellitus (HCC)     Diastolic heart failure (HCC)     Fibromyalgia     Hyperlipidemia     Hypertension     Hypothyroidism     LFT's abnormal     CHAMBERS (nonalcoholic steatohepatitis)     Nausea & vomiting     Pancreatic cyst     PNA (pneumonia) 2/23/2021    Pneumonia     Rectocele     Sleep apnea     does not use c-pap    Tremor     Vitamin D deficiency        Past Surgical History:   Procedure Laterality Date    CHOLECYSTECTOMY      COLONOSCOPY  01/10/2018    DIAGNOSTIC CARDIAC CATH LAB PROCEDURE      ECTOPIC PREGNANCY SURGERY  1985    EYE SURGERY Right     cataract    OTHER SURGICAL HISTORY  11-4-2011    Posterior repair    POSTERIOR CRUCIATE LIGAMENT REPAIR           Prior to Admission medications    Medication Sig Start Date End Date Taking?  Authorizing Provider   buPROPion (WELLBUTRIN XL) 150 MG extended release tablet TAKE 1 TABLET EVERY MORNING 5/27/21  Yes SUMA Hendricks CNP   atorvastatin (LIPITOR) 40 MG tablet TAKE 1 TABLET NIGHTLY 5/27/21  Yes SUMA Sheppard CNP   metFORMIN (GLUCOPHAGE-XR) 500 MG extended release tablet TAKE 1 TABLET TWICE A DAY 5/25/21  Yes SUMA Hendricks CNP   lisinopril (PRINIVIL;ZESTRIL) 5 MG tablet TAKE 1 TABLET DAILY 5/4/21  Yes SUMA Hendricks CNP   alendronate (FOSAMAX) 70 MG tablet TAKE 1 TABLET EVERY 7 DAYS 5/3/21  Yes SUMA Hill CNP   glimepiride (AMARYL) 4 MG tablet TAKE 1 TABLET TWICE A DAY 5/3/21  Yes SUMA Hill CNP   Insulin Glargine, 06/27/2021     (L) 06/27/2021    K 5.1 06/27/2021    CL 99 06/27/2021    CO2 19 (L) 06/27/2021     Lab Results   Component Value Date     (H) 06/27/2021     (H) 06/27/2021    ALKPHOS 672 (H) 06/27/2021    BILITOT 2.6 (H) 06/27/2021     Lab Results   Component Value Date    CKTOTAL 45 09/16/2010    TROPONINI <0.01 06/27/2021     No results for input(s): PHART, SKK2VTY, PO2ART in the last 72 hours. IMAGING:  XR CHEST PORTABLE    Result Date: 6/27/2021  EXAMINATION: ONE XRAY VIEW OF THE CHEST 6/27/2021 8:21 pm COMPARISON: None. HISTORY: ORDERING SYSTEM PROVIDED HISTORY: fatigue TECHNOLOGIST PROVIDED HISTORY: Reason for exam:->fatigue Reason for Exam: fatigue, pt states \"pain and itching all over\" recent dx of eye cancer Acuity: Acute Type of Exam: Initial FINDINGS: The heart is normal.  The pulmonary vessels are normal.  The lungs are mildly hyperinflated and emphysematous. No consolidation or effusion is seen. The bones are intact. Stable chronic changes with no acute abnormality seen. MRI ORBITS FACE NECK W WO CONTRAST    Result Date: 6/16/2021  EXAMINATION: MRI OF THE BRAIN WITHOUT AND WITH CONTRAST; MRI OF THE BRAIN AND MRI OF THE ORBITS WITH AND WITHOUT CONTRAST 6/16/2021 7:35 am TECHNIQUE: Multiplanar multisequence MRI of the head/brain was performed without and with the administration of intravenous contrast.; Multiplanar multisequence MRI of the brain and MRI of the orbits was performed with and without intravenous contrast. COMPARISON: None. HISTORY: ORDERING SYSTEM PROVIDED HISTORY: Posterior uveitis, unspecified laterality TECHNOLOGIST PROVIDED HISTORY: Reason for Exam: Vision loss left eye for 4 weeks. ? basal cell CA. Headaches across frontal head. Dizziness. Acuity: Acute Type of Exam: Initial FINDINGS: MRI BRAIN: INTRACRANIAL STRUCTURES/VENTRICLES: There is no evidence of an acute infarct.  A small likely subacute infarct is seen involving the left precentral gyrus (series 4, image 24). A small chronic infarct is seen involving the posterior right frontal lobe. There is a nonspecific 5 mm focus of enhancement along the left parietal/occipital lobe (series 16, image 17; series 17, image 27). No mass effect or midline shift. Otherwise, no abnormal enhancement seen in the brain. No evidence of an acute intracranial hemorrhage. Areas of T2 FLAIR hyperintensity are seen in the periventricular and subcortical white matter, which are nonspecific, but may represent chronic microvascular ischemic change. There is prominence of the ventricles and sulci due to global parenchymal volume loss. The sellar/suprasellar regions appear unremarkable. The normal signal voids within the major intracranial vessels appear maintained. SINUSES: The visualized paranasal sinuses demonstrate no acute abnormality. Trace right mastoid effusion. BONES/SOFT TISSUES: The bone marrow signal intensity appears normal. The soft tissues demonstrate no acute abnormality. MRI ORBITS: Motion degrades images limiting evaluation. The patient is status post right-sided cataract surgery. There is somewhat lobulated thickening involving the left posterior retina, which appears to demonstrate associated enhancement. No convincing abnormal signal or enhancement of the optic nerves. The left optic nerve is slightly decreased in caliber compared to the right. The extraocular muscles appear symmetric. 1. There is a lobulated thickening involving the left posterior retina, which appears to demonstrate associated enhancement. 2. There is suggestion of asymmetrically decreased caliber of the left intraorbital optic nerve compared to the right. 3. Small focus of enhancement seen along the left parietal/occipital lobe without significant mass effect or midline shift. 4. Small subacute infarct involving the right precentral gyrus. 5. Otherwise, no acute intracranial abnormality. No acute infarct.  6. Small chronic infarct involving the right posterior frontal lobe. 7. Mild global parenchymal volume loss with chronic microvascular ischemic changes. These results were sent to the [facility] [id number] ([address]) on 6/16/2021 at 9:09 am to be communicated to the referring/covering health care provider/office. CT CHEST ABDOMEN PELVIS WO CONTRAST    Result Date: 6/27/2021  EXAMINATION: CT OF THE CHEST, ABDOMEN, AND PELVIS WITHOUT CONTRAST 6/27/2021 8:18 pm TECHNIQUE: CT of the chest, abdomen and pelvis was performed without the administration of intravenous contrast. Multiplanar reformatted images are provided for review. Dose modulation, iterative reconstruction, and/or weight based adjustment of the mA/kV was utilized to reduce the radiation dose to as low as reasonably achievable. COMPARISON: Abdominal CT study from September 15, 2015. Lumbar spine CT study from March 21, 2021. HISTORY: ORDERING SYSTEM PROVIDED HISTORY: back pain/sob TECHNOLOGIST PROVIDED HISTORY: Reason for exam:->back pain/sob Additional Contrast?->None Decision Support Exception - unselect if not a suspected or confirmed emergency medical condition->Emergency Medical Condition (MA) Reason for Exam: SOB, back pain (since Feb) and extremely itchy (since she stopped used cortisone) Acuity: Acute Type of Exam: Initial Relevant Medical/Surgical History: hx of gallbladder FINDINGS: Chest: Mediastinum: The heart size is normal.  No pericardial effusion. The thoracic aorta is normal in caliber. Numerous mildly enlarged lymph nodes are present throughout the mediastinum. No enlarged or suspicious axillary, hilar, or right supraclavicular lymphadenopathy. There is a small node within the left supraclavicular region. Lungs/pleura: The trachea and mainstem bronchi are widely patent. , The left supraclavicular region, the mesenteric, and the retroperitoneum the lungs are otherwise clear. No discrete pulmonary nodule or mass. No pleural effusion.  Soft Tissues/Bones: Degenerative changes are present throughout the thoracic spine. There are numerous small sclerotic lesions throughout the thoracic spine, the left clavicle, and both scapulae as well as a lytic lesion within the upper aspect of the scapula on the right. No acute or healing rib fractures. .  A 1.3 cm soft tissue nodule within the subcutaneous aspect of the anterior left chest is noted which is of uncertain significance. Abdomen/Pelvis: Organs: The liver, spleen, and adrenal glands have an unremarkable unenhanced CT appearance. The gallbladder is surgically absent. Cystic lesions throughout the proximal pancreas are unchanged. There is new moderate to severe bilateral hydronephrosis and hydroureter without renal, ureteral, or bladder calculus and without apparent obstructing mass or lymph node. [facility] GI/Bowel: The stomach and the small and large bowel loops are normal in caliber, contour, and morphology, without acute or significant abnormality. No dilated loops or areas of bowel wall thickening. No appreciable colonic or rectal mass. Peritoneum/Retroperitoneum: There is no free fluid, free air, or abnormal fluid collection. Mild diffuse mesenteric edema is present and there are several small lymph nodes present throughout the mesentery as well as throughout the retroperitoneum which are new from prior. The abdominal aorta and iliac arteries are patent and of normal caliber. Pelvis: No pelvic free fluid or enlarged or suspicious pelvic or inguinal lymphadenopathy. No appreciable uterine or adnexal abnormality. The urinary bladder and the pelvic bowel loops are unremarkable. Bones/Soft Tissues: Degenerative changes are present throughout the lumbar spine and hips. There are numerous small sclerotic lesions throughout the spine, pelvic bones, and sacrum including a healing nondisplaced fracture of the left L2 transverse process which is new from prior and may be pathologic. [facility]      Somewhat limited study in the absence of contrast material. Numerous sclerotic metastases throughout all bones of the visualized axial and appendicular skeleton with a possible lytic lesion within the upper aspect of the right scapula and a small soft tissue nodule within the anterior left chest, possibly metastatic. These findings are new from prior. A healing nondisplaced subacute fracture of the left L2 transverse process is also new, possibly pathologic. There are numerous mildly enlarged lymph nodes throughout the mediastinum as well as the retroperitoneum in the abdominal mesentery. Moderate to severe bilateral hydronephrosis and hydroureter, also new from prior, without appreciable obstructing stone. It is unclear whether this is related to metastatic cervical cancer or an underlying lymphoproliferative disorder such as lymphoma. Lymph node sampling is recommended. Minimal bibasilar atelectasis. Otherwise, clear lungs. MRI BRAIN W WO CONTRAST    Result Date: 6/16/2021  EXAMINATION: MRI OF THE BRAIN WITHOUT AND WITH CONTRAST; MRI OF THE BRAIN AND MRI OF THE ORBITS WITH AND WITHOUT CONTRAST 6/16/2021 7:35 am TECHNIQUE: Multiplanar multisequence MRI of the head/brain was performed without and with the administration of intravenous contrast.; Multiplanar multisequence MRI of the brain and MRI of the orbits was performed with and without intravenous contrast. COMPARISON: None. HISTORY: ORDERING SYSTEM PROVIDED HISTORY: Posterior uveitis, unspecified laterality TECHNOLOGIST PROVIDED HISTORY: Reason for Exam: Vision loss left eye for 4 weeks. ? basal cell CA. Headaches across frontal head. Dizziness. Acuity: Acute Type of Exam: Initial FINDINGS: MRI BRAIN: INTRACRANIAL STRUCTURES/VENTRICLES: There is no evidence of an acute infarct. A small likely subacute infarct is seen involving the left precentral gyrus (series 4, image 24). A small chronic infarct is seen involving the posterior right frontal lobe.   There is a nonspecific 5 mm focus of enhancement along the left parietal/occipital lobe (series 16, image 17; series 17, image 27). No mass effect or midline shift. Otherwise, no abnormal enhancement seen in the brain. No evidence of an acute intracranial hemorrhage. Areas of T2 FLAIR hyperintensity are seen in the periventricular and subcortical white matter, which are nonspecific, but may represent chronic microvascular ischemic change. There is prominence of the ventricles and sulci due to global parenchymal volume loss. The sellar/suprasellar regions appear unremarkable. The normal signal voids within the major intracranial vessels appear maintained. SINUSES: The visualized paranasal sinuses demonstrate no acute abnormality. Trace right mastoid effusion. BONES/SOFT TISSUES: The bone marrow signal intensity appears normal. The soft tissues demonstrate no acute abnormality. MRI ORBITS: Motion degrades images limiting evaluation. The patient is status post right-sided cataract surgery. There is somewhat lobulated thickening involving the left posterior retina, which appears to demonstrate associated enhancement. No convincing abnormal signal or enhancement of the optic nerves. The left optic nerve is slightly decreased in caliber compared to the right. The extraocular muscles appear symmetric. 1. There is a lobulated thickening involving the left posterior retina, which appears to demonstrate associated enhancement. 2. There is suggestion of asymmetrically decreased caliber of the left intraorbital optic nerve compared to the right. 3. Small focus of enhancement seen along the left parietal/occipital lobe without significant mass effect or midline shift. 4. Small subacute infarct involving the right precentral gyrus. 5. Otherwise, no acute intracranial abnormality. No acute infarct. 6. Small chronic infarct involving the right posterior frontal lobe.  7. Mild global parenchymal volume loss with chronic microvascular ischemic changes. These results were sent to the [facility] [id number] ([address] [facility]) on 6/16/2021 at 9:09 am to be communicated to the referring/covering health care provider/office. I directly reviewed all recent imaging studies as well as pertinent prior studies. Radiology reports may or may not be available at the time of my review. EKG:  New and pertinent prior tracings were directly reviewed. My interpretation is as follows:  NSR    Active Hospital Problems    Diagnosis Date Noted    Bone metastases ([facility] [address] [C79.51] 06/28/2021    Abdominal lymphadenopathy [R59.0] 06/28/2021    Transaminitis [R74.01] 06/28/2021    LANA (acute kidney injury) ([facility] [address] [N17.9] 06/27/2021    Obstructive nephropathy [N13.8] 06/27/2021    DM2 (diabetes mellitus, type 2) ([facility] [address] [E11.9] 06/19/2013       ASSESSMENT & PLAN  LANA, Obstructive nephropathy  -  Baseline creatinine < 0.5 back in February, climbing since then, and currently 3.  -  Bilateral hydroureteronephrosis without significant bladder distention (although there is some urine in the bladder). It appears both ureters are dilated above the pelvis, but suddenly taper in diameter in the pelvis near the UVJ. No obstructing calculi apparent. Bilateral malignant strictures are possible. -  Place chandra catheter. There was 300-400mL urine output. Will request urology assistance and keep patient NPO in case of procedures. -  Avoid nephrotoxin exposure. Hold home metformin, lisinopril.  -  Received 1L NS in the ER as a bolus. Continue with LR @ 100mL/hr. Metastatic Cancer, Unknown primary  -  Sclerotic lesions are widespread throughout the axial skeleton. One lesion in the right scapula might be lytic. -  Widespread lymphadenopathy in the abdomen, retroperitoneal space, and mediastinum. -  Will request assistance from Hem/Onc for further diagnostics and choosing a biopsy site. Transaminitis  -  Mixed hepatocellular and cholestatic pattern.   -  Acute hepatitis panel sent from ER.    -  No explanatory finding on CT w/o contrast per report. RUQ U/S performed. Read is pending. Images were reviewed. Patient is s/p cholecystectomy. CBD is severely dilated at 2.2 cm. CT images were reviewed again. There is abrupt tapering of the CBD within the pancreatic head. No radio-opaque calculus is visible. -  Choledocholithiasis, CBD stricture/tumor, ampullary dysfunction/tumor. Will request help from GI.    DM2,  Possible iatrogenic adrenal insufficiency  -  Hold all oral antidiabetic agents. Start s.c. Insulin regimen based on home regimen. Adjust as needed. -  With patient now off steroids, insulin requirements are likely to drop precipitously. Vigilance for hypoglycemia and appropriate insulin adjustments are warranted. -  That patient took high dose steroids for roughly 1.5 months. Vigilance for signs of adrenal insufficiency is also warranted. Chronic Conditions  HLD - Continue statin. CAD, HTN - continue metoprolol. Hold ASA for procedures. Depression - Continue bupropion and duloxetine. Hypothyroid - Continue synthroid. DVT prophylaxis: SCDs. S.c. UFH later after procedures. Code Status:  Full  Disposition:  Inpatient for the foreseeable future.     Jesse Davenport MD MD

## 2021-06-29 NOTE — ED PROVIDER NOTES
Attending Supervisory Note/Shared Visit   I have personally performed a face to face diagnostic evaluation on this patient. I have reviewed the mid-levels findings and agree. History and Exam by me shows well-appearing female no acute distress. Patient presented to the ED for evaluation of epigastric abdominal pain with associated nausea. Patient also complaining of diffuse pruritus. she reports decreased appetite and unintentional weight gain. Patient recently had been diagnosed with eye cancer and behind her left eye. She states that she was told that this is likely at the primary, and she was awaiting further testing. That she has not establish care with an oncologist at this time. On evaluation patient resting comfortably. Vital signs within normal limits. She does have mild epigastric tenderness. No rebound or guarding. Lungs clear to auscultation. She is a regular rate and rhythm. Patient's work-up remarkable for new LANA as well as hyperbilirubinemia and transaminitis. Transaminitis has been present previously, but no hyperbilirubinemia. CT was obtained which demonstrated multiple sclerotic lesions concerning for metastases throughout the patient's axial skeleton and soft tissue. Patient did have mediastinal lymphadenopathy as well as new onset bilateral hydronephrosis and concerns for possible cervical malignancy. Patient does report being aware of the sclerotic lesions on the scapula and in her chest which she states are new. Laboratory findings and CT results discussed with the patient and her son I did recommend admission to the hospital for further medical management evaluation. They are amenable to treatment plan. I agree with the [facility] plan of care. Please [facility] Note for full ED course and final disposition. Patient's EKG shows sinus rhythm with ventricular to 78 bpm.  Patient's NE interval is prolonged QTc interval within normal limits. Patient has normal axis.   There are no significant ST elevations or depressions EKG is nondiagnostic for ACS. Compared EKG from 2/23/2021 there are no significant changes. Disposition:  1. Generalized weakness    2. Nausea and vomiting, intractability of vomiting not specified, unspecified vomiting type    3. Transaminitis    4. LANA (acute kidney injury) ([facility] [address]    5.  Metastatic malignant neoplasm, unspecified site ([facility] [address]    6. Elevated bilirubin          Can Maria MD  Attending Emergency Physician        Can Maria MD  06/29/21 0149

## 2021-06-29 NOTE — PROGRESS NOTES
MRCP showed obstruction at the papilla. Pt will need an ERCP and EGD. I will need to see what endo staff can arrange for her.

## 2021-06-29 NOTE — PROGRESS NOTES
Pt A&O x4. VSS. Denies dyspnea and N/V. Reports passing flatus. Patient reports itchiness and abdominal pain, see eMAR for medication admin. Assessment is as charted. Call light and bedside table within reach, wheels locked, bed in lowest position, bed alarm on, Pt instructed to call out for assistance and expressed understanding, calls out appropriately.     Electronically signed by Nina Lock RN on 6/29/2021 at 12:28 AM

## 2021-06-29 NOTE — PROGRESS NOTES
[address] [facility],  [address] [place] [address] Ave  [place], [facility]1 [facility]  Phone: [id number] She is a  [2] White [1] 80 y.o. Bula Dayhoff female      Main Problems/Chief Complaint for which GI service is seeing pt   Difficult to control itching  Obstructive jaundice    Clinical Summary      The patient has continued to have itching in spite of Atarax. She has been found to have severe obstruction of the bile duct with some suspicion for malignancy on MRCP:  1. Severe intrahepatic and extrahepatic biliary dilation and up to moderate   dilation of the main pancreatic duct with abrupt caliber changes in the   common bile duct and main pancreatic duct near the pancreatic head.  No   definite underlying lesion is identified, although one is not excluded on the   basis of this study.  Considerations include ductal adenocarcinoma or   postinflammatory strictures from prior pancreatitis.  Consider further   evaluation with pancreas protocol abdomen CT or MRI versus ERCP. 2. Sequelae of chronic pancreatitis best demonstrated on CT with potential   for superimposed mild acute pancreatitis.  However, the visualized   peripancreatic stranding could be related to minimal anasarca seen elsewhere. 3. Innumerable simple and minimally complicated cystic lesions measuring up   to 1.8 cm throughout the pancreas could represent dilated side branches from   chronic pancreatitis and/or branch duct intraductal papillary mucinous   neoplasms.  Recommend follow-up with pancreas protocol and MRI (preferred) or   CT in 1 year. Apparently the patient has had an ERCP done before. She is familiar with the procedure and its complications.       PAST MEDICAL HISTORY     Past Medical History:   Diagnosis Date    Anemia     Anesthesia     PONV    Arthritis     CAD (coronary artery disease)     CVA (cerebral infarction)     Diabetes mellitus ([address]     Diastolic heart failure ([address]     Fibromyalgia     Hyperlipidemia     Hypertension     Hypothyroidism     LFT's abnormal     CHAMBERS (nonalcoholic steatohepatitis)     Nausea & vomiting     Pancreatic cyst     PNA (pneumonia) 2/23/2021    Pneumonia     Rectocele     Sleep apnea     does not use c-pap    Tremor     Vitamin D deficiency      FAMILY HISTORY     Family History   Problem Relation Age of Onset    Diabetes Mother     Stroke Mother     Heart Disease Father     Cancer Father         prostate    Heart Disease Brother         multiple stents    Heart Disease Brother     Heart Disease Sister         carotid artery surgery    Heart Disease Brother     Heart Attack Brother     Heart Disease Brother     Asthma Neg Hx     Emphysema Neg Hx     Heart Failure Neg Hx        SURGICAL HISTORY     Past Surgical History:   Procedure Laterality Date    CHOLECYSTECTOMY      COLONOSCOPY  01/10/2018    DIAGNOSTIC CARDIAC CATH LAB PROCEDURE      ECTOPIC PREGNANCY SURGERY  1985    EYE SURGERY Right     cataract    OTHER SURGICAL HISTORY  11-4-2011    Posterior repair    POSTERIOR CRUCIATE LIGAMENT REPAIR       CURRENT MEDICATIONS   (This list may include medications prescribed during this encounter as epic can not insert only the list prior to this encounter.)      ALLERGIES     Allergies   Allergen Reactions    Codeine     Ultram [Tramadol]      vomiting    Percocet [Oxycodone-Acetaminophen] Nausea And Vomiting       REVIEW OF SYSTEMS   No chest pain suspicious for cardiac origin  No SOB    PHYSICAL EXAM   Vitals as per nursing record. Heart: WNL  Lungs: Clear to A&P  Abd: soft, non-tender, no masses are felt. Bowel sounds are normoactive. Neurologic: Alert & oriented x 3. Psych: Good mood and memory. Pt is able to make appropriate decisions.       FINAL IMPRESSION AND RECOMMENDATIONS     The etiology of difficult to control itching appears to be obstructive jaundice with expected elevation of bile salts especially and subcutaneous tissue. The possibility of malignancy needs to be ruled out. But at the least, the obstruction at the papil now about 15 minutes for me to leave the hospital [address] [place] [address] Road needs to be relieved. ERCP with papillotomy and stone removal with or without stent placement is recommended. The patient is explained the above procedure(s) in simple words along with its need, risks, benefits and alternatives. To widen the opening of the bile duct a cut is planned to be placed from inside the small bowel with the help on an instrument passed through the endoscope. The preferred method of removing stones with the help of a balloon was explained. Occasionally this is not possible. In that case or in some other situations, a small plastic tube (Stent) may be placed to keep the bile duct draining bile into the small bowel. The risks explained to the patient included, but are not limited to, bleeding, perforation, infection, suppression of breathing, heart attack, stroke, need for hospitalization and/or surgery and remotely even death. The fact that the above risks occur more frequently than with usual EGD and colonoscopy was also explained. The patient is explained the risks specific to an ERCP including, but not limited to, acute pancreatitis (sudden development of inflammation of the pancreas), cholangitis (infection in the bile duct), damage to the internal organs and possible need for surgery. The alternatives to the above procedure were: wait and watch and EUS. The prognosis is explained if the procedure is not done. The patient was given opportunity to ask questions and no question was left unanswered. The patient's response to the discussion was: I know what this involves. I want to get this done. The patient has voiced the understanding of the above and has been given opportunity to ask questions. No question was left unanswered.  The informed consent for the above procedure(s) is obtained. The total time spent face-to-face, review of the record and on the bower during this visit was 25 minutes with more than 50% of the time spent in review of the electronic record MRCP findings and labs showing dilation of the bile duct and elevation of total bilirubin and coordination of care for ERCP -patient's nurse is informed about the plan and about the fact that patient would prefer to get ERCP done first and counseling the patient regarding procedure of and risks, benefits and prognosis of ERCP with papillotomy and possible stent placement and or stone removal.            Sawyer Bucio MD 6/29/21 6:21 PM EDT    CC:  SUMA Candelaria - CNP      IMPORTANT: Please note that some portions of this note may have been created using Dragon voice recognition software. Some \"sound-alike\" and totally wrong word substitutions may have taken place due to known inherent limitations of any such software, including this voice recognition software. In spite of efforts to eliminate such errors, some may not have been corrected. So please read the note with this in mind and recognize such mistakes and understand the correct version using the  context. Thanks.

## 2021-06-29 NOTE — ANESTHESIA PRE PROCEDURE
Department of Anesthesiology  Preprocedure Note       Name:  Anita Peabody   Age:  80 y.o.  :  1950                                          MRN:  [id number]         Date:  2021      Surgeon: Raghu Bolton):  Chas Jamison MD    Procedure: Procedure(s):  CYSTOSCOPY BILATERAL URETERAL STENT INSERTION    Medications prior to admission:   Prior to Admission medications    Medication Sig Start Date End Date Taking? Authorizing Provider   buPROPion (WELLBUTRIN XL) 150 MG extended release tablet TAKE 1 TABLET EVERY MORNING 21  Yes SUMA Sherman CNP   atorvastatin (LIPITOR) 40 MG tablet TAKE 1 TABLET NIGHTLY 21  Yes SUMA Nguyen CNP   metFORMIN (GLUCOPHAGE-XR) 500 MG extended release tablet TAKE 1 TABLET TWICE A DAY 21  Yes SUMA Sherman CNP   lisinopril (PRINIVIL;ZESTRIL) 5 MG tablet TAKE 1 TABLET DAILY 21  Yes SUMA Shermna CNP   alendronate (FOSAMAX) 70 MG tablet TAKE 1 TABLET EVERY 7 DAYS  Patient taking differently: 70 mg TAKE 1 TABLET EVERY 7 DAYS.   Takes on Fridays 5/3/21  Yes SUMA Vincent CNP   glimepiride (AMARYL) 4 MG tablet TAKE 1 TABLET TWICE A DAY 5/3/21  Yes SUMA Vincent CNP   Insulin Glargine, 1 Unit Dial, (TOUJEO SOLOSTAR) 300 UNIT/ML SOPN INJECT 70 UNITS            SUBCUTANEOUSLY NIGHTLY 5/3/21  Yes SUMA Vincent CNP   metoprolol tartrate (LOPRESSOR) 25 MG tablet TAKE 1 TABLET TWICE A DAY 5/3/21  Yes SUMA Vincent CNP   levothyroxine (SYNTHROID) 75 MCG tablet TAKE 1 TABLET DAILY 21  Yes Doretha Mayo MD   blood glucose test strips (ONETOUCH ULTRA) strip USE TO TEST BLOOD SUGAR TWOTIMES A DAY 21  Yes SUMA Pizarro CNP   primidone (MYSOLINE) 50 MG tablet TAKE 3 TABLETS NIGHTLY 21  Yes SUMA Sherman CNP   DULoxetine (CYMBALTA) 60 MG extended release capsule Take 1 capsule by mouth 2 times daily 21  Yes Jayden Serra, APRN - CNP Insulin Pen Needle (B-D ULTRAFINE III SHORT PEN) 31G X 8 MM MISC USE AND DISCARD 1 PEN      NEEDLE DAILY 6/23/20  Yes Narda Jackson MD   Cyanocobalamin (VITAMIN B 12 PO) Take by mouth daily    Yes Historical Provider, MD   Blood Glucose Monitoring Suppl (MM EASY TOUCH GLUCOSE METER) w/Device KIT 1 kit by Does not apply route 2 times daily 3/15/18  Yes SUMA Mays - CNP   vitamin D (CHOLECALCIFEROL) 1000 UNIT TABS tablet Take 4,000 Units by mouth daily   Yes Historical Provider, MD   magnesium 30 MG tablet Take 30 mg by mouth daily   Yes Historical Provider, MD   aspirin 325 MG tablet Take 1 tablet by mouth daily.  3/18/14  Yes Jonathan Massac, DO   UNABLE TO FIND Tumeric    Historical Provider, MD   ONE TOUCH ULTRASOFT LANCETS MISC Test blood sugar 2 times a day 4/6/17   Carisa Patricia MD       Current medications:    Current Facility-Administered Medications   Medication Dose Route Frequency Provider Last Rate Last Admin    hydrALAZINE (APRESOLINE) 20 MG/ML injection             labetalol (NORMODYNE;TRANDATE) injection 10 mg  10 mg Intravenous Q6H PRN Rebekah King MD        buPROPion (WELLBUTRIN XL) extended release tablet 150 mg  150 mg Oral Daily Ekaterina Jhaveri MD   150 mg at 06/28/21 0803    DULoxetine (CYMBALTA) extended release capsule 60 mg  60 mg Oral Daily Ekaterina Jhaveri MD   60 mg at 06/28/21 0803    levothyroxine (SYNTHROID) tablet 75 mcg  75 mcg Oral Daily Ekaterina Jhaveri MD   75 mcg at 06/29/21 0710    metoprolol tartrate (LOPRESSOR) tablet 25 mg  25 mg Oral BID Ekaterina Jhaveri MD   25 mg at 06/29/21 0844    vitamin D (CHOLECALCIFEROL) tablet 4,000 Units  4,000 Units Oral Daily Ekaterina Jhaveri MD   4,000 Units at 06/28/21 0803    glucose (GLUTOSE) 40 % oral gel 15 g  15 g Oral PRN Ekaterina Jhaveri MD        dextrose 50 % IV solution  12.5 g Intravenous PRN Ekaterina Jhaveri MD   12.5 g at 06/29/21 0648    glucagon (rDNA) injection 1 mg  1 mg Intramuscular PRN Niru Ivory Francia Duran MD        dextrose 5 % solution  100 mL/hr Intravenous PRN Sergio Corbin MD        sodium chloride flush 0.9 % injection 10 mL  10 mL Intravenous PRN Sergio Corbin MD   10 mL at 06/28/21 0803    0.9 % sodium chloride infusion  25 mL Intravenous PRN Sergio Corbin MD        acetaminophen (TYLENOL) tablet 650 mg  650 mg Oral Q6H PRN Sergio Corbin MD   650 mg at 06/29/21 7625    Or    acetaminophen (TYLENOL) suppository 650 mg  650 mg Rectal Q6H PRN Sergio Corbin MD        melatonin tablet 3 mg  3 mg Oral Nightly PRN Sergio Corbin MD        diphenhydrAMINE (BENADRYL) tablet 50 mg  50 mg Oral Q6H PRN Sergio Corbin MD   50 mg at 06/28/21 2156    hydrOXYzine (ATARAX) tablet 10 mg  10 mg Oral TID PRN Lorrain Ek, APRN - CNP   10 mg at 06/29/21 4134    ursodiol (ACTIGALL) capsule 300 mg  300 mg Oral BID Yamilet Capellan MD   300 mg at 06/28/21 2142    HYDROmorphone (DILAUDID) injection 1 mg  1 mg Intravenous Q4H PRN Bethel Ramu Landin MD   1 mg at 06/29/21 0709    insulin lispro (HUMALOG) injection vial 0-6 Units  0-6 Units Subcutaneous TID WC Jame Mcdermott MD        insulin lispro (HUMALOG) injection vial 0-3 Units  0-3 Units Subcutaneous Nightly Jame Mcdermott MD        0.9 % sodium chloride infusion   Intravenous Continuous Jame Mcdermott  mL/hr at 06/29/21 0141 New Bag at 06/29/21 0141    heparin (porcine) injection 5,000 Units  5,000 Units Subcutaneous 3 times per day Jame Mcdermott MD   5,000 Units at 06/29/21 0709    ondansetron (ZOFRAN-ODT) disintegrating tablet 4 mg  4 mg Oral Q4H PRN Sergio Corbin MD        ondansetron [facility]) injection 4 mg  4 mg Intravenous Q4H PRN Sergio Corbin MD   4 mg at 06/29/21 9357    promethazine (PHENERGAN) injection 12.5 mg  12.5 mg Intravenous Q4H PRN Sergio Corbin MD        promethazine (PHENERGAN) tablet 12.5 mg  12.5 mg Oral Q4H PRN Sergio Corbin MD           Allergies:     Allergies Allergen Reactions    Codeine     Ultram [Tramadol]      vomiting    Percocet [Oxycodone-Acetaminophen] Nausea And Vomiting       Problem List:    Patient Active Problem List   Diagnosis Code    CAD (coronary artery disease) I25.10    Sleep apnea G47.30    Generalized weakness P83.2    Nonalcoholic steatohepatitis Z95.63    Rectocele N81.6    DM2 (diabetes mellitus, type 2) (HCC) E11.9    HTN (hypertension), benign I10    Cerebral infarction ([facility] I63.9    Right shoulder pain M25.511    Insomnia G47.00    SUNIL (obstructive sleep apnea) G47.33    Coarse tremor G25.2    Dizziness R42    Palpitations R00.2    Essential tremor G25.0    Dyslipidemia E78.5    PNA (pneumonia) J18.9    Obesity E66.9    Bilateral primary osteoarthritis of knee M17.0    Chronic bilateral low back pain without sciatica M54.5, G89.29    DDD (degenerative disc disease), lumbar M51.36    Facet arthritis of lumbar region M47.816    Primary osteoarthritis of right knee M17.11    Abnormal finding on thyroid function test R94.6    LANA (acute kidney injury) ([facility] N17.9    Obstructive nephropathy N13.8    Metastatic malignant neoplasm (HCC) C79.9    Abdominal lymphadenopathy R59.0    Transaminitis R74.01    Elevated bilirubin R17    Nausea and vomiting R11.2    Bile duct obstruction K83.1    Moderate malnutrition (HCC) E44.0       Past Medical History:        Diagnosis Date    Anemia     Anesthesia     PONV    Arthritis     CAD (coronary artery disease)     CVA (cerebral infarction)     Diabetes mellitus (HCC)     Diastolic heart failure (HCC)     Fibromyalgia     Hyperlipidemia     Hypertension     Hypothyroidism     LFT's abnormal     CHAMBERS (nonalcoholic steatohepatitis)     Nausea & vomiting     Pancreatic cyst     PNA (pneumonia) 2/23/2021    Pneumonia     Rectocele     Sleep apnea     does not use c-pap    Tremor     Vitamin D deficiency        Past Surgical History:        Procedure Laterality Date    CHOLECYSTECTOMY      COLONOSCOPY  01/10/2018    DIAGNOSTIC CARDIAC CATH LAB PROCEDURE      ECTOPIC PREGNANCY SURGERY  1985    EYE SURGERY Right     cataract    OTHER SURGICAL HISTORY  2011    Posterior repair    POSTERIOR CRUCIATE LIGAMENT REPAIR         Social History:    Social History     Tobacco Use    Smoking status: Former Smoker     Packs/day: 0.25     Years: 2.00     Pack years: 0.50     Types: Cigarettes     Quit date: 1991     Years since quittin.2    Smokeless tobacco: Never Used   Substance Use Topics    Alcohol use: No     Alcohol/week: 0.0 standard drinks                                Counseling given: Not Answered      Vital Signs (Current):   Vitals:    21 0651 21 0656 21 0716 21 0807   BP:  (!) 186/98 (!) 176/92 (!) 155/88   Pulse: 112  106 111   Resp:   20 18   Temp:   99.1 °F (37.3 °C) 98.5 °F (36.9 °C)   TempSrc:    Oral   SpO2:   91% 93%   Weight:       Height:                                                  BP Readings from Last 3 Encounters:   21 (!) 155/88   21 121/71   21 102/68       NPO Status:                                                                                 BMI:   Wt Readings from Last 3 Encounters:   21 200 lb 6.4 oz (90.9 kg)   21 201 lb (91.2 kg)   21 201 lb (91.2 kg)     Body mass index is 31.86 kg/m².     CBC:   Lab Results   Component Value Date    WBC 9.8 2021    RBC 4.16 2021    HGB 10.8 2021    HCT 32.9 2021    MCV 79.2 2021    RDW 17.1 2021     2021       CMP:   Lab Results   Component Value Date     2021    K 5.3 2021    K 3.6 2021     2021    CO2 17 2021    BUN 27 2021    CREATININE 2.7 2021    GFRAA 21 2021    GFRAA >60 2011    AGRATIO 1.0 2021    LABGLOM 17 2021    GLUCOSE 523 2021    PROT 5.9 2021    PROT 5.8 2011 CALCIUM 8.4 06/29/2021    BILITOT 4.1 06/29/2021    ALKPHOS 654 06/29/2021     06/29/2021     06/29/2021       POC Tests:   Recent Labs     06/29/21  0654 06/29/21  0719 06/29/21  1131   POCGLU 321*   < > 103*   POCNA 139  --   --    POCK 5.4*  --   --    POCHCT 32.0*  --   --     < > = values in this interval not displayed. Coags:   Lab Results   Component Value Date    PROTIME 12.8 06/29/2021    INR 1.10 06/29/2021    APTT 27.8 06/03/2011       HCG (If Applicable): No results found for: PREGTESTUR, PREGSERUM, HCG, HCGQUANT     ABGs:   Lab Results   Component Value Date    PHART 7.308 06/29/2021    PO2ART 72.5 06/29/2021    JKE8NSC 35.2 06/29/2021    NJR6GYG 17.6 06/29/2021    BEART -9 06/29/2021    E8HBSQXM 93 06/29/2021        Type & Screen (If Applicable):  Lab Results   Component Value Date    LABABO A 11/04/2011    LABRH Negative 11/04/2011       Drug/Infectious Status (If Applicable):  No results found for: HIV, HEPCAB    COVID-19 Screening (If Applicable):   Lab Results   Component Value Date    COVID19 Not Detected 06/29/2021    COVID19 Not Detected 02/23/2021           Anesthesia Evaluation  Patient summary reviewed   history of anesthetic complications: PONV. Airway: Mallampati: II  TM distance: >3 FB   Neck ROM: full  Mouth opening: > = 3 FB Dental: normal exam         Pulmonary:Negative Pulmonary ROS and normal exam  breath sounds clear to auscultation  (+) sleep apnea:                             Cardiovascular:Negative CV ROS  Exercise tolerance: good (>4 METS),   (+) hypertension:, CAD:,         Rhythm: regular  Rate: normal                    Neuro/Psych:   Negative Neuro/Psych ROS  (+) CVA:, neuromuscular disease:,             GI/Hepatic/Renal: Neg GI/Hepatic/Renal ROS  (+) liver disease:, renal disease: ARF,           Endo/Other: Negative Endo/Other ROS   (+) Diabetes, hypothyroidism::., .                 Abdominal:             Vascular: negative vascular ROS.          Other Findings:          Pre-Operative Diagnosis: LANA (acute kidney injury) ([facility] [N17.9]    80 y.o.   BMI:  Body mass index is 31.86 kg/m². Vitals:    21 0651 21 0656 21 0716 21 0807   BP:  (!) 186/98 (!) 176/92 (!) 155/88   Pulse: 112  106 111   Resp:   20 18   Temp:   99.1 °F (37.3 °C) 98.5 °F (36.9 °C)   TempSrc:    Oral   SpO2:   91% 93%   Weight:       Height:           Allergies   Allergen Reactions    Codeine     Ultram [Tramadol]      vomiting    Percocet [Oxycodone-Acetaminophen] Nausea And Vomiting       Social History     Tobacco Use    Smoking status: Former Smoker     Packs/day: 0.25     Years: 2.00     Pack years: 0.50     Types: Cigarettes     Quit date: 1991     Years since quittin.2    Smokeless tobacco: Never Used   Substance Use Topics    Alcohol use: No     Alcohol/week: 0.0 standard drinks       LABS:    CBC  Lab Results   Component Value Date/Time    WBC 9.8 2021 06:51 AM    HGB 10.8 (L) 2021 06:54 AM    HCT 32.9 (L) 2021 06:51 AM     2021 06:51 AM     RENAL  Lab Results   Component Value Date/Time     (L) 2021 06:51 AM    K 5.3 (H) 2021 06:51 AM    K 3.6 2021 06:04 AM     2021 06:51 AM    CO2 17 (L) 2021 06:51 AM    BUN 27 (H) 2021 06:51 AM    CREATININE 2.7 (H) 2021 06:51 AM    GLUCOSE 523 (H) 2021 06:51 AM     COAGS  Lab Results   Component Value Date/Time    PROTIME 12.8 2021 09:40 AM    INR 1.10 2021 09:40 AM    APTT 27.8 2011 10:00 AM        Anesthesia Plan      general     ASA 4     (I discussed with the patient the risks and benefits of PIV, anesthesia, IV Narcotics, PACU. All questions were answered the patient agrees with the plan and wishes to proceed)  Induction: intravenous.                           Shar Christopher MD   2021

## 2021-06-29 NOTE — PROGRESS NOTES
ONCOLOGY HEMATOLOGY CARE PROGRESS NOTE      SUBJECTIVE:     The patient had hypoglycemia this morning with hypotension. A rapid response was called. She is fatigued now, but does talk sparingly. ROS:     Constitutional:  No weight loss, No fever, No chills, No night sweats. Energy level good. Eyes:  No impairment or change in vision  ENT / Mouth:  No pain, abnormal ulceration, bleeding, nasal drip or change in voice or hearing  Cardiovascular:  No chest pain, palpitations, new edema, or calf discomfort  Respiratory:  No pain, hemoptysis, change to breathing  Breast:  No pain, discharge, change in appearance or texture  Gastrointestinal:  No pain, cramping, jaundice, change to eating and bowel habits  Urinary:  No pain, bleeding or change in continence  Genitalia: No pain, bleeding or discharge  Musculoskeletal:  No redness, pain, edema or weakness  Skin:  No pruritus, rash, change to nodules or lesions  Neurologic:  No discomfort, change in mental status, speech, sensory or motor activity  Psychiatric:  No change in concentration or change to affect or mood  Endocrine:  No hot flashes, increased thirst, or change to urine production  Hematologic: No petechiae, ecchymosis or bleeding  Lymphatic:  No lymphadenopathy or lymphedema  Allergy / Immunologic:  No eczema, hives, frequent or recurrent infections    OBJECTIVE        Physical    VITALS:  BP (!) 176/92   Pulse 106   Temp 99.1 °F (37.3 °C)   Resp 20   Ht 5' 6.5\" (1.689 m)   Wt 200 lb 6.4 oz (90.9 kg)   LMP  (LMP Unknown)   SpO2 91%   BMI 31.86 kg/m²   TEMPERATURE:  Current - Temp: 99.1 °F (37.3 °C);  Max - Temp  Av.7 °F (37.1 °C)  Min: 98.3 °F (36.8 °C)  Max: 99.1 °F (37.3 °C)  PULSE OXIMETRY RANGE: SpO2  Av.5 %  Min: 90 %  Max: 99 %  24HR INTAKE/OUTPUT:      Intake/Output Summary (Last 24 hours) at 2021 0852  Last data filed at 2021 2339  Gross per 24 hour   Intake 1170 ml   Output steatohepatitis    Rectocele    DM2 (diabetes mellitus, type 2) (HCC)    HTN (hypertension), benign    Cerebral infarction (HCC)    Right shoulder pain    Insomnia    SUNIL (obstructive sleep apnea)    Coarse tremor    Dizziness    Palpitations    Essential tremor    Dyslipidemia    PNA (pneumonia)    Obesity    Bilateral primary osteoarthritis of knee    Chronic bilateral low back pain without sciatica    DDD (degenerative disc disease), lumbar    Facet arthritis of lumbar region    Primary osteoarthritis of right knee    Abnormal finding on thyroid function test    LANA (acute kidney injury) ([address]    Obstructive nephropathy    Metastatic malignant neoplasm (HCC)    Abdominal lymphadenopathy    Transaminitis    Elevated bilirubin    Nausea and vomiting    Bile duct obstruction    Moderate malnutrition (HCC)       ASSESSMENT AND PLAN:    Metastatic carcinoma:  -She appears to have choroidal mets. -She has bone lesions, possible intrahepatic lesion, and omental metastasis  -CA-125 is over thousand  -Initial myeloma studies are negative  -The differential is quite broad. -She is going to have an MRCP or ERCP to look for pancreatic carcinoma. -If this is negative, I would consider a bone biopsy to help with the differential.  -She has a very poor prognosis.     Elevated liver functions:  -The patient is going to have an ERCP          ONCOLOGIC DISPOSITION:    -Post diagnosis    Yenny Drew MD  Please contact through [facility]

## 2021-06-29 NOTE — PROGRESS NOTES
Per Dr Veronika Jensen, we will wait until after bone scan to determine location for biopsy and may potentially do a bone marrow biopsy instead. Dennis Tomlinson RN updated and she will notifiy IR after bone scan is completed.

## 2021-06-29 NOTE — PROGRESS NOTES
Pt now able to ambulate to the restroom and have a soft light brown BM. She was assisted back to her bed and was tolerating ice chips. She was sent to [facility] for a bone scan and the floor nurse Bruno Deutsch is aware and will check her glucose on arrival back to 548. Pt wouldn't eat anything for me.

## 2021-06-29 NOTE — PROGRESS NOTES
Urology Attending Progress Note      Subjective: has a headache    Vitals:  BP (!) 155/88   Pulse 111   Temp 98.5 °F (36.9 °C) (Oral)   Resp 18   Ht 5' 6.5\" (1.689 m)   Wt 200 lb 6.4 oz (90.9 kg)   LMP  (LMP Unknown)   SpO2 93%   BMI 31.86 kg/m²   Temp  Av.7 °F (37.1 °C)  Min: 98.3 °F (36.8 °C)  Max: 99.1 °F (37.3 °C)    Intake/Output Summary (Last 24 hours) at 2021 1226  Last data filed at 2021 0945  Gross per 24 hour   Intake 670 ml   Output 1150 ml   Net -480 ml       Exam: abd soft    Labs:  WBC:    Lab Results   Component Value Date    WBC 9.8 2021     Hemoglobin/Hematocrit:    Lab Results   Component Value Date    HGB 10.8 2021    HCT 32.9 2021     BMP:    Lab Results   Component Value Date     2021    K 5.3 2021    K 3.6 2021     2021    CO2 17 2021    BUN 27 2021    LABALBU 2.9 2021    CREATININE 2.7 2021    CALCIUM 8.4 2021    GFRAA 21 2021    GFRAA >60 2011    LABGLOM 17 2021     PT/INR:    Lab Results   Component Value Date    PROTIME 12.8 2021    INR 1.10 2021     PTT:    Lab Results   Component Value Date    APTT 27.8 2011   [APTT    Impression/Plan: bilateral hydro, LANA  1.  To OR for cysto bilateral stents    Rochelle Koch MD

## 2021-06-29 NOTE — PROGRESS NOTES
Patient found to be very lethargic and difficult to rouse, /100 . Rapid response called.      Electronically signed by Catherine Spangler RN on 6/29/2021 at 8:04 AM

## 2021-06-29 NOTE — PROGRESS NOTES
Speech Language Pathology  Attempt Note    Name: Piedad Joshua  : 1950  Medical Diagnosis: LANA (acute kidney injury) ([facility] [address] [N17.9]    SLP attempted to see pt for dysphagia evaluation. Pt currently NPO for a procedure. SLP to re-attempt evaluation as pt's schedule and condition allows. No charges.     Thank you,    Modesto Nelson MA [facility]  Speech Language Pathologist

## 2021-06-29 NOTE — PROGRESS NOTES
RRT    For altered mental status HOB elevated eye closed not really responding BG 50 . Gave 1amp dextrose became more arousable but not really perked up BP elevated 221 systolic didn't have a prn  Gave hydralazine 10mg IVP  She appears to be tachy as well 110's will add labetalol 10mg IV prn     ABG 7.308/35/72/17.6  CO2 has dropped will add lactate level    If remains poorly responsive compared to prior consider CT or mRI brain .  She told nurse has HA (could be elevated BP or low BG)

## 2021-06-29 NOTE — PROGRESS NOTES
ABG drawn at this time during rapid response. Patient tolerated well. Results shown to Dr. Valeria Giraldo by ICU RN on POC testing. Electronically signed by Greg Marks RCP, RRT, RRT-ACCS on 6/29/2021 at 7:02 AM        06/29/21 0650   Oxygen Therapy/Pulse Ox   O2 Therapy Room air   O2 Device None (Room air)   SpO2 93 %   Blood Gas  Performed? Yes   Ike's Test #1 Pos   Site #1 Left Radial   Site Prepped #1 Yes   Number of Attempts #1 1   Pressure Held #1 Yes   Complications #1 None   Post-procedure #1 Standard   Specimen Status #1 To lab   How Tolerated?  Tolerated well

## 2021-06-29 NOTE — PLAN OF CARE
Problem: Falls - Risk of:  Goal: Will remain free from falls  Description: Will remain free from falls  Outcome: Ongoing     Problem: Pain:  Goal: Pain level will decrease  Description: Pain level will decrease  Outcome: Ongoing     Problem: Fluid Volume:  Goal: Maintenance of adequate hydration will improve  Description: Maintenance of adequate hydration will improve  Outcome: Ongoing     Problem: Sensory:  Goal: Pain level will decrease  Description: Pain level will decrease  Outcome: Ongoing

## 2021-06-29 NOTE — ANESTHESIA POSTPROCEDURE EVALUATION
Department of Anesthesiology  Postprocedure Note    Patient: Maribel Leroy  MRN: [id number]  YOB: 1950  Date of evaluation: 6/29/2021  Time:  4:41 PM     Procedure Summary     Date: 06/29/21 Room / Location: [address]    Anesthesia Start: 8246 Anesthesia Stop: 4730    Procedure: CYSTOSCOPY BILATERAL URETERAL STENT INSERTION (Bilateral Bladder) Diagnosis: (-)    Surgeons: Shelbie Larios MD Responsible Provider: Clair Douglas MD    Anesthesia Type: general ASA Status: 4          Anesthesia Type: general    Adams Phase I: Adams Score: 7    Adams Phase II:      Last vitals: Reviewed and per EMR flowsheets. Anesthesia Post Evaluation    Patient location during evaluation: PACU  Patient participation: complete - patient participated  Level of consciousness: awake and alert  Airway patency: patent  Nausea & Vomiting: no nausea and no vomiting  Complications: no  Cardiovascular status: blood pressure returned to baseline  Respiratory status: acceptable  Hydration status: euvolemic  Comments: VSS on transfer to phase 2 recovery. No anesthetic complications.

## 2021-06-29 NOTE — PROGRESS NOTES
Hospitalist Progress Note      PCP: Bernabe Stein, SUMA - CNP    Date of Admission: 6/27/2021    Chief Complaint: Fatigue    Hospital Course: Doss Hammans is a 80 y.o. female.     She developed left eye soreness and headaches in May. She saw an ophthalmologist, Dr. Sean Lu at [facility], on 5/19/21. 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 She has been on a tapering regimen since then. She has had problems with glycemic control. She has also not responded as expected to treatment. Her ophthalmologist became concerned about an intracranial neoplasm. It was recommended that patient undergo additional imaging including MRI brain and maxillofacial/sinus w and w/o contrast.  She was also recommended to undergo CT chest w/o contrast.  The MRI did not reveal a tumor but was notable for lobular left retinal thickening and appparent left optic nerve compression. The CT chest was notable for mild mediastinal lymphadenopathy. She was referred to oncology but has not had an office visit yet. She abruptly stopped the prednisone several days ago. She had tapered by that time to 60mg daily.     She has been having abdominal pain for roughly a month. She though this was from taking prednisone so tried to ignore it or treat it with OTC remedies. She presents tonight for progressive pain and persistent nausea. She cannot say for how long though. She does relate she is having bowel movements and passing flatus although she may go 3 or 4 days between bowel movements. She denies any difficulty urinating, but relates that when she wakes up every morning she is leaking urine and leaks urine all the way to the restroom.   She recalls being hospitalized for pneumonia in February and having urinary retention at the time which was treated with a short term chandra catheter    Subjective:   Hypoglycemic episode this morning  Received insulin and she was n.p.o.most part of non-distended with normal bowel sounds. Obese  Musculoskeletal: No clubbing, cyanosis or edema bilaterally. Full range of motion without deformity. Skin: Skin color, texture, turgor normal.  Left groin lymphadenopathy, subcutaneous lump chest and upper back  Neurologic:  Neurovascularly intact without any focal sensory/motor deficits. Cranial nerves: II-XII intact, grossly non-focal.  His upper extremity tremor  Psychiatric: Alert and oriented, thought content appropriate, normal insight  Capillary Refill: Brisk,3 seconds, normal   Peripheral Pulses: +2 palpable, equal bilaterally       Labs:   Recent Labs     06/27/21 2012 06/27/21 2012 06/28/21  0654 06/29/21  0651 06/29/21  0654   WBC 8.8  --  9.6 9.8  --    HGB 12.9   < > 11.9* 10.8* 10.8*   HCT 38.9  --  36.8 32.9*  --      --  266 266  --     < > = values in this interval not displayed. Recent Labs     06/27/21 2012 06/27/21 2012 06/28/21  0654 06/28/21  1330 06/29/21  0651   *  --  138  --  134*   K 5.1   < > 5.6* 5.1 5.3*   CL 99  --  110  --  106   CO2 19*  --  17*  --  17*   BUN 29*  --  25*  --  27*   CREATININE 3.0*  --  2.9*  --  2.7*   CALCIUM 9.1  --  8.7  --  8.4    < > = values in this interval not displayed. Recent Labs     06/27/21 2012 06/28/21  0654 06/29/21  0651   * 342* 241*   * 483* 352*   BILITOT 2.6* 3.0* 4.1*   ALKPHOS 672* 652* 654*     Recent Labs     06/28/21  0000 06/29/21  0940   INR 1.03 1.10     Recent Labs     06/27/21 2012   TROPONINI <0.01       Urinalysis:      Lab Results   Component Value Date    NITRU Negative 06/27/2021    WBCUA 10-20 06/27/2021    BACTERIA Rare 06/27/2021    RBCUA None seen 06/27/2021    BLOODU Negative 06/27/2021    SPECGRAV 1.020 06/27/2021    GLUCOSEU 500 06/27/2021       Radiology:  MRI ABDOMEN WO CONTRAST MRCP   Final Result   1.  Severe intrahepatic and extrahepatic biliary dilation and up to moderate   dilation of the main pancreatic duct with abrupt caliber changes in the   common bile duct and main pancreatic duct near the pancreatic head. No   definite underlying lesion is identified, although one is not excluded on the   basis of this study. Considerations include ductal adenocarcinoma or   postinflammatory strictures from prior pancreatitis. Consider further   evaluation with pancreas protocol abdomen CT or MRI versus ERCP. 2. Sequelae of chronic pancreatitis best demonstrated on CT with potential   for superimposed mild acute pancreatitis. However, the visualized   peripancreatic stranding could be related to minimal anasarca seen elsewhere. 3. Innumerable simple and minimally complicated cystic lesions measuring up   to 1.8 cm throughout the pancreas could represent dilated side branches from   chronic pancreatitis and/or branch duct intraductal papillary mucinous   neoplasms. Recommend follow-up with pancreas protocol and MRI (preferred) or   CT in 1 year. 4. Severe bilateral hydronephrosis, moderate to severe left hydroureter (in   the setting of a partially duplicated collecting system), and mild right   hydroureter. The right ureter changes caliber proximal to the pelvic brim,   while the conjoint left ureter changes caliber near the pelvic brim. Underline postinflammatory or malignant strictures are not excluded. Consider further evaluation with CT urography. 5. Trace to small bilateral pleural effusions, trace ascites, and minimal   anasarca suggestive of volume overload. 6. Suboptimally seen mesenteric lymph nodes better demonstrated on CT could   be reactive but could also be related to mesenteric panniculitis or   neoplastic involvement. Recommend attention on follow-up imaging. US GALLBLADDER RUQ   Final Result   1. Right-sided hydroureteronephrosis. 2. Common duct dilation status post cholecystectomy.          CT CHEST ABDOMEN PELVIS WO CONTRAST   Final Result   Somewhat limited study in the absence of contrast material.      Numerous sclerotic metastases throughout all bones of the visualized axial   and appendicular skeleton with a possible lytic lesion within the upper   aspect of the right scapula and a small soft tissue nodule within the   anterior left chest, possibly metastatic. These findings are new from prior. A healing nondisplaced subacute fracture of the left L2 transverse process is   also new, possibly pathologic. There are numerous mildly enlarged lymph nodes throughout the mediastinum as   well as the retroperitoneum in the abdominal mesentery. Moderate to severe bilateral hydronephrosis and hydroureter, also new from   prior, without appreciable obstructing stone. It is unclear whether this is   related to metastatic cervical cancer or an underlying lymphoproliferative   disorder such as lymphoma. Lymph node sampling is recommended. Minimal bibasilar atelectasis. Otherwise, clear lungs. XR CHEST PORTABLE   Final Result   Stable chronic changes with no acute abnormality seen.          CT GUIDED NEEDLE PLACEMENT    (Results Pending)   NM BONE SCAN WHOLE BODY    (Results Pending)   XR ABDOMEN (KUB) (SINGLE AP VIEW)    (Results Pending)   FLUORO FOR SURGICAL PROCEDURES    (Results Pending)           Assessment/Plan:    Active Hospital Problems    Diagnosis     Metastatic malignant neoplasm ([address] [C79.9]     Abdominal lymphadenopathy [R59.0]     Transaminitis [R74.01]     Moderate malnutrition (HCC) [E44.0]     Elevated bilirubin [R17]     Nausea and vomiting [R11.2]     Bile duct obstruction [K83.1]     LANA (acute kidney injury) ([address] [N17.9]     Obstructive nephropathy [N13.8]     DM2 (diabetes mellitus, type 2) (HCC) [E11.9]     Generalized weakness [R53.1]      LANA, Obstructive nephropathy  -  Baseline creatinine < 0.5 back in February, climbing since then, and currently 3.  -  Bilateral hydroureteronephrosis without significant bladder distention (although there is some urine in the bladder). It appears both ureters are dilated above the pelvis, but suddenly taper in diameter in the pelvis near the UVJ. No obstructing calculi apparent. Bilateral malignant strictures are possible. -  Place chandra catheter. There was 300-400mL urine output. urology assistance appreciated and keep patient NPO in case of procedures. planning for cystoscopy and ureteral stent today  -  Avoid nephrotoxin exposure. Hold home metformin, lisinopril. -  Fluid normal saline.     Metastatic Cancer, Unknown primary  -  Sclerotic lesions are widespread throughout the axial skeleton. One lesion in the right scapula might be lytic. -  Widespread lymphadenopathy in the abdomen, retroperitoneal space, and mediastinum. -   assistance from Hem/Onc appreciated for further diagnostics and choosing a biopsy site. Advised bone scan, multiple myeloma studies tumor markers micronutrients skin biopsy, flow cytometry      Transaminitis  -  Mixed hepatocellular and cholestatic pattern. -  Acute hepatitis panel sent from ER.    -  No explanatory finding on CT w/o contrast per report. RUQ U/S performed. Shows dilated common bile duct at 2.2 cm. CT images were reviewed again. There is abrupt tapering of the CBD within the pancreatic head. No radio-opaque calculus is visible. -  Choledocholithiasis, CBD stricture/tumor, ampullary dysfunction/tumor. GI input appreciated, following  MRCP shows severe biliary ductal dilatation   Pancreatic ductal adenocarcinoma cannot be ruled out    DM2,  Possible iatrogenic adrenal insufficiency  -  Hold all oral antidiabetic agents. Start s.c. Insulin regimen based on home regimen. Adjust as needed. -  With patient now off steroids, insulin requirements are likely to drop precipitously. Vigilance for hypoglycemia and appropriate insulin adjustments are warranted. -  That patient took high dose steroids for roughly 1.5 months.   Vigilance for signs of adrenal insufficiency is also warranted. Blood sugar has been running low, DC high sliding scale insulin coverage start low sliding scale coverage  -Cortisol level pending    . Hypoglycemic episode this morning, i Lantus was discontinued       Mild hyperkalemia-repeat stat and Kayexalate, DC Ringer lactate,  start normal saline, repeat potassium 5.1 and today's labs pending     Chronic Conditions  HLD -hold statin. as of transaminitis  CAD, HTN - continue metoprolol. Hold ASA for procedures. Depression - Continue bupropion and duloxetine. Hypothyroid - Continue synthroid.     DVT Prophylaxis: Heparin subcu  Diet: Diet NPO Exceptions are: Ice Chips, Sips of Water with Meds  Code Status: Full Code    PT/OT Eval Status:     Dispo -pending improvement     Lito Mckeon MD

## 2021-06-30 NOTE — PROGRESS NOTES
Shift assessment completed per documentation. Pt A&Ox4. VSS. Pt awake in bed. Pt c/o chronic back pain 8/10. Bilateral lung sounds diminished. Abdomen soft and rounded. Bowel sounds active. Marroquin in place. Bed locked and in lowest position. Bed alarm on. Nonskid slippers on. Side rails up 2/4. Call light and personal belongings within reach. Will continue to monitor.

## 2021-06-30 NOTE — PROGRESS NOTES
Hospitalist Progress Note      PCP: SUMA Rodrigues CNP    Date of Admission: 6/27/2021    Chief Complaint: Fatigue    Hospital Course: Adelso Vera is a 80 y.o. female.     She developed left eye soreness and headaches in May. She saw an ophthalmologist, Dr. Wolf Cardoso at [address] [place] [address], on 5/19/21. 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 She has been on a tapering regimen since then. She has had problems with glycemic control. She has also not responded as expected to treatment. Her ophthalmologist became concerned about an intracranial neoplasm. It was recommended that patient undergo additional imaging including MRI brain and maxillofacial/sinus w and w/o contrast.  She was also recommended to undergo CT chest w/o contrast.  The MRI did not reveal a tumor but was notable for lobular left retinal thickening and appparent left optic nerve compression. The CT chest was notable for mild mediastinal lymphadenopathy. She was referred to oncology but has not had an office visit yet. She abruptly stopped the prednisone several days ago. She had tapered by that time to 60mg daily.     She has been having abdominal pain for roughly a month. She though this was from taking prednisone so tried to ignore it or treat it with OTC remedies. She presents tonight for progressive pain and persistent nausea. She cannot say for how long though. She does relate she is having bowel movements and passing flatus although she may go 3 or 4 days between bowel movements. She denies any difficulty urinating, but relates that when she wakes up every morning she is leaking urine and leaks urine all the way to the restroom.   She recalls being hospitalized for pneumonia in February and having urinary retention at the time which was treated with a short term chandra catheter    Subjective:   Complain of extreme tiredness and not feeling that great  No fever, acute change in mental status, chest pain, shortness of breath, nausea vomiting diarrhea or abdominal distention. She has abdominal pain that is not new        Medications:  Reviewed    Infusion Medications    dextrose      sodium chloride      sodium chloride 100 mL/hr at 06/30/21 1283     Scheduled Medications    metoprolol tartrate  50 mg Oral BID    buPROPion  150 mg Oral Daily    DULoxetine  60 mg Oral Daily    levothyroxine  75 mcg Oral Daily    Vitamin D  4,000 Units Oral Daily    ursodiol  300 mg Oral BID    insulin lispro  0-6 Units Subcutaneous TID WC    insulin lispro  0-3 Units Subcutaneous Nightly    heparin (porcine)  5,000 Units Subcutaneous 3 times per day     PRN Meds: labetalol, glucose, dextrose, glucagon (rDNA), dextrose, sodium chloride flush, sodium chloride, acetaminophen **OR** acetaminophen, melatonin, diphenhydrAMINE, hydrOXYzine, HYDROmorphone, ondansetron, ondansetron, promethazine, promethazine      Intake/Output Summary (Last 24 hours) at 6/30/2021 1203  Last data filed at 6/30/2021 1001  Gross per 24 hour   Intake 4366.47 ml   Output 4350 ml   Net 16.47 ml       Physical Exam Performed:    BP (!) 179/83   Pulse 95   Temp 97.6 °F (36.4 °C) (Oral)   Resp 18   Ht 5' 6.5\" (1.689 m)   Wt 200 lb 6.4 oz (90.9 kg)   LMP  (LMP Unknown)   SpO2 95%   BMI 31.86 kg/m²     General appearance: No apparent distress, appears stated age and cooperative, looks tired. HEENT: Pupils equal, round, and reactive to light. Conjunctivae/corneas -mildly jaundiced. Neck: Supple, with full range of motion. No jugular venous distention. Trachea midline. Respiratory:  Normal respiratory effort. Reduced air entry, bilaterally without Rales/Wheezes/Rhonchi. Cardiovascular: Regular rate and rhythm with normal S1/S2 without murmurs, rubs or gallops. Abdomen: Soft, non-tender, non-distended with normal bowel sounds. Obese  Musculoskeletal: No clubbing, cyanosis or edema bilaterally.   Full range of motion without deformity. Skin: Skin color, texture, turgor normal.  Left groin lymphadenopathy, subcutaneous lump chest and upper back  Neurologic:  Neurovascularly intact without any focal sensory/motor deficits. Has upper extremity tremor  Psychiatric: Alert and oriented,   Capillary Refill: Brisk,3 seconds, normal   Peripheral Pulses: +2 palpable, equal bilaterally       Labs:   Recent Labs     06/28/21  0654 06/28/21  0654 06/29/21  0651 06/29/21  0654 06/30/21  0610   WBC 9.6  --  9.8  --  9.2   HGB 11.9*   < > 10.8* 10.8* 11.0*   HCT 36.8  --  32.9*  --  33.0*     --  266  --  264    < > = values in this interval not displayed. Recent Labs     06/28/21  0654 06/28/21  1330 06/29/21  0651 06/30/21  0610 06/30/21  0827     --  134* 143  --    K 5.6*   < > 5.3* 6.0* 5.4*     --  106 113*  --    CO2 17*  --  17* 22  --    BUN 25*  --  27* 17  --    CREATININE 2.9*  --  2.7* 1.2  --    CALCIUM 8.7  --  8.4 9.1  --     < > = values in this interval not displayed. Recent Labs     06/28/21  0654 06/29/21  0651 06/30/21  0610   * 241* 161*   * 352* 260*   BILITOT 3.0* 4.1* 4.6*   ALKPHOS 652* 654* 667*     Recent Labs     06/28/21  0000 06/29/21  0940   INR 1.03 1.10     Recent Labs     06/27/21 2012   TROPONINI <0.01       Urinalysis:      Lab Results   Component Value Date    NITRU Negative 06/27/2021    WBCUA 10-20 06/27/2021    BACTERIA Rare 06/27/2021    RBCUA None seen 06/27/2021    BLOODU Negative 06/27/2021    SPECGRAV 1.020 06/27/2021    GLUCOSEU 500 06/27/2021       Radiology:  NM BONE SCAN WHOLE BODY   Final Result   Widespread osseous metastatic disease throughout the axial and appendicular   skeleton. MRI ABDOMEN WO CONTRAST MRCP   Final Result   1. Severe intrahepatic and extrahepatic biliary dilation and up to moderate   dilation of the main pancreatic duct with abrupt caliber changes in the   common bile duct and main pancreatic duct near the pancreatic head.   No definite underlying lesion is identified, although one is not excluded on the   basis of this study. Considerations include ductal adenocarcinoma or   postinflammatory strictures from prior pancreatitis. Consider further   evaluation with pancreas protocol abdomen CT or MRI versus ERCP. 2. Sequelae of chronic pancreatitis best demonstrated on CT with potential   for superimposed mild acute pancreatitis. However, the visualized   peripancreatic stranding could be related to minimal anasarca seen elsewhere. 3. Innumerable simple and minimally complicated cystic lesions measuring up   to 1.8 cm throughout the pancreas could represent dilated side branches from   chronic pancreatitis and/or branch duct intraductal papillary mucinous   neoplasms. Recommend follow-up with pancreas protocol and MRI (preferred) or   CT in 1 year. 4. Severe bilateral hydronephrosis, moderate to severe left hydroureter (in   the setting of a partially duplicated collecting system), and mild right   hydroureter. The right ureter changes caliber proximal to the pelvic brim,   while the conjoint left ureter changes caliber near the pelvic brim. Underline postinflammatory or malignant strictures are not excluded. Consider further evaluation with CT urography. 5. Trace to small bilateral pleural effusions, trace ascites, and minimal   anasarca suggestive of volume overload. 6. Suboptimally seen mesenteric lymph nodes better demonstrated on CT could   be reactive but could also be related to mesenteric panniculitis or   neoplastic involvement. Recommend attention on follow-up imaging. US GALLBLADDER RUQ   Final Result   1. Right-sided hydroureteronephrosis. 2. Common duct dilation status post cholecystectomy.          CT CHEST ABDOMEN PELVIS WO CONTRAST   Final Result   Somewhat limited study in the absence of contrast material.      Numerous sclerotic metastases throughout all bones of the visualized axial   and appendicular skeleton with a possible lytic lesion within the upper   aspect of the right scapula and a small soft tissue nodule within the   anterior left chest, possibly metastatic. These findings are new from prior. A healing nondisplaced subacute fracture of the left L2 transverse process is   also new, possibly pathologic. There are numerous mildly enlarged lymph nodes throughout the mediastinum as   well as the retroperitoneum in the abdominal mesentery. Moderate to severe bilateral hydronephrosis and hydroureter, also new from   prior, without appreciable obstructing stone. It is unclear whether this is   related to metastatic cervical cancer or an underlying lymphoproliferative   disorder such as lymphoma. Lymph node sampling is recommended. Minimal bibasilar atelectasis. Otherwise, clear lungs. XR CHEST PORTABLE   Final Result   Stable chronic changes with no acute abnormality seen. Assessment/Plan:    Active Hospital Problems    Diagnosis     Metastatic malignant neoplasm ([facility] [C79.9]     Abdominal lymphadenopathy [R59.0]     Transaminitis [R74.01]     Moderate malnutrition (HCC) [E44.0]     Elevated bilirubin [R17]     Nausea and vomiting [R11.2]     Bile duct obstruction [K83.1]     LANA (acute kidney injury) ([address] [N17.9]     Obstructive nephropathy [N13.8]     DM2 (diabetes mellitus, type 2) ([address] [E11.9]     Generalized weakness [R53.1]      LANA, Obstructive nephropathy  -  Baseline creatinine < 0.5 back in February, climbing since then, and currently 3.  -  Bilateral hydroureteronephrosis without significant bladder distention (although there is some urine in the bladder). It appears both ureters are dilated above the pelvis, but suddenly taper in diameter in the pelvis near the UVJ. No obstructing calculi apparent. Bilateral malignant strictures are possible. -  Place chandra catheter. There was 300-400mL urine output.    urology assistance appreciated  S/p . Cystoscopy and bilateral double-J ureteral stent  Placement. 6/29   -  Avoid nephrotoxin exposure. Hold home metformin, lisinopril. -  Fluid normal saline.  -Kidney function improving     Metastatic Cancer, Unknown primary  -  Sclerotic lesions are widespread throughout the axial skeleton. One lesion in the right scapula might be lytic. -  Widespread lymphadenopathy in the abdomen, retroperitoneal space, and mediastinum. Multiple small subcutaneous lumps   -   assistance from Hem/Onc appreciated for further diagnostics and choosing a biopsy site. Advised bone scan, multiple myeloma studies tumor markers micronutrients skin biopsy, flow cytometry      Transaminitis  -  Mixed hepatocellular and cholestatic pattern. -  Acute hepatitis panel sent from ER.    -  No explanatory finding on CT w/o contrast per report. RUQ U/S performed. Shows dilated common bile duct at 2.2 cm. CT images were reviewed again. There is abrupt tapering of the CBD within the pancreatic head. No radio-opaque calculus is visible. -  Choledocholithiasis, CBD stricture/tumor, ampullary dysfunction/tumor. GI input appreciated, following  MRCP shows severe biliary ductal dilatation   Pancreatic ductal adenocarcinoma cannot be ruled out   Awaiting for ERCP today    Metabolic acidosis with hyperkalemia-possibly secondary to above, consider nephrology evaluation    DM2,  Possible iatrogenic adrenal insufficiency  -Recent episode of hypoglycemia  -  Hold all oral antidiabetic agents. -  With patient now off steroids, insulin requirements are likely to drop precipitously. Vigilance for hypoglycemia and appropriate insulin adjustments are warranted. -  That patient took high dose steroids for roughly 1.5 months.   Vigilance for signs of adrenal insufficiency is also warranted.   -Cortisol level pending        Mild hyperkalemia-repeat start and Kayexalate, repeat potassium 5.4    Hypertension-blood pressure is elevated-increase metoprolol to 50 twice daily    Chronic Conditions  HLD -hold statin. as of transaminitis  CAD,- continue metoprolol. ASA and statin were on hold  Depression - Continue bupropion and duloxetine. Hypothyroid - Continue synthroid. DVT Prophylaxis: Heparin subcu  Diet: ADULT DIET;  Regular; 4 carb choices (60 gm/meal)  Diet NPO Exceptions are: Ice Chips, Sips of Water with Meds  Code Status: Full Code    PT/OT Eval Status:     Dispo -pending improvement     Raz Skinner MD

## 2021-06-30 NOTE — CARE COORDINATION
Chart review- from home with grandson,  at [facility]; Metastatic CA; ERCP tomorrow. Possible prophylactic pinning of left femoral head. Will follow for needs.

## 2021-06-30 NOTE — PROGRESS NOTES
06/30/21 1215 -Palliative care consult completed and Lexi Maldonado added to treatment team.    -Jason Villagomez, RODRIGUEZ

## 2021-06-30 NOTE — OP NOTE
[facility] [place]                 Rhett Sawyer                                OPERATIVE REPORT    PATIENT NAME: Mery Villegas                  :        1950  MED REC NO:   [id number]                          ROOM:       [id number]  ACCOUNT NO:   [de-identified]                           ADMIT DATE: 2021  PROVIDER:     Vincenzo Louise. Yaritza Isaacs MD    DATE OF PROCEDURE:  2021    LOCATION:  At [facility].    PREOPERATIVE DIAGNOSIS:  Bilateral ureteral obstruction. POSTOPERATIVE DIAGNOSIS:  Bilateral ureteral obstruction. OPERATION PERFORMED:  Cystoscopy and bilateral double-J ureteral stent  placement. SURGEON:  Vincenzo Louise. Yaritza Isaacs MD    INDICATION FOR PROCEDURE:  The patient is a 78-year-old female with  metastatic cancer of unknown primary. She has bilateral hydronephrosis  and renal failure. The risks and benefits of a cystoscopy and bilateral  stent placement were discussed with the patient and she agreed to  proceed. DESCRIPTION OF THE PROCEDURE:  The patient had preoperative antibiotics,  general anesthesia, was in the lithotomy position. Genitalia prepped  and draped in the usual sterile fashion. A 21-Malaysian rigid cystoscope  was inserted into the patient's urethra. The urethra was normal.  I  found the left ureteric orifice and placed a ZIPwire up under  fluoroscopic guidance into the left kidney. I then placed a 6-Malaysian  24-cm double-J ureteral stent over the wire. I removed the string and  the wire and the stent coiled up in the kidney and down in the bladder. I then placed the ZIPwire up the right ureteric orifice up into the  kidney. I then placed another 6-Malaysian 24-cm double-J ureteral stent. I removed the string and the wire and the stent coiled up here too. Both stents had coils in the bladder. I placed the Marroquin catheter. She  was awoken and sent to recovery room in good condition.   She tolerated  the procedure well. PLAN:  She needs to have a cysto and bilateral stent exchange in four  months. ESTIMATED BLOOD LOSS:  0 mL.         Ankit Acosta MD    D: 06/29/2021 14:25:59       T: 06/29/2021 17:01:22     [email or web address]  Job#: [id number]     Doc#: [id number]    CC:

## 2021-06-30 NOTE — PROGRESS NOTES
CONSTITUTIONAL: awake, alert, cooperative, no apparent distress   EYES: pupils equal, round and reactive to light, sclera icteric and conjunctiva normal  ENT: Normocephalic, without obvious abnormality, atraumatic  NECK: supple, symmetrical, no jugular venous distension and no carotid bruits   HEMATOLOGIC/LYMPHATIC: no cervical, supraclavicular or axillary lymphadenopathy   LUNGS: no increased work of breathing and clear to auscultation   CARDIOVASCULAR: regular rate and rhythm, normal S1 and S2, no murmur noted  ABDOMEN: normal bowel sounds x 4, soft, non-distended, non-tender, no masses palpated, no hepatosplenomgaly   MUSCULOSKELETAL: full range of motion noted, tone is normal  NEUROLOGIC: awake, alert, oriented to name, place and time. Motor skills grossly intact. SKIN: Normal skin color, texture, turgor and + jaundice. appears intact   EXTREMITIES: no LE edema       Data      Recent Labs     06/28/21  0654 06/28/21  0654 06/29/21  0651 06/29/21  0654 06/30/21  0610   WBC 9.6  --  9.8  --  9.2   HGB 11.9*   < > 10.8* 10.8* 11.0*   HCT 36.8  --  32.9*  --  33.0*     --  266  --  264   MCV 80.2  --  79.2*  --  79.3*    < > = values in this interval not displayed. Recent Labs     06/28/21  0654 06/28/21  1330 06/29/21  0651 06/30/21  0610 06/30/21  0827     --  134* 143  --    K 5.6*   < > 5.3* 6.0* 5.4*     --  106 113*  --    CO2 17*  --  17* 22  --    BUN 25*  --  27* 17  --    CREATININE 2.9*  --  2.7* 1.2  --     < > = values in this interval not displayed.      Recent Labs     06/28/21  0654 06/29/21  0651 06/30/21  0610   * 241* 161*   * 352* 260*   BILITOT 3.0* 4.1* 4.6*   ALKPHOS 652* 654* 667*       Magnesium:    Lab Results   Component Value Date    MG 1.80 06/30/2021    MG 2.00 06/29/2021    MG 2.30 06/28/2021         Problem List  Patient Active Problem List   Diagnosis    CAD (coronary artery disease)    Sleep apnea    Generalized weakness    Nonalcoholic steatohepatitis    Rectocele    DM2 (diabetes mellitus, type 2) (HCC)    HTN (hypertension), benign    Cerebral infarction ([address]    Right shoulder pain    Insomnia    SUNIL (obstructive sleep apnea)    Coarse tremor    Dizziness    Palpitations    Essential tremor    Dyslipidemia    PNA (pneumonia)    Obesity    Bilateral primary osteoarthritis of knee    Chronic bilateral low back pain without sciatica    DDD (degenerative disc disease), lumbar    Facet arthritis of lumbar region    Primary osteoarthritis of right knee    Abnormal finding on thyroid function test    LANA (acute kidney injury) ([address]    Obstructive nephropathy    Metastatic malignant neoplasm (HCC)    Abdominal lymphadenopathy    Transaminitis    Elevated bilirubin    Nausea and vomiting    Bile duct obstruction    Moderate malnutrition (HCC)       ASSESSMENT AND PLAN:    Metastatic carcinoma:  -She appears to have choroidal mets. -She has bone lesions, possible intrahepatic lesion, and omental metastasis  -CA-125 is over thousand  -Initial myeloma studies are negative  -The differential is quite broad. -She is going to have an ERCP to look for pancreatic carcinoma. -If this is negative, I would consider a bone biopsy to help with the differential.  -She has a very poor prognosis.   - Very widespread bone mets     Elevated liver functions:  -The patient is going to have an ERCP tomorrow   - check ammonia levels     Hydronephrosis   - Bilateral stents in place   - to be exchanged in 4 months         ONCOLOGIC DISPOSITION:    -Post diagnosis  - ERCP tomorrow; hold on bone bx for now   - Ask ortho to see given L femoral head lesion; eval for need for prophylactic pinning ; high risk for spontaneous fracture    - Palliative care to see patient for psych social       Read HAYLEY Rivas   Please contact through [employer]

## 2021-06-30 NOTE — CONSULTS
Palliative Care Initial Note  Palliative Care Admit date:  6/30/21  Reason for c/s:  Family support    Advance Directives:  Pt has not executed AD's. She reports being legally  though does not want her spouse to have decision making authority. Have explained to pt and son, Janna Levi, that pts spouse is viewed as her NOK and that she should consider executing a HCPOA to that she can designate whom she would want to make healthcare decisions. Have left message for pastoral care requesting they [place] back to complete AD w/ pt. Ms. Fracisco Fabian currently has a full code status. Plan of care/goals:  W/o provocation, Maria Antonia verb'd \"I know I have an aggressive cancer\" and she states she \"feels\" her life expectancy is limited. Valery Mary wishing she could \"just go to sleep. ...and not wake up. \"  She was very verbal, had a lot she wanted to say, and feels it is pressing that she \"get all of this out. \"   Writer provided supportive listening & pt is receptive to ongoing f/u. Pt c/o pain in \"middle of back\" and across her abd. Pt reports pain is essentially constant, pain med thus far has seemed only to \"make me sleep\" more than alleviate the discomfort. Pain rated 10/0-10 @ worst.  Chart reflects pattern of prn hydromorphone having been titrated before she lost her IV site; pt had rec'd 4 doses in last 24 hr.  Last doc'd stool was today. Social/Spiritual:  Pt is recently retired and her younger son, Janusz Moralez, lives w/ her. She is concerned about her snf and, most pressing, the life insurance. This issue seemed to dominate our discussion     Plan:   to f/u for HCPOA completion.   Plan ongoing f/u, likelihood for ACP around [address]          Reason for consult:  _X_ Advance Care Planning  ___ Transition of Care Planning  _X_ Psychosocial/Spiritual Support  ___ Symptom Management                                                                                                                                      Ishmael Leger RN

## 2021-06-30 NOTE — PROGRESS NOTES
Speech Language Pathology    SLP attempted evaluation, spoke with RN. Pt currently NPO for procedures this date per RN. RN denied dysphagia with PO intake yesterday. ST to continue to follow, re-attempt BSE tomorrow as indicated.     Hira Galicia M.S. [id number] [facility]  Speech-language pathologist  [phone]

## 2021-06-30 NOTE — PROGRESS NOTES
The plan was to perform an ERCP today. The endoscopy is busy with cases and I will need sufficient time to perform the therapeutic ERCP. I have discussed with the patient's nurse and also endoscopy nurses the options. It is in the patient's best interest to have a relatively early time in the morning tomorrow rather than be on the waiting list especially when after 3 PM no non-urgent cases are done with anesthesia.

## 2021-06-30 NOTE — FLOWSHEET NOTE
06/30/21 1714   Encounter Summary   Services provided to: Patient   Referral/Consult From: Nurse;Patient; Family   Continue Visiting   (6/30: completed AD forms, placed in chart)   Complexity of Encounter Moderate   Length of Encounter 45 minutes   Spiritual Assessment Completed Yes

## 2021-06-30 NOTE — PROGRESS NOTES
06/30/21 1229 -Orthopedic surgery consult called in to [facility] and sent out to Dr. John Castano with RNs number provided for callback.    -Kipp Opitz, RODRIGUEZ

## 2021-06-30 NOTE — CONSULTS
Nephrology Consult Note                                                                                                                                                                                                                                                                                                                                                               Office : [phone]     Fax :[phone]              Patient's Name: Ashly Monae  1:02 PM  6/30/2021    Reason for Consult:  LANA, Hyperkalemia      Requesting Physician:  SUMA Lomeli - CNP      Chief Complaint:  Fatigue     History of Present Ilness:    Ashly Monae is a 80 y.o. female with PMH of HTN , DLP , DM type 2    Presented with c/o fatigue     Was diagnosed with uveitis in 5/21 and treated with prednisone    CT chest s/o mediastinal LNE ----> referred to oncology    C/o abdominal pain       C/o urinary retention    Denies sob or chest pain or diarrhea    At present being worked up for metastatic cancer , primary unknown              Past Medical History:   Diagnosis Date    Anemia     Anesthesia     PONV    Arthritis     CAD (coronary artery disease)     CVA (cerebral infarction)     Diabetes mellitus ([address]     Diastolic heart failure ([address]     Fibromyalgia     Hyperlipidemia     Hypertension     Hypothyroidism     LFT's abnormal     CHAMBERS (nonalcoholic steatohepatitis)     Nausea & vomiting     Pancreatic cyst     PNA (pneumonia) 2/23/2021    Pneumonia     Rectocele     Sleep apnea     does not use c-pap    Tremor     Vitamin D deficiency        Past Surgical History:   Procedure Laterality Date    CHOLECYSTECTOMY      COLONOSCOPY  01/10/2018    CYSTOSCOPY Bilateral 6/29/2021    CYSTOSCOPY BILATERAL URETERAL STENT INSERTION performed by Leelee Pace MD at [address] [place] [address] Right     cataract    OTHER SURGICAL HISTORY  11-4-2011    Posterior repair    POSTERIOR CRUCIATE LIGAMENT REPAIR         Family History   Problem Relation Age of Onset   Dora Conroy Diabetes Mother     Stroke Mother     Heart Disease Father     Cancer Father         prostate    Heart Disease Brother         multiple stents    Heart Disease Brother     Heart Disease Sister         carotid artery surgery    Heart Disease Brother     Heart Attack Brother     Heart Disease Brother     Asthma Neg Hx     Emphysema Neg Hx     Heart Failure Neg Hx         reports that she quit smoking about 30 years ago. Her smoking use included cigarettes. She has a 0.50 pack-year smoking history. She has never used smokeless tobacco. She reports that she does not drink alcohol and does not use drugs.     Allergies:  Codeine, Ultram [tramadol], and Percocet [oxycodone-acetaminophen]    Current Medications:    metoprolol tartrate (LOPRESSOR) tablet 50 mg, BID  sodium zirconium cyclosilicate (LOKELMA) oral suspension 5 g, TID  labetalol (NORMODYNE;TRANDATE) injection 10 mg, Q6H PRN  buPROPion (WELLBUTRIN XL) extended release tablet 150 mg, Daily  DULoxetine (CYMBALTA) extended release capsule 60 mg, Daily  levothyroxine (SYNTHROID) tablet 75 mcg, Daily  vitamin D (CHOLECALCIFEROL) tablet 4,000 Units, Daily  glucose (GLUTOSE) 40 % oral gel 15 g, PRN  dextrose 50 % IV solution, PRN  glucagon (rDNA) injection 1 mg, PRN  dextrose 5 % solution, PRN  sodium chloride flush 0.9 % injection 10 mL, PRN  0.9 % sodium chloride infusion, PRN  acetaminophen (TYLENOL) tablet 650 mg, Q6H PRN   Or  acetaminophen (TYLENOL) suppository 650 mg, Q6H PRN  melatonin tablet 3 mg, Nightly PRN  diphenhydrAMINE (BENADRYL) tablet 50 mg, Q6H PRN  hydrOXYzine (ATARAX) tablet 10 mg, TID PRN  ursodiol (ACTIGALL) capsule 300 mg, BID  HYDROmorphone (DILAUDID) injection 1 mg, Q4H PRN  insulin lispro (HUMALOG) injection vial 0-6 Units, TID WC  insulin lispro (HUMALOG) injection vial 0-3 Units, Nightly  0.9 % sodium chloride infusion, Continuous  heparin (porcine) injection 5,000 Units, 3 times per day  ondansetron (ZOFRAN-ODT) disintegrating tablet 4 mg, Q4H PRN  ondansetron (ZOFRAN) injection 4 mg, Q4H PRN  promethazine (PHENERGAN) injection 12.5 mg, Q4H PRN  promethazine (PHENERGAN) tablet 12.5 mg, Q4H PRN        Review of Systems:   14 point ROS obtained but were negative except mentioned in HPI      Physical exam:     Vitals:  BP (!) 179/83   Pulse 95   Temp 97.6 °F (36.4 °C) (Oral)   Resp 18   Ht 5' 6.5\" (1.689 m)   Wt 200 lb 6.4 oz (90.9 kg)   LMP  (LMP Unknown)   SpO2 95%   BMI 31.86 kg/m²   Constitutional:  OAA X3 NAD  Skin: no rash, turgor wnl  Heent:  eomi, mmm  Neck: no bruits or jvd noted  Cardiovascular:  S1, S2 without m/r/g  Respiratory: CTA B without w/r/r  Abdomen:  +bs, soft, nt, nd  Ext: no lower extremity edema  Psychiatric: mood and affect appropriate  Musculoskeletal:  Rom, muscular strength intact    Data:   Labs:  CBC:   Recent Labs     06/28/21  0654 06/28/21  0654 06/29/21  0651 06/29/21  0654 06/30/21  0610   WBC 9.6  --  9.8  --  9.2   HGB 11.9*   < > 10.8* 10.8* 11.0*     --  266  --  264    < > = values in this interval not displayed. BMP:    Recent Labs     06/28/21  0654 06/28/21  1330 06/29/21  0651 06/30/21  0610 06/30/21  0827     --  134* 143  --    K 5.6*   < > 5.3* 6.0* 5.4*     --  106 113*  --    CO2 17*  --  17* 22  --    BUN 25*  --  27* 17  --    CREATININE 2.9*  --  2.7* 1.2  --    GLUCOSE 82  --  523* 138*  --     < > = values in this interval not displayed.      Ca/Mg/Phos:   Recent Labs     06/28/21  0654 06/29/21  0651 06/30/21  0610   CALCIUM 8.7 8.4 9.1   MG 2.30 2.00 1.80     Hepatic:   Recent Labs     06/28/21  0654 06/29/21  0651 06/30/21  0610   * 241* 161*   * 352* 260*   BILITOT 3.0* 4.1* 4.6*   ALKPHOS 652* 654* 667*     Troponin:   Recent Labs     06/27/21 2012   TROPONINI <0.01     BNP: No results for input(s): BNP in the last 72 hours. Lipids: No results for input(s): CHOL, TRIG, HDL, LDLCALC, LABVLDL in the last 72 hours. ABGs:   Recent Labs     06/29/21  0654   PHART 7.308*   PO2ART 72.5*   JQT0CAL 35.2     INR:   Recent Labs     06/28/21  0000 06/29/21  0940   INR 1.03 1.10     UA:  Recent Labs     06/27/21 2135   COLORU Yellow   CLARITYU Clear   GLUCOSEU 500*   BILIRUBINUR SMALL*   KETUA Negative   SPECGRAV 1.020   BLOODU Negative   PHUR 6.0   PROTEINU TRACE*   UROBILINOGEN 1.0   NITRU Negative   LEUKOCYTESUR Negative   LABMICR YES   URINETYPE NotGiven      Urine Microscopic:   Recent Labs     06/27/21 2135   BACTERIA Rare*   HYALCAST 0-2   WBCUA 10-20*   RBCUA None seen   EPIU 2-5     Urine Culture: No results for input(s): LABURIN in the last 72 hours. Urine Chemistry: No results for input(s): Ragini Osman, PROTEINUR, NAUR in the last 72 hours. IMAGING:  NM BONE SCAN WHOLE BODY   Final Result   Widespread osseous metastatic disease throughout the axial and appendicular   skeleton. MRI ABDOMEN WO CONTRAST MRCP   Final Result   1. Severe intrahepatic and extrahepatic biliary dilation and up to moderate   dilation of the main pancreatic duct with abrupt caliber changes in the   common bile duct and main pancreatic duct near the pancreatic head. No   definite underlying lesion is identified, although one is not excluded on the   basis of this study. Considerations include ductal adenocarcinoma or   postinflammatory strictures from prior pancreatitis. Consider further   evaluation with pancreas protocol abdomen CT or MRI versus ERCP. 2. Sequelae of chronic pancreatitis best demonstrated on CT with potential   for superimposed mild acute pancreatitis. However, the visualized   peripancreatic stranding could be related to minimal anasarca seen elsewhere.    3. Innumerable simple and minimally complicated cystic lesions measuring up   to 1.8 cm throughout the pancreas could represent dilated side branches from   chronic pancreatitis and/or branch duct intraductal papillary mucinous   neoplasms. Recommend follow-up with pancreas protocol and MRI (preferred) or   CT in 1 year. 4. Severe bilateral hydronephrosis, moderate to severe left hydroureter (in   the setting of a partially duplicated collecting system), and mild right   hydroureter. The right ureter changes caliber proximal to the pelvic brim,   while the conjoint left ureter changes caliber near the pelvic brim. Underline postinflammatory or malignant strictures are not excluded. Consider further evaluation with CT urography. 5. Trace to small bilateral pleural effusions, trace ascites, and minimal   anasarca suggestive of volume overload. 6. Suboptimally seen mesenteric lymph nodes better demonstrated on CT could   be reactive but could also be related to mesenteric panniculitis or   neoplastic involvement. Recommend attention on follow-up imaging. US GALLBLADDER RUQ   Final Result   1. Right-sided hydroureteronephrosis. 2. Common duct dilation status post cholecystectomy. CT CHEST ABDOMEN PELVIS WO CONTRAST   Final Result   Somewhat limited study in the absence of contrast material.      Numerous sclerotic metastases throughout all bones of the visualized axial   and appendicular skeleton with a possible lytic lesion within the upper   aspect of the right scapula and a small soft tissue nodule within the   anterior left chest, possibly metastatic. These findings are new from prior. A healing nondisplaced subacute fracture of the left L2 transverse process is   also new, possibly pathologic. There are numerous mildly enlarged lymph nodes throughout the mediastinum as   well as the retroperitoneum in the abdominal mesentery. Moderate to severe bilateral hydronephrosis and hydroureter, also new from   prior, without appreciable obstructing stone.   It is unclear whether this is   related to metastatic cervical cancer or an underlying lymphoproliferative   disorder such as lymphoma. Lymph node sampling is recommended. Minimal bibasilar atelectasis. Otherwise, clear lungs. XR CHEST PORTABLE   Final Result   Stable chronic changes with no acute abnormality seen. XR FEMUR LEFT (MIN 2 VIEWS)    (Results Pending)       Assessment/Plan     1. Hyperkalemia in setting of LANA and obstructive uropathy       Serum K :  5.6 -------> 6 ---> 5.4       S/p BL stents placement       LANA improving       Acidosis improving        Plan       Low K diet       Add Lokelma x 3 dose           2. LANA     Etiology seems obstructive uropathy     S/p cystoscopy and BL stent placement    LANA improving    3.  Metabolic acidosis in setting of LANA    Improving     4. Acid- base/ Electrolyte imbalance     moniter      5 BL hydronephrosis    6 DM 2    7 metastatic cancer , unknown primary         Thank you for allowing us to participate in care of Donna Duncan MD  Feel free to contact me   [facility] [address]  Office : [phone]  Fax :[phone]

## 2021-07-01 NOTE — PROGRESS NOTES
ERCP Note  ========  Retained solid food in the stomach and duodenum. Difficult cannulation. NK papillotome was used. Bile duct was entered, bile started coming out and bile duct was partially visualized with injection of dye, but the duct could not be cannulated deeply - probably because of tight constriction of the part going through the pancreatic head. Pancreatic ductal lumen could be visualized. Pancreatogram was obtained and a 4FrX 5cm stent was placed to reduce chances of pancreatitis. The scope channel got plugged so tightly that even water could not be flushed. Accessories could not be passed through any more. The procedure was terminated. Another attempt will need to be made once fully functioning equipment is available, but the hard part is over with.

## 2021-07-01 NOTE — ANESTHESIA POSTPROCEDURE EVALUATION
Department of Anesthesiology  Postprocedure Note    Patient: Jonathan Barrow  MRN: [id number]  YOB: 1950  Date of evaluation: 7/1/2021  Time:  5:33 PM     Procedure Summary     Date: 07/01/21 Room / Location: [address]    Anesthesia Start: 5490 Anesthesia Stop: 0181    Procedures:       ENDOSCOPIC RETROGRADE CHOLANGIOPANCREATOGRAPHY (N/A )      ERCP SPHINCTER/PAPILLOTOMY      ERCP STENT INSERTION Diagnosis: (Dilated CBD)    Surgeons: Ze King MD Responsible Provider: Dawn Lewis MD    Anesthesia Type: general ASA Status: 3          Anesthesia Type: general    Adams Phase I: Adams Score: 10    Adams Phase II:      Last vitals: Reviewed and per EMR flowsheets.        Anesthesia Post Evaluation    Comments: Postoperative Anesthesia Note    Name:    Jonathan Barrow  MRN:      [id number]    Patient Vitals in the past 12 hrs:  07/01/21 1425, BP:(!) 172/94, Temp:98.5 °F (36.9 °C), Temp src:Oral, Pulse:85, Resp:14, SpO2:95 %  07/01/21 1252, BP:(!) 168/79, Temp:98.3 °F (36.8 °C), Temp src:Oral, Pulse:86, Resp:12, SpO2:95 %  07/01/21 1230, BP:(!) 164/72, Pulse:88, Resp:11, SpO2:93 %  07/01/21 1216, BP:(!) 179/80, Pulse:84, Resp:12, SpO2:92 %  07/01/21 1215, BP:(!) 171/68, Pulse:86, Resp:12, SpO2:92 %  07/01/21 1210, BP:(!) 181/83, Pulse:83, Resp:11, SpO2:92 %  07/01/21 1205, BP:(!) 178/89, Pulse:84, Resp:9, SpO2:91 %  07/01/21 1200, BP:(!) 163/82, Pulse:84, Resp:11, SpO2:91 %  07/01/21 1155, BP:(!) 189/87, Pulse:81, Resp:11, SpO2:91 %  07/01/21 1150, BP:(!) 188/82, Pulse:87, Resp:12, SpO2:90 %  07/01/21 1145, BP:(!) 177/80, Pulse:85, Resp:13, SpO2:91 %  07/01/21 1140, BP:(!) 164/77, Pulse:85, Resp:12, SpO2:(!) 89 %  07/01/21 1135, BP:(!) 159/73, Temp:97.1 °F (36.2 °C), Temp src:Temporal, Pulse:87, Resp:12, SpO2:91 %  07/01/21 0837, BP:(!) 196/98, Temp:96.8 °F (36 °C), Temp src:Temporal, Pulse:94, Resp:16, SpO2:93 %  07/01/21 0755, BP:(!) 179/82, Pulse:94,

## 2021-07-01 NOTE — PROGRESS NOTES
Hospitalist Progress Note      PCP: SUMA Elizondo - CNP    Date of Admission: 6/27/2021    Chief Complaint: Fatigue    Hospital Course: Dorrine Dakins is a 80 y.o. female.     She developed left eye soreness and headaches in May. She saw an ophthalmologist, Dr. Paty Porter at [facility], on 5/19/21. 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 She has been on a tapering regimen since then. She has had problems with glycemic control. She has also not responded as expected to treatment. Her ophthalmologist became concerned about an intracranial neoplasm. It was recommended that patient undergo additional imaging including MRI brain and maxillofacial/sinus w and w/o contrast.  She was also recommended to undergo CT chest w/o contrast.  The MRI did not reveal a tumor but was notable for lobular left retinal thickening and appparent left optic nerve compression. The CT chest was notable for mild mediastinal lymphadenopathy. She was referred to oncology but has not had an office visit yet. She abruptly stopped the prednisone several days ago. She had tapered by that time to 60mg daily.     She has been having abdominal pain for roughly a month. She though this was from taking prednisone so tried to ignore it or treat it with OTC remedies. She presents tonight for progressive pain and persistent nausea. She cannot say for how long though. She does relate she is having bowel movements and passing flatus although she may go 3 or 4 days between bowel movements. She denies any difficulty urinating, but relates that when she wakes up every morning she is leaking urine and leaks urine all the way to the restroom.   She recalls being hospitalized for pneumonia in February and having urinary retention at the time which was treated with a short term chandra catheter    Subjective:    s/p ERCP   Looks better , sons at bed side    c/o hungry       Medications: Reviewed    Infusion Medications    lactated ringers      dextrose      sodium chloride      sodium chloride Stopped (06/30/21 1416)     Scheduled Medications    metoprolol tartrate  50 mg Oral BID    sodium zirconium cyclosilicate  5 g Oral TID    buPROPion  150 mg Oral Daily    DULoxetine  60 mg Oral Daily    levothyroxine  75 mcg Oral Daily    Vitamin D  4,000 Units Oral Daily    ursodiol  300 mg Oral BID    insulin lispro  0-6 Units Subcutaneous TID WC    insulin lispro  0-3 Units Subcutaneous Nightly    [Held by provider] heparin (porcine)  5,000 Units Subcutaneous 3 times per day     PRN Meds: meperidine, HYDROmorphone, HYDROmorphone, ondansetron, promethazine, diphenhydrAMINE, labetalol, hydrALAZINE, oxyCODONE-acetaminophen, labetalol, glucose, dextrose, glucagon (rDNA), dextrose, sodium chloride flush, sodium chloride, acetaminophen **OR** acetaminophen, melatonin, diphenhydrAMINE, hydrOXYzine, HYDROmorphone, ondansetron, ondansetron, promethazine, promethazine      Intake/Output Summary (Last 24 hours) at 7/1/2021 0934  Last data filed at 7/1/2021 0510  Gross per 24 hour   Intake --   Output 3150 ml   Net -3150 ml       Physical Exam Performed:    BP (!) 196/98   Pulse 94   Temp 96.8 °F (36 °C) (Temporal)   Resp 16   Ht 5' 6.5\" (1.689 m)   Wt 191 lb (86.6 kg)   LMP  (LMP Unknown)   SpO2 93%   BMI 30.37 kg/m²     General appearance: No apparent distress, appears stated age and cooperative, . HEENT: Pupils equal, round, and reactive to light. Conjunctivae/corneas -mildly jaundiced. Neck: Supple, with full range of motion. No jugular venous distention. Trachea midline. Respiratory:  Normal respiratory effort. Reduced air entry, bilaterally without Rales/Wheezes/Rhonchi. Cardiovascular: Regular rate and rhythm with normal S1/S2 without murmurs, rubs or gallops. Abdomen: Soft, non-tender, non-distended with normal bowel sounds.   Obese  Musculoskeletal: No clubbing, cyanosis or edema bilaterally. Full range of motion without deformity. Skin: Skin color, texture, turgor normal.  Left groin lymphadenopathy, subcutaneous lump chest and upper back  Neurologic:  Neurovascularly intact without any focal sensory/motor deficits. Has upper extremity tremor  Psychiatric: Alert and oriented,   Capillary Refill: Brisk,3 seconds, normal   Peripheral Pulses: +2 palpable, equal bilaterally       Labs:   Recent Labs     06/29/21  0651 06/29/21  0651 06/29/21  0654 06/30/21  0610 07/01/21  0625   WBC 9.8  --   --  9.2 7.6   HGB 10.8*   < > 10.8* 11.0* 10.9*   HCT 32.9*  --   --  33.0* 32.5*     --   --  264 285    < > = values in this interval not displayed. Recent Labs     06/29/21  0651 06/29/21  0651 06/30/21  0610 06/30/21  0827 07/01/21  0625   *  --  143  --  139   K 5.3*   < > 6.0* 5.4* 4.9     --  113*  --  106   CO2 17*  --  22  --  24   BUN 27*  --  17  --  12   CREATININE 2.7*  --  1.2  --  1.1   CALCIUM 8.4  --  9.1  --  8.8    < > = values in this interval not displayed. Recent Labs     06/29/21  0651 06/30/21  0610 07/01/21  0625   * 161* 113*   * 260* 194*   BILITOT 4.1* 4.6* 4.9*   ALKPHOS 654* 667* 679*     Recent Labs     06/29/21  0940   INR 1.10     No results for input(s): Abbe Breeze in the last 72 hours. Urinalysis:      Lab Results   Component Value Date    NITRU Negative 06/27/2021    WBCUA 10-20 06/27/2021    BACTERIA Rare 06/27/2021    RBCUA None seen 06/27/2021    BLOODU Negative 06/27/2021    SPECGRAV 1.020 06/27/2021    GLUCOSEU 500 06/27/2021       Radiology:  XR FEMUR LEFT (MIN 2 VIEWS)   Final Result   Focal area of mixed lucency/sclerosis in the greater   trochanteric/intertrochanteric region of the left proximal femur is   consistent with area of suspected metastatic disease with no evidence of   superimposed pathologic fracture as described above.          NM BONE SCAN WHOLE BODY   Final Result   Widespread osseous metastatic disease throughout the axial and appendicular   skeleton. MRI ABDOMEN WO CONTRAST MRCP   Final Result   1. Severe intrahepatic and extrahepatic biliary dilation and up to moderate   dilation of the main pancreatic duct with abrupt caliber changes in the   common bile duct and main pancreatic duct near the pancreatic head. No   definite underlying lesion is identified, although one is not excluded on the   basis of this study. Considerations include ductal adenocarcinoma or   postinflammatory strictures from prior pancreatitis. Consider further   evaluation with pancreas protocol abdomen CT or MRI versus ERCP. 2. Sequelae of chronic pancreatitis best demonstrated on CT with potential   for superimposed mild acute pancreatitis. However, the visualized   peripancreatic stranding could be related to minimal anasarca seen elsewhere. 3. Innumerable simple and minimally complicated cystic lesions measuring up   to 1.8 cm throughout the pancreas could represent dilated side branches from   chronic pancreatitis and/or branch duct intraductal papillary mucinous   neoplasms. Recommend follow-up with pancreas protocol and MRI (preferred) or   CT in 1 year. 4. Severe bilateral hydronephrosis, moderate to severe left hydroureter (in   the setting of a partially duplicated collecting system), and mild right   hydroureter. The right ureter changes caliber proximal to the pelvic brim,   while the conjoint left ureter changes caliber near the pelvic brim. Underline postinflammatory or malignant strictures are not excluded. Consider further evaluation with CT urography. 5. Trace to small bilateral pleural effusions, trace ascites, and minimal   anasarca suggestive of volume overload. 6. Suboptimally seen mesenteric lymph nodes better demonstrated on CT could   be reactive but could also be related to mesenteric panniculitis or   neoplastic involvement. Recommend attention on follow-up imaging. US GALLBLADDER RUQ   Final Result   1. Right-sided hydroureteronephrosis. 2. Common duct dilation status post cholecystectomy. CT CHEST ABDOMEN PELVIS WO CONTRAST   Final Result   Somewhat limited study in the absence of contrast material.      Numerous sclerotic metastases throughout all bones of the visualized axial   and appendicular skeleton with a possible lytic lesion within the upper   aspect of the right scapula and a small soft tissue nodule within the   anterior left chest, possibly metastatic. These findings are new from prior. A healing nondisplaced subacute fracture of the left L2 transverse process is   also new, possibly pathologic. There are numerous mildly enlarged lymph nodes throughout the mediastinum as   well as the retroperitoneum in the abdominal mesentery. Moderate to severe bilateral hydronephrosis and hydroureter, also new from   prior, without appreciable obstructing stone. It is unclear whether this is   related to metastatic cervical cancer or an underlying lymphoproliferative   disorder such as lymphoma. Lymph node sampling is recommended. Minimal bibasilar atelectasis. Otherwise, clear lungs. XR CHEST PORTABLE   Final Result   Stable chronic changes with no acute abnormality seen.          FL ERCP BILIARY AND PANCREATIC S&I    (Results Pending)   FLUORO FOR SURGICAL PROCEDURES    (Results Pending)           Assessment/Plan:    Active Hospital Problems    Diagnosis     Metastatic malignant neoplasm ([facility] [address] [C79.9]     Abdominal lymphadenopathy [R59.0]     Transaminitis [R74.01]     Moderate malnutrition (HCC) [E44.0]     Elevated bilirubin [R17]     Nausea and vomiting [R11.2]     Bile duct obstruction [K83.1]     LANA (acute kidney injury) ([facility] [address] [N17.9]     Obstructive nephropathy [N13.8]     DM2 (diabetes mellitus, type 2) ([facility] [address] [E11.9]     Generalized weakness [R53.1]      LANA, Obstructive nephropathy  -  Baseline creatinine < 0.5 back in February, climbing since then, and currently 3.  -  Bilateral hydroureteronephrosis without significant bladder distention (although there is some urine in the bladder). It appears both ureters are dilated above the pelvis, but suddenly taper in diameter in the pelvis near the UVJ. No obstructing calculi apparent. Bilateral malignant strictures are possible. -  Place chandra catheter. There was 300-400mL urine output. urology assistance appreciated  S/p . Cystoscopy and bilateral double-J ureteral stent  Placement. 6/29   -  Avoid nephrotoxin exposure. Hold home metformin, lisinopril. -  Fluid normal saline.  -Kidney function improving     Metastatic Cancer, Unknown primary- suspicion for choroidal mets   -  Sclerotic lesions are widespread throughout the axial skeleton. One lesion in the right scapula might be lytic. -  Widespread lymphadenopathy in the abdomen, retroperitoneal space, and mediastinum. Multiple small subcutaneous lumps   -   assistance from Hem/Onc appreciated for further diagnostics and choosing a biopsy site. s/p bone scan- multiple mets , multiple myeloma studies tumor markers micronutrients skin biopsy, flow cytometry  Elevated Ca 19/ ca125  Ortho consulted   possible prophylatic nailing of left femur- family would like to wait till get Bx      Transaminitis  -  Mixed hepatocellular and cholestatic pattern. -  Acute hepatitis panel sent from ER.    -  No explanatory finding on CT w/o contrast per report. RUQ U/S performed. Shows dilated common bile duct at 2.2 cm. CT images were reviewed again. There is abrupt tapering of the CBD within the pancreatic head. No radio-opaque calculus is visible. -  Choledocholithiasis, CBD stricture/tumor, ampullary dysfunction/tumor.    GI input appreciated, following  MRCP shows severe biliary ductal dilatation   Pancreatic ductal adenocarcinoma cannot be ruled out    s/p  ERCP today , poor gastric emptying , going for again tomorrow Clear liquid , NPO MN      Metabolic acidosis with hyperkalemia-possibly secondary to above,- improved   Nephrology eval appreciated , following     DM2,  Possible iatrogenic adrenal insufficiency  -Recent episode of hypoglycemia  -  Hold all oral antidiabetic agents. -  With patient now off steroids, insulin requirements are likely to drop precipitously. Vigilance for hypoglycemia and appropriate insulin adjustments are warranted. -  That patient took high dose steroids for roughly 1.5 months. Vigilance for signs of adrenal insufficiency is also warranted.   -Cortisol level         Mild hyperkalemia-improved ,     Hypertension-blood pressure is elevated-increase metoprolol to 50 twice daily- better controlled     Chronic Conditions  HLD -hold statin. as of transaminitis  CAD,- continue metoprolol. ASA and statin were on hold  Depression - Continue bupropion and duloxetine. Hypothyroid - Continue synthroid.     DVT Prophylaxis: Heparin subcu  Diet: Diet NPO Exceptions are: Ice Chips, Sips of Water with Meds  Code Status: Full Code    PT/OT Eval Status:     Dispo -pending improvement   Palliative care is following     Yvette Gill MD

## 2021-07-01 NOTE — PROGRESS NOTES
Urology Attending Progress Note    Cc:  \"I feel okay\"     Ros:  LANA slowly improving  S/p cysto/bilateral stents    Subjective: reema diet. Ambulated for bowel movement. Denies catheter issues    Vitals:  BP (!) 172/94   Pulse 85   Temp 98.5 °F (36.9 °C) (Oral)   Resp 14   Ht 5' 6.5\" (1.689 m)   Wt 191 lb (86.6 kg)   LMP  (LMP Unknown)   SpO2 95%   BMI 30.37 kg/m²   Temp  Av.5 °F (36.4 °C)  Min: 96.8 °F (36 °C)  Max: 98.6 °F (37 °C)    Intake/Output Summary (Last 24 hours) at 2021 1727  Last data filed at 2021 1335  Gross per 24 hour   Intake 1800 ml   Output 2350 ml   Net -550 ml       Exam:   Constitutional: pleasant female in no acute distress, normal body habitus  Lungs: normal respiratory movements  Genitourinary: stable bladder. Urethral catheter with clear urine  Psych: normal mood and affect, alert and appropriate, answering questions    Labs:  WBC:    Lab Results   Component Value Date    WBC 7.6 2021     Hemoglobin/Hematocrit:    Lab Results   Component Value Date    HGB 10.9 2021    HCT 32.5 2021     BMP:    Lab Results   Component Value Date     2021    K 4.9 2021    K 3.6 2021     2021    CO2 24 2021    BUN 12 2021    LABALBU 2.9 2021    CREATININE 1.1 2021    CALCIUM 8.8 2021    GFRAA 59 2021    GFRAA >60 2011    LABGLOM 49 2021     PT/INR:    Lab Results   Component Value Date    PROTIME 12.8 2021    INR 1.10 2021     PTT:    Lab Results   Component Value Date    APTT 27.8 2011   [APTT    Impression/Plan: bilateral hydro, LANA  S/p  cysto bilateral stents by Dr. Soren Quinteros    1. Plan cysto/stent change in 3-4 months  2.  Remove chandra once clinically improved and after completely other med/surg interventions      Helen Weber MD

## 2021-07-01 NOTE — CONSULTS
Department of Orthopedic Surgery  Physician Assistant   Consult Note        Reason for Consult:  Left hip mass  Requesting Physician: Kedar Hull MD  Date of Service: 7/1/2021 1:19 PM    CHIEF COMPLAINT:  As Above    History Obtained From:  patient, family member - Son    HISTORY OF PRESENT ILLNESS:                The patient is a 80 y.o. female who presents with above chief complaint. This patient has been found to have diffuse metastatic disease. Left hip x ray and bone scan show a lesion in the left proximal femur concerning for pending pathologic fracture and we have been asked to consult on this patient. The patient is seen today with her son in the room Her son is the POA of the patient. The patient reporting pain in the Right knee but the Left hip is asymptomatic. Past Medical History:        Diagnosis Date    Anemia     Anesthesia     PONV    Arthritis     CAD (coronary artery disease)     CVA (cerebral infarction)     Diabetes mellitus (HCC)     Diastolic heart failure (HCC)     Fibromyalgia     Hyperlipidemia     Hypertension     Hypothyroidism     LFT's abnormal     CHAMBERS (nonalcoholic steatohepatitis)     Nausea & vomiting     Pancreatic cyst     PNA (pneumonia) 2/23/2021    Pneumonia     Rectocele     Sleep apnea     does not use c-pap    Tremor     Vitamin D deficiency      Past Surgical History:        Procedure Laterality Date    CHOLECYSTECTOMY      COLONOSCOPY  01/10/2018    CYSTOSCOPY Bilateral 6/29/2021    CYSTOSCOPY BILATERAL URETERAL STENT INSERTION performed by Daya Perez MD at [facility] CATH LAB PROCEDURE      ECTOPIC PREGNANCY SURGERY  1985    EYE SURGERY Right     cataract    OTHER SURGICAL HISTORY  11-4-2011    Posterior repair    POSTERIOR CRUCIATE LIGAMENT REPAIR           Medications Prior to Admission:   Prior to Admission medications    Medication Sig Start Date End Date Taking?  Authorizing Provider   buPROPion (WELLBUTRIN XL) 150 MG extended release tablet TAKE 1 TABLET EVERY MORNING 5/27/21  Yes SUMA Ross CNP   atorvastatin (LIPITOR) 40 MG tablet TAKE 1 TABLET NIGHTLY 5/27/21  Yes SUMA Ross CNP   metFORMIN (GLUCOPHAGE-XR) 500 MG extended release tablet TAKE 1 TABLET TWICE A DAY 5/25/21  Yes SUMA Ross CNP   lisinopril (PRINIVIL;ZESTRIL) 5 MG tablet TAKE 1 TABLET DAILY 5/4/21  Yes SUMA Ross CNP   alendronate (FOSAMAX) 70 MG tablet TAKE 1 TABLET EVERY 7 DAYS  Patient taking differently: 70 mg TAKE 1 TABLET EVERY 7 DAYS.   Takes on Fridays 5/3/21  Yes SUMA Carreon CNP   glimepiride (AMARYL) 4 MG tablet TAKE 1 TABLET TWICE A DAY 5/3/21  Yes SUMA Carreon CNP   Insulin Glargine, 1 Unit Dial, (TOUJEO SOLOSTAR) 300 UNIT/ML SOPN INJECT 70 UNITS            SUBCUTANEOUSLY NIGHTLY 5/3/21  Yes SUMA Carreon CNP   metoprolol tartrate (LOPRESSOR) 25 MG tablet TAKE 1 TABLET TWICE A DAY 5/3/21  Yes SUMA Carreon CNP   levothyroxine (SYNTHROID) 75 MCG tablet TAKE 1 TABLET DAILY 4/19/21  Yes Karine Jo MD   blood glucose test strips (ONETOUCH ULTRA) strip USE TO TEST BLOOD SUGAR TWOTIMES A DAY 2/8/21  Yes SUMA Contreras CNP   primidone (MYSOLINE) 50 MG tablet TAKE 3 TABLETS NIGHTLY 2/2/21  Yes SUMA Ross CNP   DULoxetine (CYMBALTA) 60 MG extended release capsule Take 1 capsule by mouth 2 times daily 2/2/21  Yes SUMA Nguyễn CNP   Insulin Pen Needle (B-D ULTRAFINE III SHORT PEN) 31G X 8 MM MISC USE AND DISCARD 1 PEN      NEEDLE DAILY 6/23/20  Yes Lady Griselda MD   Cyanocobalamin (VITAMIN B 12 PO) Take by mouth daily    Yes Historical Provider, MD   Blood Glucose Monitoring Suppl (MM EASY TOUCH GLUCOSE METER) w/Device KIT 1 kit by Does not apply route 2 times daily 3/15/18  Yes Jose M Kim, APRN - CNP   vitamin D (CHOLECALCIFEROL) 1000 UNIT TABS tablet Take 4,000 Units by mouth daily   Yes Historical Provider, MD   magnesium 30 MG tablet Take 30 mg by mouth daily   Yes Historical Provider, MD   aspirin 325 MG tablet Take 1 tablet by mouth daily. 3/18/14  Yes Macario Chambers, DO   UNABLE TO FIND Tumeric    Historical Provider, MD   ONE TOUCH ULTRASOFT LANCETS MISC Test blood sugar 2 times a day 4/6/17   Marisa Hood MD       Allergies:  Codeine, Ultram [tramadol], and Percocet [oxycodone-acetaminophen]    Social History:    Tobacco:  reports that she quit smoking about 30 years ago. Her smoking use included cigarettes. She has a 0.50 pack-year smoking history. She has never used smokeless tobacco.   Alcohol:  reports no history of alcohol use. Illicit Drug: No  Family History:       Problem Relation Age of Onset    Diabetes Mother     Stroke Mother     Heart Disease Father     Cancer Father         prostate    Heart Disease Brother         multiple stents    Heart Disease Brother     Heart Disease Sister         carotid artery surgery    Heart Disease Brother     Heart Attack Brother     Heart Disease Brother     Asthma Neg Hx     Emphysema Neg Hx     Heart Failure Neg Hx        REVIEW OF SYSTEMS:    CONSTITUTIONAL:  negative  MUSCULOSKELETAL:  positive for  pain  All other systems reviewed and negative    PHYSICAL EXAM:    awake, alert, cooperative, no apparent distress, and appears stated age  MUSCULOSKELETAL:  Left hip: The skin is intact with no signs of erythema or infection. Denies pain to palpation with no palpable mass. Range of motion is good in flexion, internal and external rotation. Log roll is negative. Right knee: Skin is intact without erythema. No obvious palpable mass or crepitance. ROM and strength reduced secondary to pain.  The knee is stable on exam.    DATA:    CBC:   Recent Labs     06/29/21  0651 06/29/21  0651 06/29/21  0654 06/30/21  0610 07/01/21  0625   WBC 9.8  --   --  9.2 7.6   HGB 10.8*   < > 10.8* 11.0* 10.9*     --   --  264 285 < > = values in this interval not displayed. BMP:    Recent Labs     06/29/21  0651 06/29/21  0651 06/30/21  0610 06/30/21  0827 07/01/21  0625   *  --  143  --  139   K 5.3*   < > 6.0* 5.4* 4.9     --  113*  --  106   CO2 17*  --  22  --  24   BUN 27*  --  17  --  12   CREATININE 2.7*  --  1.2  --  1.1   GLUCOSE 523*  --  138*  --  219*    < > = values in this interval not displayed. INR:   Recent Labs     06/29/21  0940   INR 1.10       Radiology:   FL ERCP BILIARY AND PANCREATIC S&I   Final Result   ERCP images as above      52 52         XR FEMUR LEFT (MIN 2 VIEWS)   Final Result   Focal area of mixed lucency/sclerosis in the greater   trochanteric/intertrochanteric region of the left proximal femur is   consistent with area of suspected metastatic disease with no evidence of   superimposed pathologic fracture as described above. NM BONE SCAN WHOLE BODY   Final Result   Widespread osseous metastatic disease throughout the axial and appendicular   skeleton. MRI ABDOMEN WO CONTRAST MRCP   Final Result   1. Severe intrahepatic and extrahepatic biliary dilation and up to moderate   dilation of the main pancreatic duct with abrupt caliber changes in the   common bile duct and main pancreatic duct near the pancreatic head. No   definite underlying lesion is identified, although one is not excluded on the   basis of this study. Considerations include ductal adenocarcinoma or   postinflammatory strictures from prior pancreatitis. Consider further   evaluation with pancreas protocol abdomen CT or MRI versus ERCP. 2. Sequelae of chronic pancreatitis best demonstrated on CT with potential   for superimposed mild acute pancreatitis. However, the visualized   peripancreatic stranding could be related to minimal anasarca seen elsewhere.    3. Innumerable simple and minimally complicated cystic lesions measuring up   to 1.8 cm throughout the pancreas could represent dilated side branches from   chronic pancreatitis and/or branch duct intraductal papillary mucinous   neoplasms. Recommend follow-up with pancreas protocol and MRI (preferred) or   CT in 1 year. 4. Severe bilateral hydronephrosis, moderate to severe left hydroureter (in   the setting of a partially duplicated collecting system), and mild right   hydroureter. The right ureter changes caliber proximal to the pelvic brim,   while the conjoint left ureter changes caliber near the pelvic brim. Underline postinflammatory or malignant strictures are not excluded. Consider further evaluation with CT urography. 5. Trace to small bilateral pleural effusions, trace ascites, and minimal   anasarca suggestive of volume overload. 6. Suboptimally seen mesenteric lymph nodes better demonstrated on CT could   be reactive but could also be related to mesenteric panniculitis or   neoplastic involvement. Recommend attention on follow-up imaging. US GALLBLADDER RUQ   Final Result   1. Right-sided hydroureteronephrosis. 2. Common duct dilation status post cholecystectomy. CT CHEST ABDOMEN PELVIS WO CONTRAST   Final Result   Somewhat limited study in the absence of contrast material.      Numerous sclerotic metastases throughout all bones of the visualized axial   and appendicular skeleton with a possible lytic lesion within the upper   aspect of the right scapula and a small soft tissue nodule within the   anterior left chest, possibly metastatic. These findings are new from prior. A healing nondisplaced subacute fracture of the left L2 transverse process is   also new, possibly pathologic. There are numerous mildly enlarged lymph nodes throughout the mediastinum as   well as the retroperitoneum in the abdominal mesentery. Moderate to severe bilateral hydronephrosis and hydroureter, also new from   prior, without appreciable obstructing stone.   It is unclear whether this is   related to metastatic cervical cancer or an underlying lymphoproliferative   disorder such as lymphoma. Lymph node sampling is recommended. Minimal bibasilar atelectasis. Otherwise, clear lungs. XR CHEST PORTABLE   Final Result   Stable chronic changes with no acute abnormality seen. FLUORO FOR SURGICAL PROCEDURES    (Results Pending)   XR KNEE RIGHT (1-2 VIEWS)    (Results Pending)          IMPRESSION/RECOMMENDATIONS:    Assessment: Left proximal femur pending pathologic fracture    Plan:  1) Cont. To discuss with family possible prophylatic nailing of left femur. The patient's son and POA would like to await biopsy results. ERCP biopsy unsuccessful today  2) Right knee/distal femur uptake noted on bone scan but not completely imaged  3) Xray Right knee ordered  4) Will follow      Thank you for the opportunity to consult on this patient.     ALBERTO Tobar

## 2021-07-01 NOTE — PROGRESS NOTES
Pt arrives to [facility] for ERCP, Consent signed. Pt alert and oriented.   Son at [facility]

## 2021-07-01 NOTE — PROGRESS NOTES
D: Patient here from [facility] via bed, taken to bay 2 in PACU, airway is present, all current drips, treatments, skin issues, and plan of care were reviewed by all RN's, patient transferred in stable condition.

## 2021-07-01 NOTE — ACP (ADVANCE CARE PLANNING)
ADVANCED CARE PLANNING    Name:Pallavi Alvarez       :  1950              MRN:  [id number]  Admission Date: 2021  7:42 PM  Date of Discussion: 2021      Purpose of Encounter: Advanced care planning in light of bone meta stasis primary unknown  Parties in attendance: :Frederick Sidhu MD, Family members: 2 sons  Decisional Capacity:Yes      Objective/Medical Story:   67-year-old female admitted with generalized weakness and acute kidney injury found to have obstructive uropathy, biliary obstruction with marked transaminitis multiple bone meta stasis. Urology, oncology, GI were consulted. She had a urinary stent and awaiting for successful repeat ERCP for possible biliary stent and pancreatic stent. Oncology has been following and high suspicion for choroidal mets  CA-125 is very much elevated  Pancreatic CA cannot be ruled out  Nephrology was consulted for electrolyte imbalance and acid-base disorders  Orthopedic was consulted for possible prophylactic surgery    Active Diagnoses:     Active Hospital Problems    Diagnosis Date Noted    Metastatic malignant neoplasm ([facility] [C79.9] 2021    Abdominal lymphadenopathy [R59.0] 2021    Transaminitis [R74.01] 2021    Moderate malnutrition (HCC) [E44.0] 2021    Elevated bilirubin [R17]     Nausea and vomiting [R11.2]     Bile duct obstruction [K83.1]     LANA (acute kidney injury) ([facility] [N17.9] 2021    Obstructive nephropathy [N13.8] 2021    DM2 (diabetes mellitus, type 2) ([facility] [E11.9] 2013    Generalized weakness [R53.1] 2011       These active diagnoses are of sufficient risk that focused discussion on advance care planning is indicated in order to allow the patient to thoughtfully consider personal goals of care; and if situations arise that prevent the ability to personally give input, to ensure appropriate representation of their personal desires for different levels and agressiveness of care. Goals of Care Determinations: Patient wishes to focus on current management to find out what exactly going on with her  Code Status: At this time patient wishes to be full code at this time         Time Spent on Advanced Planning Documents: 16 mins. The following items were considered in medical decision making:  Independent review of images , Review / order clinical lab tests. Review / order radiology tests, Decision to obtain old records. Advanced Care Planning Documents:   Completed advances directives, advanced directives in chart. Electronically signed by Ricci Elkins MD on 7/1/2021 at 7:23 PM    Thank you SUMA Adhikari CNP for the opportunity to be involved in this patient's care. If you have any questions or concerns please feel free to contact me at [phone].

## 2021-07-01 NOTE — PLAN OF CARE
Problem: Falls - Risk of:  Goal: Will remain free from falls  Description: Will remain free from falls  Outcome: Ongoing     Problem: Pain:  Goal: Pain level will decrease  Description: Pain level will decrease  Outcome: Ongoing     Problem: Health Behavior:  Goal: Ability to state signs and symptoms to report to health care provider will improve  Description: Ability to state signs and symptoms to report to health care provider will improve  Outcome: Ongoing     Problem: Physical Regulation:  Goal: Complications related to the disease process, condition or treatment will be avoided or minimized  Description: Complications related to the disease process, condition or treatment will be avoided or minimized  Outcome: Ongoing     Problem: Sensory:  Goal: Pain level will decrease  Description: Pain level will decrease  Outcome: Ongoing

## 2021-07-01 NOTE — PROGRESS NOTES
A: Telephone report given to [employer], all current drips, treatments, skin issues, and plan of care were reviewed, patient transferred in stable condition. TC to [facility] to verify transmission of telemitry monitor, which was confirmed.

## 2021-07-01 NOTE — PROGRESS NOTES
Patient met with  yesterday, and an updated HCPOA/living will was decided upon. The copy is in her chart, and it names her Carrol Martínez, as the [employer].

## 2021-07-01 NOTE — OP NOTE
Operative Note      Patient: Eliazar Deras  YOB: 1950  MRN: [id number]    Date of Procedure: 7/1/2021    Pre-Op Diagnosis: Dilated CBD    Post-Op Diagnosis: Dilated CBD with very difficult anatomy at its lower end. Pancreatic duct with evidence of chronic pancreatitis. Procedure(s):  ENDOSCOPIC RETROGRADE CHOLANGIOPANCREATOGRAPHY  ERCP SPHINCTER/PAPILLOTOMY  ERCP STENT INSERTION    Surgeon(s):  Keenan Partida MD    Assistant:   * No surgical staff found *    Anesthesia: General    Estimated Blood Loss (mL): Minimal    Complications: None    Specimens:   * No specimens in log *    Implants:  Implant Name Type Inv. Item Serial No.  Lot No. LRB No. Used Action   STENT PANCREAS 4FR L5CM POLYMER SGL PGTL W O LD BINTA RADPQ  STENT PANCREAS 4FR L5CM POLYMER SGL PGTL W O LD BINTA RADPQ  [employer]-WD   1 Implanted         Drains:   Urethral Catheter Latex 16 fr (Active)   Catheter Indications Perioperative use for selected surgical procedures 07/01/21 1135   Securement Device Date Changed 07/06/21 07/01/21 1135   Site Assessment Pink; No urethral drainage 07/01/21 1135   Urine Color Chris 07/01/21 1135   Urine Appearance Cloudy 07/01/21 1135   Urine Odor Malodorous 07/01/21 1135   Output (mL) 1125 mL 07/01/21 1754   Provider Notified to Remove No 06/29/21 2041       Ureteral Catheter Left ureter 6 fr (Active)       Ureteral Catheter Right ureter 6 fr (Active)       [REMOVED] Urethral Catheter Non-latex 16 fr (Removed)   Catheter Indications Urinary retention (acute or chronic), continuous bladder irrigation or bladder outlet obstruction 06/29/21 0807   Site Assessment Red 06/29/21 1500   Urine Color Adrienne 06/29/21 0807   Urine Appearance Clear 06/29/21 0807   Urine Odor Malodorous 06/28/21 2339   Output (mL) 400 mL 06/28/21 Aria Quiles 13 [address] [facility] ,  [facility]  [place], [facility]  Phone: [phone]    ERCP Procedure Note    Patient: Barroso Minor  : 1950    Procedure: ERCP with biliary sphincterotomy and pancreatic stent placement. Date:  2021     Endoscopist:  Kathy Amador MD, MD    Referring Physician:  SUMA Welch - CNP    Preoperative Diagnosis:  LANA (acute kidney injury) ([facility] [N17.9]    Postoperative Diagnosis:  LANA (acute kidney injury) ([facility] [N17.9]    Anesthesia: Anesthesia: GA      Procedure Details    The patient was placed in prone position after the endotracheal intubation and anesthesia. Olympus video duodenoscope was passed into the esophagus and then into the stomach. The gastric content was suctioned as much as possible. The scope was advanced into the duodenum. Solid residual food was encountered both in the stomach and the duodenum. This came in the way of easy access to the papula in desirable location. The location of the papilla was different than expected with normal anatomy. The patient's position was changed to adjust forward. The papilla was free of any neoplastic-looking process. Cannulation of the bile duct was very difficult and could not be accomplished with usual techniques. A freehand needle knife papillotomy was performed. Drops of bile were noted. Regular papillotome was inserted. A guidewire was advanced into the bile duct. Initially very dilated biliary tree was visualized partially upon injection of the dye. Because of the resistance encountered at the distal most part of the bile duct or a very tortuous course the papillotome could not be inserted all the way up. Alternatives were considered. But during the process, pancreatic duct, with ductal mucosa visible, was cannulated. The diet was injected visualizing dilated pancreatic duct with dilated side branches and changes suggestive of chronic pancreatitis. A pancreatic stent was placed to reduce the chances of pancreatitis-4 [place] by 5 cm.     Biliary cannulation was then planned to be attempted with the hope of directing the papillotome differently with pancreatic stent in place. Unfortunately, the channel of the scope was blocked completely. Even water could not be flushed through. We had to terminate the procedure because of that. The air and secretions were suctioned as much as possible. EBL < 5 ml  No complications or adverse events. Radiographic documentation was obtained during the procedure. Radiological Interpretation:  All fluoroscopic images and still x-ray films were carefullly examined and interpreted by the endoscopist on the spot during the procedure in the absence of a radiologist.  These interpretations guided the course of the procedure and the interventions mentioned above and below. Specimens: None. Complications:   None; patient tolerated the procedure well. Disposition:   PACU - hemodynamically stable. Radiological Interpretation:  All fluoroscopic images and still x-ray films were carefullly examined and interpreted by the endoscopist on the spot during the procedure in the absence of a radiologist.  These interpretations guided the course of the procedure and the interventions mentioned above and below. Estimated Blood loss:  none    We will complete the procedure tomorrow if the scope can get unblocked and is in usable condition. IMPORTANT: Please note that some portions of this note may have been created using Dragon voice recognition software. Some \"sound-alike\" and totally wrong word substitutions may have taken place due to known inherent limitations of any such software, including this voice recognition software. In spite of efforts to eliminate such errors, some may not have been corrected. So please read the note with this in mind and recognize such mistakes and understand the correct version using the  context.  If there are still uncertainties in the mind of the medical provider reading this note about any aspect of the note, the provider can feel free to contact me. Thanks.                 Electronically signed by Shauna Nielson MD on 7/1/2021 at 7:27 PM

## 2021-07-01 NOTE — PROGRESS NOTES
D: Patient was able to push airway out with tongue. A: Assessment completed and documented, call light is in reach, discussed plan of care was reviewed with patient who agreed.

## 2021-07-01 NOTE — PROGRESS NOTES
ONCOLOGY HEMATOLOGY CARE PROGRESS NOTE      SUBJECTIVE:     The patient is not very talkative, but she is aware she is going to have an ERCP today and the ramifications. ROS:     Constitutional:  No weight loss, No fever, No chills, No night sweats. Energy level good. Eyes:  No impairment or change in vision  ENT / Mouth:  No pain, abnormal ulceration, bleeding, nasal drip or change in voice or hearing  Cardiovascular:  No chest pain, palpitations, new edema, or calf discomfort  Respiratory:  No pain, hemoptysis, change to breathing  Breast:  No pain, discharge, change in appearance or texture  Gastrointestinal:  No pain, cramping, jaundice, change to eating and bowel habits  Urinary:  No pain, bleeding or change in continence  Genitalia: No pain, bleeding or discharge  Musculoskeletal:  No redness, pain, edema or weakness  Skin:  itchy , dry skin   Neurologic:  No discomfort, change in mental status, speech, sensory or motor activity  Psychiatric:  No change in concentration or change to affect or mood  Endocrine:  No hot flashes, increased thirst, or change to urine production  Hematologic: No petechiae, ecchymosis or bleeding  Lymphatic:  No lymphadenopathy or lymphedema  Allergy / Immunologic:  No eczema, hives, frequent or recurrent infections    OBJECTIVE        Physical    VITALS:  BP (!) 179/82   Pulse 94   Temp 98.1 °F (36.7 °C) (Oral)   Resp 18   Ht 5' 6.5\" (1.689 m)   Wt 191 lb (86.6 kg)   LMP  (LMP Unknown)   SpO2 93%   BMI 30.37 kg/m²   TEMPERATURE:  Current - Temp: 98.1 °F (36.7 °C);  Max - Temp  Av.1 °F (36.7 °C)  Min: 97.6 °F (36.4 °C)  Max: 98.6 °F (37 °C)  PULSE OXIMETRY RANGE: SpO2  Av.2 %  Min: 93 %  Max: 96 %  24HR INTAKE/OUTPUT:      Intake/Output Summary (Last 24 hours) at 2021 0811  Last data filed at 2021 0510  Gross per 24 hour   Intake --   Output 3150 ml   Net -3150 ml       CONSTITUTIONAL: awake, alert, cooperative, no apparent distress   EYES: pupils equal, round and reactive to light, sclera icteric and conjunctiva normal  ENT: Normocephalic, without obvious abnormality, atraumatic  NECK: supple, symmetrical, no jugular venous distension and no carotid bruits   HEMATOLOGIC/LYMPHATIC: no cervical, supraclavicular or axillary lymphadenopathy   LUNGS: no increased work of breathing and clear to auscultation   CARDIOVASCULAR: regular rate and rhythm, normal S1 and S2, no murmur noted  ABDOMEN: normal bowel sounds x 4, soft, non-distended, non-tender, no masses palpated, no hepatosplenomgaly   MUSCULOSKELETAL: full range of motion noted, tone is normal  NEUROLOGIC: awake, alert, oriented to name, place and time. Motor skills grossly intact. SKIN: Normal skin color, texture, turgor and + jaundice. appears intact   EXTREMITIES: no LE edema       Data      Recent Labs     06/29/21  0651 06/29/21  0651 06/29/21  0654 06/30/21  0610 07/01/21  0625   WBC 9.8  --   --  9.2 7.6   HGB 10.8*   < > 10.8* 11.0* 10.9*   HCT 32.9*  --   --  33.0* 32.5*     --   --  264 285   MCV 79.2*  --   --  79.3* 79.4*    < > = values in this interval not displayed. Recent Labs     06/29/21  0651 06/29/21  0651 06/30/21  0610 06/30/21  0827 07/01/21  0625   *  --  143  --  139   K 5.3*   < > 6.0* 5.4* 4.9     --  113*  --  106   CO2 17*  --  22  --  24   BUN 27*  --  17  --  12   CREATININE 2.7*  --  1.2  --  1.1    < > = values in this interval not displayed.      Recent Labs     06/29/21  0651 06/30/21  0610 07/01/21  0625   * 161* 113*   * 260* 194*   BILITOT 4.1* 4.6* 4.9*   ALKPHOS 654* 667* 679*       Magnesium:    Lab Results   Component Value Date    MG 1.50 07/01/2021    MG 1.80 06/30/2021    MG 2.00 06/29/2021         Problem List  Patient Active Problem List   Diagnosis    CAD (coronary artery disease)    Sleep apnea    Generalized weakness    Nonalcoholic steatohepatitis    Rectocele    DM2 (diabetes mellitus, type 2) ([address]    HTN (hypertension), benign    Cerebral infarction ([address]    Right shoulder pain    Insomnia    SUNIL (obstructive sleep apnea)    Coarse tremor    Dizziness    Palpitations    Essential tremor    Dyslipidemia    PNA (pneumonia)    Obesity    Bilateral primary osteoarthritis of knee    Chronic bilateral low back pain without sciatica    DDD (degenerative disc disease), lumbar    Facet arthritis of lumbar region    Primary osteoarthritis of right knee    Abnormal finding on thyroid function test    LANA (acute kidney injury) ([address]    Obstructive nephropathy    Metastatic malignant neoplasm (HCC)    Abdominal lymphadenopathy    Transaminitis    Elevated bilirubin    Nausea and vomiting    Bile duct obstruction    Moderate malnutrition (HCC)       ASSESSMENT AND PLAN:    Metastatic carcinoma:  -She appears to have choroidal mets. -She has bone lesions, possible intrahepatic lesion, and omental metastasis  -CA-125 is over thousand  -Initial myeloma studies are negative  -The differential is quite broad. -She is going to have an ERCP to look for pancreatic carcinoma. -If this is negative, I would consider a bone biopsy to help with the differential.  -She has a very poor prognosis.   - Very widespread bone mets   -We are still waiting for the ERCP    Elevated liver functions:  -We are waiting for the ERCP  - check ammonia levels     Hydronephrosis   - Bilateral stents in place   - to be exchanged in 4 months         ONCOLOGIC DISPOSITION:    -Post diagnosis  - We are still waiting on the ERCP  - Ask ortho to see given L femoral head lesion; eval for need for prophylactic pinning ; high risk for spontaneous fracture    - Palliative care to see patient for psych social     Nilesh Nair MD  May be reached through [address] [place] [address]

## 2021-07-01 NOTE — ANESTHESIA PRE PROCEDURE
Department of Anesthesiology  Preprocedure Note       Name:  Hammad Novoa   Age:  80 y.o.  :  1950                                          MRN:  [id number]         Date:  2021      Surgeon: Carleen Valenzuela):  Delvin Fowler MD    Procedure: Procedure(s):  ENDOSCOPIC RETROGRADE CHOLANGIOPANCREATOGRAPHY    Medications prior to admission:   Prior to Admission medications    Medication Sig Start Date End Date Taking? Authorizing Provider   buPROPion (WELLBUTRIN XL) 150 MG extended release tablet TAKE 1 TABLET EVERY MORNING 21  Yes SUMA Diaz CNP   atorvastatin (LIPITOR) 40 MG tablet TAKE 1 TABLET NIGHTLY 21  Yes SUMA Franco CNP   metFORMIN (GLUCOPHAGE-XR) 500 MG extended release tablet TAKE 1 TABLET TWICE A DAY 21  Yes SUMA Diaz CNP   lisinopril (PRINIVIL;ZESTRIL) 5 MG tablet TAKE 1 TABLET DAILY 21  Yes SUMA Diaz CNP   alendronate (FOSAMAX) 70 MG tablet TAKE 1 TABLET EVERY 7 DAYS  Patient taking differently: 70 mg TAKE 1 TABLET EVERY 7 DAYS.   Takes on Fridays 5/3/21  Yes SUMA Clay CNP   glimepiride (AMARYL) 4 MG tablet TAKE 1 TABLET TWICE A DAY 5/3/21  Yes SUMA Clay CNP   Insulin Glargine, 1 Unit Dial, (TOUJEO SOLOSTAR) 300 UNIT/ML SOPN INJECT 70 UNITS            SUBCUTANEOUSLY NIGHTLY 5/3/21  Yes SUMA Clay CNP   metoprolol tartrate (LOPRESSOR) 25 MG tablet TAKE 1 TABLET TWICE A DAY 5/3/21  Yes SUMA Clay CNP   levothyroxine (SYNTHROID) 75 MCG tablet TAKE 1 TABLET DAILY 21  Yes Doretha Mayo MD   blood glucose test strips (ONETOUCH ULTRA) strip USE TO TEST BLOOD SUGAR TWOTIMES A DAY 21  Yes SUMA Fields CNP   primidone (MYSOLINE) 50 MG tablet TAKE 3 TABLETS NIGHTLY 21  Yes SUMA Diaz CNP   DULoxetine (CYMBALTA) 60 MG extended release capsule Take 1 capsule by mouth 2 times daily 21  Yes Tina Waldron, APRN - CNP Insulin Pen Needle (B-D ULTRAFINE III SHORT PEN) 31G X 8 MM MISC USE AND DISCARD 1 PEN      NEEDLE DAILY 6/23/20  Yes Jocelyne Glass MD   Cyanocobalamin (VITAMIN B 12 PO) Take by mouth daily    Yes Historical Provider, MD   Blood Glucose Monitoring Suppl (MM EASY TOUCH GLUCOSE METER) w/Device KIT 1 kit by Does not apply route 2 times daily 3/15/18  Yes Laly Mckeon APRN - CNP   vitamin D (CHOLECALCIFEROL) 1000 UNIT TABS tablet Take 4,000 Units by mouth daily   Yes Historical Provider, MD   magnesium 30 MG tablet Take 30 mg by mouth daily   Yes Historical Provider, MD   aspirin 325 MG tablet Take 1 tablet by mouth daily.  3/18/14  Yes Macario Chambers, DO   UNABLE TO FIND Tumeric    Historical Provider, MD   ONE TOUCH ULTRASOFT LANCETS MISC Test blood sugar 2 times a day 4/6/17   Marisa Hood MD       Current medications:    Current Facility-Administered Medications   Medication Dose Route Frequency Provider Last Rate Last Admin    lactated ringers infusion   Intravenous Continuous Satinder Acosta MD        metoprolol tartrate (LOPRESSOR) tablet 50 mg  50 mg Oral BID Ina Rios MD   50 mg at 07/01/21 0757    sodium zirconium cyclosilicate (LOKELMA) oral suspension 5 g  5 g Oral TID Lilliana Power MD   5 g at 06/30/21 2248    oxyCODONE-acetaminophen (PERCOCET) 5-325 MG per tablet 1 tablet  1 tablet Oral Q6H PRN Ina Rios MD   1 tablet at 07/01/21 0519    labetalol (NORMODYNE;TRANDATE) injection 10 mg  10 mg Intravenous Q6H PRN Marcus Bermudez MD   10 mg at 06/30/21 2256    buPROPion (WELLBUTRIN XL) extended release tablet 150 mg  150 mg Oral Daily Marcus Bermudez MD   150 mg at 06/28/21 0803    DULoxetine (CYMBALTA) extended release capsule 60 mg  60 mg Oral Daily Marcus Bermudez MD   60 mg at 06/28/21 0803    levothyroxine (SYNTHROID) tablet 75 mcg  75 mcg Oral Daily Marcus Bermudez MD   75 mcg at 07/01/21 0519    vitamin D (CHOLECALCIFEROL) tablet 4,000 Units  4,000 Units Oral Daily Alessio Moss Wanda Bhatt MD   4,000 Units at 06/28/21 0803    glucose (GLUTOSE) 40 % oral gel 15 g  15 g Oral PRN Vera Love MD        dextrose 50 % IV solution  12.5 g Intravenous PRN Vera Love MD   12.5 g at 06/29/21 1433    glucagon (rDNA) injection 1 mg  1 mg Intramuscular PRN Vera Love MD        dextrose 5 % solution  100 mL/hr Intravenous PRN Vera Love MD        sodium chloride flush 0.9 % injection 10 mL  10 mL Intravenous PRN Vera Love MD   10 mL at 06/30/21 1952    0.9 % sodium chloride infusion  25 mL Intravenous PRN Vera Love MD        acetaminophen (TYLENOL) tablet 650 mg  650 mg Oral Q6H PRN Vera Love MD   650 mg at 06/29/21 1708    Or    acetaminophen (TYLENOL) suppository 650 mg  650 mg Rectal Q6H PRN Vera Love MD        melatonin tablet 3 mg  3 mg Oral Nightly PRN Vera Love MD        diphenhydrAMINE (BENADRYL) tablet 50 mg  50 mg Oral Q6H PRN Vera Love MD   50 mg at 07/01/21 0519    hydrOXYzine (ATARAX) tablet 10 mg  10 mg Oral TID PRN Vera Love MD   10 mg at 06/29/21 1709    ursodiol (ACTIGALL) capsule 300 mg  300 mg Oral BID Vera Love MD   300 mg at 06/30/21 2248    HYDROmorphone (DILAUDID) injection 1 mg  1 mg Intravenous Q4H PRN Vera Love MD   1 mg at 06/30/21 1952    insulin lispro (HUMALOG) injection vial 0-6 Units  0-6 Units Subcutaneous TID WC Vera Love MD        insulin lispro (HUMALOG) injection vial 0-3 Units  0-3 Units Subcutaneous Nightly Vera Love MD   1 Units at 06/30/21 2233    0.9 % sodium chloride infusion   Intravenous Continuous Vera Love MD   Stopped at 06/30/21 1416    [Held by provider] heparin (porcine) injection 5,000 Units  5,000 Units Subcutaneous 3 times per day Vera Love MD   5,000 Units at 06/29/21 2145    ondansetron (ZOFRAN-ODT) disintegrating tablet 4 mg  4 mg Oral Q4H PRN Vera Love MD        ondansetron [facility]) injection 4 mg  4 mg Intravenous Q4H PRN Vera Love MD   4 mg at 07/01/21 0524    promethazine (PHENERGAN) injection 12.5 mg  12.5 mg Intravenous Q4H PRN Lei Duran MD        promethazine (PHENERGAN) tablet 12.5 mg  12.5 mg Oral Q4H PRN Lei Duran MD           Allergies:     Allergies   Allergen Reactions    Codeine     Ultram [Tramadol]      vomiting    Percocet [Oxycodone-Acetaminophen] Nausea And Vomiting       Problem List:    Patient Active Problem List   Diagnosis Code    CAD (coronary artery disease) I25.10    Sleep apnea G47.30    Generalized weakness B84.7    Nonalcoholic steatohepatitis Z27.41    Rectocele N81.6    DM2 (diabetes mellitus, type 2) (HCC) E11.9    HTN (hypertension), benign I10    Cerebral infarction ([facility] I63.9    Right shoulder pain M25.511    Insomnia G47.00    SUNIL (obstructive sleep apnea) G47.33    Coarse tremor G25.2    Dizziness R42    Palpitations R00.2    Essential tremor G25.0    Dyslipidemia E78.5    PNA (pneumonia) J18.9    Obesity E66.9    Bilateral primary osteoarthritis of knee M17.0    Chronic bilateral low back pain without sciatica M54.5, G89.29    DDD (degenerative disc disease), lumbar M51.36    Facet arthritis of lumbar region M47.816    Primary osteoarthritis of right knee M17.11    Abnormal finding on thyroid function test R94.6    LANA (acute kidney injury) ([facility] N17.9    Obstructive nephropathy N13.8    Metastatic malignant neoplasm (HCC) C79.9    Abdominal lymphadenopathy R59.0    Transaminitis R74.01    Elevated bilirubin R17    Nausea and vomiting R11.2    Bile duct obstruction K83.1    Moderate malnutrition (HCC) E44.0       Past Medical History:        Diagnosis Date    Anemia     Anesthesia     PONV    Arthritis     CAD (coronary artery disease)     CVA (cerebral infarction)     Diabetes mellitus (HCC)     Diastolic heart failure (HCC)     Fibromyalgia     Hyperlipidemia     Hypertension     Hypothyroidism     LFT's abnormal     CHAMBERS (nonalcoholic steatohepatitis)     Nausea & vomiting     Pancreatic cyst     PNA (pneumonia) 2021    Pneumonia     Rectocele     Sleep apnea     does not use c-pap    Tremor     Vitamin D deficiency        Past Surgical History:        Procedure Laterality Date    CHOLECYSTECTOMY      COLONOSCOPY  01/10/2018    CYSTOSCOPY Bilateral 2021    CYSTOSCOPY BILATERAL URETERAL STENT INSERTION performed by Daya Perez MD at [address] [place] [address] CATH LAB PROCEDURE      ECTOPIC PREGNANCY SURGERY  1985    EYE SURGERY Right     cataract    OTHER SURGICAL HISTORY  2011    Posterior repair    POSTERIOR CRUCIATE LIGAMENT REPAIR         Social History:    Social History     Tobacco Use    Smoking status: Former Smoker     Packs/day: 0.25     Years: 2.00     Pack years: 0.50     Types: Cigarettes     Quit date: 1991     Years since quittin.2    Smokeless tobacco: Never Used   Substance Use Topics    Alcohol use: No     Alcohol/week: 0.0 standard drinks                                Counseling given: Not Answered      Vital Signs (Current):   Vitals:    21 1522 21 2223 21 0510 21 0755   BP: (!) 157/84 (!) 195/105 (!) 162/77 (!) 179/82   Pulse: 128 116 91 94   Resp: 16 20 20 18   Temp:  98.6 °F (37 °C) 98.1 °F (36.7 °C)    TempSrc:  Oral Oral    SpO2: 94% 96% 93% 93%   Weight:   191 lb (86.6 kg)    Height:                                                  BP Readings from Last 3 Encounters:   21 (!) 179/82   21 136/72   21 121/71       NPO Status:                                                                                 BMI:   Wt Readings from Last 3 Encounters:   21 191 lb (86.6 kg)   21 201 lb (91.2 kg)   21 201 lb (91.2 kg)     Body mass index is 30.37 kg/m².     CBC:   Lab Results   Component Value Date    WBC 7.6 2021    RBC 4.10 2021    HGB 10.9 2021    HCT 32.5 2021    MCV 79.4 2021    RDW 16.7 2021     2021       CMP:   Lab Results Component Value Date     07/01/2021    K 4.9 07/01/2021    K 3.6 02/24/2021     07/01/2021    CO2 24 07/01/2021    BUN 12 07/01/2021    CREATININE 1.1 07/01/2021    GFRAA 59 07/01/2021    GFRAA >60 11/05/2011    AGRATIO 0.9 07/01/2021    LABGLOM 49 07/01/2021    GLUCOSE 219 07/01/2021    PROT 6.3 07/01/2021    PROT 5.8 11/05/2011    CALCIUM 8.8 07/01/2021    BILITOT 4.9 07/01/2021    ALKPHOS 679 07/01/2021     07/01/2021     07/01/2021       POC Tests:   Recent Labs     06/29/21  0654 06/29/21  0719 07/01/21  0748   POCGLU 321*   < > 192*   POCNA 139  --   --    POCK 5.4*  --   --    POCHCT 32.0*  --   --     < > = values in this interval not displayed. Coags:   Lab Results   Component Value Date    PROTIME 12.8 06/29/2021    INR 1.10 06/29/2021    APTT 27.8 06/03/2011       HCG (If Applicable): No results found for: PREGTESTUR, PREGSERUM, HCG, HCGQUANT     ABGs:   Lab Results   Component Value Date    PHART 7.308 06/29/2021    PO2ART 72.5 06/29/2021    FQW8YVA 35.2 06/29/2021    SHM0LFV 17.6 06/29/2021    BEART -9 06/29/2021    Q6SHWGYD 93 06/29/2021        Type & Screen (If Applicable):  Lab Results   Component Value Date    LABABO A 11/04/2011    LABRH Negative 11/04/2011       Drug/Infectious Status (If Applicable):  No results found for: HIV, HEPCAB    COVID-19 Screening (If Applicable):   Lab Results   Component Value Date    COVID19 Not Detected 06/29/2021    COVID19 Not Detected 02/23/2021           Anesthesia Evaluation  Patient summary reviewed   history of anesthetic complications: PONV.   Airway: Mallampati: II  TM distance: >3 FB   Neck ROM: full  Mouth opening: > = 3 FB Dental: normal exam         Pulmonary:Negative Pulmonary ROS and normal exam  breath sounds clear to auscultation  (+) sleep apnea:                             Cardiovascular:Negative CV ROS  Exercise tolerance: good (>4 METS),   (+) hypertension:, CAD:,         Rhythm: regular  Rate: normal Neuro/Psych:   Negative Neuro/Psych ROS  (+) CVA:, neuromuscular disease:,             GI/Hepatic/Renal: Neg GI/Hepatic/Renal ROS  (+) liver disease:, renal disease: ARF,           Endo/Other: Negative Endo/Other ROS   (+) Diabetes, hypothyroidism::., .                 Abdominal:             Vascular: negative vascular ROS. Other Findings:        Pre-Operative Diagnosis: LANA (acute kidney injury) ([facility] [N17.9]    80 y.o.   BMI:  Body mass index is 30.37 kg/m².      Vitals:    21 2223 21 0510 21 0755 21 0837   BP: (!) 195/105 (!) 162/77 (!) 179/82 (!) 196/98   Pulse: 116 91 94 94   Resp: 20  18 16   Temp: 98.6 °F (37 °C) 98.1 °F (36.7 °C)  96.8 °F (36 °C)   TempSrc: Oral Oral  Temporal   SpO2: 96% 93% 93% 93%   Weight:  191 lb (86.6 kg)     Height:           Allergies   Allergen Reactions    Codeine     Ultram [Tramadol]      vomiting    Percocet [Oxycodone-Acetaminophen] Nausea And Vomiting       Social History     Tobacco Use    Smoking status: Former Smoker     Packs/day: 0.25     Years: 2.00     Pack years: 0.50     Types: Cigarettes     Quit date: 1991     Years since quittin.2    Smokeless tobacco: Never Used   Substance Use Topics    Alcohol use: No     Alcohol/week: 0.0 standard drinks       LABS:    CBC  Lab Results   Component Value Date/Time    WBC 7.6 2021 06:25 AM    HGB 10.9 (L) 2021 06:25 AM    HCT 32.5 (L) 2021 06:25 AM     2021 06:25 AM     RENAL  Lab Results   Component Value Date/Time     2021 06:25 AM    K 4.9 2021 06:25 AM    K 3.6 2021 06:04 AM     2021 06:25 AM    CO2 24 2021 06:25 AM    BUN 12 2021 06:25 AM    CREATININE 1.1 2021 06:25 AM    GLUCOSE 219 (H) 2021 06:25 AM     NANDO  Lab Results   Component Value Date/Time    PROTIME 12.8 2021 09:40 AM    INR 1.10 2021 09:40 AM    APTT 27.8 2011 10:00 AM            Anesthesia Plan      general     ASA 3     (I discussed with the patient the risks and benefits of PIV, anesthesia, IV Narcotics, PACU. All questions were answered the patient agrees with the plan and wishes to proceed)  Induction: intravenous.                           Stephanie Brito MD   7/1/2021

## 2021-07-01 NOTE — H&P
Pertinent Complete H & P EGD  =======================  The patient has intense itching, obstructive jaundice, RUQ pain and has dilated bile duct with suspicion of malignancy involving on imaging study   No chest pain or SOB    PAST MEDICAL HISTORY     Past Medical History:   Diagnosis Date    Anemia     Anesthesia     PONV    Arthritis     CAD (coronary artery disease)     CVA (cerebral infarction)     Diabetes mellitus (HCC)     Diastolic heart failure (HCC)     Fibromyalgia     Hyperlipidemia     Hypertension     Hypothyroidism     LFT's abnormal     CHAMBERS (nonalcoholic steatohepatitis)     Nausea & vomiting     Pancreatic cyst     PNA (pneumonia) 2021    Pneumonia     Rectocele     Sleep apnea     does not use c-pap    Tremor     Vitamin D deficiency      FAMILY HISTORY     Family History   Problem Relation Age of Onset    Diabetes Mother     Stroke Mother     Heart Disease Father     Cancer Father         prostate    Heart Disease Brother         multiple stents    Heart Disease Brother     Heart Disease Sister         carotid artery surgery    Heart Disease Brother     Heart Attack Brother     Heart Disease Brother     Asthma Neg Hx     Emphysema Neg Hx     Heart Failure Neg Hx      SOCIAL HISTORY     Social History     Socioeconomic History    Marital status:      Spouse name: Dylan Branch Number of children: 2    Years of education: 15    Highest education level: Not on file   Occupational History    Occupation: [employer]     Comment: school   Tobacco Use    Smoking status: Former Smoker     Packs/day: 0.25     Years: 2.00     Pack years: 0.50     Types: Cigarettes     Quit date: 1991     Years since quittin.2    Smokeless tobacco: Never Used   Vaping Use    Vaping Use: Never used   Substance and Sexual Activity    Alcohol use: No     Alcohol/week: 0.0 standard drinks    Drug use: No    Sexual activity: Yes     Partners: Male     Comment:  Other Topics Concern    Not on file   Social History Narrative    Not on file     Social Determinants of Health     Financial Resource Strain:     Difficulty of Paying Living Expenses:    Food Insecurity:     Worried About Running Out of Food in the Last Year:     [address] in the Last Year:    Transportation Needs:     Lack of Transportation (Medical):  Lack of Transportation (Non-Medical):    Physical Activity:     Days of Exercise per Week:     Minutes of Exercise per Session:    Stress:     Feeling of Stress :    Social Connections:     Frequency of Communication with Friends and Family:     Frequency of Social Gatherings with Friends and Family:     Attends Scientologist Services:     Active Member of Clubs or Organizations:     Attends Club or Organization Meetings:     Marital Status:    Intimate Partner Violence:     Fear of Current or Ex-Partner:     Emotionally Abused:     Physically Abused:     Sexually Abused:        SURGICAL HISTORY     Past Surgical History:   Procedure Laterality Date    CHOLECYSTECTOMY      COLONOSCOPY  01/10/2018    CYSTOSCOPY Bilateral 6/29/2021    CYSTOSCOPY BILATERAL URETERAL STENT INSERTION performed by Rochelle Koch MD at [address] CATH LAB PROCEDURE      [address] Right     cataract    OTHER SURGICAL HISTORY  11-4-2011    Posterior repair    [facility]   (This list may include medications prescribed during this encounter as epic can not insert only the list prior to this encounter.)    No current facility-administered medications on file prior to encounter.      Current Outpatient Medications on File Prior to Encounter   Medication Sig Dispense Refill    buPROPion (WELLBUTRIN XL) 150 MG extended release tablet TAKE 1 TABLET EVERY MORNING 90 tablet 1    atorvastatin (LIPITOR) 40 MG tablet TAKE 1 TABLET NIGHTLY 90 tablet 1    metFORMIN (GLUCOPHAGE-XR) 500 MG extended release tablet TAKE 1 TABLET TWICE A  tablet 3    lisinopril (PRINIVIL;ZESTRIL) 5 MG tablet TAKE 1 TABLET DAILY 90 tablet 1    alendronate (FOSAMAX) 70 MG tablet TAKE 1 TABLET EVERY 7 DAYS (Patient taking differently: 70 mg TAKE 1 TABLET EVERY 7 DAYS. Takes on Fridays) 12 tablet 0    glimepiride (AMARYL) 4 MG tablet TAKE 1 TABLET TWICE A  tablet 0    Insulin Glargine, 1 Unit Dial, (TOUJEO SOLOSTAR) 300 UNIT/ML SOPN INJECT 70 UNITS            SUBCUTANEOUSLY NIGHTLY 18 mL 2    metoprolol tartrate (LOPRESSOR) 25 MG tablet TAKE 1 TABLET TWICE A  tablet 1    levothyroxine (SYNTHROID) 75 MCG tablet TAKE 1 TABLET DAILY 90 tablet 0    blood glucose test strips (ONETOUCH ULTRA) strip USE TO TEST BLOOD SUGAR TWOTIMES A  strip 2    primidone (MYSOLINE) 50 MG tablet TAKE 3 TABLETS NIGHTLY 270 tablet 1    DULoxetine (CYMBALTA) 60 MG extended release capsule Take 1 capsule by mouth 2 times daily 180 capsule 1    Insulin Pen Needle (B-D ULTRAFINE III SHORT PEN) 31G X 8 MM MISC USE AND DISCARD 1 PEN      NEEDLE DAILY 100 each 3    Cyanocobalamin (VITAMIN B 12 PO) Take by mouth daily       Blood Glucose Monitoring Suppl (MM EASY TOUCH GLUCOSE METER) w/Device KIT 1 kit by Does not apply route 2 times daily 1 kit 0    vitamin D (CHOLECALCIFEROL) 1000 UNIT TABS tablet Take 4,000 Units by mouth daily      magnesium 30 MG tablet Take 30 mg by mouth daily      aspirin 325 MG tablet Take 1 tablet by mouth daily.  30 tablet 3    UNABLE TO FIND Tumeric      ONE TOUCH ULTRASOFT LANCETS MISC Test blood sugar 2 times a day 200 each 5     ALLERGIES     Allergies   Allergen Reactions    Codeine     Ultram [Tramadol]      vomiting    Percocet [Oxycodone-Acetaminophen] Nausea And Vomiting         PHYSICAL EXAM   VITAL SIGNS: BP (!) 196/98   Pulse 94   Temp 96.8 °F (36 °C) (Temporal)   Resp 16   Ht 5' 6.5\" (1.689 m)   Wt 191 lb (86.6 kg)   LMP  (LMP Unknown) SpO2 93%   BMI 30.37 kg/m²   Wt Readings from Last 3 Encounters:   07/01/21 191 lb (86.6 kg)   05/25/21 201 lb (91.2 kg)   05/25/21 201 lb (91.2 kg)       A & O X3  Lungs: Clear to auscultation  Heart: WNL  Abd: soft, RUQ tenderness without rebound or rigidity. BS:+. Plan:   ERCP with papillotomy and stone removal with or without stent placement is recommended. The patient was explained the above procedure(s) in simple words along with its need, risks, benefits and alternatives and the following two days ago: To widen the opening of the bile duct a cut is planned to be placed from inside the small bowel with the help on an instrument passed through the endoscope. The preferred method of removing stones with the help of a balloon was explained. Occasionally this is not possible. In that case or in some other situations, a small plastic tube (Stent) may be placed to keep the bile duct draining bile into the small bowel. The patient is informed that there is a possibility that the stone may not be actually in the bile duct and may be pressing upon it from within the cystic duct (diagrams were drawn to explain this.) and in that case the procedure is not going to relieve her problem fully, but we may put a stent in the bile duct. The risks explained to the patient included, but are not limited to, bleeding, perforation, infection, suppression of breathing, heart attack, stroke, need for hospitalization and/or surgery and remotely even death. The fact that the above risks occur more frequently than with usual EGD and colonoscopy was also explained. The patient was explained the risks specific to an ERCP including, but not limited to, acute pancreatitis (sudden development of inflammation of the pancreas), cholangitis (infection in the bile duct), damage to the internal organs and possible need for surgery. The alternatives to the above procedure were: wait and watch and EUS.      The prognosis was

## 2021-07-01 NOTE — PROGRESS NOTES
Speech Language Pathology  Attempted Bedside Swallow Eval    SLP received consult and completed chart review. Per RN, pt is NPO for ERCP today. SLP will re-attempt as schedule allows. Shayy Haddad, [id number] [facility] PP[address][id number]  Speech-Language Pathologist

## 2021-07-01 NOTE — PROGRESS NOTES
Post ERCP note  ============  I have evaluated the patient by the bedside. She is feeling great and is in good spirits and tells me that the abdominal pain is gone. I have informed her about how the procedure had to be terminated because the instrument was clogged by the content of the duodenum and that will have to complete the procedure tomorrow. She is in agreement with that and she knows the risk benefits and alternatives.

## 2021-07-02 NOTE — PROGRESS NOTES
Patient was off the floor for procedure from 1115 to 1445. Patient reported immediate increased comfort following procedure. Safety precautions instilled in room; bed in lower position, call light within reach, bed alarm on, caregivers at bedside upon return. Patient NPO status lifted at 1800 and is satisfied, comfortable.

## 2021-07-02 NOTE — OP NOTE
Operative Note      Patient: Jefferson Sapp  YOB: 1950  MRN: [id number]    Date of Procedure: 2021    Pre-Op Diagnosis: dilated CBD    Post-Op Diagnosis: Papillotomy was extended. Balloon sweeps - some sludge removed. 10cm X7Fr stent placed. Papilla Bxed. Procedure(s):  ERCP ENDOSCOPIC RETROGRADE CHOLANGIOPANCREATOGRAPHY    Surgeon(s):  Alexandrea Yeager MD    Assistant:   * No surgical staff found *    Anesthesia: General    Estimated Blood Loss (mL): Minimal    Complications: None    Specimens:   * No specimens in log *    Implants:  * No implants in log *      Drains:   Urethral Catheter Latex 16 fr (Active)   Catheter Indications Comfort for end of life care, if needed 21 0800   Securement Device Date Changed 21 1135   Site Assessment Pink; No urethral drainage 21   Urine Color Chris 21 0112   Urine Appearance Cloudy 21 0112   Urine Odor Malodorous 21 0112   Output (mL) 500 mL 21 0112   Provider Notified to Remove No 21       Ureteral Catheter Left ureter 6 fr (Active)       Ureteral Catheter Right ureter 6 fr (Active)       [REMOVED] Urethral Catheter Non-latex 16 fr (Removed)   Catheter Indications Urinary retention (acute or chronic), continuous bladder irrigation or bladder outlet obstruction 21 0807   Site Assessment Red 21 1500   Urine Color Adrienne 21 0807   Urine Appearance Clear 21 0807   Urine Odor Malodorous 21 2339   Output (mL) 400 mL 21 2339       [facility] PHYSIC[facility]     [facility] [address]  [place], [facility]  Phone: [phone]    ERCP Procedure Note    Patient: Jefferson Sapp  : 1950      Date:  2021     Endoscopist:  Alexandrea Yeager MD, MD    Referring Physician:  Roosevelt Julian APRN - CNP    Anesthesia: Anesthesia: GA    Procedure Details    The patient was placed in prone position after the endotracheal intubation and procedure well. Disposition:   PACU - hemodynamically stable. Radiological Interpretation:  All fluoroscopic images and still x-ray films were carefullly examined and interpreted by the endoscopist on the spot during the procedure in the absence of a radiologist.  These interpretations guided the course of the procedure and the interventions mentioned above and below. Estimated Blood loss:  none    Recommendations:  - Monitor LFT's   - NPO for next 6 hours. Advance diet as tolerated. Low fat diet. - Stent removal/exchange in 1 month. I will discuss with the son about placing a metal stent. IMPORTANT: Please note that some portions of this note may have been created using Dragon voice recognition software. Some \"sound-alike\" and totally wrong word substitutions may have taken place due to known inherent limitations of any such software, including this voice recognition software. In spite of efforts to eliminate such errors, some may not have been corrected. So please read the note with this in mind and recognize such mistakes and understand the correct version using the  context. If there are still uncertainties in the mind of the medical provider reading this note about any aspect of the note, the provider can feel free to contact me. Thanks.     Electronically signed by Mona Hodgson MD on 7/2/2021 at 12:49 PM

## 2021-07-02 NOTE — PLAN OF CARE
Problem: Nutrition  Goal: Optimal nutrition therapy  Outcome: Ongoing  Note: Nutrition Problem #1: Inadequate oral intake  Intervention: Food and/or Nutrient Delivery: Start Oral Diet, Start Oral Nutrition Supplement  Nutritional Goals:  Tolerate diet and have po intakes 50% or greater this admission

## 2021-07-02 NOTE — PROGRESS NOTES
D: Patient here from Special Procedures via bed, taken to bay 3, all current drips, treatments, skin issues, plan of care were reviewed by both RN's, patient transferred in stable condition.

## 2021-07-02 NOTE — ANESTHESIA PRE PROCEDURE
Department of Anesthesiology  Preprocedure Note       Name:  Manan Monae   Age:  80 y.o.  :  1950                                          MRN:  [id number]         Date:  2021      Surgeon: Gretchen Oliver):  Dodie Jesus MD    Procedure: Procedure(s):  ERCP ENDOSCOPIC RETROGRADE CHOLANGIOPANCREATOGRAPHY    Medications prior to admission:   Prior to Admission medications    Medication Sig Start Date End Date Taking? Authorizing Provider   buPROPion (WELLBUTRIN XL) 150 MG extended release tablet TAKE 1 TABLET EVERY MORNING 21  Yes SUMA Greer CNP   atorvastatin (LIPITOR) 40 MG tablet TAKE 1 TABLET NIGHTLY 21  Yes SUMA Rangel CNP   metFORMIN (GLUCOPHAGE-XR) 500 MG extended release tablet TAKE 1 TABLET TWICE A DAY 21  Yes SUMA Greer CNP   lisinopril (PRINIVIL;ZESTRIL) 5 MG tablet TAKE 1 TABLET DAILY 21  Yes SUMA Greer CNP   alendronate (FOSAMAX) 70 MG tablet TAKE 1 TABLET EVERY 7 DAYS  Patient taking differently: 70 mg TAKE 1 TABLET EVERY 7 DAYS.   Takes on Fridays 5/3/21  Yes SUMA Bran CNP   glimepiride (AMARYL) 4 MG tablet TAKE 1 TABLET TWICE A DAY 5/3/21  Yes SUMA Bran CNP   Insulin Glargine, 1 Unit Dial, (TOUJEO SOLOSTAR) 300 UNIT/ML SOPN INJECT 70 UNITS            SUBCUTANEOUSLY NIGHTLY 5/3/21  Yes SUMA Bran CNP   metoprolol tartrate (LOPRESSOR) 25 MG tablet TAKE 1 TABLET TWICE A DAY 5/3/21  Yes SUMA Bran CNP   levothyroxine (SYNTHROID) 75 MCG tablet TAKE 1 TABLET DAILY 21  Yes Doretha aMyo MD   blood glucose test strips (ONETOUCH ULTRA) strip USE TO TEST BLOOD SUGAR TWOTIMES A DAY 21  Yes SUMA Vincent CNP   primidone (MYSOLINE) 50 MG tablet TAKE 3 TABLETS NIGHTLY 21  Yes SUMA Greer CNP   DULoxetine (CYMBALTA) 60 MG extended release capsule Take 1 capsule by mouth 2 times daily 21  Yes Ar Blum, APRN - CNP Insulin Pen Needle (B-D ULTRAFINE III SHORT PEN) 31G X 8 MM MISC USE AND DISCARD 1 PEN      NEEDLE DAILY 6/23/20  Yes Israel Nagel MD   Cyanocobalamin (VITAMIN B 12 PO) Take by mouth daily    Yes Historical Provider, MD   Blood Glucose Monitoring Suppl (MM EASY TOUCH GLUCOSE METER) w/Device KIT 1 kit by Does not apply route 2 times daily 3/15/18  Yes SUMA Doyle CNP   vitamin D (CHOLECALCIFEROL) 1000 UNIT TABS tablet Take 4,000 Units by mouth daily   Yes Historical Provider, MD   magnesium 30 MG tablet Take 30 mg by mouth daily   Yes Historical Provider, MD   aspirin 325 MG tablet Take 1 tablet by mouth daily.  3/18/14  Yes Ilan Villanueva,    UNABLE TO FIND Tumeric    Historical Provider, MD   ONE TOUCH ULTRASOFT LANCETS MISC Test blood sugar 2 times a day 4/6/17   Sangeeta Romero MD       Current medications:    Current Facility-Administered Medications   Medication Dose Route Frequency Provider Last Rate Last Admin    indomethacin (INDOCIN) 50 MG suppository 100 mg  100 mg Rectal On Call to OR Tedd Nyhan, MD        lactated ringers infusion   Intravenous Continuous Tedd Nyhan,  mL/hr at 07/02/21 1035 New Bag at 07/02/21 1035    lactulose (CHRONULAC) 10 GM/15ML solution 20 g  20 g Oral TID Phyllistine Shape, APRN - CNP   20 g at 07/01/21 2045    hydrALAZINE (APRESOLINE) injection 20 mg  20 mg Intravenous Q4H PRN Zana Stearns MD        metoprolol tartrate (LOPRESSOR) tablet 50 mg  50 mg Oral BID Yanique Jimenez MD   50 mg at 07/02/21 0810    oxyCODONE-acetaminophen (PERCOCET) 5-325 MG per tablet 1 tablet  1 tablet Oral Q6H PRN Yanique Jimenez MD   1 tablet at 07/02/21 0810    labetalol (NORMODYNE;TRANDATE) injection 10 mg  10 mg Intravenous Q6H PRN Kristan Damon MD   10 mg at 06/30/21 2256    buPROPion (WELLBUTRIN XL) extended release tablet 150 mg  150 mg Oral Daily Kristan Damon MD   150 mg at 07/02/21 0809    DULoxetine (CYMBALTA) extended release capsule 60 mg  60 mg Oral Daily Jose Teresa MD   60 mg at 07/02/21 0810    levothyroxine (SYNTHROID) tablet 75 mcg  75 mcg Oral Daily Jose Teresa MD   75 mcg at 07/02/21 0810    vitamin D (CHOLECALCIFEROL) tablet 4,000 Units  4,000 Units Oral Daily Jose Teresa MD   4,000 Units at 07/02/21 0809    glucose (GLUTOSE) 40 % oral gel 15 g  15 g Oral PRN Jose Teresa MD        dextrose 50 % IV solution  12.5 g Intravenous PRN Jose Teresa MD   12.5 g at 06/29/21 1433    glucagon (rDNA) injection 1 mg  1 mg Intramuscular PRN Jose Teresa MD   0.4 mg at 07/01/21 1042    dextrose 5 % solution  100 mL/hr Intravenous PRN Jose Teresa MD        sodium chloride flush 0.9 % injection 10 mL  10 mL Intravenous PRN Jose Teresa MD   10 mL at 07/01/21 2045    0.9 % sodium chloride infusion  25 mL Intravenous PRN Jose Teresa MD        acetaminophen (TYLENOL) tablet 650 mg  650 mg Oral Q6H PRN Jose Teresa MD   650 mg at 07/02/21 8623    Or    acetaminophen (TYLENOL) suppository 650 mg  650 mg Rectal Q6H PRN Jose Teresa MD        melatonin tablet 3 mg  3 mg Oral Nightly PRN Jose Teresa MD        diphenhydrAMINE (BENADRYL) tablet 50 mg  50 mg Oral Q6H PRN Jose Teresa MD   50 mg at 07/02/21 0249    hydrOXYzine (ATARAX) tablet 10 mg  10 mg Oral TID PRN Jose Teresa MD   10 mg at 06/29/21 1709    ursodiol (ACTIGALL) capsule 300 mg  300 mg Oral BID Jose Teresa MD   300 mg at 07/02/21 0811    HYDROmorphone (DILAUDID) injection 1 mg  1 mg Intravenous Q4H PRN Jose Teresa MD   1 mg at 07/02/21 0933    insulin lispro (HUMALOG) injection vial 0-6 Units  0-6 Units Subcutaneous TID WC Jose Teresa MD   3 Units at 07/01/21 1657    insulin lispro (HUMALOG) injection vial 0-3 Units  0-3 Units Subcutaneous Nightly Jose Teresa MD   3 Units at 07/01/21 2051    0.9 % sodium chloride infusion   Intravenous Continuous Jose Teresa  mL/hr at 07/01/21 2054 New Bag at 07/01/21 2054    [Held by provider] heparin (porcine) injection 5,000 Units  CAD (coronary artery disease)     CVA (cerebral infarction)     Diabetes mellitus (HCC)     Diastolic heart failure (HCC)     Fibromyalgia     Hyperlipidemia     Hypertension     Hypothyroidism     LFT's abnormal     CHAMBERS (nonalcoholic steatohepatitis)     Nausea & vomiting     Pancreatic cyst     PNA (pneumonia) 2021    Pneumonia     Rectocele     Sleep apnea     does not use c-pap    Tremor     Vitamin D deficiency        Past Surgical History:        Procedure Laterality Date    CHOLECYSTECTOMY      COLONOSCOPY  01/10/2018    CYSTOSCOPY Bilateral 2021    CYSTOSCOPY BILATERAL URETERAL STENT INSERTION performed by Vera Love MD at [address] CATH LAB PROCEDURE      ECTOPIC PREGNANCY SURGERY  1985    EYE SURGERY Right     cataract    OTHER SURGICAL HISTORY  2011    Posterior repair    POSTERIOR CRUCIATE LIGAMENT REPAIR         Social History:    Social History     Tobacco Use    Smoking status: Former Smoker     Packs/day: 0.25     Years: 2.00     Pack years: 0.50     Types: Cigarettes     Quit date: 1991     Years since quittin.2    Smokeless tobacco: Never Used   Substance Use Topics    Alcohol use: No     Alcohol/week: 0.0 standard drinks                                Counseling given: Not Answered      Vital Signs (Current):   Vitals:    21 0005 21 0317 21 0648 21 0800   BP: (!) 163/81 (!) 143/71  (!) 154/79   Pulse: 75 81  100   Resp: 14 14  15   Temp: 98.2 °F (36.8 °C) 98.1 °F (36.7 °C)  98.6 °F (37 °C)   TempSrc: Oral Oral  Oral   SpO2: 91% 93%  95%   Weight:   191 lb (86.6 kg)    Height:                                                  BP Readings from Last 3 Encounters:   21 (!) 154/79   21 122/73   21 136/72       NPO Status: Time of last liquid consumption:                         Time of last solid consumption:                         Date of last liquid consumption: 21 Date of last solid food consumption: 06/30/21    BMI:   Wt Readings from Last 3 Encounters:   07/02/21 191 lb (86.6 kg)   05/25/21 201 lb (91.2 kg)   05/25/21 201 lb (91.2 kg)     Body mass index is 30.37 kg/m². CBC:   Lab Results   Component Value Date    WBC 7.9 07/02/2021    RBC 3.94 07/02/2021    HGB 10.3 07/02/2021    HCT 31.5 07/02/2021    MCV 80.1 07/02/2021    RDW 16.4 07/02/2021     07/02/2021       CMP:   Lab Results   Component Value Date     07/02/2021    K 4.0 07/02/2021    K 3.6 02/24/2021     07/02/2021    CO2 26 07/02/2021    BUN 12 07/02/2021    CREATININE 1.1 07/02/2021    GFRAA 59 07/02/2021    GFRAA >60 11/05/2011    AGRATIO 0.8 07/02/2021    LABGLOM 49 07/02/2021    GLUCOSE 180 07/02/2021    PROT 6.1 07/02/2021    PROT 5.8 11/05/2011    CALCIUM 8.6 07/02/2021    BILITOT 2.9 07/02/2021    ALKPHOS 539 07/02/2021    AST 87 07/02/2021     07/02/2021       POC Tests:   Recent Labs     07/02/21  0758   POCGLU 162*       Coags:   Lab Results   Component Value Date    PROTIME 12.8 06/29/2021    INR 1.10 06/29/2021    APTT 27.8 06/03/2011       HCG (If Applicable): No results found for: PREGTESTUR, PREGSERUM, HCG, HCGQUANT     ABGs:   Lab Results   Component Value Date    PHART 7.308 06/29/2021    PO2ART 72.5 06/29/2021    JXY8SEB 35.2 06/29/2021    UIF2JBE 17.6 06/29/2021    BEART -9 06/29/2021    M3RVMQGC 93 06/29/2021        Type & Screen (If Applicable):  Lab Results   Component Value Date    LABABO A 11/04/2011    LABRH Negative 11/04/2011       Drug/Infectious Status (If Applicable):  No results found for: HIV, HEPCAB    COVID-19 Screening (If Applicable):   Lab Results   Component Value Date    COVID19 Not Detected 06/29/2021    COVID19 Not Detected 02/23/2021           Anesthesia Evaluation     history of anesthetic complications: PONV.   Airway: Mallampati: III  TM distance: <3 FB   Neck ROM: limited  Mouth opening: > = 3 FB Dental:    (+) partials      Pulmonary:   (+) pneumonia: resolved,  sleep apnea:                            ROS comment: H/o tob   Cardiovascular:    (+) hypertension:, CAD:, hyperlipidemia    (-)  angina                Neuro/Psych:   (+) neuromuscular disease:, psychiatric history:depression/anxiety             GI/Hepatic/Renal:   (+) liver disease:,          ROS comment: Bile duct obstruction. Endo/Other:    (+) DiabetesType II DM, using insulin, hypothyroidism: arthritis: OA., malignancy/cancer. Abdominal:             Vascular: negative vascular ROS. Other Findings:             Anesthesia Plan      general     ASA 3     (Pt agrees to risks, benefits and alternatives of GETA. Questions answered. Willing to proceed with plan.)  Induction: intravenous. Anesthetic plan and risks discussed with patient.                       Dusty Aleman MD   7/2/2021

## 2021-07-02 NOTE — PROGRESS NOTES
A: Telephone report given to [facility], all current drips, treatments, skin issues, and plan of care were reviewed by both RN, patient transferred in stable condition, TC to CMU to confirm monitor recording which is was.

## 2021-07-02 NOTE — PROGRESS NOTES
Message sent to MD:    KAIA: Pt pain is 10/10 this am. Gave 1mg Dilaudid. Pt is noting pain in her mid to lower back, radiating to both sides. No response needed.

## 2021-07-02 NOTE — PROGRESS NOTES
Medications:  Reviewed    Infusion Medications    lactated ringers      dextrose      sodium chloride      sodium chloride 100 mL/hr at 07/01/21 2054     Scheduled Medications    indomethacin  100 mg Rectal On Call to OR    lactulose  20 g Oral TID    metoprolol tartrate  50 mg Oral BID    buPROPion  150 mg Oral Daily    DULoxetine  60 mg Oral Daily    levothyroxine  75 mcg Oral Daily    Vitamin D  4,000 Units Oral Daily    ursodiol  300 mg Oral BID    insulin lispro  0-6 Units Subcutaneous TID     insulin lispro  0-3 Units Subcutaneous Nightly    [Held by provider] heparin (porcine)  5,000 Units Subcutaneous 3 times per day     PRN Meds: hydrALAZINE, oxyCODONE-acetaminophen, labetalol, glucose, dextrose, glucagon (rDNA), dextrose, sodium chloride flush, sodium chloride, acetaminophen **OR** acetaminophen, melatonin, diphenhydrAMINE, hydrOXYzine, HYDROmorphone, ondansetron, ondansetron, promethazine, promethazine      Intake/Output Summary (Last 24 hours) at 7/2/2021 0907  Last data filed at 7/2/2021 0112  Gross per 24 hour   Intake 1800 ml   Output 2675 ml   Net -875 ml       Physical Exam Performed:    BP (!) 154/79   Pulse 100   Temp 98.6 °F (37 °C) (Oral)   Resp 15   Ht 5' 6.5\" (1.689 m)   Wt 191 lb (86.6 kg)   LMP  (LMP Unknown)   SpO2 95%   BMI 30.37 kg/m²     General appearance: in pain , appears stated age and cooperative, . HEENT: Pupils equal, round, and reactive to light. Conjunctivae/corneas -mildly jaundiced. Neck: Supple, with full range of motion. No jugular venous distention. Trachea midline. Respiratory:  Normal respiratory effort. Reduced air entry, bilaterally without Rales/Wheezes/Rhonchi. Cardiovascular: Regular rate and rhythm with normal S1/S2 without murmurs, rubs or gallops. Abdomen: Soft, tender, non-distended with normal bowel sounds. Obese  Musculoskeletal: No clubbing, cyanosis or edema bilaterally. Full range of motion without deformity.   Skin: Skin color, texture, turgor normal.  Left groin lymphadenopathy, subcutaneous lump chest and upper back  Neurologic:  Neurovascularly intact without any focal sensory/motor deficits. Has upper extremity tremor  Psychiatric: Alert and oriented,   Capillary Refill: Brisk,3 seconds, normal   Peripheral Pulses: +2 palpable, equal bilaterally       Labs:   Recent Labs     06/30/21  0610 07/01/21  0625 07/02/21  0557   WBC 9.2 7.6 7.9   HGB 11.0* 10.9* 10.3*   HCT 33.0* 32.5* 31.5*    285 305     Recent Labs     06/30/21  0610 06/30/21  0610 06/30/21  0827 07/01/21  0625 07/02/21  0557     --   --  139 138   K 6.0*   < > 5.4* 4.9 4.0   *  --   --  106 103   CO2 22  --   --  24 26   BUN 17  --   --  12 12   CREATININE 1.2  --   --  1.1 1.1   CALCIUM 9.1  --   --  8.8 8.6    < > = values in this interval not displayed. Recent Labs     06/30/21  0610 07/01/21  0625 07/02/21  0557   * 113* 87*   * 194* 146*   BILITOT 4.6* 4.9* 2.9*   ALKPHOS 667* 679* 539*     Recent Labs     06/29/21  0940   INR 1.10     No results for input(s): Som Tirso in the last 72 hours. Urinalysis:      Lab Results   Component Value Date    NITRU Negative 06/27/2021    WBCUA 10-20 06/27/2021    BACTERIA Rare 06/27/2021    RBCUA None seen 06/27/2021    BLOODU Negative 06/27/2021    SPECGRAV 1.020 06/27/2021    GLUCOSEU 500 06/27/2021       Radiology:  XR KNEE RIGHT (1-2 VIEWS)   Final Result   No acute osseous abnormality. FL ERCP BILIARY AND PANCREATIC S&I   Final Result   ERCP images as above      52 52         XR FEMUR LEFT (MIN 2 VIEWS)   Final Result   Focal area of mixed lucency/sclerosis in the greater   trochanteric/intertrochanteric region of the left proximal femur is   consistent with area of suspected metastatic disease with no evidence of   superimposed pathologic fracture as described above.          NM BONE SCAN WHOLE BODY   Final Result   Widespread osseous metastatic disease throughout the axial and appendicular   skeleton. MRI ABDOMEN WO CONTRAST MRCP   Final Result   1. Severe intrahepatic and extrahepatic biliary dilation and up to moderate   dilation of the main pancreatic duct with abrupt caliber changes in the   common bile duct and main pancreatic duct near the pancreatic head. No   definite underlying lesion is identified, although one is not excluded on the   basis of this study. Considerations include ductal adenocarcinoma or   postinflammatory strictures from prior pancreatitis. Consider further   evaluation with pancreas protocol abdomen CT or MRI versus ERCP. 2. Sequelae of chronic pancreatitis best demonstrated on CT with potential   for superimposed mild acute pancreatitis. However, the visualized   peripancreatic stranding could be related to minimal anasarca seen elsewhere. 3. Innumerable simple and minimally complicated cystic lesions measuring up   to 1.8 cm throughout the pancreas could represent dilated side branches from   chronic pancreatitis and/or branch duct intraductal papillary mucinous   neoplasms. Recommend follow-up with pancreas protocol and MRI (preferred) or   CT in 1 year. 4. Severe bilateral hydronephrosis, moderate to severe left hydroureter (in   the setting of a partially duplicated collecting system), and mild right   hydroureter. The right ureter changes caliber proximal to the pelvic brim,   while the conjoint left ureter changes caliber near the pelvic brim. Underline postinflammatory or malignant strictures are not excluded. Consider further evaluation with CT urography. 5. Trace to small bilateral pleural effusions, trace ascites, and minimal   anasarca suggestive of volume overload. 6. Suboptimally seen mesenteric lymph nodes better demonstrated on CT could   be reactive but could also be related to mesenteric panniculitis or   neoplastic involvement. Recommend attention on follow-up imaging.          [address] GALLBLADDER RUQ   Final Result   1. Right-sided hydroureteronephrosis. 2. Common duct dilation status post cholecystectomy. CT CHEST ABDOMEN PELVIS WO CONTRAST   Final Result   Somewhat limited study in the absence of contrast material.      Numerous sclerotic metastases throughout all bones of the visualized axial   and appendicular skeleton with a possible lytic lesion within the upper   aspect of the right scapula and a small soft tissue nodule within the   anterior left chest, possibly metastatic. These findings are new from prior. A healing nondisplaced subacute fracture of the left L2 transverse process is   also new, possibly pathologic. There are numerous mildly enlarged lymph nodes throughout the mediastinum as   well as the retroperitoneum in the abdominal mesentery. Moderate to severe bilateral hydronephrosis and hydroureter, also new from   prior, without appreciable obstructing stone. It is unclear whether this is   related to metastatic cervical cancer or an underlying lymphoproliferative   disorder such as lymphoma. Lymph node sampling is recommended. Minimal bibasilar atelectasis. Otherwise, clear lungs. XR CHEST PORTABLE   Final Result   Stable chronic changes with no acute abnormality seen.          FLUORO FOR SURGICAL PROCEDURES    (Results Pending)   FL ERCP BILIARY AND PANCREATIC S&I    (Results Pending)   FLUORO FOR SURGICAL PROCEDURES    (Results Pending)           Assessment/Plan:    Active Hospital Problems    Diagnosis     Metastatic malignant neoplasm ([facility] [address] [C79.9]     Abdominal lymphadenopathy [R59.0]     Transaminitis [R74.01]     Moderate malnutrition (HCC) [E44.0]     Elevated bilirubin [R17]     Nausea and vomiting [R11.2]     Bile duct obstruction [K83.1]     LANA (acute kidney injury) ([address] [N17.9]     Obstructive nephropathy [N13.8]     DM2 (diabetes mellitus, type 2) ([address] [E11.9]     Generalized weakness [R53.1]      LANA, s/p  ERCP 7/1  , poor gastric emptying , going for againtoday   , NPO MN  Abdominal pain-secondary to above-additional dose of Dilaudid IV    Metabolic acidosis with hyperkalemia-possibly secondary to above,- improved   Nephrology eval appreciated , following     DM2,  Possible iatrogenic adrenal insufficiency  -Recent episode of hypoglycemia  -  Hold all oral antidiabetic agents. -  With patient now off steroids, insulin requirements are likely to drop precipitously. Vigilance for hypoglycemia and appropriate insulin adjustments are warranted. -  That patient took high dose steroids for roughly 1.5 months. Vigilance for signs of adrenal insufficiency is also warranted.   -Cortisol level   Mild hyperkalemia-improved ,     Hypertension-blood pressure is elevated-increase metoprolol to 50 twice daily- better controlled     Chronic Conditions  HLD -hold statin. as of transaminitis  CAD,- continue metoprolol. ASA and statin were on hold  Depression - Continue bupropion and duloxetine. Hypothyroid - Continue synthroid.     DVT Prophylaxis: Heparin subcu  Diet: Diet NPO Exceptions are: Ice Chips, Sips of Water with Meds  Code Status: Full Code    PT/OT Eval Status:     Dispo -pending improvement   Palliative care is following     Grecia Hodge MD

## 2021-07-02 NOTE — ANESTHESIA POSTPROCEDURE EVALUATION
Department of Anesthesiology  Postprocedure Note    Patient: Ashwini Jesus  MRN: [id number]  YOB: 1950  Date of evaluation: 7/2/2021  Time:  2:47 PM     Procedure Summary     Date: 07/02/21 Room / Location: [address]    Anesthesia Start: 1212 Anesthesia Stop: 1493    Procedures:       ERCP PAPILLOTOMY (N/A )      ERCP BIOPSY      ERCP STENT INSERTION      ERCP ENDOSCOPIC RETROGRADE CHOLANGIOPANCREATOGRAPHY Diagnosis: (dilated CBD)    Surgeons: Braydon Correa MD Responsible Provider: Melonie Venegas MD    Anesthesia Type: general ASA Status: 3          Anesthesia Type: general    Adams Phase I: Adams Score: 10    Adams Phase II:      Last vitals: Reviewed and per EMR flowsheets. Anesthesia Post Evaluation    Patient location during evaluation: PACU  Patient participation: complete - patient participated  Level of consciousness: awake and alert  Airway patency: patent  Nausea & Vomiting: no nausea and no vomiting  Complications: no  Cardiovascular status: blood pressure returned to baseline  Respiratory status: acceptable  Hydration status: euvolemic  Comments: VSS on transfer to phase 2 recovery. No anesthetic complications.

## 2021-07-02 NOTE — PROGRESS NOTES
Department of Orthopedic Surgery  Physician Assistant   Progress Note    Subjective:       Systemic or Specific Complaints:Right knee pain continues. No new complaints of Left hip pain from uptake seen on bone scan. Sitting up comfortably at bedside. Objective:     Patient Vitals for the past 24 hrs:   BP Temp Temp src Pulse Resp SpO2 Weight   07/02/21 0648 -- -- -- -- -- -- 191 lb (86.6 kg)   07/02/21 0317 (!) 143/71 98.1 °F (36.7 °C) Oral 81 14 93 % --   07/02/21 0005 (!) 163/81 98.2 °F (36.8 °C) Oral 75 14 91 % --   07/01/21 1425 (!) 172/94 98.5 °F (36.9 °C) Oral 85 14 95 % --   07/01/21 1252 (!) 168/79 98.3 °F (36.8 °C) Oral 86 12 95 % --   07/01/21 1230 (!) 164/72 -- -- 88 11 93 % --   07/01/21 1216 (!) 179/80 -- -- 84 12 92 % --   07/01/21 1215 (!) 171/68 -- -- 86 12 92 % --   07/01/21 1210 (!) 181/83 -- -- 83 11 92 % --   07/01/21 1205 (!) 178/89 -- -- 84 9 91 % --   07/01/21 1200 (!) 163/82 -- -- 84 11 91 % --   07/01/21 1155 (!) 189/87 -- -- 81 11 91 % --   07/01/21 1150 (!) 188/82 -- -- 87 12 90 % --   07/01/21 1145 (!) 177/80 -- -- 85 13 91 % --   07/01/21 1140 (!) 164/77 -- -- 85 12 (!) 89 % --   07/01/21 1135 (!) 159/73 97.1 °F (36.2 °C) Temporal 87 12 91 % --   07/01/21 0837 (!) 196/98 96.8 °F (36 °C) Temporal 94 16 93 % --   07/01/21 0755 (!) 179/82 -- -- 94 18 93 % --       General: alert, appears stated age and cooperative   Wound: none   Motion: Right knee pain with ROM. Left hip ROM within normal limits and log roll negative.    DVT Exam: No evidence of DVT seen on physical exam.     Additional exam: n/v intact    Data Review  CBC:   Lab Results   Component Value Date    WBC 7.9 07/02/2021    RBC 3.94 07/02/2021    HGB 10.3 07/02/2021    HCT 31.5 07/02/2021     07/02/2021       Renal:   Lab Results   Component Value Date     07/02/2021    K 4.0 07/02/2021    K 3.6 02/24/2021     07/02/2021    CO2 26 07/02/2021    BUN 12 07/02/2021    CREATININE 1.1 07/02/2021    GLUCOSE 180 07/02/2021    CALCIUM 8.6 07/02/2021            Assessment:      right proximal femur mass. Plan:      1:  Right knee x-ray negative  2:  Continue Deep venous thrombosis prophylaxis  3:  Continue Pain Control  4:  Continue to await biopsy results  5:   Will follow    ALBERTO Brown       Pt examined and above not reviewed  Left femur involved not right by bone scan   No pain on exam   D/w oncology on Friday  No surgery recommended at this time   Will follow   Path pending

## 2021-07-02 NOTE — PROGRESS NOTES
Speech Language Pathology  Attempted Bedside Swallow Eval      SLP attempted evaluation, spoke with RN. Pt is NPO after ERCP later this date per RN. Of note, evaluation has been attempted across multiple days (7/1, 6/30, 6/29, and 6/28); will keep on caseload d/t pt's attempts r/t procedures. ST to re-attempt BSE tomorrow and if unable to complete will d/c order (re-consult ST once pt is able and appropriate).       Merline Altes, M.S. Ata Christian  Speech-language pathologist  [phone]

## 2021-07-02 NOTE — PROGRESS NOTES
Nephrology Progress  Note                                                                                                                                                                                                                                                                                                                                                               Office : [phone]     Fax :[phone]              Patient's Name: Silverio Hernandez  3:52 PM  7/2/2021    Reason for Consult:  LANA, Hyperkalemia      Requesting Physician:  SUMA Yang CNP      Chief Complaint:  Fatigue       Interval History :     For ERCP     Serum cr improved    K improved    No diarrhea  No vomiting    History of Present Ilness:    Silverio Hernandez is a 80 y.o. female with PMH of HTN , DLP , DM type 2    Presented with c/o fatigue     Was diagnosed with uveitis in 5/21 and treated with prednisone    CT chest s/o mediastinal LNE ----> referred to oncology    C/o abdominal pain       C/o urinary retention    Denies sob or chest pain or diarrhea    At present being worked up for metastatic cancer , primary unknown              Past Medical History:   Diagnosis Date    Anemia     Anesthesia     PONV    Arthritis     CAD (coronary artery disease)     CVA (cerebral infarction)     Diabetes mellitus ([address]     Diastolic heart failure ([address]     Fibromyalgia     Hyperlipidemia     Hypertension     Hypothyroidism     LFT's abnormal     CHAMBERS (nonalcoholic steatohepatitis)     Nausea & vomiting     Pancreatic cyst     PNA (pneumonia) 2/23/2021    Pneumonia     Rectocele     Sleep apnea     does not use c-pap    Tremor     Vitamin D deficiency        Past Surgical History:   Procedure Laterality Date    CHOLECYSTECTOMY      COLONOSCOPY  01/10/2018    CYSTOSCOPY Bilateral 6/29/2021    CYSTOSCOPY BILATERAL URETERAL STENT INSERTION performed by Joanne Mckeon MD at [address] [place] [address] CATH LAB PROCEDURE      ECTOPIC PREGNANCY SURGERY  1985    EYE SURGERY Right     cataract    OTHER SURGICAL HISTORY  11-4-2011    Posterior repair    POSTERIOR CRUCIATE LIGAMENT REPAIR         Family History   Problem Relation Age of Onset    Diabetes Mother     Stroke Mother     Heart Disease Father     Cancer Father         prostate    Heart Disease Brother         multiple stents    Heart Disease Brother     Heart Disease Sister         carotid artery surgery    Heart Disease Brother     Heart Attack Brother     Heart Disease Brother     Asthma Neg Hx     Emphysema Neg Hx     Heart Failure Neg Hx         reports that she quit smoking about 30 years ago. Her smoking use included cigarettes. She has a 0.50 pack-year smoking history. She has never used smokeless tobacco. She reports that she does not drink alcohol and does not use drugs.     Allergies:  Codeine, Ultram [tramadol], and Percocet [oxycodone-acetaminophen]    Current Medications:    meperidine (DEMEROL) injection 12.5 mg, Q5 Min PRN  HYDROmorphone (DILAUDID) injection 0.25 mg, Q5 Min PRN  HYDROmorphone (DILAUDID) injection 0.5 mg, Q5 Min PRN  morphine (PF) injection 1 mg, Q5 Min PRN  morphine (PF) injection 2 mg, Q5 Min PRN  oxyCODONE-acetaminophen (PERCOCET) 5-325 MG per tablet 1 tablet, PRN   Or  oxyCODONE-acetaminophen (PERCOCET) 5-325 MG per tablet 2 tablet, PRN  ondansetron (ZOFRAN) injection 4 mg, Once PRN  promethazine (PHENERGAN) injection 6.25 mg, Once PRN  diphenhydrAMINE (BENADRYL) injection 12.5 mg, Once PRN  labetalol (NORMODYNE;TRANDATE) injection 5 mg, Q10 Min PRN  hydrALAZINE (APRESOLINE) injection 5 mg, Q10 Min PRN  cefTRIAXone (ROCEPHIN) 1000 mg IVPB in 50 mL D5W minibag, Once  lactulose (CHRONULAC) 10 GM/15ML solution 20 g, TID  hydrALAZINE (APRESOLINE) injection 20 mg, Q4H PRN  metoprolol tartrate (LOPRESSOR) tablet 50 mg, BID  oxyCODONE-acetaminophen (PERCOCET) 5-325 MG per tablet 1 tablet, Q6H PRN  labetalol (NORMODYNE;TRANDATE) injection 10 mg, Q6H PRN  buPROPion (WELLBUTRIN XL) extended release tablet 150 mg, Daily  DULoxetine (CYMBALTA) extended release capsule 60 mg, Daily  levothyroxine (SYNTHROID) tablet 75 mcg, Daily  vitamin D (CHOLECALCIFEROL) tablet 4,000 Units, Daily  glucose (GLUTOSE) 40 % oral gel 15 g, PRN  dextrose 50 % IV solution, PRN  glucagon (rDNA) injection 1 mg, PRN  dextrose 5 % solution, PRN  sodium chloride flush 0.9 % injection 10 mL, PRN  0.9 % sodium chloride infusion, PRN  acetaminophen (TYLENOL) tablet 650 mg, Q6H PRN   Or  acetaminophen (TYLENOL) suppository 650 mg, Q6H PRN  melatonin tablet 3 mg, Nightly PRN  diphenhydrAMINE (BENADRYL) tablet 50 mg, Q6H PRN  hydrOXYzine (ATARAX) tablet 10 mg, TID PRN  ursodiol (ACTIGALL) capsule 300 mg, BID  HYDROmorphone (DILAUDID) injection 1 mg, Q4H PRN  insulin lispro (HUMALOG) injection vial 0-6 Units, TID WC  insulin lispro (HUMALOG) injection vial 0-3 Units, Nightly  0.9 % sodium chloride infusion, Continuous  [Held by provider] heparin (porcine) injection 5,000 Units, 3 times per day  ondansetron (ZOFRAN-ODT) disintegrating tablet 4 mg, Q4H PRN  ondansetron (ZOFRAN) injection 4 mg, Q4H PRN  promethazine (PHENERGAN) injection 12.5 mg, Q4H PRN  promethazine (PHENERGAN) tablet 12.5 mg, Q4H PRN        Review of Systems:   14 point ROS obtained but were negative except mentioned in HPI      Physical exam:     Vitals:  BP (!) 173/83   Pulse 77   Temp 97.5 °F (36.4 °C) (Oral)   Resp 14   Ht 5' 6.5\" (1.689 m)   Wt 191 lb (86.6 kg)   LMP  (LMP Unknown)   SpO2 92%   BMI 30.37 kg/m²   Constitutional:  OAA X3 NAD  Skin: no rash, turgor wnl  Heent:  eomi, mmm  Neck: no bruits or jvd noted  Cardiovascular:  S1, S2 without m/r/g  Respiratory: CTA B without w/r/r  Abdomen:  +bs, soft, nt, nd  Ext: no lower extremity edema  Psychiatric: mood and affect appropriate  Musculoskeletal:  Rom, muscular strength intact    Data:   Labs:  CBC:   Recent Labs 06/30/21  0610 07/01/21  0625 07/02/21  0557   WBC 9.2 7.6 7.9   HGB 11.0* 10.9* 10.3*    285 305     BMP:    Recent Labs     06/30/21  0610 06/30/21  0610 06/30/21  0827 07/01/21 0625 07/02/21  0557     --   --  139 138   K 6.0*   < > 5.4* 4.9 4.0   *  --   --  106 103   CO2 22  --   --  24 26   BUN 17  --   --  12 12   CREATININE 1.2  --   --  1.1 1.1   GLUCOSE 138*  --   --  219* 180*    < > = values in this interval not displayed. Ca/Mg/Phos:   Recent Labs     06/30/21 0610 07/01/21 0625 07/02/21  0557   CALCIUM 9.1 8.8 8.6   MG 1.80 1.50* 1.90     Hepatic:   Recent Labs     06/30/21 0610 07/01/21 0625 07/02/21  0557   * 113* 87*   * 194* 146*   BILITOT 4.6* 4.9* 2.9*   ALKPHOS 667* 679* 539*     Troponin:   No results for input(s): TROPONINI in the last 72 hours. BNP: No results for input(s): BNP in the last 72 hours. Lipids: No results for input(s): CHOL, TRIG, HDL, LDLCALC, LABVLDL in the last 72 hours. ABGs:   No results for input(s): PHART, PO2ART, AEJ9BID in the last 72 hours. INR:   No results for input(s): INR in the last 72 hours. UA:  No results for input(s): Jannet Rhein, GLUCOSEU, BILIRUBINUR, KETUA, SPECGRAV, BLOODU, PHUR, PROTEINU, UROBILINOGEN, NITRU, LEUKOCYTESUR, Rosalba Loud in the last 72 hours. Urine Microscopic:   No results for input(s): LABCAST, BACTERIA, COMU, HYALCAST, WBCUA, RBCUA, EPIU in the last 72 hours. Urine Culture: No results for input(s): LABURIN in the last 72 hours. Urine Chemistry: No results for input(s): Mare Spotted, PROTEINUR, NAUR in the last 72 hours. IMAGING:  FL ERCP BILIARY AND PANCREATIC S&I   Final Result   Intraoperative fluoroscopy provided for ERCP. Please refer to the procedure   report for further details. XR KNEE RIGHT (1-2 VIEWS)   Final Result   No acute osseous abnormality.          FL ERCP BILIARY AND PANCREATIC S&I   Final Result   ERCP images as above      52 52         XR FEMUR LEFT (MIN 2 VIEWS)   Final Result   Focal area of mixed lucency/sclerosis in the greater   trochanteric/intertrochanteric region of the left proximal femur is   consistent with area of suspected metastatic disease with no evidence of   superimposed pathologic fracture as described above. NM BONE SCAN WHOLE BODY   Final Result   Widespread osseous metastatic disease throughout the axial and appendicular   skeleton. MRI ABDOMEN WO CONTRAST MRCP   Final Result   1. Severe intrahepatic and extrahepatic biliary dilation and up to moderate   dilation of the main pancreatic duct with abrupt caliber changes in the   common bile duct and main pancreatic duct near the pancreatic head. No   definite underlying lesion is identified, although one is not excluded on the   basis of this study. Considerations include ductal adenocarcinoma or   postinflammatory strictures from prior pancreatitis. Consider further   evaluation with pancreas protocol abdomen CT or MRI versus ERCP. 2. Sequelae of chronic pancreatitis best demonstrated on CT with potential   for superimposed mild acute pancreatitis. However, the visualized   peripancreatic stranding could be related to minimal anasarca seen elsewhere. 3. Innumerable simple and minimally complicated cystic lesions measuring up   to 1.8 cm throughout the pancreas could represent dilated side branches from   chronic pancreatitis and/or branch duct intraductal papillary mucinous   neoplasms. Recommend follow-up with pancreas protocol and MRI (preferred) or   CT in 1 year. 4. Severe bilateral hydronephrosis, moderate to severe left hydroureter (in   the setting of a partially duplicated collecting system), and mild right   hydroureter. The right ureter changes caliber proximal to the pelvic brim,   while the conjoint left ureter changes caliber near the pelvic brim. Underline postinflammatory or malignant strictures are not excluded.    Consider further evaluation with CT urography. 5. Trace to small bilateral pleural effusions, trace ascites, and minimal   anasarca suggestive of volume overload. 6. Suboptimally seen mesenteric lymph nodes better demonstrated on CT could   be reactive but could also be related to mesenteric panniculitis or   neoplastic involvement. Recommend attention on follow-up imaging. US GALLBLADDER RUQ   Final Result   1. Right-sided hydroureteronephrosis. 2. Common duct dilation status post cholecystectomy. CT CHEST ABDOMEN PELVIS WO CONTRAST   Final Result   Somewhat limited study in the absence of contrast material.      Numerous sclerotic metastases throughout all bones of the visualized axial   and appendicular skeleton with a possible lytic lesion within the upper   aspect of the right scapula and a small soft tissue nodule within the   anterior left chest, possibly metastatic. These findings are new from prior. A healing nondisplaced subacute fracture of the left L2 transverse process is   also new, possibly pathologic. There are numerous mildly enlarged lymph nodes throughout the mediastinum as   well as the retroperitoneum in the abdominal mesentery. Moderate to severe bilateral hydronephrosis and hydroureter, also new from   prior, without appreciable obstructing stone. It is unclear whether this is   related to metastatic cervical cancer or an underlying lymphoproliferative   disorder such as lymphoma. Lymph node sampling is recommended. Minimal bibasilar atelectasis. Otherwise, clear lungs. XR CHEST PORTABLE   Final Result   Stable chronic changes with no acute abnormality seen. FLUORO FOR SURGICAL PROCEDURES    (Results Pending)   FLUORO FOR SURGICAL PROCEDURES    (Results Pending)       Assessment/Plan     1.   Hyperkalemia in setting of LANA and obstructive uropathy       Serum K :  5.6 -------> 6 ---> 5.4       S/p BL stents placement       LANA improving       Acidosis improving        Plan       Low K diet       Add Lokelma x 3 dose           2. LANA     Etiology seems obstructive uropathy     S/p cystoscopy and BL stent placement    LANA improving    3.  Metabolic acidosis in setting of LANA    Improving     4. Acid- base/ Electrolyte imbalance     moniter      5 BL hydronephrosis    6 DM 2    7 metastatic cancer , unknown primary         Thank you for allowing us to participate in care of Oswald Severin, MD  Feel free to contact me   [facility] [id number]  Office : [phone]  Fax :[phone]

## 2021-07-02 NOTE — PROGRESS NOTES
Nephrology Progress  Note                                                                                                                                                                                                                                                                                                                                                               Office : [phone]     Fax :[phone]              Patient's Name: Kirstin Zarate  3:53 PM  7/2/2021    Reason for Consult:  LANA, Hyperkalemia      Requesting Physician:  SUMA Patel CNP      Chief Complaint:  Fatigue       Interval History :    S/p ERCP on 7/2 : s/p stent placement     S/p biopsy    Serum cr improved    K improved    No diarrhea  No vomiting    History of Present Ilness:    Kirstin Zarate is a 80 y.o. female with PMH of HTN , DLP , DM type 2    Presented with c/o fatigue     Was diagnosed with uveitis in 5/21 and treated with prednisone    CT chest s/o mediastinal LNE ----> referred to oncology    C/o abdominal pain       C/o urinary retention    Denies sob or chest pain or diarrhea    At present being worked up for metastatic cancer , primary unknown              Past Medical History:   Diagnosis Date    Anemia     Anesthesia     PONV    Arthritis     CAD (coronary artery disease)     CVA (cerebral infarction)     Diabetes mellitus ([address]     Diastolic heart failure ([address]     Fibromyalgia     Hyperlipidemia     Hypertension     Hypothyroidism     LFT's abnormal     CHAMBERS (nonalcoholic steatohepatitis)     Nausea & vomiting     Pancreatic cyst     PNA (pneumonia) 2/23/2021    Pneumonia     Rectocele     Sleep apnea     does not use c-pap    Tremor     Vitamin D deficiency        Past Surgical History:   Procedure Laterality Date    CHOLECYSTECTOMY      COLONOSCOPY  01/10/2018    CYSTOSCOPY Bilateral 6/29/2021    CYSTOSCOPY BILATERAL URETERAL STENT INSERTION performed by Tiff Waller Nasreen Monroe MD at [facility] CATH LAB PROCEDURE      ECTOPIC PREGNANCY SURGERY  1985    EYE SURGERY Right     cataract    OTHER SURGICAL HISTORY  11-4-2011    Posterior repair    POSTERIOR CRUCIATE LIGAMENT REPAIR         Family History   Problem Relation Age of Onset    Diabetes Mother     Stroke Mother     Heart Disease Father     Cancer Father         prostate    Heart Disease Brother         multiple stents    Heart Disease Brother     Heart Disease Sister         carotid artery surgery    Heart Disease Brother     Heart Attack Brother     Heart Disease Brother     Asthma Neg Hx     Emphysema Neg Hx     Heart Failure Neg Hx         reports that she quit smoking about 30 years ago. Her smoking use included cigarettes. She has a 0.50 pack-year smoking history. She has never used smokeless tobacco. She reports that she does not drink alcohol and does not use drugs.     Allergies:  Codeine, Ultram [tramadol], and Percocet [oxycodone-acetaminophen]    Current Medications:    meperidine (DEMEROL) injection 12.5 mg, Q5 Min PRN  HYDROmorphone (DILAUDID) injection 0.25 mg, Q5 Min PRN  HYDROmorphone (DILAUDID) injection 0.5 mg, Q5 Min PRN  morphine (PF) injection 1 mg, Q5 Min PRN  morphine (PF) injection 2 mg, Q5 Min PRN  oxyCODONE-acetaminophen (PERCOCET) 5-325 MG per tablet 1 tablet, PRN   Or  oxyCODONE-acetaminophen (PERCOCET) 5-325 MG per tablet 2 tablet, PRN  ondansetron (ZOFRAN) injection 4 mg, Once PRN  promethazine (PHENERGAN) injection 6.25 mg, Once PRN  diphenhydrAMINE (BENADRYL) injection 12.5 mg, Once PRN  labetalol (NORMODYNE;TRANDATE) injection 5 mg, Q10 Min PRN  hydrALAZINE (APRESOLINE) injection 5 mg, Q10 Min PRN  cefTRIAXone (ROCEPHIN) 1000 mg IVPB in 50 mL D5W minibag, Once  lactulose (CHRONULAC) 10 GM/15ML solution 20 g, TID  hydrALAZINE (APRESOLINE) injection 20 mg, Q4H PRN  metoprolol tartrate (LOPRESSOR) tablet 50 mg, BID  oxyCODONE-acetaminophen (PERCOCET) 5-325 MG per tablet 1 tablet, Q6H PRN  labetalol (NORMODYNE;TRANDATE) injection 10 mg, Q6H PRN  buPROPion (WELLBUTRIN XL) extended release tablet 150 mg, Daily  DULoxetine (CYMBALTA) extended release capsule 60 mg, Daily  levothyroxine (SYNTHROID) tablet 75 mcg, Daily  vitamin D (CHOLECALCIFEROL) tablet 4,000 Units, Daily  glucose (GLUTOSE) 40 % oral gel 15 g, PRN  dextrose 50 % IV solution, PRN  glucagon (rDNA) injection 1 mg, PRN  dextrose 5 % solution, PRN  sodium chloride flush 0.9 % injection 10 mL, PRN  0.9 % sodium chloride infusion, PRN  acetaminophen (TYLENOL) tablet 650 mg, Q6H PRN   Or  acetaminophen (TYLENOL) suppository 650 mg, Q6H PRN  melatonin tablet 3 mg, Nightly PRN  diphenhydrAMINE (BENADRYL) tablet 50 mg, Q6H PRN  hydrOXYzine (ATARAX) tablet 10 mg, TID PRN  ursodiol (ACTIGALL) capsule 300 mg, BID  HYDROmorphone (DILAUDID) injection 1 mg, Q4H PRN  insulin lispro (HUMALOG) injection vial 0-6 Units, TID WC  insulin lispro (HUMALOG) injection vial 0-3 Units, Nightly  0.9 % sodium chloride infusion, Continuous  [Held by provider] heparin (porcine) injection 5,000 Units, 3 times per day  ondansetron (ZOFRAN-ODT) disintegrating tablet 4 mg, Q4H PRN  ondansetron (ZOFRAN) injection 4 mg, Q4H PRN  promethazine (PHENERGAN) injection 12.5 mg, Q4H PRN  promethazine (PHENERGAN) tablet 12.5 mg, Q4H PRN        Review of Systems:   14 point ROS obtained but were negative except mentioned in HPI      Physical exam:     Vitals:  BP (!) 173/83   Pulse 77   Temp 97.5 °F (36.4 °C) (Oral)   Resp 14   Ht 5' 6.5\" (1.689 m)   Wt 191 lb (86.6 kg)   LMP  (LMP Unknown)   SpO2 92%   BMI 30.37 kg/m²   Constitutional:  OAA X3 NAD  Skin: no rash, turgor wnl  Heent:  eomi, mmm  Neck: no bruits or jvd noted  Cardiovascular:  S1, S2 without m/r/g  Respiratory: CTA B without w/r/r  Abdomen:  +bs, soft, nt, nd  Ext: no lower extremity edema  Psychiatric: mood and affect appropriate  Musculoskeletal:  Rom, muscular strength intact    Data: Labs:  CBC:   Recent Labs     06/30/21  0610 07/01/21  0625 07/02/21  0557   WBC 9.2 7.6 7.9   HGB 11.0* 10.9* 10.3*    285 305     BMP:    Recent Labs     06/30/21  0610 06/30/21  0610 06/30/21  0827 07/01/21  0625 07/02/21  0557     --   --  139 138   K 6.0*   < > 5.4* 4.9 4.0   *  --   --  106 103   CO2 22  --   --  24 26   BUN 17  --   --  12 12   CREATININE 1.2  --   --  1.1 1.1   GLUCOSE 138*  --   --  219* 180*    < > = values in this interval not displayed. Ca/Mg/Phos:   Recent Labs     06/30/21  0610 07/01/21  0625 07/02/21  0557   CALCIUM 9.1 8.8 8.6   MG 1.80 1.50* 1.90     Hepatic:   Recent Labs     06/30/21 0610 07/01/21 0625 07/02/21  0557   * 113* 87*   * 194* 146*   BILITOT 4.6* 4.9* 2.9*   ALKPHOS 667* 679* 539*     Troponin:   No results for input(s): TROPONINI in the last 72 hours. BNP: No results for input(s): BNP in the last 72 hours. Lipids: No results for input(s): CHOL, TRIG, HDL, LDLCALC, LABVLDL in the last 72 hours. ABGs:   No results for input(s): PHART, PO2ART, WTN0MTY in the last 72 hours. INR:   No results for input(s): INR in the last 72 hours. UA:  No results for input(s): Alleen Corti, GLUCOSEU, BILIRUBINUR, KETUA, SPECGRAV, BLOODU, PHUR, PROTEINU, UROBILINOGEN, NITRU, LEUKOCYTESUR, Theone Ebbs in the last 72 hours. Urine Microscopic:   No results for input(s): LABCAST, BACTERIA, COMU, HYALCAST, WBCUA, RBCUA, EPIU in the last 72 hours. Urine Culture: No results for input(s): LABURIN in the last 72 hours. Urine Chemistry: No results for input(s): Meghan Bene, PROTEINUR, NAUR in the last 72 hours. IMAGING:  FL ERCP BILIARY AND PANCREATIC S&I   Final Result   Intraoperative fluoroscopy provided for ERCP. Please refer to the procedure   report for further details. XR KNEE RIGHT (1-2 VIEWS)   Final Result   No acute osseous abnormality.          FL ERCP BILIARY AND PANCREATIC S&I   Final Result   ERCP images as above      52 52         XR FEMUR LEFT (MIN 2 VIEWS)   Final Result   Focal area of mixed lucency/sclerosis in the greater   trochanteric/intertrochanteric region of the left proximal femur is   consistent with area of suspected metastatic disease with no evidence of   superimposed pathologic fracture as described above. NM BONE SCAN WHOLE BODY   Final Result   Widespread osseous metastatic disease throughout the axial and appendicular   skeleton. MRI ABDOMEN WO CONTRAST MRCP   Final Result   1. Severe intrahepatic and extrahepatic biliary dilation and up to moderate   dilation of the main pancreatic duct with abrupt caliber changes in the   common bile duct and main pancreatic duct near the pancreatic head. No   definite underlying lesion is identified, although one is not excluded on the   basis of this study. Considerations include ductal adenocarcinoma or   postinflammatory strictures from prior pancreatitis. Consider further   evaluation with pancreas protocol abdomen CT or MRI versus ERCP. 2. Sequelae of chronic pancreatitis best demonstrated on CT with potential   for superimposed mild acute pancreatitis. However, the visualized   peripancreatic stranding could be related to minimal anasarca seen elsewhere. 3. Innumerable simple and minimally complicated cystic lesions measuring up   to 1.8 cm throughout the pancreas could represent dilated side branches from   chronic pancreatitis and/or branch duct intraductal papillary mucinous   neoplasms. Recommend follow-up with pancreas protocol and MRI (preferred) or   CT in 1 year. 4. Severe bilateral hydronephrosis, moderate to severe left hydroureter (in   the setting of a partially duplicated collecting system), and mild right   hydroureter. The right ureter changes caliber proximal to the pelvic brim,   while the conjoint left ureter changes caliber near the pelvic brim.    Underline postinflammatory or malignant strictures are not excluded. Consider further evaluation with CT urography. 5. Trace to small bilateral pleural effusions, trace ascites, and minimal   anasarca suggestive of volume overload. 6. Suboptimally seen mesenteric lymph nodes better demonstrated on CT could   be reactive but could also be related to mesenteric panniculitis or   neoplastic involvement. Recommend attention on follow-up imaging. US GALLBLADDER RUQ   Final Result   1. Right-sided hydroureteronephrosis. 2. Common duct dilation status post cholecystectomy. CT CHEST ABDOMEN PELVIS WO CONTRAST   Final Result   Somewhat limited study in the absence of contrast material.      Numerous sclerotic metastases throughout all bones of the visualized axial   and appendicular skeleton with a possible lytic lesion within the upper   aspect of the right scapula and a small soft tissue nodule within the   anterior left chest, possibly metastatic. These findings are new from prior. A healing nondisplaced subacute fracture of the left L2 transverse process is   also new, possibly pathologic. There are numerous mildly enlarged lymph nodes throughout the mediastinum as   well as the retroperitoneum in the abdominal mesentery. Moderate to severe bilateral hydronephrosis and hydroureter, also new from   prior, without appreciable obstructing stone. It is unclear whether this is   related to metastatic cervical cancer or an underlying lymphoproliferative   disorder such as lymphoma. Lymph node sampling is recommended. Minimal bibasilar atelectasis. Otherwise, clear lungs. XR CHEST PORTABLE   Final Result   Stable chronic changes with no acute abnormality seen. FLUORO FOR SURGICAL PROCEDURES    (Results Pending)   FLUORO FOR SURGICAL PROCEDURES    (Results Pending)       Assessment/Plan     1.   Hyperkalemia in setting of LANA and obstructive uropathy       Serum K :  5.6 -------> 6 ---> 5.4       S/p BL stents placement LANA imprved       Acidosis improving        Plan    Hyperkalemia improved        Low K diet       Add Lokelma x 3 dose           2. LANA : improved     Etiology seems obstructive uropathy     S/p cystoscopy and BL stent placement      3.  Metabolic acidosis in setting of LANA    improved    4. Acid- base/ Electrolyte imbalance     moniter      5 BL hydronephrosis    6 DM 2    7 metastatic cancer , unknown primary         Thank you for allowing us to participate in care of Fei Almaguer MD  Feel free to contact me   [facility] [address]  Office : [phone]  Fax :[phone]

## 2021-07-02 NOTE — H&P
Pertinent Complete H & P EGD  =======================  RUQ pain and obstructive jaundice. Severely dilated CBD on MRCP. The ERCP yesterday had to be terminated when the scope could not be unblocked. She had some relief from the papillotomy when I saw her yesterday, but had a rough time through the night. T. Bili and enz have improved.     No chest pain or SOB      PAST MEDICAL HISTORY     Past Medical History:   Diagnosis Date    Anemia     Anesthesia     PONV    Arthritis     CAD (coronary artery disease)     CVA (cerebral infarction)     Diabetes mellitus (HCC)     Diastolic heart failure (HCC)     Fibromyalgia     Hyperlipidemia     Hypertension     Hypothyroidism     LFT's abnormal     CHAMBERS (nonalcoholic steatohepatitis)     Nausea & vomiting     Pancreatic cyst     PNA (pneumonia) 2021    Pneumonia     Rectocele     Sleep apnea     does not use c-pap    Tremor     Vitamin D deficiency      FAMILY HISTORY     Family History   Problem Relation Age of Onset    Diabetes Mother     Stroke Mother     Heart Disease Father     Cancer Father         prostate    Heart Disease Brother         multiple stents    Heart Disease Brother     Heart Disease Sister         carotid artery surgery    Heart Disease Brother     Heart Attack Brother     Heart Disease Brother     Asthma Neg Hx     Emphysema Neg Hx     Heart Failure Neg Hx      SOCIAL HISTORY     Social History     Socioeconomic History    Marital status:      Spouse name: Robert Rice Number of children: 2    Years of education: 15    Highest education level: Not on file   Occupational History    Occupation: [employer]     Comment: school   Tobacco Use    Smoking status: Former Smoker     Packs/day: 0.25     Years: 2.00     Pack years: 0.50     Types: Cigarettes     Quit date: 1991     Years since quittin.2    Smokeless tobacco: Never Used   Vaping Use    Vaping Use: Never used   Substance and Sexual Activity  Alcohol use: No     Alcohol/week: 0.0 standard drinks    Drug use: No    Sexual activity: Yes     Partners: Male     Comment:    Other Topics Concern    Not on file   Social History Narrative    Not on file     Social Determinants of Health     Financial Resource Strain:     Difficulty of Paying Living Expenses:    Food Insecurity:     Worried About Running Out of Food in the Last Year:     [address] in the Last Year:    Transportation Needs:     Lack of Transportation (Medical):  Lack of Transportation (Non-Medical):    Physical Activity:     Days of Exercise per Week:     Minutes of Exercise per Session:    Stress:     Feeling of Stress :    Social Connections:     Frequency of Communication with Friends and Family:     Frequency of Social Gatherings with Friends and Family:     Attends Evangelical Services:     Active Member of Clubs or Organizations:     Attends Club or Organization Meetings:     Marital Status:    Intimate Partner Violence:     Fear of Current or Ex-Partner:     Emotionally Abused:     Physically Abused:     Sexually Abused:        SURGICAL HISTORY     Past Surgical History:   Procedure Laterality Date    CHOLECYSTECTOMY      COLONOSCOPY  01/10/2018    CYSTOSCOPY Bilateral 6/29/2021    CYSTOSCOPY BILATERAL URETERAL STENT INSERTION performed by Marcus Bermudez MD at [address] [place] [address] CATH LAB PROCEDURE      [address] Right     cataract    OTHER SURGICAL HISTORY  11-4-2011    Posterior repair    [facility]   (This list may include medications prescribed during this encounter as epic can not insert only the list prior to this encounter.)    No current facility-administered medications on file prior to encounter.      Current Outpatient Medications on File Prior to Encounter   Medication Sig Dispense Refill    buPROPion (WELLBUTRIN XL) 150 MG extended release tablet TAKE 1 TABLET EVERY MORNING 90 tablet 1    atorvastatin (LIPITOR) 40 MG tablet TAKE 1 TABLET NIGHTLY 90 tablet 1    metFORMIN (GLUCOPHAGE-XR) 500 MG extended release tablet TAKE 1 TABLET TWICE A  tablet 3    lisinopril (PRINIVIL;ZESTRIL) 5 MG tablet TAKE 1 TABLET DAILY 90 tablet 1    alendronate (FOSAMAX) 70 MG tablet TAKE 1 TABLET EVERY 7 DAYS (Patient taking differently: 70 mg TAKE 1 TABLET EVERY 7 DAYS. Takes on Fridays) 12 tablet 0    glimepiride (AMARYL) 4 MG tablet TAKE 1 TABLET TWICE A  tablet 0    Insulin Glargine, 1 Unit Dial, (TOUJEO SOLOSTAR) 300 UNIT/ML SOPN INJECT 70 UNITS            SUBCUTANEOUSLY NIGHTLY 18 mL 2    metoprolol tartrate (LOPRESSOR) 25 MG tablet TAKE 1 TABLET TWICE A  tablet 1    levothyroxine (SYNTHROID) 75 MCG tablet TAKE 1 TABLET DAILY 90 tablet 0    blood glucose test strips (ONETOUCH ULTRA) strip USE TO TEST BLOOD SUGAR TWOTIMES A  strip 2    primidone (MYSOLINE) 50 MG tablet TAKE 3 TABLETS NIGHTLY 270 tablet 1    DULoxetine (CYMBALTA) 60 MG extended release capsule Take 1 capsule by mouth 2 times daily 180 capsule 1    Insulin Pen Needle (B-D ULTRAFINE III SHORT PEN) 31G X 8 MM MISC USE AND DISCARD 1 PEN      NEEDLE DAILY 100 each 3    Cyanocobalamin (VITAMIN B 12 PO) Take by mouth daily       Blood Glucose Monitoring Suppl (MM EASY TOUCH GLUCOSE METER) w/Device KIT 1 kit by Does not apply route 2 times daily 1 kit 0    vitamin D (CHOLECALCIFEROL) 1000 UNIT TABS tablet Take 4,000 Units by mouth daily      magnesium 30 MG tablet Take 30 mg by mouth daily      aspirin 325 MG tablet Take 1 tablet by mouth daily.  30 tablet 3    UNABLE TO FIND Tumeric      ONE TOUCH ULTRASOFT LANCETS MISC Test blood sugar 2 times a day 200 each 5     ALLERGIES     Allergies   Allergen Reactions    Codeine     Ultram [Tramadol]      vomiting    Percocet [Oxycodone-Acetaminophen] Nausea And Vomiting         PHYSICAL EXAM   VITAL SIGNS: BP 107/61   Pulse 77   Temp 96.8 °F (36 °C) (Temporal)   Resp 16   Ht 5' 6.5\" (1.689 m)   Wt 191 lb (86.6 kg)   LMP  (LMP Unknown)   SpO2 95%   BMI 30.37 kg/m²   Wt Readings from Last 3 Encounters:   07/02/21 191 lb (86.6 kg)   05/25/21 201 lb (91.2 kg)   05/25/21 201 lb (91.2 kg)       A & O X3  Lungs: Clear to auscultation  Heart: WNL  Abd: soft, Mild epigastric and RUQ tendereness - no rebound. BS:+. Plan:   ERCP with papillotomy and stone removal with or without stent placement has been explained. Pt has given informed consent for the unfinished part of the procedure.

## 2021-07-02 NOTE — PROGRESS NOTES
Discussed with pt's son the findings and management plan moving forward. Metal stent can expand the stricture and can be a long-term solution, but has higher complication rate. We need to decide this in a month.

## 2021-07-02 NOTE — PROGRESS NOTES
Comprehensive Nutrition Assessment    Type and Reason for Visit:  Reassess    Nutrition Recommendations/Plan:   1. Advance diet as medically feasible to 4 carb choices per meal   2. Add Glucerna BID following diet advancement   3. Monitor nutrition adequacy, pertinent labs, bowel habits, wt changes, and clinical progress    Nutrition Assessment:  Follow up: Pt nutritionally declining AEB frequent NPO status d/t multiple procedures. SLP consulted, has not been able to see pt d/t NPO status. S/p BL ureteral stent placement. Pt unavailable after multiple attempts this date. S/p ERCP today with stent placement and papilla bx. Ortho consulted for left hip mass and possible need for nailing. Recommend advancing diet as medically feasible. Will add ONS to promote PO intakes d/t weight loss this admission and catabolic illness. Noted palliative care following. Will continue to monitor. Malnutrition Assessment:  Malnutrition Status: Moderate malnutrition      Estimated Daily Nutrient Needs:  Energy (kcal):  3331-7387; Weight Used for Energy Requirements:  Ideal     Protein (g):  60-73; Weight Used for Protein Requirements:  Ideal        Fluid (ml/day):   ; Method Used for Fluid Requirements:  1 ml/kcal      Nutrition Related Findings:  BG elevated. Wounds:  None       Current Nutrition Therapies:    Diet NPO Exceptions are: Ice Chips, Sips of Water with Meds    Anthropometric Measures:  · Height: 5' 6.5\" (168.9 cm)  · Current Body Weight: 191 lb (86.6 kg)   · Admission Body Weight: 200 lb (90.7 kg)    · Ideal Body Weight: 133 lbs;   · BMI: 30.4  · BMI Categories: Obese Class 1 (BMI 30.0-34. 9)       Nutrition Diagnosis:   · Inadequate oral intake related to inadequate protein-energy intake as evidenced by weight loss, poor intake prior to admission      Nutrition Interventions:   Food and/or Nutrient Delivery:  Start Oral Diet, Start Oral Nutrition Supplement  Nutrition Education/Counseling:  No recommendation at this time   Coordination of Nutrition Care:  Continue to monitor while inpatient, Speech Therapy    Goals: Tolerate diet and have po intakes 50% or greater this admission       Nutrition Monitoring and Evaluation:   Behavioral-Environmental Outcomes:  None Identified   Food/Nutrient Intake Outcomes:  Diet Advancement/Tolerance  Physical Signs/Symptoms Outcomes:  Weight, Nausea or Vomiting     Discharge Planning:     Too soon to determine     Electronically signed by Neno Woo MS, RD, LD on 7/2/21 at 2:13 PM EDT    Contact: [phone]

## 2021-07-02 NOTE — PROGRESS NOTES
D: Patient is now awake and alert x 4, denies pain at present. A: Assessment completed and documented, call light is in reach, discussed plan of care with patient who agreed.

## 2021-07-03 NOTE — PROGRESS NOTES
Speech Language Pathology  Facility/Department: [facility] C5 - MED SURG/ORTHO   CLINICAL BEDSIDE SWALLOW EVALUATION    Recommended Diet and Intervention  Diet Solids Recommendation: Regular (encourage soft/moist foods d/t odynophagia)  Liquid Consistency Recommendation: Thin  Recommended Form of Meds: Whole with water  ST will sign off, please re-consult if concerns arise  Oropharyngeal phase WNL    NAME: Nik Morales  : 1950  MRN: [id number]    ADMISSION DATE: 2021  ADMITTING DIAGNOSIS: has CAD (coronary artery disease); Sleep apnea; Generalized weakness; Nonalcoholic steatohepatitis; Rectocele; DM2 (diabetes mellitus, type 2) ([address] HTN (hypertension), benign; Cerebral infarction ([address] Right shoulder pain; Insomnia; SUNIL (obstructive sleep apnea); Coarse tremor; Dizziness; Palpitations; Essential tremor; Dyslipidemia; PNA (pneumonia); Obesity; Bilateral primary osteoarthritis of knee; Chronic bilateral low back pain without sciatica; DDD (degenerative disc disease), lumbar; Facet arthritis of lumbar region; Primary osteoarthritis of right knee; Abnormal finding on thyroid function test; LANA (acute kidney injury) ([address] Obstructive nephropathy; Metastatic malignant neoplasm ([address] Abdominal lymphadenopathy; Transaminitis; Elevated bilirubin; Nausea and vomiting; Bile duct obstruction; and Moderate malnutrition (HCC) on their problem list.  ONSET DATE: 21    Recent Chest Xray/CT of Chest: (21 )  Stable chronic changes with no acute abnormality seen.     Date of Eval: 7/3/2021  Evaluating Therapist: REJI Mercedes    Current Diet level:  Current Diet : Regular  Current Liquid Diet : Thin    Primary Complaint  Patient Complaint: C/o odynophagia from recent scopes/procedures    Pain:  Pain Assessment  Pain Assessment: 0-10  Pain Level: 7  Patient's Stated Pain Goal: No pain  Pain Type: Acute pain  Pain Location: Abdomen  Non-Pharmaceutical Pain Intervention(s): Heat applied, Repositioned  Response to Pain Intervention: Asleep with RR greater than 10    Reason for Referral  Wanda Monroe was referred for a bedside swallow evaluation to assess the efficiency of her swallow function, identify signs and symptoms of aspiration and make recommendations regarding safe dietary consistencies, effective compensatory strategies, and safe eating environment. Impression  Dysphagia Diagnosis: Swallow function appears grossly intact    Dysphagia Impression : Pt is a 81 y/o female admitted on 6/27 with N/V, weight loss, and LANA. Pt has metastatic carcinoma and palliative care is following. PMhx includes CVA, CAD, DM, PNA, sleep apnea, and tremor. Dilated CBD yesterday. Pt alert and agreeable. She's hungry. Oral mechanism examination unremarkable. Observed pt consume thin liquids x 4 oz via straw, 4 oz puree, and a pack of cheryl crackers. Effective mastication and oral clearance. Swallow timely with adequate hyolaryngeal elevation and excursion. Clear vocal quality post swallow. No clinical s/s aspiration. Stable RR and 02. Pt denying globus sensation or dysphagia. Positive for odynophagia d/t recent procedures/scopes, this did not impact the safety of the swallow. Increase odynophagia with dry foods, suggested added moisture (extra sauce, gravy, etc). Pt demonstrated 100% understanding of safe swallow strategies. Rec: con't current diet of regular texture with thin liquids. Will sign off at this time. Please re-consult if concerns arise. Disucssed with RN. Dysphagia Outcome Severity Scale: Level 7: Normal in all situations     Treatment Plan  Requires SLP Intervention: No     Recommended Diet and Intervention  Diet Solids Recommendation: Regular (encourage soft/moist foods d/t odynophagia)  Liquid Consistency Recommendation: Thin  Recommended Form of Meds: Whole with water     Compensatory Swallowing Strategies  Compensatory Swallowing Strategies:  Alternate solids and liquids;Eat/Feed slowly; Small bites/sips    Treatment/Goals: N/A     General  Chart Reviewed: Yes  Comments: Per MD note,Pallavi Contreras is a 80 y.o. female.     She developed left eye soreness and headaches in May.  She saw an ophthalmologist, Dr. Anika Topete at [facility], on 5/19/21. 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  She has been on a tapering regimen since then. Montserrat Rivera has had problems with glycemic control.  She has also not responded as expected to treatment. AUDELIA RIVERA [facility] ophthalmologist became concerned about an intracranial neoplasm. Clarisse Mi was recommended that patient undergo additional imaging including MRI brain and maxillofacial/sinus w and w/o contrast.  She was also recommended to undergo CT chest w/o contrast.  The MRI did not reveal a tumor but was notable for lobular left retinal thickening and appparent left optic nerve compression.  The CT chest was notable for mild mediastinal lymphadenopathy.  She was referred to oncology but has not had an office visit yet.  She abruptly stopped the prednisone several days ago. Montserrat Rivera had tapered by that time to 60mg daily.     She has been having abdominal pain for roughly a month.  She though this was from taking prednisone so tried to ignore it or treat it with OTC remedies. She presents tonight for progressive pain and persistent nausea.  She cannot say for how long though.  She does relate she is having bowel movements and passing flatus although she may go 3 or 4 days between bowel movements.  She denies any difficulty urinating, but relates that when she wakes up every morning she is leaking urine and leaks urine all the way to the restroom.  She recalls being hospitalized for pneumonia in February and having urinary retention at the time which was treated with a short term chandra catheter  Behavior/Cognition: Alert; Cooperative;Pleasant mood  Respiratory Status: Room air  Communication Observation: Functional  Follows Directions: Complex  Dentition: Adequate (Upper partial and lower teeth on bottom)  Patient Positioning: Upright in bed  Baseline Vocal Quality: Normal  Volitional Cough: Strong  Prior Dysphagia History: none  Consistencies Administered: Reg solid; Dysphagia Pureed (Dysphagia I); Thin - straw    Oral Motor Deficits  Oral/Motor  Oral Motor: Within functional limits    Oral Phase Dysfunction  Oral Phase  Oral Phase: WNL     Indicators of Pharyngeal Phase Dysfunction   Pharyngeal Phase  Pharyngeal Phase: WNL    Prognosis  Prognosis  Prognosis for safe diet advancement: excellent  Individuals consulted  Consulted and agree with results and recommendations: Patient;RN    Education  Patient Education: Educated pt on role of SLP, diet recs, and safe swallow strategies.   Patient Education Response: Verbalizes understanding;Demonstrated understanding  Safety Devices in place: Yes          Therapy Time  SLP Individual Minutes  Time In: 0745  Time Out: 4535  Minutes: Magdalena Lord  7/3/2021 8:15 AM

## 2021-07-03 NOTE — PROGRESS NOTES
ONCOLOGY HEMATOLOGY CARE PROGRESS NOTE      SUBJECTIVE:     Pt had ERCP yesterday. Pathology pending. ROS:     Constitutional:  No weight loss, No fever, No chills, No night sweats. Energy level good. Eyes:  No impairment or change in vision  ENT / Mouth:  No pain, abnormal ulceration, bleeding, nasal drip or change in voice or hearing  Cardiovascular:  No chest pain, palpitations, new edema, or calf discomfort  Respiratory:  No pain, hemoptysis, change to breathing  Breast:  No pain, discharge, change in appearance or texture  Gastrointestinal:  No pain, cramping, jaundice, change to eating and bowel habits  Urinary:  No pain, bleeding or change in continence  Genitalia: No pain, bleeding or discharge  Musculoskeletal:  No redness, pain, edema or weakness  Skin:  itchy , dry skin   Neurologic:  No discomfort, change in mental status, speech, sensory or motor activity  Psychiatric:  No change in concentration or change to affect or mood  Endocrine:  No hot flashes, increased thirst, or change to urine production  Hematologic: No petechiae, ecchymosis or bleeding  Lymphatic:  No lymphadenopathy or lymphedema  Allergy / Immunologic:  No eczema, hives, frequent or recurrent infections    OBJECTIVE        Physical    VITALS:  BP (!) 153/75   Pulse 97   Temp 98 °F (36.7 °C) (Oral)   Resp 16   Ht 5' 6.5\" (1.689 m)   Wt 200 lb 3.2 oz (90.8 kg)   LMP  (LMP Unknown)   SpO2 92%   BMI 31.83 kg/m²   TEMPERATURE:  Current - Temp: 98 °F (36.7 °C);  Max - Temp  Av.7 °F (36.5 °C)  Min: 96.8 °F (36 °C)  Max: 98.4 °F (36.9 °C)  PULSE OXIMETRY RANGE: SpO2  Av.2 %  Min: 90 %  Max: 100 %  24HR INTAKE/OUTPUT:      Intake/Output Summary (Last 24 hours) at 7/3/2021 0920  Last data filed at 7/3/2021 0416  Gross per 24 hour   Intake 350 ml   Output 1600 ml   Net -1250 ml       CONSTITUTIONAL: awake, alert, cooperative, no apparent distress   EYES: pupils equal, round and reactive to light, sclera icteric and conjunctiva normal  ENT: Normocephalic, without obvious abnormality, atraumatic  NECK: supple, symmetrical, no jugular venous distension and no carotid bruits   HEMATOLOGIC/LYMPHATIC: no cervical, supraclavicular or axillary lymphadenopathy   LUNGS: no increased work of breathing and clear to auscultation   CARDIOVASCULAR: regular rate and rhythm, normal S1 and S2, no murmur noted  ABDOMEN: normal bowel sounds x 4, soft, non-distended, non-tender, no masses palpated, no hepatosplenomgaly   MUSCULOSKELETAL: full range of motion noted, tone is normal  NEUROLOGIC: awake, alert, oriented to name, place and time. Motor skills grossly intact. SKIN: Normal skin color, texture, turgor and + jaundice. appears intact. 2 cm minimally erythematous raised mass on the right upper back.   EXTREMITIES: no LE edema       Data      Recent Labs     07/01/21  0625 07/02/21  0557 07/03/21  0601   WBC 7.6 7.9 6.2   HGB 10.9* 10.3* 10.7*   HCT 32.5* 31.5* 32.2*    305 350   MCV 79.4* 80.1 80.1        Recent Labs     07/01/21  0625 07/02/21  0557 07/03/21  0601    138 138   K 4.9 4.0 3.9    103 102   CO2 24 26 25   BUN 12 12 13   CREATININE 1.1 1.1 1.1     Recent Labs     07/01/21  0625 07/02/21  0557 07/03/21  0601   * 87* 111*   * 146* 140*   BILITOT 4.9* 2.9* 2.9*   ALKPHOS 679* 539* 577*       Magnesium:    Lab Results   Component Value Date    MG 1.70 07/03/2021    MG 1.90 07/02/2021    MG 1.50 07/01/2021         Problem List  Patient Active Problem List   Diagnosis    CAD (coronary artery disease)    Sleep apnea    Generalized weakness    Nonalcoholic steatohepatitis    Rectocele    DM2 (di[facility] mellitus, type 2) [facility]    HTN (hypertension), benign    Cerebral infarction ([facility]    Right shoulder pain    Insomnia    SUNIL (obstructive sleep apnea)    Coarse tremor    Dizziness    Palpitations    Essential tremor    Dyslipidemia    PNA (pneumonia)

## 2021-07-03 NOTE — PLAN OF CARE
Problem: Nutrition  Intervention: Swallowing evaluation  Note: SLP evaluation completed. All further notes may be found in EMR. Sheron Canales MS-CCC-SLP [id number]       Problem: Nutrition  Intervention: Aspiration precautions  Note: SLP evaluation completed. All further notes may be found in EMR.     Sheron Canales MS-CCC-SLP [id number]

## 2021-07-03 NOTE — PROGRESS NOTES
Hospitalist Progress Note  7/3/2021 4:18 PM  Subjective:   Admit Date: 6/27/2021  PCP: SUMA Orellana CNP Status: Inpatient [101]  Interval History: Hospital Day: 7, admitted with acute kidney injury from obstructive nephropathy, bilateral hydroureteronephrosis without significant bladder distention s/p cystoscopy and bilateral double-J ureteral stent  placement (6/29). Metastatic carcinoma (possible pancreatic carcinoma) with apparent choroidal mets, sclerotic lesions widespread throughout the axial skeleton. Widespread lymphadenopathy in the abdomen, retroperitoneal space, and mediastinum. Elevated CA-125 and CA 19-9. ERCP with stent (7/2) and biopsy for pancreatic carcinoma. Right proximal femur mass that may require pinning due to pending pathologic fracture. Initial myeloma studies are negative. Diagnosed with uveitis (5/21) and has been on prednisone.      Diet: controlled carbohydrate (60 gm/meal)  Right forearm peripheral IV (7/3, day #1)  Urethral catheter (6/29, day #5)  Medications:     lactulose  20 g Oral TID   metoprolol tartrate  50 mg Oral BID   bupropion  150 mg Oral Daily   duloxetine  60 mg Oral Daily   levothyroxine  75 mcg Oral Daily   Vitamin D  4,000 Units Oral Daily   ursodiol  300 mg Oral BID   insulin lispro SSI   0-6 Units Subcutaneous TID WC   heparin   5,000 Units Subcutaneous TID [held]     Recent Labs     07/01/21  0625 07/02/21  0557 07/03/21  0601   WBC 7.6 7.9 6.2   HGB 10.9* 10.3* 10.7*    305 350   MCV 79.4* 80.1 80.1     Recent Labs     07/01/21  0625 07/02/21  0557 07/03/21  0601    138 138   K 4.9 4.0 3.9    103 102   CO2 24 26 25   BUN 12 12 13   CREATININE 1.1 1.1 1.1   GLUCOSE 219* 180* 295*     Recent Labs     07/01/21  0625 07/02/21  0557 07/03/21  0601   * 87* 111*   * 146* 140*   BILITOT 4.9* 2.9* 2.9*   ALKPHOS 679* 539* 577*     Magnesium (7/3) 1.70 mg/dL  Ammonia (6/30) 79 umol/L    SARS-CoV-2, NAAT (6/29) Not Detected Alpha Fetoprotein (6/28) 1.5 ug/L   CEA (6/28) 13.3 ng/mL (nl < 5.0)  CA 19-9 (6/28) 510,000 U/mL   (6/28) 1018 U/mL  CA 27.29 (6/28) 87 U/mL  CA 15-3 (6/28) 34 U/mL    Acute hepatitis panel (6/27) negative    POC Glucose:   07/02/21  1620 07/02/21 2014 07/03/21  0752 07/03/21  1159   226* 310* 263* 407*           Left femur X-ray (6/30) Focal area of mixed lucency/sclerosis in the greater trochanteric/intertrochanteric region of the left proximal femur is consistent with area of suspected metastatic disease with no evidence of superimposed pathologic fracture as described above. Nuclear Medicine Whole Body Bone Scan (6/29) Widespread osseous metastatic disease throughout the axial and appendicular skeleton. MRCP (6/28) Severe intrahepatic and extrahepatic biliary dilation and up to moderate dilation of the main pancreatic duct with abrupt caliber changes in the common bile duct and main pancreatic duct near the pancreatic head.  No definite underlying lesion is identified, although one is not excluded on the basis of this study.  Considerations include ductal adenocarcinoma or postinflammatory strictures from prior pancreatitis. Consider further evaluation with pancreas protocol abdomen CT or MRI versus ERCP. Sequelae of chronic pancreatitis best demonstrated on CT with potential for superimposed mild acute pancreatitis.  However, the visualized peripancreatic stranding could be related to minimal anasarca seen elsewhere. Innumerable simple and minimally complicated cystic lesions measuring up to 1.8 cm throughout the pancreas could represent dilated side branches from chronic pancreatitis and/or branch duct intraductal papillary mucinous neoplasms. Recommend follow-up with pancreas protocol and MRI (preferred) or CT in 1 year.  Severe bilateral hydronephrosis, moderate to severe left hydroureter (in the setting of a partially duplicated collecting system), and mild right hydroureter.  The right ureter changes caliber proximal to the pelvic brim, while the conjoint left ureter changes caliber near the pelvic brim. Underline postinflammatory or malignant strictures are not excluded. Consider further evaluation with CT urography. Trace to small bilateral pleural effusions, trace ascites, and minimal anasarca suggestive of volume overload. Suboptimally seen mesenteric lymph nodes better demonstrated on CT could be reactive but could also be related to mesenteric panniculitis or neoplastic involvement.  Recommend attention on follow-up imaging. Gallbladder Ultrasound (6/28) Right-sided hydroureteronephrosis. Common duct dilation status post cholecystectomy. Noncontrast CT Chest / Abdomen / Pelvis  (6/27)  Numerous sclerotic metastases throughout all bones of the visualized axial and appendicular skeleton with a possible lytic lesion within the upper aspect of the right scapula and a small soft tissue nodule within the anterior left chest, possibly metastatic.  These findings are new from prior. A healing nondisplaced subacute fracture of the left L2 transverse process is also new, possibly pathologic. There are numerous mildly enlarged lymph nodes throughout the mediastinum as well as the retroperitoneum in the abdominal mesentery. Moderate to severe bilateral hydronephrosis and hydroureter, also new from prior, without appreciable obstructing stone.  It is unclear whether this is related to metastatic cervical cancer or an underlying lymphoproliferative disorder such as lymphoma.  Lymph node sampling is recommended. Minimal bibasilar atelectasis.  Otherwise, clear lungs. Portable CXR (6/27) Stable chronic changes with no acute abnormality seen. MRI brain / orbits / face (6/16) There is a lobulated thickening involving the left posterior retina, which appears to demonstrate associated enhancement.   There is suggestion of asymmetrically decreased caliber of the left intraorbital optic nerve compared to the right. Small focus of enhancement seen along the left parietal/occipital lobe without significant mass effect or midline shift. Small subacute infarct involving the right precentral gyrus. Otherwise, no acute intracranial abnormality.  No acute infarct. Small chronic infarct involving the right posterior frontal lobe. Mild global parenchymal volume loss with chronic microvascular ischemic changes. Abd X-ray (7/3) No obstruction, ileus or fecal impaction. 1 biliary stent and 2 bilateral ureteral stents were noted. Objective:   Vitals:  /79   Pulse 72   Temp 97.9 °F (36.6 °C) (Oral)   Resp 16   Ht 5' 6.5\" (1.689 m)   Wt 200 lb 3.2 oz (90.8 kg)   LMP  (LMP Unknown)   SpO2 94%   BMI 31.83 kg/m²   General appearance: alert and cooperative with exam  Lungs: diffuse rhonchi, reasonable air movement, no wheezes  Heart: regular rate and rhythm, S1, S2 normal, no murmur, click, rub or gallop  Abdomen: diffuse tenderness, audible bowel sounds, benign  Extremities: extremities normal, atraumatic, no cyanosis or edema  Neurologic: No obvious focal neurologic deficits. Assessment and Plan:   1. Metastatic carcinoma (possible pancreatic carcinoma) s/p ERCP (7/2). Bilirubin decreased from 4.9 to 2.9.   2. Widespread bone metastases, initial myeloma studies are negative. Broad differential, may benefit from bone biopsy per Hem/Onc as outpatient. 3. Acute kidney injury with hyperkalemia (eGFR 49). 4. Hydronephrosis from obstructive nephropathy, bilateral hydroureteronephrosis without significant bladder distention s/p cystoscopy and bilateral double-J ureteral stent placement (6/29). To be exchanged in 4 months. 5. Hepatic encephalopathy with elevated ammonia:  Treated with oral lactulose 20 gram TID. 6. Left proximal femur mass that may require pinning due to pending pathologic fracture. 7. Hypomagnesemia:  Unclear etiology. Replace with IV magnesium sulfate to Mg > 2.0. 8. Type 2 Diabetes (uncontrolled, HgbA1c 10.4%): Cover with a \"sliding scale\" lispro moderate scale prandial correction insulin. Add basal glargine insulin 40 units nightly. She is normally on Toujeo glargine insulin 70 units nightly in addition to glimepiride and metformin. 9. Benign positional tremor:  Restart primidone 150 mg nightly. Advance Directive: Full Code  DVT prophylaxis with enoxaparin 40 mg sub-Q daily. Discharge planning: Poor prognosis overall. Plan for Sunday, July 4.        Toni Mccain MD, MD  [facility]

## 2021-07-03 NOTE — PROGRESS NOTES
Urology Attending Progress Note      Reason for Admission: Metastatic cancer of unknown primary    Current off the floor for procedure for AXR    Meds: see med rec    Family History, Social History, Review of Systems:  Reviewed and agreed to as per chart    Exam:    Vitals:  BP (!) 170/84   Pulse 86   Temp 98.4 °F (36.9 °C) (Oral)   Resp 14   Ht 5' 6.5\" (1.689 m)   Wt 200 lb 3.2 oz (90.8 kg)   LMP  (LMP Unknown)   SpO2 94%   BMI 31.83 kg/m²   Temp  Av.2 °F (36.8 °C)  Min: 98 °F (36.7 °C)  Max: 98.4 °F (36.9 °C)    Intake/Output Summary (Last 24 hours) at 7/3/2021 1524  Last data filed at 7/3/2021 0416  Gross per 24 hour   Intake --   Output 1600 ml   Net -1600 ml         Labs:  WBC:    Lab Results   Component Value Date    WBC 6.2 2021     Hemoglobin/Hematocrit:    Lab Results   Component Value Date    HGB 10.7 2021    HCT 32.2 2021     BMP:    Lab Results   Component Value Date     2021    K 3.9 2021    K 3.6 2021     2021    CO2 25 2021    BUN 13 2021    LABALBU 3.1 2021    CREATININE 1.1 2021    CALCIUM 9.4 2021    GFRAA 59 2021    GFRAA >60 2011    LABGLOM 49 2021     PT/INR:    Lab Results   Component Value Date    PROTIME 12.8 2021    INR 1.10 2021     PTT:    Lab Results   Component Value Date    APTT 27.8 2011   [APTT    Impression/Plan: Continue chandra catheter drainage, stents in place, creatinine at baseline.     Vivienne Christina MD

## 2021-07-03 NOTE — PROGRESS NOTES
Nephrology Progress  Note                                                                                                                                                                                                                                                                                                                                                               Office : [phone]     Fax :[phone]              Patient's Name: Jefferson Sapp  2:34 PM  7/3/2021    Reason for Consult:  LANA, Hyperkalemia      Requesting Physician:  SUMA Salgado CNP      Chief Complaint:  Fatigue       Interval History :      Serum Mg 1.7      S/p ERCP on 7/2 : s/p stent placement     S/p biopsy    Serum cr improved    K improved    No diarrhea  No vomiting    History of Present Ilness:    Jefferson Sapp is a 80 y.o. female with PMH of HTN , DLP , DM type 2    Presented with c/o fatigue     Was diagnosed with uveitis in 5/21 and treated with prednisone    CT chest s/o mediastinal LNE ----> referred to oncology    C/o abdominal pain       C/o urinary retention    Denies sob or chest pain or diarrhea    At present being worked up for metastatic cancer , primary unknown              Past Medical History:   Diagnosis Date    Anemia     Anesthesia     PONV    Arthritis     CAD (coronary artery disease)     CVA (cerebral infarction)     Diabetes mellitus ([address]     Diastolic heart failure ([address]     Fibromyalgia     Hyperlipidemia     Hypertension     Hypothyroidism     LFT's abnormal     CHAMBERS (nonalcoholic steatohepatitis)     Nausea & vomiting     Pancreatic cyst     PNA (pneumonia) 2/23/2021    Pneumonia     Rectocele     Sleep apnea     does not use c-pap    Tremor     Vitamin D deficiency        Past Surgical History:   Procedure Laterality Date    CHOLECYSTECTOMY      COLONOSCOPY  01/10/2018    CYSTOSCOPY Bilateral 6/29/2021    CYSTOSCOPY BILATERAL URETERAL STENT INSERTION performed by Daya Perez MD at [address] [place] [address] LAB [address]    EYE SURGERY Right     cataract    OTHER SURGICAL HISTORY  11-4-2011    Posterior repair    POSTERIOR CRUCIATE LIGAMENT REPAIR         Family History   Problem Relation Age of Onset    Diabetes Mother     Stroke Mother     Heart Disease Father     Cancer Father         prostate    Heart Disease Brother         multiple stents    Heart Disease Brother     Heart Disease Sister         carotid artery surgery    Heart Disease Brother     Heart Attack Brother     Heart Disease Brother     Asthma Neg Hx     Emphysema Neg Hx     Heart Failure Neg Hx         reports that she quit smoking about 30 years ago. Her smoking use included cigarettes. She has a 0.50 pack-year smoking history. She has never used smokeless tobacco. She reports that she does not drink alcohol and does not use drugs.     Allergies:  Codeine, Ultram [tramadol], and Percocet [oxycodone-acetaminophen]    Current Medications:    lactulose (CHRONULAC) 10 GM/15ML solution 20 g, TID  hydrALAZINE (APRESOLINE) injection 20 mg, Q4H PRN  metoprolol tartrate (LOPRESSOR) tablet 50 mg, BID  oxyCODONE-acetaminophen (PERCOCET) 5-325 MG per tablet 1 tablet, Q6H PRN  labetalol (NORMODYNE;TRANDATE) injection 10 mg, Q6H PRN  buPROPion (WELLBUTRIN XL) extended release tablet 150 mg, Daily  DULoxetine (CYMBALTA) extended release capsule 60 mg, Daily  levothyroxine (SYNTHROID) tablet 75 mcg, Daily  vitamin D (CHOLECALCIFEROL) tablet 4,000 Units, Daily  glucose (GLUTOSE) 40 % oral gel 15 g, PRN  dextrose 50 % IV solution, PRN  glucagon (rDNA) injection 1 mg, PRN  dextrose 5 % solution, PRN  sodium chloride flush 0.9 % injection 10 mL, PRN  0.9 % sodium chloride infusion, PRN  acetaminophen (TYLENOL) tablet 650 mg, Q6H PRN   Or  acetaminophen (TYLENOL) suppository 650 mg, Q6H PRN  melatonin tablet 3 mg, Nightly PRN  diphenhydrAMINE (BENADRYL) tablet 50 mg, Q6H PRN  hydrOXYzine (ATARAX) tablet 10 mg, TID PRN  ursodiol (ACTIGALL) capsule 300 mg, BID  HYDROmorphone (DILAUDID) injection 1 mg, Q4H PRN  insulin lispro (HUMALOG) injection vial 0-6 Units, TID WC  insulin lispro (HUMALOG) injection vial 0-3 Units, Nightly  0.9 % sodium chloride infusion, Continuous  [Held by provider] heparin (porcine) injection 5,000 Units, 3 times per day  ondansetron (ZOFRAN-ODT) disintegrating tablet 4 mg, Q4H PRN  ondansetron (ZOFRAN) injection 4 mg, Q4H PRN  promethazine (PHENERGAN) injection 12.5 mg, Q4H PRN  promethazine (PHENERGAN) tablet 12.5 mg, Q4H PRN        Review of Systems:   14 point ROS obtained but were negative except mentioned in HPI      Physical exam:     Vitals:  BP (!) 170/84   Pulse 86   Temp 98.4 °F (36.9 °C) (Oral)   Resp 14   Ht 5' 6.5\" (1.689 m)   Wt 200 lb 3.2 oz (90.8 kg)   LMP  (LMP Unknown)   SpO2 94%   BMI 31.83 kg/m²   Constitutional:  OAA X3 NAD  Skin: no rash, turgor wnl  Heent:  eomi, mmm  Neck: no bruits or jvd noted  Cardiovascular:  S1, S2 without m/r/g  Respiratory: CTA B without w/r/r  Abdomen:  +bs, soft, nt, nd  Ext: no lower extremity edema  Psychiatric: mood and affect appropriate  Musculoskeletal:  Rom, muscular strength intact    Data:   Labs:  CBC:   Recent Labs     07/01/21  0625 07/02/21  0557 07/03/21  0601   WBC 7.6 7.9 6.2   HGB 10.9* 10.3* 10.7*    305 350     BMP:    Recent Labs     07/01/21  0625 07/02/21  0557 07/03/21  0601    138 138   K 4.9 4.0 3.9    103 102   CO2 24 26 25   BUN 12 12 13   CREATININE 1.1 1.1 1.1   GLUCOSE 219* 180* 295*     Ca/Mg/Phos:   Recent Labs     07/01/21  0625 07/02/21  0557 07/03/21  0601   CALCIUM 8.8 8.6 9.4   MG 1.50* 1.90 1.70*     Hepatic:   Recent Labs     07/01/21  0625 07/02/21  0557 07/03/21  0601   * 87* 111*   * 146* 140*   BILITOT 4.9* 2.9* 2.9*   ALKPHOS 679* 539* 577*     Troponin:   No results for input(s): TROPONINI in the last 72 hours.  BNP: No results for input(s): BNP in the last 72 hours. Lipids: No results for input(s): CHOL, TRIG, HDL, LDLCALC, LABVLDL in the last 72 hours. ABGs:   No results for input(s): PHART, PO2ART, WFI1AHF in the last 72 hours. INR:   No results for input(s): INR in the last 72 hours. UA:  No results for input(s): Jose Navjot, GLUCOSEU, BILIRUBINUR, KETUA, SPECGRAV, BLOODU, PHUR, PROTEINU, UROBILINOGEN, NITRU, LEUKOCYTESUR, Trula Mello in the last 72 hours. Urine Microscopic:   No results for input(s): LABCAST, BACTERIA, COMU, HYALCAST, WBCUA, RBCUA, EPIU in the last 72 hours. Urine Culture: No results for input(s): LABURIN in the last 72 hours. Urine Chemistry: No results for input(s): Rice Orestes, PROTEINUR, NAUR in the last 72 hours. IMAGING:  FL ERCP BILIARY AND PANCREATIC S&I   Final Result   Intraoperative fluoroscopy provided for ERCP. Please refer to the procedure   report for further details. XR KNEE RIGHT (1-2 VIEWS)   Final Result   No acute osseous abnormality. FL ERCP BILIARY AND PANCREATIC S&I   Final Result   ERCP images as above      52 52         XR FEMUR LEFT (MIN 2 VIEWS)   Final Result   Focal area of mixed lucency/sclerosis in the greater   trochanteric/intertrochanteric region of the left proximal femur is   consistent with area of suspected metastatic disease with no evidence of   superimposed pathologic fracture as described above. NM BONE SCAN WHOLE BODY   Final Result   Widespread osseous metastatic disease throughout the axial and appendicular   skeleton. MRI ABDOMEN WO CONTRAST MRCP   Final Result   1. Severe intrahepatic and extrahepatic biliary dilation and up to moderate   dilation of the main pancreatic duct with abrupt caliber changes in the   common bile duct and main pancreatic duct near the pancreatic head. No   definite underlying lesion is identified, although one is not excluded on the   basis of this study. Considerations include ductal adenocarcinoma or   postinflammatory strictures from prior pancreatitis. Consider further   evaluation with pancreas protocol abdomen CT or MRI versus ERCP. 2. Sequelae of chronic pancreatitis best demonstrated on CT with potential   for superimposed mild acute pancreatitis. However, the visualized   peripancreatic stranding could be related to minimal anasarca seen elsewhere. 3. Innumerable simple and minimally complicated cystic lesions measuring up   to 1.8 cm throughout the pancreas could represent dilated side branches from   chronic pancreatitis and/or branch duct intraductal papillary mucinous   neoplasms. Recommend follow-up with pancreas protocol and MRI (preferred) or   CT in 1 year. 4. Severe bilateral hydronephrosis, moderate to severe left hydroureter (in   the setting of a partially duplicated collecting system), and mild right   hydroureter. The right ureter changes caliber proximal to the pelvic brim,   while the conjoint left ureter changes caliber near the pelvic brim. Underline postinflammatory or malignant strictures are not excluded. Consider further evaluation with CT urography. 5. Trace to small bilateral pleural effusions, trace ascites, and minimal   anasarca suggestive of volume overload. 6. Suboptimally seen mesenteric lymph nodes better demonstrated on CT could   be reactive but could also be related to mesenteric panniculitis or   neoplastic involvement. Recommend attention on follow-up imaging. US GALLBLADDER RUQ   Final Result   1. Right-sided hydroureteronephrosis. 2. Common duct dilation status post cholecystectomy.          CT CHEST ABDOMEN PELVIS WO CONTRAST   Final Result   Somewhat limited study in the absence of contrast material.      Numerous sclerotic metastases throughout all bones of the visualized axial   and appendicular skeleton with a possible lytic lesion within the upper   aspect of the right scapula and a small soft tissue nodule within the   anterior left chest, possibly metastatic. These findings are new from prior. A healing nondisplaced subacute fracture of the left L2 transverse process is   also new, possibly pathologic. There are numerous mildly enlarged lymph nodes throughout the mediastinum as   well as the retroperitoneum in the abdominal mesentery. Moderate to severe bilateral hydronephrosis and hydroureter, also new from   prior, without appreciable obstructing stone. It is unclear whether this is   related to metastatic cervical cancer or an underlying lymphoproliferative   disorder such as lymphoma. Lymph node sampling is recommended. Minimal bibasilar atelectasis. Otherwise, clear lungs. XR CHEST PORTABLE   Final Result   Stable chronic changes with no acute abnormality seen. FLUORO FOR SURGICAL PROCEDURES    (Results Pending)   FLUORO FOR SURGICAL PROCEDURES    (Results Pending)       Assessment/Plan     1. Hyperkalemia in setting of LANA and obstructive uropathy       Serum K :  5.6 -------> 6 ---> 5.4       S/p BL stents placement       LANA imprved       Acidosis improving        Plan    Hyperkalemia improved        Low K diet       Add Lokelma x 3 dose           2. LANA : improved     Etiology seems obstructive uropathy     S/p cystoscopy and BL stent placement      3.  Metabolic acidosis in setting of LANA    improved    4. Acid- base/ Electrolyte imbalance     moniter      5 BL hydronephrosis    6 DM 2    7 metastatic cancer , unknown primary     8 Hypomagnesemia     Replace with IV Mg    Thank you for allowing us to participate in care of Etta Tesfaye MD  Feel free to contact me   [facility] [address]  Office : [phone]  Fax :[phone]

## 2021-07-04 NOTE — PROGRESS NOTES
Nephrology Progress  Note                                                                                                                                                                                                                                                                                                                                                               Office : [phone]     Fax :[phone]              Patient's Name: Ashly Monae  3:37 PM  7/4/2021    Reason for Consult:  LANA, Hyperkalemia      Requesting Physician:  SUMA Lomeli - CNP      Chief Complaint:  Fatigue       Interval History :    Not in acute distress    Abdominal pain + , mild  RUQ      S/p Mg replacement      S/p ERCP on 7/2 : s/p stent placement     S/p biopsy    Serum cr improved    K improved    No diarrhea  No vomiting    History of Present Ilness:    Ashly Monae is a 80 y.o. female with PMH of HTN , DLP , DM type 2    Presented with c/o fatigue     Was diagnosed with uveitis in 5/21 and treated with prednisone    CT chest s/o mediastinal LNE ----> referred to oncology    C/o abdominal pain       C/o urinary retention    Denies sob or chest pain or diarrhea    At present being worked up for metastatic cancer , primary unknown              Past Medical History:   Diagnosis Date    Anemia     Anesthesia     PONV    Arthritis     CAD (coronary artery disease)     CVA (cerebral infarction)     Diabetes mellitus ([address]     Diastolic heart failure ([address]     Fibromyalgia     Hyperlipidemia     Hypertension     Hypothyroidism     LFT's abnormal     CHAMBERS (nonalcoholic steatohepatitis)     Nausea & vomiting     Pancreatic cyst     PNA (pneumonia) 2/23/2021    Pneumonia     Rectocele     Sleep apnea     does not use c-pap    Tremor     Vitamin D deficiency        Past Surgical History:   Procedure Laterality Date    CHOLECYSTECTOMY      COLONOSCOPY  01/10/2018    CYSTOSCOPY Bilateral 6/29/2021    CYSTOSCOPY BILATERAL URETERAL STENT INSERTION performed by Mercedes Posey MD at [facility] CATH LAB PROCEDURE      ECTOPIC PREGNANCY SURGERY  1985    EYE SURGERY Right     cataract    OTHER SURGICAL HISTORY  11-4-2011    Posterior repair    POSTERIOR CRUCIATE LIGAMENT REPAIR         Family History   Problem Relation Age of Onset    Diabetes Mother     Stroke Mother     Heart Disease Father     Cancer Father         prostate    Heart Disease Brother         multiple stents    Heart Disease Brother     Heart Disease Sister         carotid artery surgery    Heart Disease Brother     Heart Attack Brother     Heart Disease Brother     Asthma Neg Hx     Emphysema Neg Hx     Heart Failure Neg Hx         reports that she quit smoking about 30 years ago. Her smoking use included cigarettes. She has a 0.50 pack-year smoking history. She has never used smokeless tobacco. She reports that she does not drink alcohol and does not use drugs.     Allergies:  Acetaminophen, Codeine, Oxycodone hcl, Oxycodone-acetaminophen, Tramadol hcl, Ultram [tramadol], and Percocet [oxycodone-acetaminophen]    Current Medications:    HYDROmorphone (DILAUDID) injection 1 mg, Q3H PRN  insulin glargine (LANTUS) injection vial 40 Units, Nightly  dicyclomine (BENTYL) capsule 10 mg, 4x Daily  primidone (MYSOLINE) tablet 150 mg, Nightly  insulin lispro (HUMALOG) injection vial 0-12 Units, TID WC  insulin lispro (HUMALOG) injection vial 0-6 Units, Nightly  lactulose (CHRONULAC) 10 GM/15ML solution 20 g, TID  hydrALAZINE (APRESOLINE) injection 20 mg, Q4H PRN  metoprolol tartrate (LOPRESSOR) tablet 50 mg, BID  oxyCODONE-acetaminophen (PERCOCET) 5-325 MG per tablet 1 tablet, Q6H PRN  labetalol (NORMODYNE;TRANDATE) injection 10 mg, Q6H PRN  buPROPion (WELLBUTRIN XL) extended release tablet 150 mg, Daily  DULoxetine (CYMBALTA) extended release capsule 60 mg, Daily  levothyroxine (SYNTHROID) tablet 75 mcg, Daily  vitamin D (CHOLECALCIFEROL) tablet 4,000 Units, Daily  glucose (GLUTOSE) 40 % oral gel 15 g, PRN  dextrose 50 % IV solution, PRN  glucagon (rDNA) injection 1 mg, PRN  dextrose 5 % solution, PRN  sodium chloride flush 0.9 % injection 10 mL, PRN  0.9 % sodium chloride infusion, PRN  acetaminophen (TYLENOL) tablet 650 mg, Q6H PRN   Or  acetaminophen (TYLENOL) suppository 650 mg, Q6H PRN  melatonin tablet 3 mg, Nightly PRN  diphenhydrAMINE (BENADRYL) tablet 50 mg, Q6H PRN  hydrOXYzine (ATARAX) tablet 10 mg, TID PRN  ursodiol (ACTIGALL) capsule 300 mg, BID  [Held by provider] heparin (porcine) injection 5,000 Units, 3 times per day  ondansetron (ZOFRAN-ODT) disintegrating tablet 4 mg, Q4H PRN  ondansetron (ZOFRAN) injection 4 mg, Q4H PRN  promethazine (PHENERGAN) injection 12.5 mg, Q4H PRN  promethazine (PHENERGAN) tablet 12.5 mg, Q4H PRN        Review of Systems:   14 point ROS obtained but were negative except mentioned in HPI      Physical exam:     Vitals:  BP (!) 193/99   Pulse 70   Temp 97.8 °F (36.6 °C) (Oral)   Resp 20   Ht 5' 6.5\" (1.689 m)   Wt 195 lb 9.6 oz (88.7 kg)   LMP  (LMP Unknown)   SpO2 92%   BMI 31.10 kg/m²   Constitutional:  OAA X3 NAD  Skin: no rash, turgor wnl  Heent:  eomi, mmm  Neck: no bruits or jvd noted  Cardiovascular:  S1, S2 without m/r/g  Respiratory: CTA B without w/r/r  Abdomen:  +bs, soft, nt, nd  Ext: no lower extremity edema  Psychiatric: mood and affect appropriate  Musculoskeletal:  Rom, muscular strength intact    Data:   Labs:  CBC:   Recent Labs     07/02/21  0557 07/03/21  0601 07/04/21  0612   WBC 7.9 6.2 8.2   HGB 10.3* 10.7* 10.8*    350 321     BMP:    Recent Labs     07/02/21  0557 07/03/21  0601 07/04/21  0612    138 134*   K 4.0 3.9 4.8    102 99   CO2 26 25 24   BUN 12 13 13   CREATININE 1.1 1.1 1.1   GLUCOSE 180* 295* 337*     Ca/Mg/Phos:   Recent Labs     07/02/21  0557 07/03/21  0601 07/04/21  0612   CALCIUM 8.6 9.4 9.2   MG 1.90 1.70* 2.20     Hepatic:   Recent Labs     07/02/21  0557 07/03/21  0601 07/04/21  0612   AST 87* 111* 42*   * 140* 98*   BILITOT 2.9* 2.9* 1.5*   ALKPHOS 539* 577* 506*     Troponin:   No results for input(s): TROPONINI in the last 72 hours. BNP: No results for input(s): BNP in the last 72 hours. Lipids: No results for input(s): CHOL, TRIG, HDL, LDLCALC, LABVLDL in the last 72 hours. ABGs:   No results for input(s): PHART, PO2ART, JVQ2OOY in the last 72 hours. INR:   No results for input(s): INR in the last 72 hours. UA:  No results for input(s): Kerney Dominion, GLUCOSEU, BILIRUBINUR, KETUA, SPECGRAV, BLOODU, PHUR, PROTEINU, UROBILINOGEN, NITRU, LEUKOCYTESUR, Rachele Pier in the last 72 hours. Urine Microscopic:   No results for input(s): LABCAST, BACTERIA, COMU, HYALCAST, WBCUA, RBCUA, EPIU in the last 72 hours. Urine Culture: No results for input(s): LABURIN in the last 72 hours. Urine Chemistry: No results for input(s): Jonatan Juniper, PROTEINUR, NAUR in the last 72 hours. IMAGING:  XR ABDOMEN (KUB) (SINGLE AP VIEW)   Final Result   No obstruction, ileus or fecal impaction. 1 biliary stent and 2 bilateral ureteral stents were noted. FL ERCP BILIARY AND PANCREATIC S&I   Final Result   Intraoperative fluoroscopy provided for ERCP. Please refer to the procedure   report for further details. XR KNEE RIGHT (1-2 VIEWS)   Final Result   No acute osseous abnormality. FL ERCP BILIARY AND PANCREATIC S&I   Final Result   ERCP images as above      52 52         XR FEMUR LEFT (MIN 2 VIEWS)   Final Result   Focal area of mixed lucency/sclerosis in the greater   trochanteric/intertrochanteric region of the left proximal femur is   consistent with area of suspected metastatic disease with no evidence of   superimposed pathologic fracture as described above.          NM BONE SCAN WHOLE BODY   Final Result   Widespread osseous metastatic disease throughout the axial and appendicular   skeleton. MRI ABDOMEN WO CONTRAST MRCP   Final Result   1. Severe intrahepatic and extrahepatic biliary dilation and up to moderate   dilation of the main pancreatic duct with abrupt caliber changes in the   common bile duct and main pancreatic duct near the pancreatic head. No   definite underlying lesion is identified, although one is not excluded on the   basis of this study. Considerations include ductal adenocarcinoma or   postinflammatory strictures from prior pancreatitis. Consider further   evaluation with pancreas protocol abdomen CT or MRI versus ERCP. 2. Sequelae of chronic pancreatitis best demonstrated on CT with potential   for superimposed mild acute pancreatitis. However, the visualized   peripancreatic stranding could be related to minimal anasarca seen elsewhere. 3. Innumerable simple and minimally complicated cystic lesions measuring up   to 1.8 cm throughout the pancreas could represent dilated side branches from   chronic pancreatitis and/or branch duct intraductal papillary mucinous   neoplasms. Recommend follow-up with pancreas protocol and MRI (preferred) or   CT in 1 year. 4. Severe bilateral hydronephrosis, moderate to severe left hydroureter (in   the setting of a partially duplicated collecting system), and mild right   hydroureter. The right ureter changes caliber proximal to the pelvic brim,   while the conjoint left ureter changes caliber near the pelvic brim. Underline postinflammatory or malignant strictures are not excluded. Consider further evaluation with CT urography. 5. Trace to small bilateral pleural effusions, trace ascites, and minimal   anasarca suggestive of volume overload. 6. Suboptimally seen mesenteric lymph nodes better demonstrated on CT could   be reactive but could also be related to mesenteric panniculitis or   neoplastic involvement. Recommend attention on follow-up imaging.          US GALLBLADDER RUQ   Final Result 1. Right-sided hydroureteronephrosis. 2. Common duct dilation status post cholecystectomy. CT CHEST ABDOMEN PELVIS WO CONTRAST   Final Result   Somewhat limited study in the absence of contrast material.      Numerous sclerotic metastases throughout all bones of the visualized axial   and appendicular skeleton with a possible lytic lesion within the upper   aspect of the right scapula and a small soft tissue nodule within the   anterior left chest, possibly metastatic. These findings are new from prior. A healing nondisplaced subacute fracture of the left L2 transverse process is   also new, possibly pathologic. There are numerous mildly enlarged lymph nodes throughout the mediastinum as   well as the retroperitoneum in the abdominal mesentery. Moderate to severe bilateral hydronephrosis and hydroureter, also new from   prior, without appreciable obstructing stone. It is unclear whether this is   related to metastatic cervical cancer or an underlying lymphoproliferative   disorder such as lymphoma. Lymph node sampling is recommended. Minimal bibasilar atelectasis. Otherwise, clear lungs. XR CHEST PORTABLE   Final Result   Stable chronic changes with no acute abnormality seen. FLUORO FOR SURGICAL PROCEDURES    (Results Pending)   FLUORO FOR SURGICAL PROCEDURES    (Results Pending)       Assessment/Plan     1. Hyperkalemia in setting of LANA and obstructive uropathy       Serum K :  5.6 -------> 6 ---> 5.4 -----> 4.8        S/p BL stents placement       LANA imprved       Acidosis improving        Plan    Hyperkalemia improved        Low K diet       Add Lokelma x 3 dose           2. LANA : improved     Etiology seems obstructive uropathy     S/p cystoscopy and BL stent placement      Encourage PO intake of fluid to thirst      3.  Metabolic acidosis in setting of LANA    improved    4. Acid- base/ Electrolyte imbalance     moniter      5 BL hydronephrosis    S/p stent    Marroquin cath    6 DM 2    7 metastatic cancer , unknown primary     8 Hypomagnesemia     Replace with IV Mg    Thank you for allowing us to participate in care of Chema Gale MD  Feel free to contact me   [facility] [address]  Office : [phone]  Fax :[phone]

## 2021-07-04 NOTE — PROGRESS NOTES
Perfect serve Dr. Lonny Gonzalez: FYI:  (correcting with med. sliding scale). FYI: Pain more severe today, has been refusing Percocet d/t it causing nausea. thanks.

## 2021-07-04 NOTE — PROGRESS NOTES
Physical Therapy    PT attempted to evaluate this patient. However the patient's RN Venkat Michele requested that therapy be held for now. PT will re-attempt to evaluate this patient as able. Thank you.      Favio Jarrett PT

## 2021-07-04 NOTE — PROGRESS NOTES
Physical Therapy    Facility/Department: [facility] - MED SURG/ORTHO  Initial Assessment and Treatment    NAME: Dharmesh Torres  : 1950  MRN: [id number]    Date of Service: 2021    Discharge Recommendations:  Subacute/Skilled Nursing Facility   PT Equipment Recommendations  Equipment Needed: No  Other: defer to patient's facility  If pt is unable to be seen after this session, please let this note serve as discharge summary. Please see case management note for discharge disposition. Thank you. Barriers to home discharge:   [x] Steps to access home entry or bed/bath: 2+5 steps to bed/bath   [x] Reported available assist at home upon discharge limited  Assessment   Body structures, Functions, Activity limitations: Decreased functional mobility ; Decreased ADL status; Decreased strength;Decreased endurance; Increased pain;Decreased high-level IADLs;Decreased posture;Decreased balance  Assessment: Patient is a 80year old female who was admitted to [facility] on 21 with LANA. Patient said that she was independently ambulating household distances prior to admission. However today she needed mod assist x 2 to complete bed mobility and mod assist to perform sit <> stand transfers. She also ambulated 2 feet with a standard walker and mod assist. Patient presented with the therapy deficits listed above. PT recommends that this patient receive skilled PT in the SNF setting, when medically stable, in order to address these deficits and to help her maximize her safety and independence with all functional mobility. PT to continue to follow. Treatment Diagnosis: Decreased independence with functional mobility  Specific instructions for Next Treatment: progress mobility as tolerated  Prognosis: Fair  Decision Making: Medium Complexity  PT Education: Goals; General Safety;Gait Training;Disease Specific Education;Plan of Care;Home Exercise Program;Transfer Training;Precautions;PT Role;Functional Mobility Training;Pressure Relief;Equipment; Injury Prevention  Patient Education: Patient educated on the benefits of increased activity, use of call bell, safety with mobility. Patient verbalized understanding. Barriers to Learning: none  REQUIRES PT FOLLOW UP: Yes  Activity Tolerance  Activity Tolerance: Patient limited by fatigue;Patient limited by pain; Patient limited by endurance;Treatment limited secondary to medical complications (free text)  Activity Tolerance: Vitals: 173/90 79 BPM 94% room air. Seated: 173/100 82 BPM. After standing: /119; In supine: 211/93 and then 193/105 after ~2min. RN notified of increased BP. Patient Diagnosis(es): The primary encounter diagnosis was Generalized weakness. Diagnoses of Nausea and vomiting, intractability of vomiting not specified, unspecified vomiting type, Transaminitis, LANA (acute kidney injury) ([address] Metastatic malignant neoplasm, unspecified site ([address] and Elevated bilirubin were also pertinent to this visit. has a past medical history of Anemia, Anesthesia, Arthritis, CAD (coronary artery disease), CVA (cerebral infarction), Diabetes mellitus ([address] Diastolic heart failure ([address] Fibromyalgia, Hyperlipidemia, Hypertension, Hypothyroidism, LFT's abnormal, CHAMBERS (nonalcoholic steatohepatitis), Nausea & vomiting, Pancreatic cyst, PNA (pneumonia), Pneumonia, Rectocele, Sleep apnea, Tremor, and Vitamin D deficiency. has a past surgical history that includes Cholecystectomy; eye surgery (Right); Ectopic pregnancy surgery (1985); Posterior cruciate ligament repair; other surgical history (11-4-2011); Diagnostic Cardiac Cath Lab Procedure; Colonoscopy (01/10/2018); and Cystoscopy (Bilateral, 6/29/2021). Restrictions  Restrictions/Precautions  Restrictions/Precautions: General Precautions, Fall Risk  Position Activity Restriction  Other position/activity restrictions:  Up with assist, chandra  Vision/Hearing  Vision: Impaired  Vision Exceptions: Wears glasses for reading  Hearing: Within functional limits     Subjective  General  Chart Reviewed: Yes  Patient assessed for rehabilitation services?: Yes  Additional Pertinent Hx: HPI per chart, \"admitted with acute kidney injury from obstructive nephropathy, bilateral hydroureteronephrosis without significant bladder distention s/p cystoscopy and bilateral double-J ureteral stentplacement (6/29). Metastatic carcinoma (possible pancreatic carcinoma) with apparent choroidal mets, sclerotic lesions widespread throughout the axial skeleton. Widespread lymphadenopathy in the abdomen, retroperitoneal space, and mediastinum. Elevated CA-125 and CA 19-9. ERCP with stent (7/2) and biopsy for pancreatic carcinoma. Right proximal femur mass that may require pinning due to pending pathologic fracture. Initial myeloma studies are negative. \"  Response To Previous Treatment: Not applicable  Family / Caregiver Present: No  Referring Practitioner: Marilyn Weber MD  Referral Date : 07/03/21  General Comment  Comments: Supine in bed upon entry of therapy staff. Cleared for therapy by CATHI Carrera. Subjective  Subjective: Patient agreed to participate. Pain Screening  Patient Currently in Pain: Yes  Pain Assessment  Pain Assessment: 0-10 (0/10 at rest, 10/10 with movement)  Pain Level: 10  Pain Type: Acute pain  Non-Pharmaceutical Pain Intervention(s): Ambulation/Increased Activity;Repositioned; Rest  Response to Pain Intervention: Patient Satisfied  Vital Signs  Patient Currently in Pain: Yes  Pre Treatment Pain Screening  Intervention List: Patient able to continue with treatment    Orientation  Orientation  Overall Orientation Status: Within Functional Limits  Social/Functional History  Social/Functional History  Lives With: Spouse (, but  is in a nursing home right now for rehabilitation.  grandson, who works)  Type of Home: [employer] Layout: Two level, Bed/Bath upstairs  Home Access: Stairs to enter with rails  Entrance Stairs - Number of Steps: 2 WALE. 5 steps to 2nd floor bed/bath. Entrance Stairs - Rails: Right  Bathroom Shower/Tub: Walk-in shower, Tub/Shower unit  Bathroom Toilet: Handicap height  Bathroom Equipment: Shower chair, Grab bars in shower  Home Equipment: Quad cane, Grab bars, [employer], [employer]acher  Receives Help From: Family  ADL Assistance: Needs assistance  Bath: Modified independent (however patient said that she did not have the energy to do a lot of her ADLs)  Dressing: Modified independent  Grooming: Modified independent   Feeding: Modified independent   Homemaking Assistance: Needs assistance (patient said that she was able to cook and clean before her cancer diagnosis. however since then she has not been able to clean. \"you should look at my house! \" \"my grandson has been doing the shopping and cleaning. \")  Meal Prep: Total  Laundry: Total  Vacuuming: Total  Cleaning: Total  Shopping: Total  Homemaking Responsibilities: No  Ambulation Assistance: Independent  Transfer Assistance: Independent  Active : No  Patient's  Info: patient's grandson has been driving  Occupation: Retired  Additional Comments: Patient had 2 falls in the past 6 months. No serious injuries with those falls. Cognition   Cognition  Overall Cognitive Status: Exceptions  Arousal/Alertness: Appropriate responses to stimuli  Following Commands:  Follows one step commands with increased time  Attention Span: Attends with cues to redirect  Memory: Appears intact  Problem Solving: Assistance required to identify errors made  Initiation: Does not require cues  Sequencing: Does not require cues    Objective     Observation/Palpation  Posture: Fair    PROM RLE (degrees)  RLE PROM: WFL  AROM RLE (degrees)  RLE AROM: WFL  PROM LLE (degrees)  LLE PROM: WFL  AROM LLE (degrees)  LLE AROM : WFL  Strength RLE  Comment: grossly 3+/5 bilateral strength  Strength LLE  Comment: grossly 3+/5 bilateral strength     Sensation  Overall Sensation Status: WFL  Bed mobility  Supine to Sit: Moderate assistance;2 Person assistance  Sit to Supine: Moderate assistance;2 Person assistance  Scooting: Maximal assistance;2 Person assistance (to scoot up in bed)  Comment: head of bed elevated  Transfers  Sit to Stand: Moderate Assistance  Stand to sit: Moderate Assistance  Bed to Chair: Unable to assess (patient requested to return to bed. patient also assisted back to bed due to high blood pressure)  Comment: with SW. cueing for safe hand placement. Ambulation  Ambulation?: Yes  More Ambulation?: No  Ambulation 1  Surface: level tile  Device: Standard Walker  Assistance: Moderate assistance  Quality of Gait: forward flexed posture, cueing to correct. 1 loss of balance. mod assist to correct. cueing to maintain base of support within SW. mod assist for SW placement. Gait Deviations: Slow Alysa;Decreased step length;Decreased step height  Distance: side steps to the right along the side of the bed ~2 feet. additional distance limited by patient's pain, poor balance. Comments: No complaints of shortness of breath or chest pain. Patient had some lightheadedness when first sitting up but this quickly improved. Stairs/Curb  Stairs?: No     Balance  Posture: Fair  Sitting - Static: Fair  Sitting - Dynamic: Fair  Comments: CGA to maintain balance at edge of bed  Exercises  Knee Long Arc Quad: 1 x 10 bilateral  Ankle Pumps: 1 x 10 bilateral  Comments: cueing for sequencing and technique.   Other exercises  Other exercises?: No     Plan   Plan  Times per week: 3-5/week  Plan weeks: 1 week 7/11/21  Specific instructions for Next Treatment: progress mobility as tolerated  Current Treatment Recommendations: Strengthening, Home Exercise Program, ROM, Safety Education & Training, Balance Training, Endurance Training, Patient/Caregiver Education & Training, Functional Mobility Training, Equipment Evaluation, Education, & procurement, Transfer Training, Gait Training, ADL/Self-care Training, Pain Management, Positioning  Safety Devices  Type of devices: All fall risk precautions in place, Bed alarm in place, Call light within reach, Gait belt, Patient at risk for falls, Left in bed, Nurse notified    AM-PAC Score     AM-PAC Inpatient Mobility without Stair Climbing Raw Score : 9 (07/04/21 1553)  AM-PAC Inpatient without Stair Climbing T-Scale Score : 32.44 (07/04/21 1553)  Mobility Inpatient CMS 0-100% Score: 76.07 (07/04/21 1553)  Mobility Inpatient without Stair CMS G-Code Modifier : CL (07/04/21 1553)       Goals  Short term goals  Time Frame for Short term goals: 1 week 7/11/21  Short term goal 1: Supine <> sit with min assist.  Short term goal 2: Sit <> stand with min assist.  Short term goal 3: Bed <> chair with min assist.  Short term goal 4: Ambulate 10 feet with LRAD and min assist.  Short term goal 5: Patient will tolerate 12-15 reps of her exercises to maximize her strength/endurance  Patient Goals   Patient goals : To improve her pain. To become stronger.        Therapy Time   Individual Concurrent Group Co-treatment   Time In 6771         Time Out 1431         Minutes 39         Timed Code Treatment Minutes: 29 Minutes (10 minute evaluation)       Orly Lorenz, PT

## 2021-07-04 NOTE — PROGRESS NOTES
Encouraged pt to take the lactulose and reinforced how it is to treat the ammonia level pt refused. states she just took it and had it 2 times yesterday

## 2021-07-04 NOTE — PROGRESS NOTES
Urology Attending Progress Note      Reason for Admission: Metastatic cancer    History: 80year old with cancer of unknown origin with hydronephrosis. Stents and catheter in place, no urinary complaints. Meds: see med rec  Family History, Social History, Review of Systems:  Reviewed and agreed to as per chart    Exam:    Vitals:  BP (!) 141/73   Pulse 91   Temp 98.5 °F (36.9 °C) (Oral)   Resp 20   Ht 5' 6.5\" (1.689 m)   Wt 195 lb 9.6 oz (88.7 kg)   LMP  (LMP Unknown)   SpO2 92%   BMI 31.10 kg/m²   Temp  Av.1 °F (36.7 °C)  Min: 97.9 °F (36.6 °C)  Max: 98.5 °F (36.9 °C)    Intake/Output Summary (Last 24 hours) at 2021 1100  Last data filed at 2021 0559  Gross per 24 hour   Intake 320 ml   Output 3000 ml   Net -2680 ml       Physical:    Well developed, well nourished in no acute distress   Mood indicates no abnormalities. Pt doesnt appear depressed   Orientated to time and place   Neck is supple, trachea is midline   Respiratory effort is normal   Cardiovascular show no extremity swelling   Abdomen no masses or hernias are palpated, there is no tenderness. Liver and Spleen appear normal.   Skin show no abnormal lesions   Lymph nodes are not palpated in the inguinal, neck, or axillary area.       Labs:  WBC:    Lab Results   Component Value Date    WBC 8.2 2021     Hemoglobin/Hematocrit:    Lab Results   Component Value Date    HGB 10.8 2021    HCT 31.9 2021     BMP:    Lab Results   Component Value Date     2021    K 4.8 2021    K 3.6 2021    CL 99 2021    CO2 24 2021    BUN 13 2021    LABALBU 3.0 2021    CREATININE 1.1 2021    CALCIUM 9.2 2021    GFRAA 59 2021    GFRAA >60 2011    LABGLOM 49 2021     PT/INR:    Lab Results   Component Value Date    PROTIME 12.8 2021    INR 1.10 2021     PTT:    Lab Results   Component Value Date    APTT 27.8 2011 [APTT    Impression/Plan: Continue chandra catheter drainage, stents in place, creatinine at baseline.     Priti Crawford MD

## 2021-07-04 NOTE — PROGRESS NOTES
LAB PROCEDURE      ECTOPIC PREGNANCY SURGERY  1985    EYE SURGERY Right     cataract    OTHER SURGICAL HISTORY  11-4-2011    Posterior repair    POSTERIOR CRUCIATE LIGAMENT REPAIR       CURRENT MEDICATIONS   (This list may include medications prescribed during this encounter as epic can not insert only the list prior to this encounter.)      ALLERGIES     Allergies   Allergen Reactions    Acetaminophen      Other reaction(s): GI upset    Codeine      Other reaction(s): GI upset    Oxycodone Hcl      Other reaction(s): GI upset    Oxycodone-Acetaminophen     Tramadol Hcl      Other reaction(s): GI upset    Ultram [Tramadol]      vomiting    Percocet [Oxycodone-Acetaminophen] Nausea And Vomiting           PHYSICAL EXAM   Vitals as per nursing record. Abd: The abdominal distention seems to be less today, epigastric tenderness in the midline in a localized area and without rebound, rigidity or guarding, no masses are felt. Bowel sounds are hyperactive. Neurologic: Alert & oriented x 3. Psych: Good mood and memory. Pt is able to make appropriate decisions. FINAL IMPRESSION AND RECOMMENDATIONS     With the improvement of her primary symptoms and drop in total bilirubin after the stent placement, it appears to me that the main problem is solved. The pancreatic duct was not entered the last time she had the ERCP with stent placement. Therefore, I doubt that she has pancreatitis. However, lipase level is ordered. Separately, the plastic stent is unlikely to last too long. A more reliable solution would be metal stent which can be placed as an outpatient after discharge if the patient continues to improve. I have reviewed the x-ray of the abdomen which has been reported to not show high fecal impaction. I still see plenty of stool in the rectum and other parts of the colon seen on the x-ray, unfortunately the transverse colon which may correspond to the epigastric pain is not seen.   With the narcotics the patient is receiving, fecal loading is to be expected. The hyperactive bowel sounds in the upper abdomen go against paralytic ileus and pancreatitis. The nursing staff is instructed to give tap water enema to see if the patient gets some relief. The total time spent face-to-face, review of the record and on the bower during this visit was 25minutes with more than 50% of the time spent in review of the electronic record showing drop in total bilirubin and independent analysis of x-ray of the abdomen revealing significant amount of feces in the colon, coordination of care with the nursing staff and needed to talk to the patient's son about future plans about the bile duct obstruction. Satinder Acosta MD 7/4/21 6:32 PM EDT    CC:  Sera Diaz, APRN - CNP      IMPORTANT: Please note that some portions of this note may have been created using Dragon voice recognition software. Some \"sound-alike\" and totally wrong word substitutions may have taken place due to known inherent limitations of any such software, including this voice recognition software. In spite of efforts to eliminate such errors, some may not have been corrected. So please read the note with this in mind and recognize such mistakes and understand the correct version using the  context. Thanks.

## 2021-07-04 NOTE — PROGRESS NOTES
Hospitalist Progress Note  7/4/2021 9:53 AM  Subjective:   Admit Date: 6/27/2021  PCP: SUMA Adhikari CNP Status: Inpatient [101]  Interval History: Hospital Day: 8, pain in abdominal and lower back more severe, declined oral Percocet due to nausea. Treated with IV Dilaudid. History of present illness:  Admitted with acute kidney injury from obstructive nephropathy, bilateral hydroureteronephrosis without significant bladder distention s/p cystoscopy and bilateral double-J ureteral stent placement (6/29). Metastatic carcinoma (possible pancreatic carcinoma) with apparent choroidal mets, sclerotic lesions widespread throughout the axial skeleton. Widespread lymphadenopathy in the abdomen, retroperitoneal space, and mediastinum. Elevated CA-125 and CA 19-9. ERCP with stent (7/2) and biopsy for pancreatic carcinoma. Right proximal femur mass that may require pinning due to pending pathologic fracture. Initial myeloma studies are negative.   Diagnosed with uveitis (5/21) and has been on prednisone    Diet: controlled carbohydrate (60 gm/meal)  Right forearm peripheral IV (7/3, day #2)  Urethral catheter (6/29, day #6)  Medications:     primidone  150 mg Oral Nightly   insulin lispro SSI  0-12 Units Subcutaneous TID WC / HS   lactulose  20 g Oral TID   metoprolol tartrate  50 mg Oral BID   bupropion  150 mg Oral Daily   duloxetine  60 mg Oral Daily   levothyroxine  75 mcg Oral Daily   Vitamin D  4,000 Units Oral Daily   ursodiol  300 mg Oral BID   heparin (porcine)  5,000 Units Subcutaneous TID [held]     Recent Labs     07/02/21  0557 07/03/21  0601 07/04/21  0612   WBC 7.9 6.2 8.2   HGB 10.3* 10.7* 10.8*    350 321   MCV 80.1 80.1 79.1*     Recent Labs     07/02/21  0557 07/03/21  0601 07/04/21  0612    138 134*   K 4.0 3.9 4.8    102 99   CO2 26 25 24   BUN 12 13 13   CREATININE 1.1 1.1 1.1   GLUCOSE 180* 295* 337*     Recent Labs     07/02/21  0557 07/03/21  0601 07/04/21  8930 AST 87* 111* 42*   * 140* 98*   BILITOT 2.9* 2.9* 1.5*   ALKPHOS 539* 577* 506*     Magnesium (7/3) 1.70 mg/dL  Ammonia (6/30) 79 umol/L     SARS-CoV-2, NAAT (6/29) Not Detected      Alpha Fetoprotein (6/28) 1.5 ug/L   CEA (6/28) 13.3 ng/mL (nl < 5.0)  CA 19-9 (6/28) 510,000 U/mL   (6/28) 1018 U/mL  CA 27.29 (6/28) 87 U/mL  CA 15-3 (6/28) 34 U/mL     Acute hepatitis panel (6/27) negative    POC Glucose:   07/03/21  1159 07/03/21  1642 07/03/21  2000 07/04/21  0726   407* 360* 353* 350*     Left femur X-ray (6/30) Focal area of mixed lucency/sclerosis in the greater trochanteric/intertrochanteric region of the left proximal femur is consistent with area of suspected metastatic disease with no evidence of superimposed pathologic fracture as described above.     Nuclear Medicine Whole Body Bone Scan (6/29) Widespread osseous metastatic disease throughout the axial and appendicular skeleton.     MRCP (6/28) Severe intrahepatic and extrahepatic biliary dilation and up to moderate dilation of the main pancreatic duct with abrupt caliber changes in the common bile duct and main pancreatic duct near the pancreatic head.  No definite underlying lesion is identified, although one is not excluded on the basis of this study.  Considerations include ductal adenocarcinoma or postinflammatory strictures from prior pancreatitis. Consider further evaluation with pancreas protocol abdomen CT or MRI versus ERCP. Sequelae of chronic pancreatitis best demonstrated on CT with potential for superimposed mild acute pancreatitis.  However, the visualized peripancreatic stranding could be related to minimal anasarca seen elsewhere.   Innumerable simple and minimally complicated cystic lesions measuring up to 1.8 cm throughout the pancreas could represent dilated side branches from chronic pancreatitis and/or branch duct intraductal papillary mucinous neoplasms. Recommend follow-up with pancreas protocol and MRI (preferred) or CT in 1 year. Severe bilateral hydronephrosis, moderate to severe left hydroureter (in the setting of a partially duplicated collecting system), and mild right hydroureter.  The right ureter changes caliber proximal to the pelvic brim, while the conjoint left ureter changes caliber near the pelvic brim. Underline postinflammatory or malignant strictures are not excluded. Consider further evaluation with CT urography. Trace to small bilateral pleural effusions, trace ascites, and minimal anasarca suggestive of volume overload. Suboptimally seen mesenteric lymph nodes better demonstrated on CT could be reactive but could also be related to mesenteric panniculitis or neoplastic involvement.  Recommend attention on follow-up imaging.     Gallbladder Ultrasound (6/28) Right-sided hydroureteronephrosis. Common duct dilation status post cholecystectomy.     Noncontrast CT Chest / Abdomen / Pelvis  (6/27)  Numerous sclerotic metastases throughout all bones of the visualized axial and appendicular skeleton with a possible lytic lesion within the upper aspect of the right scapula and a small soft tissue nodule within the anterior left chest, possibly metastatic.  These findings are new from prior. A healing nondisplaced subacute fracture of the left L2 transverse process is also new, possibly pathologic. There are numerous mildly enlarged lymph nodes throughout the mediastinum as well as the retroperitoneum in the abdominal mesentery. Moderate to severe bilateral hydronephrosis and hydroureter, also new from prior, without appreciable obstructing stone.  It is unclear whether this is related to metastatic cervical cancer or an underlying lymphoproliferative disorder such as lymphoma.  Lymph node sampling is recommended.  Minimal bibasilar atelectasis.  Otherwise, clear lungs.      Portable CXR (6/27) Stable chronic changes with no acute abnormality seen.     MRI brain / orbits / face (6/16) There is a lobulated thickening involving the left posterior retina, which appears to demonstrate associated enhancement. There is suggestion of asymmetrically decreased caliber of the left intraorbital optic nerve compared to the right. Small focus of enhancement seen along the left parietal/occipital lobe without significant mass effect or midline shift. Small subacute infarct involving the right precentral gyrus. Otherwise, no acute intracranial abnormality.  No acute infarct. Small chronic infarct involving the right posterior frontal lobe. Mild global parenchymal volume loss with chronic microvascular ischemic changes.     Abd X-ray (7/3) No obstruction, ileus or fecal impaction. 1 biliary stent and 2 bilateral ureteral stents were noted. Objective:   Vitals:  /111   Pulse 94   Temp 98.2 °F (oral)   Resp 20   Ht 5' 7\"  Wt 88.7 kg  SpO2 91% on RA  BMI 31.10 kg/m²   General appearance: alert and cooperative with exam  Lungs: diffuse rhonchi, reasonable air movement, no wheezes  Heart: regular rate and rhythm, S1, S2 normal, no murmur, click, rub or gallop  Abdomen: diffuse tenderness, audible bowel sounds, benign  Extremities: extremities normal, atraumatic, no cyanosis or edema  Neurologic: No obvious focal neurologic deficits. Assessment and Plan:   1. Metastatic carcinoma (possible pancreatic carcinoma) s/p ERCP (7/2). Bilirubin decreased from 4.9 to 2.9.   2. Widespread bone metastases, initial myeloma studies are negative. Broad differential, may benefit from bone biopsy per Hem/Onc as outpatient. 3. Acute kidney injury with hyperkalemia (eGFR 49). 4. Hydronephrosis from obstructive nephropathy, bilateral hydroureteronephrosis without significant bladder distention s/p cystoscopy and bilateral double-J ureteral stent placement (6/29). To be exchanged in 4 months. 5. Hepatic encephalopathy with elevated ammonia:  Treated with oral lactulose 20 gram TID.   6. Left proximal femur mass that may require pinning due to pending pathologic fracture. 7. Hypomagnesemia:  Unclear etiology. Replace with IV magnesium sulfate to Mg > 2.0.    8. Type 2 Diabetes (uncontrolled, HgbA1c 10.4%): Cover with a \"sliding scale\" lispro moderate scale prandial correction insulin. Add basal glargine insulin 40 units nightly. She is normally on [place] glargine insulin 70 units nightly in addition to glimepiride and metformin. 9. Benign positional tremor:  Restart primidone 150 mg nightly.      Advance Directive: Full Code  DVT prophylaxis with enoxaparin 40 mg sub-Q daily. Discharge planning: Poor prognosis overall.   Plan for Monday, July 5      Kayli Perdomo MD, MD  [facility] Hospitalist

## 2021-07-05 NOTE — PROGRESS NOTES
Physical Therapy  Facility/Department: [facility] C5 - MED SURG/ORTHO  Daily Treatment Note  NAME: Manan Monae  : 1950  MRN: [id number]    Date of Service: 2021    Discharge Recommendations:  Subacute/Skilled Nursing Facility   PT Equipment Recommendations  Equipment Needed: No  Other: defer to patient's facility    Assessment   Body structures, Functions, Activity limitations: Decreased functional mobility ; Decreased ADL status; Decreased strength;Decreased endurance; Increased pain;Decreased high-level IADLs;Decreased posture;Decreased balance  Assessment: Pt has progressed well today and required A of 1 for ambulation with RW for up to 60'. Pt reports fatigue and pain as limiting factors and was given hot packs for B hips/low back at session end. Pt is recommended for con't skilled PTand SNFat D/C. Treatment Diagnosis: Decreased independence with functional mobility  Specific instructions for Next Treatment: progress mobility as tolerated  Prognosis: Fair  Decision Making: Medium Complexity  PT Education: Goals; General Safety;Gait Training;Disease Specific Education;Plan of Care;Home Exercise Program;Transfer Training;Precautions;PT Role;Functional Mobility Training;Pressure Relief;Equipment; Injury Prevention  Patient Education: Patient educated on the benefits of increased activity, use of call bell, safety with mobility. Patient verbalized understanding. Barriers to Learning: none  REQUIRES PT FOLLOW UP: Yes  Activity Tolerance  Activity Tolerance: Patient Tolerated treatment well;Patient limited by endurance; Patient limited by fatigue  Activity Tolerance: Vitals: 157/84, HR 98 and O2 93% RA     Patient Diagnosis(es): The primary encounter diagnosis was Generalized weakness.  Diagnoses of Nausea and vomiting, intractability of vomiting not specified, unspecified vomiting type, Transaminitis, LANA (acute kidney injury) ([facility] [address] Metastatic malignant neoplasm, unspecified site [facility]), and Elevated bilirubin were also pertinent to this visit. has a past medical history of Anemia, Anesthesia, Arthritis, CAD (coronary artery disease), CVA (cerebral infarction), Diabetes mellitus ([address] Diastolic heart failure ([address] Fibromyalgia, Hyperlipidemia, Hypertension, Hypothyroidism, LFT's abnormal, CHAMBERS (nonalcoholic steatohepatitis), Nausea & vomiting, Pancreatic cyst, PNA (pneumonia), Pneumonia, Rectocele, Sleep apnea, Tremor, and Vitamin D deficiency. has a past surgical history that includes Cholecystectomy; eye surgery (Right); Ectopic pregnancy surgery (1985); Posterior cruciate ligament repair; other surgical history (11-4-2011); Diagnostic Cardiac Cath Lab Procedure; Colonoscopy (01/10/2018); and Cystoscopy (Bilateral, 6/29/2021). Restrictions  Restrictions/Precautions  Restrictions/Precautions: General Precautions, Fall Risk  Position Activity Restriction  Other position/activity restrictions: Up with assist, chandra catheter  Subjective   General  Chart Reviewed: Yes  Additional Pertinent Hx: HPI per chart, \"admitted with acute kidney injury from obstructive nephropathy, bilateral hydroureteronephrosis without significant bladder distention s/p cystoscopy and bilateral double-J ureteral stentplacement (6/29). Metastatic carcinoma (possible pancreatic carcinoma) with apparent choroidal mets, sclerotic lesions widespread throughout the axial skeleton. Widespread lymphadenopathy in the abdomen, retroperitoneal space, and mediastinum. Elevated CA-125 and CA 19-9. ERCP with stent (7/2) and biopsy for pancreatic carcinoma. Right proximal femur mass that may require pinning due to pending pathologic fracture. Initial myeloma studies are negative. \"  Response To Previous Treatment: Patient with no complaints from previous session.   Family / Caregiver Present: No  Referring Practitioner: Natalie Bellamy MD  Subjective  Subjective: Pt had been tryingto get OOB and fell asleep iwth legs out of bed on arrival,  Pt returned supine and was then agreeable to PT  Pain Screening  Patient Currently in Pain: Yes (7/10 low and mid back, heat given at session end and pt instructed to call for RN if needs ural medications)  Vital Signs  Patient Currently in Pain: Yes (7/10 low and mid back, heat given at session end and pt instructed to call for RN if needs ural medications)       Orientation  Orientation  Overall Orientation Status: Within Functional Limits  Cognition      Objective   Bed mobility  Supine to Sit: Minimal assistance; Moderate assistance  Sit to Supine: Minimal assistance; Moderate assistance (to pts L with HOB elevated and use of bedrails)  Transfers  Sit to Stand: Minimal Assistance;Contact guard assistance (verbal cues for hand placement)  Stand to sit: Minimal Assistance;Contact guard assistance (verbal cues for hand placement)  Ambulation  Ambulation?: Yes  Ambulation 1  Surface: level tile  Device: Rolling Walker  Assistance: Contact guard assistance  Quality of Gait: forward flexed posture, cueing to correct. no LOB  Gait Deviations: Slow Alysa;Decreased step length;Decreased step height  Distance: 60'        Exercises  Hip Flexion: X 15 BLE seated marches  Hip Abduction: X 15 BLE seated clamshells  Knee Long Arc Quad: X 15 BLE  Ankle Pumps: X 15 BLE seated  Comments: cueing for sequencing and technique.         AM-PAC Score     AM-PAC Inpatient Mobility without Stair Climbing Raw Score : 13 (07/05/21 1157)  AM-PAC Inpatient without Stair Climbing T-Scale Score : 38.96 (07/05/21 1157)  Mobility Inpatient CMS 0-100% Score: 58.44 (07/05/21 1157)  Mobility Inpatient without Stair CMS G-Code Modifier : CK (07/05/21 1157)       Goals  Short term goals  Time Frame for Short term goals: 1 week 7/11/21  Short term goal 1: Supine <> sit with min assist.; 7/5 met  Short term goal 2: Sit <> stand with min assist.; 7/5 met  Short term goal 3: Bed <> chair with min assist.; 7/5 met  Short term goal 4: Ambulate 10 feet with LRAD and min assist.; 7/5 met  Short term goal 5: Patient will tolerate 12-15 reps of her exercises to maximize her strength/endurance;7/5 progressing  Patient Goals   Patient goals : To improve her pain. To become stronger. Plan    Plan  Times per week: 3-5/week  Plan weeks: 1 week 7/11/21  Specific instructions for Next Treatment: progress mobility as tolerated  Current Treatment Recommendations: Strengthening, Home Exercise Program, ROM, Safety Education & Training, Balance Training, Endurance Training, Patient/Caregiver Education & Training, Functional Mobility Training, Equipment Evaluation, Education, & procurement, Transfer Training, Gait Training, ADL/Self-care Training, Pain Management, Positioning  Safety Devices  Type of devices:  All fall risk precautions in place, Call light within reach, Chair alarm in place, Gait belt, Left in chair, Nurse notified     Therapy Time   Individual Concurrent Group Co-treatment   Time In 0815         Time Out 0900         Minutes 7 [address]

## 2021-07-05 NOTE — PROGRESS NOTES
Gastroenterology follow-up note:    Erving Aase gastroenterology problem is chronic pancreatitis with bile duct obstruction. She recently had a 7 Mac[place] 10 cm stent placed in the common bile duct. She did have mild post procedure pain, and her lipase was minimally elevated. Currently her abdominal pain is resolved. Bilirubin and transaminases are trending toward normal.    Her abdomen is soft and nontender. She is afebrile. Impression/plan:  Bile duct obstruction from pancreatitis, resolved with bile duct stent placement. Recommend the following:  She will eventually require a more permanent, metal stent in the common bile duct. After discharge, she should follow-up in the clinic in about 4 weeks. The stent exchange can be done as an outpatient.

## 2021-07-05 NOTE — PROGRESS NOTES
Urology Attending Progress Note      History: Still feeling poorly  Urine dark with some sediment    Meds: see med rec  Family History, Social History, Review of Systems:  Reviewed and agreed to as per chart    Exam:    Vitals:  BP (!) 157/84   Pulse 99   Temp 98.7 °F (37.1 °C) (Oral)   Resp 16   Ht 5' 6.5\" (1.689 m)   Wt 194 lb 0.1 oz (88 kg)   LMP  (LMP Unknown)   SpO2 93%   BMI 30.84 kg/m²   Temp  Av.3 °F (36.8 °C)  Min: 97.8 °F (36.6 °C)  Max: 98.7 °F (37.1 °C)    Intake/Output Summary (Last 24 hours) at 2021 1028  Last data filed at 2021 0512  Gross per 24 hour   Intake 720 ml   Output 2100 ml   Net -1380 ml       Labs:  WBC:    Lab Results   Component Value Date    WBC 10.5 2021     Hemoglobin/Hematocrit:    Lab Results   Component Value Date    HGB 11.9 2021    HCT 35.9 2021     BMP:    Lab Results   Component Value Date     2021    K 4.6 2021    K 3.6 2021    CL 95 2021    CO2 22 2021    BUN 19 2021    LABALBU 3.2 2021    CREATININE 0.8 2021    CALCIUM 9.7 2021    GFRAA >60 2021    GFRAA >60 2011    LABGLOM >60 2021     PT/INR:    Lab Results   Component Value Date    PROTIME 12.8 2021    INR 1.10 2021     PTT:    Lab Results   Component Value Date    APTT 27.8 2011   [APTT    Impression/Plan: Continue chandra catheter drainage    Stents in position, creatinine stable    Fauzia Kaplan MD

## 2021-07-05 NOTE — PROGRESS NOTES
Nephrology Progress  Note                                                                                                                                                                                                                                                                                                                                                               Office : [phone]     Fax :[phone]              Patient's Name: Dorrine Dakins  2:10 PM  7/5/2021    Reason for Consult:  LANA, Hyperkalemia      Requesting Physician:  SUMA Elizondo CNP      Chief Complaint:  Fatigue       Interval History :    Not in acute distress    Abdominal pain + , mild  RUQ      S/p Mg replacement      S/p ERCP on 7/2 : s/p stent placement     S/p biopsy  : awaiting pathology report    Serum cr improved    K improved    No diarrhea  No vomiting    History of Present Ilness:    Dorrine Dakins is a 80 y.o. female with PMH of HTN , DLP , DM type 2    Presented with c/o fatigue     Was diagnosed with uveitis in 5/21 and treated with prednisone    CT chest s/o mediastinal LNE ----> referred to oncology    C/o abdominal pain       C/o urinary retention    Denies sob or chest pain or diarrhea    At present being worked up for metastatic cancer , primary unknown              Past Medical History:   Diagnosis Date    Anemia     Anesthesia     PONV    Arthritis     CAD (coronary artery disease)     CVA (cerebral infarction)     Diabetes mellitus ([address]     Diastolic heart failure ([address]     Fibromyalgia     Hyperlipidemia     Hypertension     Hypothyroidism     LFT's abnormal     CHAMBERS (nonalcoholic steatohepatitis)     Nausea & vomiting     Pancreatic cyst     PNA (pneumonia) 2/23/2021    Pneumonia     Rectocele     Sleep apnea     does not use c-pap    Tremor     Vitamin D deficiency        Past Surgical History:   Procedure Laterality Date    CHOLECYSTECTOMY      COLONOSCOPY 01/10/2018    CYSTOSCOPY Bilateral 6/29/2021    CYSTOSCOPY BILATERAL URETERAL STENT INSERTION performed by Luz Peterson MD at [facility] CATH LAB PROCEDURE      ECTOPIC PREGNANCY SURGERY  1985    EYE SURGERY Right     cataract    OTHER SURGICAL HISTORY  11-4-2011    Posterior repair    POSTERIOR CRUCIATE LIGAMENT REPAIR         Family History   Problem Relation Age of Onset    Diabetes Mother     Stroke Mother     Heart Disease Father     Cancer Father         prostate    Heart Disease Brother         multiple stents    Heart Disease Brother     Heart Disease Sister         carotid artery surgery    Heart Disease Brother     Heart Attack Brother     Heart Disease Brother     Asthma Neg Hx     Emphysema Neg Hx     Heart Failure Neg Hx         reports that she quit smoking about 30 years ago. Her smoking use included cigarettes. She has a 0.50 pack-year smoking history. She has never used smokeless tobacco. She reports that she does not drink alcohol and does not use drugs.     Allergies:  Acetaminophen, Codeine, Oxycodone hcl, Oxycodone-acetaminophen, Tramadol hcl, Ultram [tramadol], and Percocet [oxycodone-acetaminophen]    Current Medications:    HYDROmorphone (DILAUDID) injection 1 mg, Q3H PRN  insulin glargine (LANTUS) injection vial 40 Units, Nightly  dicyclomine (BENTYL) capsule 10 mg, 4x Daily  primidone (MYSOLINE) tablet 150 mg, Nightly  insulin lispro (HUMALOG) injection vial 0-12 Units, TID WC  insulin lispro (HUMALOG) injection vial 0-6 Units, Nightly  lactulose (CHRONULAC) 10 GM/15ML solution 20 g, TID  hydrALAZINE (APRESOLINE) injection 20 mg, Q4H PRN  metoprolol tartrate (LOPRESSOR) tablet 50 mg, BID  oxyCODONE-acetaminophen (PERCOCET) 5-325 MG per tablet 1 tablet, Q6H PRN  labetalol (NORMODYNE;TRANDATE) injection 10 mg, Q6H PRN  buPROPion (WELLBUTRIN XL) extended release tablet 150 mg, Daily  DULoxetine (CYMBALTA) extended release capsule 60 mg, Daily  levothyroxine (SYNTHROID) tablet 75 mcg, Daily  vitamin D (CHOLECALCIFEROL) tablet 4,000 Units, Daily  glucose (GLUTOSE) 40 % oral gel 15 g, PRN  dextrose 50 % IV solution, PRN  glucagon (rDNA) injection 1 mg, PRN  dextrose 5 % solution, PRN  sodium chloride flush 0.9 % injection 10 mL, PRN  0.9 % sodium chloride infusion, PRN  acetaminophen (TYLENOL) tablet 650 mg, Q6H PRN   Or  acetaminophen (TYLENOL) suppository 650 mg, Q6H PRN  melatonin tablet 3 mg, Nightly PRN  diphenhydrAMINE (BENADRYL) tablet 50 mg, Q6H PRN  hydrOXYzine (ATARAX) tablet 10 mg, TID PRN  ursodiol (ACTIGALL) capsule 300 mg, BID  [Held by provider] heparin (porcine) injection 5,000 Units, 3 times per day  ondansetron (ZOFRAN-ODT) disintegrating tablet 4 mg, Q4H PRN  ondansetron (ZOFRAN) injection 4 mg, Q4H PRN  promethazine (PHENERGAN) injection 12.5 mg, Q4H PRN  promethazine (PHENERGAN) tablet 12.5 mg, Q4H PRN        Review of Systems:   14 point ROS obtained but were negative except mentioned in HPI      Physical exam:     Vitals:  BP (!) 157/84   Pulse 99   Temp 98.7 °F (37.1 °C) (Oral)   Resp 16   Ht 5' 6.5\" (1.689 m)   Wt 194 lb 0.1 oz (88 kg)   LMP  (LMP Unknown)   SpO2 93%   BMI 30.84 kg/m²   Constitutional:  OAA X3 NAD  Skin: no rash, turgor wnl  Heent:  eomi, mmm  Neck: no bruits or jvd noted  Cardiovascular:  S1, S2 without m/r/g  Respiratory: CTA B without w/r/r  Abdomen:  +bs, soft, nt, nd  Ext: no lower extremity edema  Psychiatric: mood and affect appropriate  Musculoskeletal:  Rom, muscular strength intact    Data:   Labs:  CBC:   Recent Labs     07/03/21  0601 07/04/21  0612 07/05/21  0736   WBC 6.2 8.2 10.5   HGB 10.7* 10.8* 11.9*    321 348     BMP:    Recent Labs     07/03/21  0601 07/04/21  0612 07/05/21  0736    134* 131*   K 3.9 4.8 4.6    99 95*   CO2 25 24 22   BUN 13 13 19   CREATININE 1.1 1.1 0.8   GLUCOSE 295* 337* 280*     Ca/Mg/Phos:   Recent Labs     07/03/21  0601 07/04/21  0612 07/05/21  0736   CALCIUM 9.4 9.2 9.7   MG 1.70* 2.20 1.80     Hepatic:   Recent Labs     07/03/21  0601 07/04/21  0612 07/05/21  0736   * 42* 40*   * 98* 82*   BILITOT 2.9* 1.5* 1.6*   ALKPHOS 577* 506* 508*     Troponin:   No results for input(s): TROPONINI in the last 72 hours. BNP: No results for input(s): BNP in the last 72 hours. Lipids: No results for input(s): CHOL, TRIG, HDL, LDLCALC, LABVLDL in the last 72 hours. ABGs:   No results for input(s): PHART, PO2ART, HZN6RMP in the last 72 hours. INR:   No results for input(s): INR in the last 72 hours. UA:  No results for input(s): Jannet Rhein, GLUCOSEU, BILIRUBINUR, KETUA, SPECGRAV, BLOODU, PHUR, PROTEINU, UROBILINOGEN, NITRU, LEUKOCYTESUR, Rosalba Loud in the last 72 hours. Urine Microscopic:   No results for input(s): LABCAST, BACTERIA, COMU, HYALCAST, WBCUA, RBCUA, EPIU in the last 72 hours. Urine Culture: No results for input(s): LABURIN in the last 72 hours. Urine Chemistry: No results for input(s): Mare Spotted, PROTEINUR, NAUR in the last 72 hours. IMAGING:  XR ABDOMEN (KUB) (SINGLE AP VIEW)   Final Result   No obstruction, ileus or fecal impaction. 1 biliary stent and 2 bilateral ureteral stents were noted. FL ERCP BILIARY AND PANCREATIC S&I   Final Result   Intraoperative fluoroscopy provided for ERCP. Please refer to the procedure   report for further details. XR KNEE RIGHT (1-2 VIEWS)   Final Result   No acute osseous abnormality. FL ERCP BILIARY AND PANCREATIC S&I   Final Result   ERCP images as above      52 52         XR FEMUR LEFT (MIN 2 VIEWS)   Final Result   Focal area of mixed lucency/sclerosis in the greater   trochanteric/intertrochanteric region of the left proximal femur is   consistent with area of suspected metastatic disease with no evidence of   superimposed pathologic fracture as described above.          NM BONE SCAN WHOLE BODY   Final Result   Widespread osseous metastatic disease GALLBLADDER RUQ   Final Result   1. Right-sided hydroureteronephrosis. 2. Common duct dilation status post cholecystectomy. CT CHEST ABDOMEN PELVIS WO CONTRAST   Final Result   Somewhat limited study in the absence of contrast material.      Numerous sclerotic metastases throughout all bones of the visualized axial   and appendicular skeleton with a possible lytic lesion within the upper   aspect of the right scapula and a small soft tissue nodule within the   anterior left chest, possibly metastatic. These findings are new from prior. A healing nondisplaced subacute fracture of the left L2 transverse process is   also new, possibly pathologic. There are numerous mildly enlarged lymph nodes throughout the mediastinum as   well as the retroperitoneum in the abdominal mesentery. Moderate to severe bilateral hydronephrosis and hydroureter, also new from   prior, without appreciable obstructing stone. It is unclear whether this is   related to metastatic cervical cancer or an underlying lymphoproliferative   disorder such as lymphoma. Lymph node sampling is recommended. Minimal bibasilar atelectasis. Otherwise, clear lungs. XR CHEST PORTABLE   Final Result   Stable chronic changes with no acute abnormality seen. FLUORO FOR SURGICAL PROCEDURES    (Results Pending)   FLUORO FOR SURGICAL PROCEDURES    (Results Pending)       Assessment/Plan     1. Hyperkalemia in setting of LANA and obstructive uropathy       Serum K :  5.6 -------> 6 ---> 5.4 -----> 4.8        S/p BL stents placement       LANA imprved          Plan    Hyperkalemia improved        Low K die           2. LANA : improved     Etiology seems obstructive uropathy     S/p cystoscopy and BL stent placement      Encourage PO intake of fluid to thirst      3.  Metabolic acidosis in setting of LANA    improved    4. Acid- base/ Electrolyte imbalance     moniter      5 BL hydronephrosis    S/p stent    Marroquin cath    6 DM 2    7 metastatic cancer , unknown primary     8 Hypomagnesemia     Replace with IV Mg    Thank you for allowing us to participate in care of Sugar Paulino MD  Feel free to contact me   [facility] [address]  Office : [phone]  Fax :[phone]

## 2021-07-05 NOTE — PROGRESS NOTES
Hospitalist Progress Note      PCP: SUMA Rodrigues CNP    Date of Admission: 6/27/2021    Chief Complaint on Admission:   Fatigue (Patient arrives via walk in with c/o fatigue and abd pain x1 week. Patient states dx with Eye CA 1 week ago but has been fatigued and with abdominal pain since then. Has not been for a PET scan. )        HISTORY OF PRESENT ILLNESS:    Adelso Vera is a 80 y.o. female.     She developed left eye soreness and headaches in May. She saw an ophthalmologist, Dr. Wolf Cardoso at [facility], on 5/19/21. 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 She has been on a tapering regimen since then. She has had problems with glycemic control. She has also not responded as expected to treatment. Her ophthalmologist became concerned about an intracranial neoplasm. It was recommended that patient undergo additional imaging including MRI brain and maxillofacial/sinus w and w/o contrast.  She was also recommended to undergo CT chest w/o contrast.  The MRI did not reveal a tumor but was notable for lobular left retinal thickening and appparent left optic nerve compression. The CT chest was notable for mild mediastinal lymphadenopathy. She was referred to oncology but has not had an office visit yet. She abruptly stopped the prednisone several days ago. She had tapered by that time to 60mg daily.     She has been having abdominal pain for roughly a month. She though this was from taking prednisone so tried to ignore it or treat it with OTC remedies. She presents tonight for progressive pain and persistent nausea. She cannot say for how long though. She does relate she is having bowel movements and passing flatus although she may go 3 or 4 days between bowel movements. She denies any difficulty urinating, but relates that when she wakes up every morning she is leaking urine and leaks urine all the way to the restroom.   She recalls being hospitalized for pneumonia in February and having urinary retention at the time which was treated with a short term chandra catheter.         Pt Seen/Examined and Chart Reviewed. Admitting dx : LANA Obstructive uropathy    SUBJECTIVE:   Patient continues to complain of abdominal pain. Reports having to take both IV and oral narcotics for adequate pain control. Kidney continues to improve status post stent placement. Overall patient has very poor prognosis    Discharge planning: Patient reports she is not able to go home today. Patient felt her abdominal pain was not well controlled to be discharged today. Will review plan in the morning 7/6.     OBJECTIVE:     Allergies  Acetaminophen, Codeine, Oxycodone hcl, Oxycodone-acetaminophen, Tramadol hcl, Ultram [tramadol], and Percocet [oxycodone-acetaminophen]    Medications      Scheduled Meds:   insulin glargine  40 Units Subcutaneous Nightly    dicyclomine  10 mg Oral 4x Daily    primidone  150 mg Oral Nightly    insulin lispro  0-12 Units Subcutaneous TID WC    insulin lispro  0-6 Units Subcutaneous Nightly    lactulose  20 g Oral TID    metoprolol tartrate  50 mg Oral BID    buPROPion  150 mg Oral Daily    DULoxetine  60 mg Oral Daily    levothyroxine  75 mcg Oral Daily    Vitamin D  4,000 Units Oral Daily    ursodiol  300 mg Oral BID    [Held by provider] heparin (porcine)  5,000 Units Subcutaneous 3 times per day       Infusions:   dextrose      sodium chloride 25 mL (07/03/21 1040)       PRN Meds:  HYDROmorphone, hydrALAZINE, oxyCODONE-acetaminophen, labetalol, glucose, dextrose, glucagon (rDNA), dextrose, sodium chloride flush, sodium chloride, acetaminophen **OR** acetaminophen, melatonin, diphenhydrAMINE, hydrOXYzine, ondansetron, ondansetron, promethazine, promethazine    Intake and Output     Intake/Output Summary (Last 24 hours) at 7/5/2021 1633  Last data filed at 7/5/2021 1355  Gross per 24 hour   Intake 720 ml   Output 2425 ml   Net -1705 ml       Vitals Moderate malnutrition ([facility] [E44.0] 06/28/2021    Elevated bilirubin [R17]     Nausea and vomiting [R11.2]     Bile duct obstruction [K83.1]     LANA (acute kidney injury) ([facility] [N17.9] 06/27/2021    Obstructive nephropathy [N13.8] 06/27/2021    DM2 (diabetes mellitus, type 2) ([facility] [E11.9] 06/19/2013    Generalized weakness [R53.1] 04/21/2011     Assessment and Plan:   1. Metastatic carcinoma (possible pancreatic carcinoma) s/p ERCP (7/2).  Bilirubin decreased from 4.9 to 2.9.   2. Widespread bone metastases, initial myeloma studies are negative.  Broad differential, may benefit from bone biopsy per Hem/Onc as outpatient.   3. Acute kidney injury with hyperkalemia (eGFR 49).    4. Hydronephrosis from obstructive nephropathy, bilateral hydroureteronephrosis without significant bladder distention s/p cystoscopy and bilateral double-J ureteral stent placement (6/29).  To be exchanged in 4 months. 5. Hepatic encephalopathy with elevated ammonia:  Treated with oral lactulose 20 gram TID. 6. Left proximal femur mass, per Ortho no surgery planned for pathologic fracture. 7. Hypomagnesemia:  Unclear etiology.  Replace with IV magnesium sulfate to Mg > 2.0.    8. Type 2 Diabetes (uncontrolled, HgbA1c 10.4%):  Cover with a \"sliding scale\" lispro moderate scale prandial correction insulin.   Add basal glargine insulin 60 units nightly.   She is normally on [place] glargine insulin 70 units nightly in addition to glimepiride and metformin. 9. Benign positional tremor:  Restart primidone 150 mg nightly. 10. Puritis: Maintain ursodiol for cholestatic etiology. 11. Essential HTN: Increase Metoprolol 50 mg BID  12. HLD: Hold statin due to transaminitis  13. Depression: Maintain Wellbutrin and duloxetine. 14. Hypothyroid: Maintain synthroid and titrate as outpt to TFT.      Advance Directive: Full Code  DVT prophylaxis with enoxaparin 40 mg sub-Q daily.    Discharge planning: Poor prognosis overall.  Plan for Tues, July 6         Dagoberto Tinsley MD

## 2021-07-05 NOTE — PROGRESS NOTES
Occupational Therapy  Facility/Department: [facility] C5 - MED SURG/ORTHO  Daily Treatment Note  NAME: Phani Mckeon  : 1950  MRN: [id number]    Date of Service: 2021    Discharge Recommendations:  Subacute/Skilled Nursing Facility   Barriers to home discharge:     [x] Reported available assist at home upon discharge limited           Assessment   Performance deficits / Impairments: Decreased functional mobility ; Decreased safe awareness;Decreased ADL status; Decreased strength;Decreased high-level IADLs  Assessment: pt progressing with transfers & bathroom mobility with CGA, min assist with bed mobility, fatigues easily but vitals stable, cooperative, continues to benefit from skilled OT services  OT Education: OT Role;Plan of Care;Precautions  Patient Education: disease specific: importance of OOB to chair for meals & up to bathroom & transfers with A by staff  REQUIRES OT FOLLOW UP: Yes  Activity Tolerance  Activity Tolerance: Patient Tolerated treatment well  Activity Tolerance: high person's on RA:  BP = 147/80, HR = 81; sitting in chair after bathroom mobility:  BP = 163/83, HR = 92  Safety Devices  Safety Devices in place: Yes  Type of devices: Call light within reach; Bed alarm in place; Left in bed;Nurse notified         Patient Diagnosis(es): The primary encounter diagnosis was Generalized weakness. Diagnoses of Nausea and vomiting, intractability of vomiting not specified, unspecified vomiting type, Transaminitis, LANA (acute kidney injury) ([address] Metastatic malignant neoplasm, unspecified site ([address] and Elevated bilirubin were also pertinent to this visit.       has a past medical history of Anemia, Anesthesia, Arthritis, CAD (coronary artery disease), CVA (cerebral infarction), Diabetes mellitus ([address] Diastolic heart failure ([address] Fibromyalgia, Hyperlipidemia, Hypertension, Hypothyroidism, LFT's abnormal, CHAMBERS (nonalcoholic steatohepatitis), Nausea & vomiting, Pancreatic cyst, PNA (pneumonia), Pneumonia, Rectocele, Sleep apnea, Tremor, and Vitamin D deficiency. has a past surgical history that includes Cholecystectomy; eye surgery (Right); Ectopic pregnancy surgery (1985); Posterior cruciate ligament repair; other surgical history (11-4-2011); Diagnostic Cardiac Cath Lab Procedure; Colonoscopy (01/10/2018); and Cystoscopy (Bilateral, 6/29/2021). Restrictions  Restrictions/Precautions  Restrictions/Precautions: General Precautions, Fall Risk  Position Activity Restriction  Other position/activity restrictions: Up with assist, chandra catheter  Subjective   General  Chart Reviewed: Yes  Patient assessed for rehabilitation services?: Yes  Additional Pertinent Hx: Metastatic carcinoma  Response to previous treatment: Patient with no complaints from previous session  Family / Caregiver Present: No  Referring Practitioner: Brandon Dorado MD  Diagnosis: Acute Kidney injury, bilateral ureteral obstruction with CYSTOSCOPY BILATERAL URETERAL STENT INSERTION (6/29), Dilated CBD with ENDOSCOPIC RETROGRADE CHOLANGIOPANCREATOGRAPHYER, CP SPHINCTER/PAPILLOTOMYER, CP STENT INSERTION (7/1), ERCP ENDOSCOPIC RETROGRADE CHOLANGIOPANCREATOGRAPHY (7/2)  Subjective  Subjective: Pt agreeable therapy. General Comment  Comments: RN approved therapy.   Vital Signs  Patient Currently in Pain: Denies   Orientation  Orientation  Overall Orientation Status: Within Functional Limits  Objective    ADL  Grooming: Contact guard assistance (standing at sink to wash hands & brush teeth, set up in chair to brush hair)  Toileting: Dependent/Total (chandra catheter)        Balance  Sitting Balance: Supervision  Standing Balance: Contact guard assistance (with RW)  Standing Balance  Time: 30 seconds x 1 bed-->chair & 2-3 minutes x 1  Activity: standing ADL's at sink  Comment: with RW  Functional Mobility  Functional - Mobility Device: Rolling Walker  Activity: To/from bathroom  Assist Level: Contact guard assistance  Bed mobility  Supine to Sit: Supervision  Sit to Supine: Minimal assistance  Transfers  Sit to stand: Contact guard assistance  Stand to sit: Contact guard assistance        Cognition  Overall Cognitive Status: Exceptions (pleasant, cooperative)  Arousal/Alertness: Appropriate responses to stimuli  Following Commands: Follows one step commands consistently  Attention Span: Attends with cues to redirect  Memory: Decreased recall of precautions  Safety Judgement: Decreased awareness of need for safety;Decreased awareness of need for assistance  Insights: Decreased awareness of deficits  Initiation: Does not require cues  Sequencing: Does not require cues       Type of ROM/Therapeutic Exercise: AROM  Comment: BUE seated in chair  Hand flex/ext: x  15  Reps  Elbow flex/ext:  x  12  Reps  Forearm sup/pron:  x   12 Reps  Shld flex/ext:  x  10  Reps       Plan   Plan  Times per week: 3-5x/week  Current Treatment Recommendations: Strengthening, Balance Training, Functional Mobility Training, Endurance Training, Patient/Caregiver Education & Training, Safety Education & Training, Self-Care / ADL, Equipment Evaluation, Education, & procurement, Positioning    AM-PAC Score        AM-PAC Inpatient Daily Activity Raw Score: 14 (07/05/21 1508)  AM-PAC Inpatient ADL T-Scale Score : 33.39 (07/05/21 1508)  ADL Inpatient CMS 0-100% Score: 59.67 (07/05/21 1508)  ADL Inpatient CMS G-Code Modifier : CK (07/05/21 1508)    Goals  Short term goals  Time Frame for Short term goals: 1 week (7/10) unless stated otherwise. Short term goal 1: Pt will perform functional/toilet t/fs with min A and AD PRN.; 7/05 STG met; New STG: SBA with functional/toilet transfers by 7-11-21  Short term goal 2: Pt will perform bathroom mobility with min A and AD PRN.; 7/05 STG met pt CGA with RW; New STG: SBA with bathroom mobility with RW by 7-10-21  Short term goal 3: Pt will verbalize at least 2 energy conservation strategies to use during ADLS (7/8).   Short term goal 4: Pt will UB dress with mod I. Short term goal 5: Pt will perform BUE ther ex x15 to increase endurance for ADLS and functional mobility. 7/05 progressing tolerating 12 reps  Patient Goals   Patient goals : \"to feel better\"       Therapy Time   Individual Concurrent Group Co-treatment   Time In [address]         Time Out 1446         Minutes [address] [place] [address]

## 2021-07-05 NOTE — PROGRESS NOTES
ONCOLOGY HEMATOLOGY CARE PROGRESS NOTE      SUBJECTIVE:     Pt had ERCP 21. Pathology pending. C/O pain this AM.     ROS:     Constitutional:  No weight loss, No fever, No chills, No night sweats. Energy level good. Eyes:  No impairment or change in vision  ENT / Mouth:  No pain, abnormal ulceration, bleeding, nasal drip or change in voice or hearing  Cardiovascular:  No chest pain, palpitations, new edema, or calf discomfort  Respiratory:  No pain, hemoptysis, change to breathing  Breast:  No pain, discharge, change in appearance or texture  Gastrointestinal:  No pain, cramping, jaundice, change to eating and bowel habits  Urinary:  No pain, bleeding or change in continence  Genitalia: No pain, bleeding or discharge  Musculoskeletal:  No redness, pain, edema or weakness  Skin:  itchy , dry skin   Neurologic:  No discomfort, change in mental status, speech, sensory or motor activity  Psychiatric:  No change in concentration or change to affect or mood  Endocrine:  No hot flashes, increased thirst, or change to urine production  Hematologic: No petechiae, ecchymosis or bleeding  Lymphatic:  No lymphadenopathy or lymphedema  Allergy / Immunologic:  No eczema, hives, frequent or recurrent infections    OBJECTIVE        Physical    VITALS:  BP (!) 157/84   Pulse 99   Temp 98.7 °F (37.1 °C) (Oral)   Resp 16   Ht 5' 6.5\" (1.689 m)   Wt 194 lb 0.1 oz (88 kg)   LMP  (LMP Unknown)   SpO2 93%   BMI 30.84 kg/m²   TEMPERATURE:  Current - Temp: 98.7 °F (37.1 °C);  Max - Temp  Av.3 °F (36.8 °C)  Min: 97.8 °F (36.6 °C)  Max: 98.7 °F (37.1 °C)  PULSE OXIMETRY RANGE: SpO2  Av.3 %  Min: 90 %  Max: 94 %  24HR INTAKE/OUTPUT:      Intake/Output Summary (Last 24 hours) at 2021 1025  Last data filed at 2021 7964  Gross per 24 hour   Intake 720 ml   Output 2100 ml   Net -1380 ml       CONSTITUTIONAL: awake, alert, cooperative, no apparent distress   EYES: pupils equal, round and reactive to light, sclera icteric and conjunctiva normal  ENT: Normocephalic, without obvious abnormality, atraumatic  NECK: supple, symmetrical, no jugular venous distension and no carotid bruits   HEMATOLOGIC/LYMPHATIC: no cervical, supraclavicular or axillary lymphadenopathy   LUNGS: no increased work of breathing and clear to auscultation   CARDIOVASCULAR: regular rate and rhythm, normal S1 and S2, no murmur noted  ABDOMEN: normal bowel sounds x 4, soft, non-distended, non-tender, no masses palpated, no hepatosplenomgaly   MUSCULOSKELETAL: full range of motion noted, tone is normal  NEUROLOGIC: awake, alert, oriented to name, place and time. Motor skills grossly intact. SKIN: Normal skin color, texture, turgor and + jaundice. appears intact. 2 cm minimally erythematous raised mass on the right upper back.   EXTREMITIES: no LE edema       Data      Recent Labs     07/03/21  0601 07/04/21  0612 07/05/21  0736   WBC 6.2 8.2 10.5   HGB 10.7* 10.8* 11.9*   HCT 32.2* 31.9* 35.9*    321 348   MCV 80.1 79.1* 79.8*        Recent Labs     07/03/21  0601 07/04/21  0612 07/05/21  0736    134* 131*   K 3.9 4.8 4.6    99 95*   CO2 25 24 22   BUN 13 13 19   CREATININE 1.1 1.1 0.8     Recent Labs     07/03/21  0601 07/04/21  0612 07/05/21  0736   * 42* 40*   * 98* 82*   BILITOT 2.9* 1.5* 1.6*   ALKPHOS 577* 506* 508*       Magnesium:    Lab Results   Component Value Date    MG 1.80 07/05/2021    MG 2.20 07/04/2021    MG 1.70 07/03/2021         Problem List  Patient Active Problem List   Diagnosis    CAD (coronary artery disease)    Sleep apnea    Generalized weakness    Nonalcoholic steatohepatitis    Rectocele    [facility]2 [facility] mellitus, type 2) [facility]    HTN (hypertension), benign    [facility]    Right shoulder pain    Insomnia    SUNIL (obstructive sleep apnea)    Coarse tremor    Dizziness    Palpitations    Essential tremor    Dyslipidemia    PNA (pneumonia)    Obesity    Bilateral primary osteoarthritis of knee    Chronic bilateral low back pain without sciatica    DDD (degenerative disc disease), lumbar    Facet arthritis of lumbar region    Primary osteoarthritis of right knee    Abnormal finding on thyroid function test    LANA (acute kidney injury) ([address]    Obstructive nephropathy    Metastatic malignant neoplasm (HCC)    Abdominal lymphadenopathy    Transaminitis    Elevated bilirubin    Nausea and vomiting    Bile duct obstruction    Moderate malnutrition (HCC)    Itching    Abdominal distention       ASSESSMENT AND PLAN:    Metastatic carcinoma (Possible pancreatic carcinoma):  -She appears to have choroidal mets. -She has bone lesions, possible intrahepatic lesion, and omental metastasis  -CA-125 is over thousand  -CA 19-9 is Over 54,000 on 6/28/2021 and then a repeat was 510,000.  -Initial myeloma studies are negative  -The differential is quite broad. -She is going to have an ERCP to look for pancreatic carcinoma. -If this is negative, I would consider a bone biopsy to help with the differential.  -She has a very poor prognosis. - Very widespread bone mets   -s/p ERCP 7/2/21 - await pathology    Elevated liver functions:  -s/p stent  -bilirubin down to 1.6    Hydronephrosis   - Bilateral stents in place   - to be exchanged in 4 months     Back lesions:  -She has a 2 cm area in the upper back  -Is very difficult to tell whether this is a skin metastasis. It looks more like a mild subcutaneous abscess.     Left proximal femur:  -Possible pinning due to him pending a fracture  - ortho following    LANA  - nephrology following      ONCOLOGIC DISPOSITION:    -Post diagnosis    Luis Fernando May MD

## 2021-07-06 NOTE — TELEPHONE ENCOUNTER
Refill request for Insulin pen needles medication.      Name of Jason 81      Last visit - 5/25/2021     Pending visit - None    Last refill -6/23/2020  3 refills

## 2021-07-06 NOTE — PROGRESS NOTES
ONCOLOGY HEMATOLOGY CARE PROGRESS NOTE      SUBJECTIVE:     Pt had ERCP 21. Pathology pending. Resting this AM, no family present. Chart reviewed. ROS:     Constitutional:  No weight loss, No fever, No chills, No night sweats. Energy level good. Eyes:  No impairment or change in vision  ENT / Mouth:  No pain, abnormal ulceration, bleeding, nasal drip or change in voice or hearing  Cardiovascular:  No chest pain, palpitations, new edema, or calf discomfort  Respiratory:  No pain, hemoptysis, change to breathing  Breast:  No pain, discharge, change in appearance or texture  Gastrointestinal:  No pain, cramping, jaundice, change to eating and bowel habits  Urinary:  No pain, bleeding or change in continence  Genitalia: No pain, bleeding or discharge  Musculoskeletal:  No redness, pain, edema or weakness  Skin:  itchy , dry skin   Neurologic:  No discomfort, change in mental status, speech, sensory or motor activity  Psychiatric:  No change in concentration or change to affect or mood  Endocrine:  No hot flashes, increased thirst, or change to urine production  Hematologic: No petechiae, ecchymosis or bleeding  Lymphatic:  No lymphadenopathy or lymphedema  Allergy / Immunologic:  No eczema, hives, frequent or recurrent infections    OBJECTIVE        Physical    VITALS:  /84   Pulse 85   Temp 97.6 °F (36.4 °C) (Oral)   Resp 16   Ht 5' 6.5\" (1.689 m)   Wt 194 lb (88 kg)   LMP  (LMP Unknown)   SpO2 93%   BMI 30.84 kg/m²   TEMPERATURE:  Current - Temp: 97.6 °F (36.4 °C);  Max - Temp  Av.9 °F (36.6 °C)  Min: 97.5 °F (36.4 °C)  Max: 98.3 °F (36.8 °C)  PULSE OXIMETRY RANGE: SpO2  Av.8 %  Min: 91 %  Max: 93 %  24HR INTAKE/OUTPUT:      Intake/Output Summary (Last 24 hours) at 2021 1049  Last data filed at 2021 0556  Gross per 24 hour   Intake 150 ml   Output 2150 ml   Net -2000 ml       CONSTITUTIONAL: awake, alert, cooperative, no apparent distress   EYES: pupils equal, round and reactive to light, sclera icteric and conjunctiva normal  ENT: Normocephalic, without obvious abnormality, atraumatic  NECK: supple, symmetrical, no jugular venous distension and no carotid bruits   HEMATOLOGIC/LYMPHATIC: no cervical, supraclavicular or axillary lymphadenopathy   LUNGS: no increased work of breathing and clear to auscultation   CARDIOVASCULAR: regular rate and rhythm, normal S1 and S2, no murmur noted  ABDOMEN: normal bowel sounds x 4, soft, non-distended, non-tender, no masses palpated, no hepatosplenomgaly   MUSCULOSKELETAL: full range of motion noted, tone is normal  NEUROLOGIC: awake, alert, oriented to name, place and time. Motor skills grossly intact. SKIN: Normal skin color, texture, turgor and + jaundice. appears intact. 2 cm minimally erythematous raised mass on the right upper back.   EXTREMITIES: no LE edema       Data      Recent Labs     07/04/21 0612 07/05/21  0736 07/06/21  0634   WBC 8.2 10.5 9.8   HGB 10.8* 11.9* 11.5*   HCT 31.9* 35.9* 34.1*    348 299   MCV 79.1* 79.8* 79.1*        Recent Labs     07/04/21 0612 07/05/21  0736 07/06/21  0634   * 131* 133*   K 4.8 4.6 5.0   CL 99 95* 95*   CO2 24 22 29   BUN 13 19 20   CREATININE 1.1 0.8 1.0     Recent Labs     07/04/21 0612 07/05/21  0736 07/06/21  0634   AST 42* 40* 31   ALT 98* 82* 63*   BILITOT 1.5* 1.6* 1.1*   ALKPHOS 506* 508* 467*       Magnesium:    Lab Results   Component Value Date    MG 1.70 07/06/2021    MG 1.80 07/05/2021    MG 2.20 07/04/2021         Problem List  Patient Active Problem List   Diagnosis    CAD (coronary artery disease)    Sleep apnea    Generalized weakness    Nonalcoholic steatohepatitis    Rectocele    DM2 (diabetes mellitus, type 2) ([facility] [address] [facility]    HTN (hypertension), benign    [facility]    Right shoulder pain    Insomnia    SUNIL (obstructive sleep apnea)    Coarse tremor    Dizziness    Palpitations    Essential tremor  Dyslipidemia    PNA (pneumonia)    Obesity    Bilateral primary osteoarthritis of knee    Chronic bilateral low back pain without sciatica    DDD (degenerative disc disease), lumbar    Facet arthritis of lumbar region    Primary osteoarthritis of right knee    Abnormal finding on thyroid function test    LANA (acute kidney injury) ([facility]    Obstructive nephropathy    Metastatic malignant neoplasm (HCC)    Abdominal lymphadenopathy    Transaminitis    Elevated bilirubin    Nausea and vomiting    Bile duct obstruction    Moderate malnutrition (HCC)    Itching    Abdominal distention       ASSESSMENT AND PLAN:    Metastatic carcinoma (Possible pancreatic carcinoma):  -She appears to have choroidal mets. -She has bone lesions, possible intrahepatic lesion, and omental metastasis  -CA-125 is over thousand  -CA 19-9 is Over 54,000 on 6/28/2021 and then a repeat was 510,000.  -Initial myeloma studies are negative  -The differential is quite broad. -She is going to have an ERCP to look for pancreatic carcinoma. This was done 7/2/21. -If this is negative, I would consider a bone biopsy to help with the differential.  -She has a very poor prognosis. - Very widespread bone mets   -s/p ERCP 7/2/21 - await pathology; likely blockage from chronic pancreatitis with stent now in place; GI notes state she will need a more permanent stent in the future and to follow up in the office in 4 weeks. Elevated liver functions/chronic pancreatitis/CBD blockage :  -s/p stent  -bilirubin down to 1.1     Hydronephrosis   - Bilateral stents in place   - to be exchanged in 4 months     Back lesions:  -She has a 2 cm area in the upper back  -Is very difficult to tell whether this is a skin metastasis. It looks more like a mild subcutaneous abscess.     Left proximal femur:  -Possible pinning due to him pending a fracture  - ortho following    LANA  - nephrology following      ONCOLOGIC DISPOSITION:    -Post diagnosis; if path from ERCP is negative, will need a bone bx of the ischium.      Bipin Joseph, APRN - CNP

## 2021-07-06 NOTE — TELEPHONE ENCOUNTER
Last Office Visit  -  5-  Next Office Visit  none    Last Filled  -   Last UDS -   Contract -      Refill duloxetine 60 mg # 180 toujeo solo pen # 4

## 2021-07-06 NOTE — PROGRESS NOTES
Occupational Therapy  Facility/Department: [place] [address] - MED SURG/ORTHO  Daily Treatment Note  NAME: Barber Mckinney  : 1950  MRN: [id number]    Date of Service: 2021    Discharge Recommendations:  Subacute/Skilled Nursing Facility       Assessment   Performance deficits / Impairments: Decreased functional mobility ; Decreased safe awareness;Decreased ADL status; Decreased strength;Decreased high-level IADLs;Decreased cognition;Decreased balance  Assessment: Session very limited by fatigue this session, also Palliative Care RN present to meet with pt. Pt assisted back to bed min-mod A for balance. Pt overall functioning below her baseline and would benefit from skilled OT in SNF at d/c to address the above noted occupational performance deficits. Prognosis: Fair  OT Education: OT Role;Plan of Care;Transfer Training  REQUIRES OT FOLLOW UP: Yes  Activity Tolerance  Activity Tolerance: Patient Tolerated treatment well  Safety Devices  Safety Devices in place: Yes  Type of devices: Call light within reach;Nurse notified; Left in bed;Bed alarm in place;Gait belt         Patient Diagnosis(es): The primary encounter diagnosis was Generalized weakness. Diagnoses of Nausea and vomiting, intractability of vomiting not specified, unspecified vomiting type, Transaminitis, LANA (acute kidney injury) ([address] Metastatic malignant neoplasm, unspecified site ([address] and Elevated bilirubin were also pertinent to this visit. has a past medical history of Anemia, Anesthesia, Arthritis, CAD (coronary artery disease), CVA (cerebral infarction), Diabetes mellitus ([address] Diastolic heart failure ([address] Fibromyalgia, Hyperlipidemia, Hypertension, Hypothyroidism, LFT's abnormal, CHAMBERS (nonalcoholic steatohepatitis), Nausea & vomiting, Pancreatic cyst, PNA (pneumonia), Pneumonia, Rectocele, Sleep apnea, Tremor, and Vitamin D deficiency.    has a past surgical history that includes Cholecystectomy; eye surgery (Right); Ectopic pregnancy

## 2021-07-06 NOTE — CARE COORDINATION
CM notes therapy recs for SNF. Pt lives at home with her grandson. PC/RN following as well. Spoke to ROBERTO Maldonado PC/RN and she will reach out to pt and/or family as well to establish a POC. If pt was to go to a SNF at [place] than she will need a precert as well.  CM following-Joanna Novoa RN

## 2021-07-06 NOTE — PROGRESS NOTES
Physical Therapy  Facility/Department: [facility] - MED SURG/ORTHO  Daily Treatment Note  NAME: Ian Nair  : 1950  MRN: [id number]    Date of Service: 2021    Discharge Recommendations:  Subacute/Skilled Nursing Facility   PT Equipment Recommendations  Equipment Needed: No  Other: defer to patient's facility    Assessment   Body structures, Functions, Activity limitations: Decreased functional mobility ; Decreased ADL status; Decreased strength;Decreased endurance; Increased pain;Decreased high-level IADLs;Decreased posture;Decreased balance  Assessment: Pt con't to do well needing Aof 1 for ambulation with RW up to 40', she performed BLE exs supine and seated. Pt is recommended for con't skilled PT and SNF at D/C. Treatment Diagnosis: Decreased independence with functional mobility  Specific instructions for Next Treatment: progress mobility as tolerated  Prognosis: Fair  Decision Making: Medium Complexity  PT Education: Goals; General Safety;Gait Training;Disease Specific Education;Plan of Care;Home Exercise Program;Transfer Training;Precautions;PT Role;Functional Mobility Training;Pressure Relief;Equipment; Injury Prevention  Patient Education: Patient educated on the benefits of increased activity, use of call bell, safety with mobility. Patient verbalized understanding. Barriers to Learning: none  REQUIRES PT FOLLOW UP: Yes  Activity Tolerance  Activity Tolerance: . Patient Diagnosis(es): The primary encounter diagnosis was Generalized weakness. Diagnoses of Nausea and vomiting, intractability of vomiting not specified, unspecified vomiting type, Transaminitis, LANA (acute kidney injury) ([address] Metastatic malignant neoplasm, unspecified site ([address] and Elevated bilirubin were also pertinent to this visit.      has a past medical history of Anemia, Anesthesia, Arthritis, CAD (coronary artery disease), CVA (cerebral infarction), Diabetes mellitus ([address] Diastolic heart failure ([address] Location: Back  Pain Orientation: Lower  Vital Signs  Patient Currently in Pain: Yes       Orientation  Orientation  Overall Orientation Status: Within Functional Limits     Objective   Bed mobility  Supine to Sit: Minimal assistance  Sit to Supine: Unable to assess  Transfers  Sit to Stand: Contact guard assistance  Stand to sit: Contact guard assistance  Ambulation  Ambulation?: Yes  Ambulation 1  Surface: level tile  Device: Rolling Walker  Assistance: Contact guard assistance  Quality of Gait: forward flexed posture, cueing to correct. no LOB  Gait Deviations: Slow Alysa;Decreased step length;Decreased step height  Distance: 36' + 25'  Comments: No complaints of shortness of breath or chest pain. Patient had some lightheadedness when first sitting up but this quickly improved.         Exercises  Heelslides: X 10 BLE supine  Hip Flexion: X 15 BLE seated marches  Hip Abduction: X 15 BLE seated clamshells  Knee Long Arc Quad: X 15 BLE  Ankle Pumps: X 15 BLE seated  Comments: toileted with SBA/ CGA, stood at sink for hand hygiene CGA       ** pt given toothbrush/toothpaste and denture cup, bath wipes, and shampoo cap per her request       AM-PAC Score     AM-PAC Inpatient Mobility without Stair Climbing Raw Score : 15 (07/06/21 1401)  AM-PAC Inpatient without Stair Climbing T-Scale Score : 43.03 (07/06/21 1401)  Mobility Inpatient CMS 0-100% Score: 47.43 (07/06/21 1401)  Mobility Inpatient without Stair CMS G-Code Modifier : CK (07/06/21 1401)       Goals  Short term goals  Time Frame for Short term goals: 1 week 7/11/21  Short term goal 1: Supine <> sit with min assist.; 7/5 met  Short term goal 2: Sit <> stand with min assist.; 7/5 met  Short term goal 3: Bed <> chair with min assist.; 7/5 met  Short term goal 4: Ambulate 10 feet with LRAD and min assist.; 7/5 met  Short term goal 5: Patient will tolerate 12-15 reps of her exercises to maximize her strength/endurance;7/5 progressing/ conitnue  Patient Goals Patient goals : To improve her pain. To become stronger. Plan    Plan  Times per week: 3-5/week  Plan weeks: 1 week 7/11/21  Specific instructions for Next Treatment: progress mobility as tolerated  Current Treatment Recommendations: Strengthening, Home Exercise Program, ROM, Safety Education & Training, Balance Training, Endurance Training, Patient/Caregiver Education & Training, Functional Mobility Training, Equipment Evaluation, Education, & procurement, Transfer Training, Gait Training, ADL/Self-care Training, Pain Management, Positioning  Safety Devices  Type of devices:  All fall risk precautions in place, Call light within reach, Chair alarm in place, Gait belt, Left in chair, Nurse notified     Therapy Time   Individual Concurrent Group Co-treatment   Time In 36         Time Out 1218         Minutes [address] [place], [place] [id number]

## 2021-07-06 NOTE — FLOWSHEET NOTE
visited pt per nurse referral while rounding. Pt dealing with news, per her report, that she has \"Stage 4 cancer. \" Pt talked about her desires, \"I just wish I could close my eyes and it be over. \" Pt reports that her  is not in good health. While pt had not been feeling good since February, most recent impact of seriousness of her condition has been very much to suddenly process. Nae Sylvester listened, acknowledged pt's feelings, and offered spiritual support and encouragement. Pt welcomed prayer. Continue to follow. 07/06/21 1623   Encounter Summary   Services provided to: Patient   Referral/Consult From: Nurse   Support System Spouse; Children   Continue Visiting Yes  (7/6 - Spiritual support, prayer)   Complexity of Encounter Moderate   Length of Encounter 30 minutes   Routine   Type Initial   Assessment Anxious; Approachable;Helplessness   Intervention Explored feelings, thoughts, concerns;Prayer;Sustaining presence/ Ministry of presence   Outcome Expressed gratitude;Engaged in conversation;Receptive;Venting emotion

## 2021-07-06 NOTE — TELEPHONE ENCOUNTER
Last Office Visit  -  6/8/21  Next Office Visit  -  none    Last Filled  -  6/8/21  Last UDS -    Contract -

## 2021-07-06 NOTE — PROGRESS NOTES
Hospitalist Progress Note      PCP: Pilar Vargas, SUMA - CNP    Date of Admission: 6/27/2021    Chief Complaint on Admission:   Fatigue (Patient arrives via walk in with c/o fatigue and abd pain x1 week. Patient states dx with Eye CA 1 week ago but has been fatigued and with abdominal pain since then. Has not been for a PET scan. )        HISTORY OF PRESENT ILLNESS:    Nik Morales is a 80 y.o. female.     She developed left eye soreness and headaches in May. She saw an ophthalmologist, Dr. Bora Bourne at [facility], on 5/19/21. 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 She has been on a tapering regimen since then. She has had problems with glycemic control. She has also not responded as expected to treatment. Her ophthalmologist became concerned about an intracranial neoplasm. It was recommended that patient undergo additional imaging including MRI brain and maxillofacial/sinus w and w/o contrast.  She was also recommended to undergo CT chest w/o contrast.  The MRI did not reveal a tumor but was notable for lobular left retinal thickening and appparent left optic nerve compression. The CT chest was notable for mild mediastinal lymphadenopathy. She was referred to oncology but has not had an office visit yet. She abruptly stopped the prednisone several days ago. She had tapered by that time to 60mg daily.     She has been having abdominal pain for roughly a month. She though this was from taking prednisone so tried to ignore it or treat it with OTC remedies. She presents tonight for progressive pain and persistent nausea. She cannot say for how long though. She does relate she is having bowel movements and passing flatus although she may go 3 or 4 days between bowel movements. She denies any difficulty urinating, but relates that when she wakes up every morning she is leaking urine and leaks urine all the way to the restroom.   She recalls being hospitalized for pneumonia in February and having urinary retention at the time which was treated with a short term chandra catheter.         Pt Seen/Examined and Chart Reviewed. Admitting dx : LANA Obstructive uropathy    SUBJECTIVE:   Patient continues to complain of abdominal pain. Reports having to take both IV and oral narcotics for adequate pain control. Kidney continues to improve status post stent placement. Overall patient has very poor prognosis    Discharge planning: Patient reports she is not able to go home today. Patient felt her abdominal pain was not well controlled to be discharged today. Will review plan in the morning 7/6. Again patient hesitant of home DC. Reports pain not well controlled. She herself is unclear as to what direction she is interested in taking, treatment vs hospice.      OBJECTIVE:     Allergies  Acetaminophen, Codeine, Oxycodone hcl, Oxycodone-acetaminophen, Tramadol hcl, Ultram [tramadol], and Percocet [oxycodone-acetaminophen]    Medications      Scheduled Meds:   insulin lispro  7 Units Subcutaneous TID WC    IV infusion builder  100 mL/hr Intravenous Once    insulin glargine  60 Units Subcutaneous Nightly    dicyclomine  10 mg Oral 4x Daily    primidone  150 mg Oral Nightly    insulin lispro  0-12 Units Subcutaneous TID WC    insulin lispro  0-6 Units Subcutaneous Nightly    lactulose  20 g Oral TID    metoprolol tartrate  50 mg Oral BID    buPROPion  150 mg Oral Daily    DULoxetine  60 mg Oral Daily    levothyroxine  75 mcg Oral Daily    Vitamin D  4,000 Units Oral Daily    ursodiol  300 mg Oral BID    [Held by provider] heparin (porcine)  5,000 Units Subcutaneous 3 times per day       Infusions:   dextrose      sodium chloride 25 mL (07/03/21 1040)       PRN Meds:  HYDROmorphone, hydrALAZINE, oxyCODONE-acetaminophen, labetalol, glucose, dextrose, glucagon (rDNA), dextrose, sodium chloride flush, sodium chloride, acetaminophen **OR** acetaminophen, melatonin, diphenhydrAMINE, hydrOXYzine, ondansetron, ondansetron, promethazine, promethazine    Intake and Output     Intake/Output Summary (Last 24 hours) at 7/6/2021 1735  Last data filed at 7/6/2021 1626  Gross per 24 hour   Intake 150 ml   Output 1800 ml   Net -1650 ml       Vitals    BP (!) 145/80   Pulse 78   Temp 97.7 °F (36.5 °C) (Oral)   Resp 16   Ht 5' 6.5\" (1.689 m)   Wt 194 lb (88 kg)   LMP  (LMP Unknown)   SpO2 91%   BMI 30.84 kg/m²     Exam:    Gen: Well, mild discomfort abdomen, Alert, Oriented x 3  Lungs: Diffuse scattered rhonchi to anterior fields. Diminished to bases bilaterally, Good respiratory effort. Easy and unlabored  Cor: RRR, S1S2, w/out M/R/G, non-displaced PMI  Abdomen: Soft, ND, generally tender throughout, w/out R/G, w/ +BSx4  Ext: No C/C/E Bilaterally. Full ROM w/ out deformity   Neuro: Neurovascularly intact w/ Sensory/Motor intact UE/LE Bilaterally. Skin: Skin color, texture, turgor normal. Without Rashes/Lesions. Musculoskeletal: No evidence of joint instability, strength normal, no evidence of muscle atrophy, no deformities present    Data    Recent Labs     07/04/21 0612 07/05/21  0736 07/06/21  0634   WBC 8.2 10.5 9.8   HGB 10.8* 11.9* 11.5*   HCT 31.9* 35.9* 34.1*    348 299      Recent Labs     07/04/21  0612 07/05/21  0736 07/06/21  0634   * 131* 133*   K 4.8 4.6 5.0   CL 99 95* 95*   CO2 24 22 29   BUN 13 19 20   CREATININE 1.1 0.8 1.0     Recent Labs     07/04/21  0612 07/05/21  0736 07/06/21  0634   AST 42* 40* 31   ALT 98* 82* 63*   BILITOT 1.5* 1.6* 1.1*   ALKPHOS 506* 508* 467*     No results for input(s): INR in the last 72 hours. No results for input(s): CKTOTAL, CKMB, CKMBINDEX, TROPONINI in the last 72 hours.     Consults:     IP CONSULT TO HOSPITALIST  IP CONSULT TO HEM/ONC  IP CONSULT TO UROLOGY  IP CONSULT TO SPIRITUAL SERVICES  IP CONSULT TO GI  IP CONSULT TO PALLIATIVE CARE  IP CONSULT TO NEPHROLOGY  IP CONSULT TO ORTHOPEDIC SURGERY    ASSESSMENT AND PLAN      Active Hospital Problems    Diagnosis Date Noted    Itching [L29.9]     Abdominal distention [R14.0]     Metastatic malignant neoplasm ([facility] [C79.9] 06/28/2021    Abdominal lymphadenopathy [R59.0] 06/28/2021    Transaminitis [R74.01] 06/28/2021    Moderate malnutrition (HCC) [E44.0] 06/28/2021    Elevated bilirubin [R17]     Nausea and vomiting [R11.2]     Bile duct obstruction [K83.1]     LANA (acute kidney injury) ([facility] [N17.9] 06/27/2021    Obstructive nephropathy [N13.8] 06/27/2021    DM2 (diabetes mellitus, type 2) ([facility] [facility] [E11.9] 06/19/2013    Generalized weakness [R53.1] 04/21/2011     Assessment and Plan:   1. Metastatic carcinoma (possible pancreatic carcinoma) s/p ERCP (7/2).  Bilirubin decreased from 4.9 to 1.1.   2. Widespread bone metastases, initial myeloma studies are negative.  Broad differential, may benefit from bone biopsy per Hem/Onc as outpatient.   3. Acute kidney injury with hyperkalemia (eGFR 49).    4. Hydronephrosis from obstructive nephropathy, bilateral hydroureteronephrosis without significant bladder distention s/p cystoscopy and bilateral double-J ureteral stent placement (6/29).  To be exchanged in 4 months. 5. Hepatic encephalopathy with elevated ammonia:  Treated with oral lactulose 20 gram TID. 6. Left proximal femur mass, per Ortho no surgery planned for pathologic fracture. 7. Hypomagnesemia:  Unclear etiology.  Replace with IV magnesium sulfate to Mg > 2.0.    8. Type 2 Diabetes (uncontrolled, HgbA1c 10.4%):  Cover with a \"sliding scale\" lispro moderate scale prandial correction insulin.   Add basal glargine insulin 60 units nightly.   She is normally on [place]je[place] glargine insulin 70 units nightly in addition to glimepiride and metformin. 9. Benign positional tremor:  Restart primidone 150 mg nightly. 10. Puritis: Maintain ursodiol for cholestatic etiology. 11. Essential HTN: Increase Metoprolol 50 mg BID  12.  HLD: Hold statin due to transaminitis  13. Depression: Maintain Wellbutrin and duloxetine. 14. Hypothyroid: Maintain synthroid and titrate as outpt to TFT.      Advance Directive: Full Code  DVT prophylaxis with enoxaparin 40 mg sub-Q daily. Discharge planning: Poor prognosis overall.  Plan for Palliative care to help patient determine hospice vs further treatment. She is hesitant to go home due to persistent pain. She also does not think she wants to go to a SNF either.   Patient feels \"up in the air\".          Montana Cabezas MD

## 2021-07-06 NOTE — PLAN OF CARE
Palliative Care Progress Note  Palliative Care Admit date:  6/30/21    Advance Directives:  AD's executed, pt designated her son Virgie Callaway) as her healthcare proxy. Pt currently has a full code status    Plan of care/goals:  Spoke w/ pt, she had returned to bed after therapy. Ulises Thompson expressed feeling tired and uncertain if she has the ability &/or desire to pursue therapy after d/c. She also questions the benefit of therapy given fact she believes herself to be \"terminal.\"  She denies having been told that directly but, based on the limited discussions she has been involved in, she has gotten that impression. For pts impression that she doesn't have \"all the facts,\" she is quick to report her limited reserves for remaining more aggressive and doing therapy. We discussed a scenario of SNF level of care after d/c, the associated expectations, and the potential for recurrent admission(s). We also discussed a comfort-oriented scenario, w/ the involvement of hospice which pt is considering. Had the same above d/w Ulises Junior via phone today though he is under the impression pt would prefer to d/c for skilled therapy. He understands that SNF would be covered under [address] but that long term placement is an OOP room & board expense. Ulises Junior has had the benefit of d/w providers and questions the benefit of \"more tests for a diagnosis,\" though he isn't opposed is that is what pt would want. Ulises Junior and family don't really have a plan for providing pt w/ care if she were to go home but, if she wants more therapy, he and pt will discuss top three facilities tonight and apprise team.      Social/Spiritual:  Pt spouse was recently in SNF but has since been d/c home; family having to learn to give him IV abx. 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 Ulises Junior is considering long term placement for both, pt and her spouse.       Plan:  Ulises Junior & pt will review SNF list and apprise staff of their top three choices for SNF placement, if she wishes to d/c for skilled therapy. Both pt and son aware of the option for comfort care w/ hospice. Will f/u 7/7 Son is aware that provider is medically ready to release pt.         Reason for consult:  _X_ Advance Care Planning  ___ Transition of Care Planning  _X_ Psychosocial/Spiritual Support  ___ Symptom Management                                                                                                                                                                  Julieta Alcala RN

## 2021-07-06 NOTE — PROGRESS NOTES
Gastroenterology note    Gastroenterology problem is bile duct obstruction. She recently had a stent placed, and her biliary obstruction seems to be resolved. Her bilirubin is returned to near normal.  Her alkaline phosphatase remains elevated. Biopsies at the ampulla did not show malignancy. She continues with abdominal pain and anorexia. Her lipase is not elevated. Her CA 19-9 is greater than 500,000. Her abdomen is soft and mildly tender in the epigastric region. Impression/plan:  Likely metastatic pancreatic carcinoma with biliary obstruction, now relieved with a stent. The plan is unclear, whether it is palliative care versus further evaluation to establish a tissue diagnosis and treatment consideration. Her bile duct obstruction is relieved with the stent and she will probably need a metal stent in time. Prognosis seems grim.

## 2021-07-07 NOTE — PROGRESS NOTES
ONCOLOGY HEMATOLOGY CARE PROGRESS NOTE      SUBJECTIVE:      She is nauseated. She is working with PT at bedside. Path from ERCP was negative. Hand flapping improved. ROS:     Constitutional:  No weight loss, No fever, No chills, No night sweats. Energy level good. Eyes:  No impairment or change in vision  ENT / Mouth:  No pain, abnormal ulceration, bleeding, nasal drip or change in voice or hearing  Cardiovascular:  No chest pain, palpitations, new edema, or calf discomfort  Respiratory:  No pain, hemoptysis, change to breathing  Breast:  No pain, discharge, change in appearance or texture  Gastrointestinal:  No pain, cramping, jaundice, change to eating and bowel habits  Urinary:  No pain, bleeding or change in continence  Genitalia: No pain, bleeding or discharge  Musculoskeletal:  No redness, pain, edema or weakness  Skin:  itchy , dry skin   Neurologic:  No discomfort, change in mental status, speech, sensory or motor activity  Psychiatric:  No change in concentration or change to affect or mood  Endocrine:  No hot flashes, increased thirst, or change to urine production  Hematologic: No petechiae, ecchymosis or bleeding  Lymphatic:  No lymphadenopathy or lymphedema  Allergy / Immunologic:  No eczema, hives, frequent or recurrent infections    OBJECTIVE        Physical    VITALS:  /79   Pulse 85   Temp 98.5 °F (36.9 °C) (Oral)   Resp 18   Ht 5' 6.5\" (1.689 m)   Wt 191 lb 6.4 oz (86.8 kg)   LMP  (LMP Unknown)   SpO2 93%   BMI 30.43 kg/m²   TEMPERATURE:  Current - Temp: 98.5 °F (36.9 °C);  Max - Temp  Av.1 °F (36.7 °C)  Min: 97.7 °F (36.5 °C)  Max: 98.6 °F (37 °C)  PULSE OXIMETRY RANGE: SpO2  Av.4 %  Min: 90 %  Max: 93 %  24HR INTAKE/OUTPUT:      Intake/Output Summary (Last 24 hours) at 2021 1054  Last data filed at 2021 0926  Gross per 24 hour   Intake 200 ml   Output 1425 ml   Net -1225 ml       CONSTITUTIONAL: awake, alert, cooperative, no apparent distress   EYES: pupils equal, round and reactive to light, sclera icteric improving and conjunctiva normal  ENT: Normocephalic, without obvious abnormality, atraumatic  NECK: supple, symmetrical, no jugular venous distension and no carotid bruits   HEMATOLOGIC/LYMPHATIC: no cervical, supraclavicular or axillary lymphadenopathy   LUNGS: no increased work of breathing and clear to auscultation   CARDIOVASCULAR: regular rate and rhythm, normal S1 and S2, no murmur noted  ABDOMEN: normal bowel sounds x 4, soft, non-distended, non-tender, no masses palpated, no hepatosplenomgaly   MUSCULOSKELETAL: full range of motion noted, tone is normal  NEUROLOGIC: awake, alert, oriented to name, place and time. Motor skills grossly intact. SKIN: Normal skin color, texture, turgor and  Jaundice nearly resolved. appears intact. 2 cm minimally erythematous raised mass on the right upper back.   EXTREMITIES: no LE edema       Data      Recent Labs     07/05/21  0736 07/06/21  0634 07/07/21  0725   WBC 10.5 9.8 9.1   HGB 11.9* 11.5* 11.1*   HCT 35.9* 34.1* 34.0*    299 309   MCV 79.8* 79.1* 80.6        Recent Labs     07/05/21  0736 07/06/21  0634 07/07/21  0725   * 133* 134*   K 4.6 5.0 4.9   CL 95* 95* 97*   CO2 22 29 30   BUN 19 20 23*   CREATININE 0.8 1.0 1.0     Recent Labs     07/05/21  0736 07/06/21  0634 07/07/21  0725   AST 40* 31 29   ALT 82* 63* 51*   BILITOT 1.6* 1.1* 1.0   ALKPHOS 508* 467* 386*       Magnesium:    Lab Results   Component Value Date    MG 1.90 07/07/2021    MG 1.70 07/06/2021    MG 1.80 07/05/2021         Problem List  Patient Active Problem List   Diagnosis    CAD (coronary artery disease)    Sleep apnea    Generalized weakness    Nonalcoholic steatohepatitis    Rectocele    DM2 (diabetes mellitus, type 2) ([facility] [address]    HTN (hypertension), benign    C[facility]    Right shoulder pain    Insomnia    SUNIL (obstructive sleep apnea)    Coarse tremor following      ONCOLOGIC DISPOSITION:    - Bone bx of pelvis needed for pathological confirmation of disease; possible GYN primary with bone mets   - Likely needs SNF on dc if she desires aggressive care   - Discussed case with Dr. Montserrat Murphy - agrees with additional bone bx of the pelvis/femur   - Discussed updates with son Claire Maxwell today   - We discussed hospice as a potential if the patient decides to value quality over quantity- palliative care is following and TBD     SUMA Pollock - CNP

## 2021-07-07 NOTE — PROGRESS NOTES
Physical Therapy  Facility/Department: [facility] C[facility] - MED SURG/ORTHO  Daily Treatment Note  NAME: Aston MeeksB: 1950  MRN: [id number]    Date of Service: 2021    Discharge Recommendations:  Subacute/Skilled Nursing Facility   PT Equipment Recommendations  Equipment Needed: No  Other: defer to patient's facility    Assessment   Body structures, Functions, Activity limitations: Decreased functional mobility ; Decreased ADL status; Decreased strength;Decreased endurance; Increased pain;Decreased high-level IADLs;Decreased posture;Decreased balance  Assessment: Pt more fatigued wach day this week now is wlaking only 13' with RW then requesting rest.  She required mod Afor supine > sit and STS today, more than previous days this week. Pt needed extra time for all acitivities and verbalized her desire to \"lie down and be done\". She did report hallucinations, RN was notified. If pt pursues treatment then SNF will be necessary to recover some lost strength and endurance, if not home with 24 hr/ hospice would be more appropriate. Treatment Diagnosis: Decreased independence with functional mobility  Specific instructions for Next Treatment: progress mobility as tolerated  Prognosis: Fair  Decision Making: Medium Complexity  PT Education: Goals; General Safety;Gait Training;Disease Specific Education;Plan of Care;Home Exercise Program;Transfer Training;Precautions;PT Role;Functional Mobility Training;Pressure Relief;Equipment; Injury Prevention  Patient Education: Patient educated on the benefits of increased activity, use of call bell, safety with mobility. Patient verbalized understanding. Barriers to Learning: none  REQUIRES PT FOLLOW UP: Yes  Activity Tolerance  Activity Tolerance: Patient limited by endurance; Patient limited by fatigue  Activity Tolerance: . Abigail Domingo   Vitals:    21    BP: 137/79   Pulse: 85   Resp:    Temp:    SpO2: 93% RA       Patient Diagnosis(es): The primary encounter diagnosis was Generalized weakness. Diagnoses of Nausea and vomiting, intractability of vomiting not specified, unspecified vomiting type, Transaminitis, LANA (acute kidney injury) ([address] Metastatic malignant neoplasm, unspecified site ([address] and Elevated bilirubin were also pertinent to this visit. has a past medical history of Anemia, Anesthesia, Arthritis, CAD (coronary artery disease), CVA (cerebral infarction), Diabetes mellitus ([address] Diastolic heart failure ([address] Fibromyalgia, Hyperlipidemia, Hypertension, Hypothyroidism, LFT's abnormal, CHAMBERS (nonalcoholic steatohepatitis), Nausea & vomiting, Pancreatic cyst, PNA (pneumonia), Pneumonia, Rectocele, Sleep apnea, Tremor, and Vitamin D deficiency. has a past surgical history that includes Cholecystectomy; eye surgery (Right); Ectopic pregnancy surgery (1985); Posterior cruciate ligament repair; other surgical history (11-4-2011); Diagnostic Cardiac Cath Lab Procedure; Colonoscopy (01/10/2018); Cystoscopy (Bilateral, 6/29/2021); ERCP (N/A, 7/1/2021); ERCP (7/1/2021); ERCP (7/1/2021); ERCP (N/A, 7/2/2021); ERCP (7/2/2021); ERCP (7/2/2021); and ERCP (7/2/2021). Restrictions  Restrictions/Precautions  Restrictions/Precautions: General Precautions, Fall Risk  Position Activity Restriction  Other position/activity restrictions: Up with assist, chandra catheter     Subjective   General  Chart Reviewed: Yes  Additional Pertinent Hx: HPI per chart, \"admitted with acute kidney injury from obstructive nephropathy, bilateral hydroureteronephrosis without significant bladder distention s/p cystoscopy and bilateral double-J ureteral stentplacement (6/29). Metastatic carcinoma (possible pancreatic carcinoma) with apparent choroidal mets, sclerotic lesions widespread throughout the axial skeleton. Widespread lymphadenopathy in the abdomen, retroperitoneal space, and mediastinum. Elevated CA-125 and CA 19-9. ERCP with stent (7/2) and biopsy for pancreatic carcinoma.   Right proximal femur mass that may require pinning due to pending pathologic fracture. Initial myeloma studies are negative. \"  Response To Previous Treatment: Patient with no complaints from previous session. Family / Caregiver Present: No  Referring Practitioner: Toi Ohara MD  Subjective  Subjective: Pt agreeable, reports fatigue, has had a bath already today and doesn't have much energy  General Comment  Comments: Supine in bed upon entry of therapy staff. Cleared for therapy by RN  Pain Screening  Patient Currently in Pain: Yes  Pain Assessment  Pain Assessment: 0-10  Pain Level: 7  Pain Type: Acute pain  Pain Location: Back  Pain Orientation: Mid;Upper; Lower  Non-Pharmaceutical Pain Intervention(s): Emotional support;Repositioned  Vital Signs  Patient Currently in Pain: Yes         Objective   Bed mobility  Supine to Sit: Moderate assistance (to pts L with HOB elvevated and use of rails)  Sit to Supine: Unable to assess (remained up)  Transfers  Sit to Stand: Moderate Assistance  Stand to sit: Minimal Assistance  Ambulation  Ambulation?: Yes  Ambulation 1  Surface: level tile  Device: Rolling Walker  Assistance: Contact guard assistance;Minimal assistance  Quality of Gait: forward flexed posture, cueing to correct. no LOB  Distance: 13' X 2  Comments: Pt was headed to walk the same distance as yesterday (40') but after reaching ~ 15' pt requested to sit stating \"I just don't think I can make it\" After a 5m in seated rest pt was able to walk back to chair another 15'  Stairs/Curb  Stairs?: No        Exercises  Hip Flexion: X 15 BLE seated marches  Hip Abduction: X 15 BLE seated clamshells  Knee Long Arc Quad: X 15 BLE  Ankle Pumps: X 15 BLE seated  Comments: Pt needing extra time for all bed mobility, transfers and ex today. She talks about wnatingto give up and not having the desireto go on. \"I'm the kind to lie down and give up\". Encouragment offered.         AM-PAC Score     AM-PAC Inpatient Mobility without Stair Climbing Raw Score : 13 (07/07/21 1129)  AM-PAC Inpatient without Stair Climbing T-Scale Score : 38.96 (07/07/21 1129)  Mobility Inpatient CMS 0-100% Score: 58.44 (07/07/21 1129)  Mobility Inpatient without Stair CMS G-Code Modifier : CK (07/07/21 1129)       Goals  Short term goals  Time Frame for Short term goals: 1 week 7/11/21  Short term goal 1: Supine <> sit with min assist.; 7/7 mod A  Short term goal 2: Sit <> stand with min assist.; 7/7 mod A  Short term goal 3: Bed <> chair with min assist.; 7/5 met with RW  Short term goal 4: Ambulate 10 feet with LRAD and min assist.; 7/5 met  Short term goal 5: Patient will tolerate 12-15 reps of her exercises to maximize her strength/endurance;7/5 progressing/ conitnue  Patient Goals   Patient goals : To improve her pain. To become stronger. Plan    Plan  Times per week: 3-5/week  Plan weeks: 1 week 7/11/21  Specific instructions for Next Treatment: progress mobility as tolerated  Current Treatment Recommendations: Strengthening, Home Exercise Program, ROM, Safety Education & Training, Balance Training, Endurance Training, Patient/Caregiver Education & Training, Functional Mobility Training, Equipment Evaluation, Education, & procurement, Transfer Training, Gait Training, ADL/Self-care Training, Pain Management, Positioning  Safety Devices  Type of devices:  All fall risk precautions in place, Call light within reach, Chair alarm in place, Gait belt, Left in chair, Nurse notified     Therapy Time   Individual Concurrent Group Co-treatment   Time In 1042         Time Out 1121         Minutes [address] [place] [address] [id number]

## 2021-07-07 NOTE — PROGRESS NOTES
Occupational Therapy  Facility/Department: [facility] C5 - MED SURG/ORTHO  Daily Treatment Note  NAME: Silverio Hernandez  : 1950  MRN: [id number]    Date of Service: 2021    Discharge Recommendations:  Subacute/Skilled Nursing Facility     Assessment   Performance deficits / Impairments: Decreased functional mobility ; Decreased safe awareness;Decreased ADL status; Decreased strength;Decreased high-level IADLs;Decreased cognition;Decreased balance;Decreased endurance  Assessment: Pt continues to be limited by fatigue, requiring increased time and seated rest break during functional mobility in room, pt previously ambulating to/from bathroom, unable to this date due to fatigue. Pt continues to function below her baseline and would benefit from continued skilled OT in SNF setting as pt tolerance permits. Prognosis: Fair;Poor  OT Education: OT Role;Plan of Care;Transfer Training;Home Exercise Program  Disease Specific Education: Pt educated on importance of OOB mobility, prevention of complications of bedrest, and general safety during hospitalization. Pt verbalized understanding. REQUIRES OT FOLLOW UP: Yes  Activity Tolerance  Activity Tolerance: Patient limited by fatigue;Patient limited by pain  Activity Tolerance: Vitals: BP= 169/92, HR= 83, SPO2= 93%  Safety Devices  Safety Devices in place: Yes  Type of devices: Left in chair;Chair alarm in place;Call light within reach;Nurse notified;Gait belt         Patient Diagnosis(es): The primary encounter diagnosis was Generalized weakness. Diagnoses of Nausea and vomiting, intractability of vomiting not specified, unspecified vomiting type, Transaminitis, LANA (acute kidney injury) ([address] Metastatic malignant neoplasm, unspecified site ([address] and Elevated bilirubin were also pertinent to this visit.       has a past medical history of Anemia, Anesthesia, Arthritis, CAD (coronary artery disease), CVA (cerebral infarction), Diabetes mellitus ([address] Diastolic heart administer meds;Nurse/Physician notified  Vital Signs  Patient Currently in Pain: Yes     Orientation  Orientation  Overall Orientation Status: Within Functional Limits     Objective    ADL  Additional Comments: Pt declined need for ADLs at this time, had shower with PCA this AM.     Balance  Sitting Balance: Stand by assistance  Standing Balance: Minimal assistance (RW)  Standing Balance  Activity: functional mobility in room with RW    Functional Mobility  Functional - Mobility Device: Rolling Walker  Activity: Other  Assist Level: Minimal assistance  Functional Mobility Comments: pt fatigues quickly, requiring chair pulled up underneath her due to fatigue, after seated rest able to complete additional mobility back to bedside chair    Bed mobility  Supine to Sit: Moderate assistance (to L with HOB elevated and use of bedrails)     Transfers  Sit to stand: Minimal assistance  Stand to sit: Minimal assistance     Cognition  Overall Cognitive Status: Exceptions  Arousal/Alertness: Appropriate responses to stimuli  Following Commands: Follows one step commands with increased time; Follows one step commands with repetition  Attention Span: Attends with cues to redirect  Memory: Decreased recall of recent events;Decreased short term memory  Safety Judgement: Decreased awareness of need for safety;Decreased awareness of need for assistance  Insights: Decreased awareness of deficits  Initiation: Requires cues for some     Type of ROM/Therapeutic Exercise  Type of ROM/Therapeutic Exercise: AROM  Comment: BUE seated in chair, decreased attention to task, verbal and tactile cues to execute ther ex  Exercises  Shoulder Flexion: 10x  Elbow Flexion: 15x  Elbow Extension: 15x  Finger Flexion: 15x  Finger Extension: 15x     Plan   Plan  Times per week: 3-5x/week  Current Treatment Recommendations: Strengthening, Balance Training, Functional Mobility Training, Endurance Training, Patient/Caregiver Education & Training, Safety Education & Training, Self-Care / ADL, Equipment Evaluation, Education, & procurement, Positioning    AM-PAC Score  AM-PAC Inpatient Daily Activity Raw Score: 14 (07/07/21 1217)  AM-PAC Inpatient ADL T-Scale Score : 33.39 (07/07/21 1217)  ADL Inpatient CMS 0-100% Score: 59.67 (07/07/21 1217)  ADL Inpatient CMS G-Code Modifier : CK (07/07/21 1217)    Goals  Short term goals  Time Frame for Short term goals: 1 week (7/10) unless stated otherwise. Short term goal 1: Pt will perform functional/toilet t/fs with min A and AD PRN.; 7/05 STG met; New STG: SBA with functional/toilet transfers by 7--- ongoing 7/7/21  Short term goal 2: Pt will perform bathroom mobility with min A and AD PRN.; 7/05 STG met pt CGA with RW; New STG: SBA with bathroom mobility with RW by 7-10-21-- ongoing 7/7/21  Short term goal 3: Pt will verbalize at least 2 energy conservation strategies to use during ADLS (7/8). -- ongoing 7/7/21  Short term goal 4: Pt will UB dress with mod I.-- ongoing 7/7/21  Short term goal 5: Pt will perform BUE ther ex x15 to increase endurance for ADLS and functional mobility. -- ongoing 7/7/21  Patient Goals   Patient goals : \"to feel better\"       Therapy Time   Individual Concurrent Group Co-treatment   Time In 1036         Time Out 1121         Minutes Mary Lucio [address] CARMEN/L

## 2021-07-07 NOTE — CONSULTS
Consult for bilateral heels purplish. Patient was just up in the bathroom having a BM and the PCA assisting her back to bed. Feet and lower legs purple. Heels purple but blanching. As she laid in bed a few minutes coloring in feet pinked up and less purple. Patient has venous insufficiency. No pressure injury noted at this time. Right Heel:        Left Heel:        Recommend:  Off load heels on pillow when in bed. Apply barrier film daily prn as needed for protection to heels. Wound care to sign off.

## 2021-07-07 NOTE — PLAN OF CARE
Palliative Care Progress Note  Palliative Care Admit date:  6/30/21    Advance Directives:    Plan of care/goals:  Pt and son had opportunity for discussion last evening and they would like for pt to be d/c to SNF level care. Their top placement choices are:  [facility], [facility] [place], [facility] and [facility]. Social/Spiritual:  Kathleen Thakkar freely expresses her emotional challenges in this journey. Tearfully verb'd not being \"ready to leave my family\" yet would prefer to \"just go to sleep and not wake up. \"  Kathleen Thakkar has financial goals to accomplish and Yair Johnston is working diligently to support his mom. Plan:  CM apprised of above, will seek bed availability and preauth.   Will continue to make supportive visits to pt, remain available to son        Reason for consult:  _X_ Advance Care Planning  ___ Transition of Care Planning  ___ Psychosocial/Spiritual Support  ___ Symptom Management                                                                                                                                          Keira Corbin RN

## 2021-07-07 NOTE — PROGRESS NOTES
Nephrology Progress  Note                                                                                                                                                                                                                                                                                                                                                               Office : [phone]     Fax :[phone]              Patient's Name: Dharmesh Torres  7:14 PM  7/7/2021    Reason for Consult:  LANA, Hyperkalemia      Requesting Physician:  SUMA Welch CNP      Chief Complaint:  Fatigue       Interval History :    C/o nausea    C/o abdominal pain    Not in acute distress    No diarrhea      S/p Mg replacement      S/p ERCP on 7/2 : s/p stent placement     S/p biopsy  : awaiting pathology report    Serum cr improved    K improved    No diarrhea  No vomiting    History of Present Ilness:    Dharmesh Torres is a 80 y.o. female with PMH of HTN , DLP , DM type 2    Presented with c/o fatigue     Was diagnosed with uveitis in 5/21 and treated with prednisone    CT chest s/o mediastinal LNE ----> referred to oncology    C/o abdominal pain       C/o urinary retention    Denies sob or chest pain or diarrhea    At present being worked up for metastatic cancer , primary unknown              Past Medical History:   Diagnosis Date    Anemia     Anesthesia     [facility]    Arthritis     CAD (coronary artery disease)     CVA (cerebral infarction)     Diabetes mellitus ([address]     Diastolic heart failure ([address]     Fibromyalgia     Hyperlipidemia     Hypertension     Hypothyroidism     LFT's abnormal     CHAMBERS (nonalcoholic steatohepatitis)     Nausea & vomiting     Pancreatic cyst     PNA (pneumonia) 2/23/2021    Pneumonia     Rectocele     Sleep apnea     does not use c-pap    Tremor     Vitamin D deficiency        Past Surgical History:   Procedure Laterality Date    CHOLECYSTECTOMY lispro (HUMALOG) injection vial 7 Units, TID WC  magnesium sulfate 1,000 mg in dextrose 5 % 250 mL infusion, Once  insulin glargine (LANTUS) injection vial 60 Units, Nightly  HYDROmorphone (DILAUDID) injection 1 mg, Q3H PRN  dicyclomine (BENTYL) capsule 10 mg, 4x Daily  primidone (MYSOLINE) tablet 150 mg, Nightly  insulin lispro (HUMALOG) injection vial 0-12 Units, TID WC  insulin lispro (HUMALOG) injection vial 0-6 Units, Nightly  lactulose (CHRONULAC) 10 GM/15ML solution 20 g, TID  hydrALAZINE (APRESOLINE) injection 20 mg, Q4H PRN  metoprolol tartrate (LOPRESSOR) tablet 50 mg, BID  oxyCODONE-acetaminophen (PERCOCET) 5-325 MG per tablet 1 tablet, Q6H PRN  labetalol (NORMODYNE;TRANDATE) injection 10 mg, Q6H PRN  buPROPion (WELLBUTRIN XL) extended release tablet 150 mg, Daily  DULoxetine (CYMBALTA) extended release capsule 60 mg, Daily  levothyroxine (SYNTHROID) tablet 75 mcg, Daily  vitamin D (CHOLECALCIFEROL) tablet 4,000 Units, Daily  glucose (GLUTOSE) 40 % oral gel 15 g, PRN  dextrose 50 % IV solution, PRN  glucagon (rDNA) injection 1 mg, PRN  dextrose 5 % solution, PRN  sodium chloride flush 0.9 % injection 10 mL, PRN  0.9 % sodium chloride infusion, PRN  acetaminophen (TYLENOL) tablet 650 mg, Q6H PRN   Or  acetaminophen (TYLENOL) suppository 650 mg, Q6H PRN  melatonin tablet 3 mg, Nightly PRN  diphenhydrAMINE (BENADRYL) tablet 50 mg, Q6H PRN  hydrOXYzine (ATARAX) tablet 10 mg, TID PRN  ursodiol (ACTIGALL) capsule 300 mg, BID  [Held by provider] heparin (porcine) injection 5,000 Units, 3 times per day  ondansetron (ZOFRAN-ODT) disintegrating tablet 4 mg, Q4H PRN  ondansetron (ZOFRAN) injection 4 mg, Q4H PRN  promethazine (PHENERGAN) injection 12.5 mg, Q4H PRN  promethazine (PHENERGAN) tablet 12.5 mg, Q4H PRN        Review of Systems:   14 point ROS obtained but were negative except mentioned in HPI      Physical exam:     Vitals:  /79   Pulse 85   Temp 98.5 °F (36.9 °C) (Oral)   Resp 18   Ht 5' 6.5\" (1.689 m)   Wt 191 lb 6.4 oz (86.8 kg)   LMP  (LMP Unknown)   SpO2 93%   BMI 30.43 kg/m²   Constitutional:  OAA X3 NAD  Skin: no rash, turgor wnl  Heent:  eomi, mmm  Neck: no bruits or jvd noted  Cardiovascular:  S1, S2 without m/r/g  Respiratory: CTA B without w/r/r  Abdomen:  +bs, soft, nt, nd  Ext: no lower extremity edema  Psychiatric: mood and affect appropriate  Musculoskeletal:  Rom, muscular strength intact    Data:   Labs:  CBC:   Recent Labs     07/05/21  0736 07/06/21  0634 07/07/21  0725   WBC 10.5 9.8 9.1   HGB 11.9* 11.5* 11.1*    299 309     BMP:    Recent Labs     07/05/21  0736 07/06/21  0634 07/07/21  0725   * 133* 134*   K 4.6 5.0 4.9   CL 95* 95* 97*   CO2 22 29 30   BUN 19 20 23*   CREATININE 0.8 1.0 1.0   GLUCOSE 280* 185* 143*     Ca/Mg/Phos:   Recent Labs     07/05/21  0736 07/06/21  0634 07/07/21  0725   CALCIUM 9.7 9.7 9.5   MG 1.80 1.70* 1.90     Hepatic:   Recent Labs     07/05/21  0736 07/06/21  0634 07/07/21  0725   AST 40* 31 29   ALT 82* 63* 51*   BILITOT 1.6* 1.1* 1.0   ALKPHOS 508* 467* 386*     Troponin:   No results for input(s): TROPONINI in the last 72 hours. BNP: No results for input(s): BNP in the last 72 hours. Lipids: No results for input(s): CHOL, TRIG, HDL, LDLCALC, LABVLDL in the last 72 hours. ABGs:   No results for input(s): PHART, PO2ART, NOB5TLI in the last 72 hours. INR:   No results for input(s): INR in the last 72 hours. UA:  No results for input(s): Janee Bun, GLUCOSEU, BILIRUBINUR, KETUA, SPECGRAV, BLOODU, PHUR, PROTEINU, UROBILINOGEN, NITRU, LEUKOCYTESUR, Tonnie Boer in the last 72 hours. Urine Microscopic:   No results for input(s): LABCAST, BACTERIA, COMU, HYALCAST, WBCUA, RBCUA, EPIU in the last 72 hours. Urine Culture: No results for input(s): LABURIN in the last 72 hours. Urine Chemistry: No results for input(s): Iglesia Carranza, PROTEINUR, NAUR in the last 72 hours.           IMAGING:  XR ABDOMEN (KUB) (SINGLE AP VIEW) Final Result   No obstruction, ileus or fecal impaction. 1 biliary stent and 2 bilateral ureteral stents were noted. FL ERCP BILIARY AND PANCREATIC S&I   Final Result   Intraoperative fluoroscopy provided for ERCP. Please refer to the procedure   report for further details. XR KNEE RIGHT (1-2 VIEWS)   Final Result   No acute osseous abnormality. FL ERCP BILIARY AND PANCREATIC S&I   Final Result   ERCP images as above      52 52         XR FEMUR LEFT (MIN 2 VIEWS)   Final Result   Focal area of mixed lucency/sclerosis in the greater   trochanteric/intertrochanteric region of the left proximal femur is   consistent with area of suspected metastatic disease with no evidence of   superimposed pathologic fracture as described above. NM BONE SCAN WHOLE BODY   Final Result   Widespread osseous metastatic disease throughout the axial and appendicular   skeleton. MRI ABDOMEN WO CONTRAST MRCP   Final Result   1. Severe intrahepatic and extrahepatic biliary dilation and up to moderate   dilation of the main pancreatic duct with abrupt caliber changes in the   common bile duct and main pancreatic duct near the pancreatic head. No   definite underlying lesion is identified, although one is not excluded on the   basis of this study. Considerations include ductal adenocarcinoma or   postinflammatory strictures from prior pancreatitis. Consider further   evaluation with pancreas protocol abdomen CT or MRI versus ERCP. 2. Sequelae of chronic pancreatitis best demonstrated on CT with potential   for superimposed mild acute pancreatitis. However, the visualized   peripancreatic stranding could be related to minimal anasarca seen elsewhere. 3. Innumerable simple and minimally complicated cystic lesions measuring up   to 1.8 cm throughout the pancreas could represent dilated side branches from   chronic pancreatitis and/or branch duct intraductal papillary mucinous   neoplasms. Recommend follow-up with pancreas protocol and MRI (preferred) or   CT in 1 year. 4. Severe bilateral hydronephrosis, moderate to severe left hydroureter (in   the setting of a partially duplicated collecting system), and mild right   hydroureter. The right ureter changes caliber proximal to the pelvic brim,   while the conjoint left ureter changes caliber near the pelvic brim. Underline postinflammatory or malignant strictures are not excluded. Consider further evaluation with CT urography. 5. Trace to small bilateral pleural effusions, trace ascites, and minimal   anasarca suggestive of volume overload. 6. Suboptimally seen mesenteric lymph nodes better demonstrated on CT could   be reactive but could also be related to mesenteric panniculitis or   neoplastic involvement. Recommend attention on follow-up imaging. US GALLBLADDER RUQ   Final Result   1. Right-sided hydroureteronephrosis. 2. Common duct dilation status post cholecystectomy. CT CHEST ABDOMEN PELVIS WO CONTRAST   Final Result   Somewhat limited study in the absence of contrast material.      Numerous sclerotic metastases throughout all bones of the visualized axial   and appendicular skeleton with a possible lytic lesion within the upper   aspect of the right scapula and a small soft tissue nodule within the   anterior left chest, possibly metastatic. These findings are new from prior. A healing nondisplaced subacute fracture of the left L2 transverse process is   also new, possibly pathologic. There are numerous mildly enlarged lymph nodes throughout the mediastinum as   well as the retroperitoneum in the abdominal mesentery. Moderate to severe bilateral hydronephrosis and hydroureter, also new from   prior, without appreciable obstructing stone. It is unclear whether this is   related to metastatic cervical cancer or an underlying lymphoproliferative   disorder such as lymphoma.   Lymph node sampling is recommended. Minimal bibasilar atelectasis. Otherwise, clear lungs. XR CHEST PORTABLE   Final Result   Stable chronic changes with no acute abnormality seen. FLUORO FOR SURGICAL PROCEDURES    (Results Pending)   FLUORO FOR SURGICAL PROCEDURES    (Results Pending)   CT GUIDED NEEDLE PLACEMENT    (Results Pending)       Assessment/Plan     1. Hyperkalemia in setting of LANA and obstructive uropathy       Serum K :  5.6 -------> 6 ---> 5.4 -----> 4.8        S/p BL stents placement       LANA imprved          Plan    Hyperkalemia improved        Low K die           2. LANA : improved     Etiology seems obstructive uropathy     S/p cystoscopy and BL stent placement      Encourage PO intake of fluid to thirst      3.  Metabolic acidosis in setting of LANA    improved    4. Acid- base/ Electrolyte imbalance     moniter      5 BL hydronephrosis    S/p stent    Marroquin cath    6 DM 2    7 metastatic cancer , unknown primary     8 Hypomagnesemia     Replace with IV Mg    Thank you for allowing us to participate in care of Maeve Rubi MD  Feel free to contact me   [facility] [id number]  Office : [phone]  Fax :[phone]

## 2021-07-07 NOTE — PROGRESS NOTES
Hospitalist Progress Note      PCP: SUMA Peña CNP    Date of Admission: 6/27/2021    Chief Complaint on Admission:   Fatigue (Patient arrives via walk in with c/o fatigue and abd pain x1 week. Patient states dx with Eye CA 1 week ago but has been fatigued and with abdominal pain since then. Has not been for a PET scan. )        HISTORY OF PRESENT ILLNESS:    Phani Mckeon is a 80 y.o. female.     She developed left eye soreness and headaches in May. She saw an ophthalmologist, Dr. Yeni Mix at [facility], on 5/19/21. 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 She has been on a tapering regimen since then. She has had problems with glycemic control. She has also not responded as expected to treatment. Her ophthalmologist became concerned about an intracranial neoplasm. It was recommended that patient undergo additional imaging including MRI brain and maxillofacial/sinus w and w/o contrast.  She was also recommended to undergo CT chest w/o contrast.  The MRI did not reveal a tumor but was notable for lobular left retinal thickening and appparent left optic nerve compression. The CT chest was notable for mild mediastinal lymphadenopathy. She was referred to oncology but has not had an office visit yet. She abruptly stopped the prednisone several days ago. She had tapered by that time to 60mg daily.     She has been having abdominal pain for roughly a month. She though this was from taking prednisone so tried to ignore it or treat it with OTC remedies. She presents tonight for progressive pain and persistent nausea. She cannot say for how long though. She does relate she is having bowel movements and passing flatus although she may go 3 or 4 days between bowel movements. She denies any difficulty urinating, but relates that when she wakes up every morning she is leaking urine and leaks urine all the way to the restroom.   She recalls being hospitalized for pneumonia in February and having urinary retention at the time which was treated with a short term chandra catheter.         Pt Seen/Examined and Chart Reviewed. Admitting dx : LANA Obstructive uropathy    SUBJECTIVE:   Patient continues to complain of abdominal pain. Reports having to take both IV and oral narcotics for adequate pain control. Kidney continues to improve status post stent placement. Overall patient has very poor prognosis    S/P ERCP 7/2/21 with negative pathology for acute malignancy. Plan for bone bx of pelvis 7/8 to assist with identification of primary disease. Discharge planning: Patient reports she is not able to go home. Patient felt her abdominal pain was not well controlled to be discharged. Again patient hesitant of home DC. She herself is unclear as to what direction she is interested in taking, treatment vs hospice. Tentative plan for SNF to assist her if she wants to continue with treatment.      OBJECTIVE:     Allergies  Acetaminophen, Codeine, Oxycodone hcl, Oxycodone-acetaminophen, Tramadol hcl, Ultram [tramadol], and Percocet [oxycodone-acetaminophen]    Medications      Scheduled Meds:   insulin lispro  7 Units Subcutaneous TID WC    IV infusion builder  100 mL/hr Intravenous Once    insulin glargine  60 Units Subcutaneous Nightly    dicyclomine  10 mg Oral 4x Daily    primidone  150 mg Oral Nightly    insulin lispro  0-12 Units Subcutaneous TID WC    insulin lispro  0-6 Units Subcutaneous Nightly    lactulose  20 g Oral TID    metoprolol tartrate  50 mg Oral BID    buPROPion  150 mg Oral Daily    DULoxetine  60 mg Oral Daily    levothyroxine  75 mcg Oral Daily    Vitamin D  4,000 Units Oral Daily    ursodiol  300 mg Oral BID    [Held by provider] heparin (porcine)  5,000 Units Subcutaneous 3 times per day       Infusions:   dextrose      sodium chloride 25 mL (07/03/21 1040)       PRN Meds:  HYDROmorphone, hydrALAZINE, oxyCODONE-acetaminophen, labetalol, glucose, dextrose, glucagon (rDNA), dextrose, sodium chloride flush, sodium chloride, acetaminophen **OR** acetaminophen, melatonin, diphenhydrAMINE, hydrOXYzine, ondansetron, ondansetron, promethazine, promethazine    Intake and Output     Intake/Output Summary (Last 24 hours) at 7/7/2021 1617  Last data filed at 7/7/2021 0926  Gross per 24 hour   Intake 200 ml   Output 1425 ml   Net -1225 ml       Vitals    /79   Pulse 85   Temp 98.5 °F (36.9 °C) (Oral)   Resp 18   Ht 5' 6.5\" (1.689 m)   Wt 191 lb 6.4 oz (86.8 kg)   LMP  (LMP Unknown)   SpO2 93%   BMI 30.43 kg/m²     Exam:    Gen: Well, mild discomfort abdomen, Alert, Oriented x 3  Lungs: Diffuse scattered rhonchi to anterior fields. Diminished to bases bilaterally, Good respiratory effort. Easy and unlabored  Cor: RRR, S1S2, w/out M/R/G, non-displaced PMI  Abdomen: Soft, ND, generally tender throughout, w/out R/G, w/ +BSx4  Ext: No C/C/E Bilaterally. Full ROM w/ out deformity   Neuro: Neurovascularly intact w/ Sensory/Motor intact UE/LE Bilaterally. Skin: Skin color, texture, turgor normal. Without Rashes/Lesions. Musculoskeletal: No evidence of joint instability, strength normal, no evidence of muscle atrophy, no deformities present    Data    Recent Labs     07/05/21  0736 07/06/21  0634 07/07/21  0725   WBC 10.5 9.8 9.1   HGB 11.9* 11.5* 11.1*   HCT 35.9* 34.1* 34.0*    299 309      Recent Labs     07/05/21  0736 07/06/21  0634 07/07/21  0725   * 133* 134*   K 4.6 5.0 4.9   CL 95* 95* 97*   CO2 22 29 30   BUN 19 20 23*   CREATININE 0.8 1.0 1.0     Recent Labs     07/05/21  0736 07/06/21  0634 07/07/21  0725   AST 40* 31 29   ALT 82* 63* 51*   BILITOT 1.6* 1.1* 1.0   ALKPHOS 508* 467* 386*     No results for input(s): INR in the last 72 hours. No results for input(s): CKTOTAL, CKMB, CKMBINDEX, TROPONINI in the last 72 hours.     Consults:     IP CONSULT TO HOSPITALIST  IP CONSULT TO HEM/ONC  IP CONSULT TO UROLOGY  IP CONSULT TO SPIRITUAL SERVICES  IP CONSULT TO GI  IP CONSULT TO PALLIATIVE CARE  IP CONSULT TO NEPHROLOGY  IP CONSULT TO ORTHOPEDIC SURGERY    ASSESSMENT AND PLAN      Active Hospital Problems    Diagnosis Date Noted    Itching [L29.9]     Abdominal distention [R14.0]     Metastatic malignant neoplasm ([facility] [address] [C79.9] 06/28/2021    Abdominal lymphadenopathy [R59.0] 06/28/2021    Transaminitis [R74.01] 06/28/2021    Moderate malnutrition ([address] [E44.0] 06/28/2021    Elevated bilirubin [R17]     Nausea and vomiting [R11.2]     Bile duct obstruction [K83.1]     LANA (acute kidney injury) ([facility] [address] [N17.9] 06/27/2021    Obstructive nephropathy [N13.8] 06/27/2021    DM2 (diabetes mellitus, type 2) ([facility] [address] [E11.9] 06/19/2013    Generalized weakness [R53.1] 04/21/2011     Assessment and Plan:   1. Metastatic carcinoma:  s/p ERCP (7/2) with negative pathology.   Unclear primary site. Pelvic bone biopsy to assist with identification. 2. Widespread bone metastases, initial myeloma studies are negative.  Broad differential, plan for pelvic bone biopsy per Hem/Onc.   3. Acute kidney injury with hyperkalemia (eGFR 49) Resolving after stent placement. .    4. Hydronephrosis from obstructive nephropathy, bilateral hydroureteronephrosis without significant bladder distention s/p cystoscopy and bilateral double-J ureteral stent placement (6/29).  To be exchanged in 4 months. 5. Hepatic encephalopathy with elevated ammonia: Mentation at baseline.  Treated with oral lactulose 20 gram TID. 6. Left proximal femur mass, per Ortho no surgery planned for pathologic fracture. 7. Hypomagnesemia:  Unclear etiology.  Replace with IV magnesium sulfate to Mg > 2.0.    8. Type 2 Diabetes (uncontrolled, HgbA1c 10.4%):  Cover with a \"sliding scale\" lispro moderate scale prandial correction insulin.   Add basal glargine insulin 60 units nightly.   She is normally on Toujeo glargine insulin 70 units nightly in addition to glimepiride and metformin. 9. Benign positional tremor:  Restart primidone 150 mg nightly. 10. Puritis: Maintain ursodiol for cholestatic etiology. 11. Essential HTN: Increase Metoprolol 50 mg BID  12. HLD: Hold statin due to transaminitis  13. Depression: Maintain Wellbutrin and duloxetine. 14. Hypothyroid: Maintain synthroid and titrate as outpt to TFT.      Advance Directive: Full Code  DVT prophylaxis with enoxaparin 40 mg sub-Q daily. Discharge planning: Poor prognosis overall.  Plan for Palliative care to help patient determine hospice vs further treatment. She is hesitant to go home due to persistent pain.  Patient to tentatively go to SNF at discharge.   Latrice Le MD

## 2021-07-07 NOTE — PLAN OF CARE
Problem: Falls - Risk of:  Goal: Will remain free from falls  Description: Will remain free from falls  Outcome: Ongoing     Problem: Falls - Risk of:  Goal: Absence of physical injury  Description: Absence of physical injury  Outcome: Ongoing     Problem: Pain:  Description: Pain management should include both nonpharmacologic and pharmacologic interventions. Goal: Pain level will decrease  Description: Pain level will decrease  Outcome: Ongoing     Problem: Pain:  Description: Pain management should include both nonpharmacologic and pharmacologic interventions.   Goal: Control of acute pain  Description: Control of acute pain  Outcome: Ongoing

## 2021-07-07 NOTE — CARE COORDINATION
CM updated by ROBERTO BHAT/RN family wanting a referral to [facility]. Referral faxed and call placed to confirm receipt. CM following-Joanna Novoa RN     ADDENDUM [id number]: received VM from Rocio with [facility] and updated that they did review referral, however wanting clarification on bone bx and POC with HEM/OC. Spoke to ROBERTO BHAT/RN and she reached out to TERRIE Rodriguez NP with [facility]EM. OC and per NP, \"will initiate chemo after SNF stay. \" LVM with Rocio at [facility] information and the need to initiate precert ASAP.  CM following-Joanna Novoa RN

## 2021-07-08 NOTE — PROGRESS NOTES
Physical Therapy  Facility/Department: [facility] C5 - MED SURG/ORTHO  Daily Treatment Note  NAME: Ian Nair  : 1950  MRN: [id number]    Date of Service: 2021    Discharge Recommendations:  Subacute/Skilled Nursing Facility   PT Equipment Recommendations  Equipment Needed: No  Other: defer to patient's facility  If pt discharges prior to next PT session this note will serve as discharge summary. Assessment   Body structures, Functions, Activity limitations: Decreased functional mobility ; Decreased ADL status; Decreased strength;Decreased endurance; Increased pain;Decreased high-level IADLs;Decreased posture;Decreased balance  Assessment: Treatment again limited by fatigue. Pt voices distress over dx, fatigue and pain. Slow to process instructions and slow in movement today. Verbal,visual, and tactile cues needed for all exercises and activities. Pt amb with RW 25 ft min assist and participated in 5-10 reps LE Ex. Pt returned to bed at end of session. RN aware. Pt will benefit from skilled PT to address deficits. Recommend SNF at discharge. Treatment Diagnosis: Decreased independence with functional mobility  Specific instructions for Next Treatment: progress mobility as tolerated  Prognosis: Fair  PT Education: Goals; General Safety;Gait Training;Disease Specific Education;Plan of Care;Home Exercise Program;Transfer Training;Precautions;PT Role;Functional Mobility Training;Pressure Relief;Equipment; Injury Prevention  Patient Education: Patient educated on the benefits of increased activity, use of call bell, safety with mobility. Patient verbalized understanding. Barriers to Learning: none  REQUIRES PT FOLLOW UP: Yes  Activity Tolerance  Activity Tolerance: Patient Tolerated treatment well;Patient limited by fatigue;Patient limited by endurance     Patient Diagnosis(es): The primary encounter diagnosis was Generalized weakness.  Diagnoses of Nausea and vomiting, intractability of vomiting not specified, negative. \"  Response To Previous Treatment: Patient with no complaints from previous session.   Family / Caregiver Present: No  Referring Practitioner: Leah Ro MD  Subjective  Subjective: pt agrees to therapy, reports generalized pain and fatigue, worrying about biopsy  General Comment  Comments: pt testing in bed on approach  Pain Screening  Patient Currently in Pain: Yes  Pain Assessment  Pain Assessment: Faces  Nunez-Baker Pain Rating: Hurts even more  Pain Type: Chronic pain  Pain Location: Generalized  Pain Descriptors: Discomfort;Aching  Pain Frequency: Continuous  Pain Onset: On-going  Clinical Progression: Not changed  Functional Pain Assessment: Prevents or interferes some active activities and ADLs  Non-Pharmaceutical Pain Intervention(s): Emotional support;Repositioned  Vital Signs  Temp: 98 °F (36.7 °C)  Pulse: 78  Heart Rate Source: Monitor  BP: (!) 151/82  BP Location: Left upper arm  Patient Currently in Pain: Yes  Oxygen Therapy  SpO2: 94 %  Pulse Oximeter Device Mode: Intermittent  O2 Device: None (Room air)       Orientation  Overall Orientation Status: Within Functional Limits     Objective   Bed mobility  Supine to Sit: Minimal assistance  Sit to Supine: Minimal assistance  Transfers  Sit to Stand: Minimal Assistance  Stand to sit: Minimal Assistance  Bed to Chair: Minimal assistance  Comment: Transfers practiced from EOB and from chair with arm rests, extra time needed, cues for hand placement each time  Ambulation  Surface: level tile  Device: Rolling Walker  Assistance: Minimal assistance  Quality of Gait: shortened stride, minimal foot clearance, assist for walker navigation and at gait belt for balance, decreased safety awarness when going to sit down  Distance: 25 ft x 1  Comments: Distance limited by fatigue        Exercises  Straight Leg Raise: 10 x B  Quad Sets: 5 x B  Heelslides: 5 x B assisted, pt reporting bilat knee pain with flexion  Gluteal Sets: 10 x B  Hip Abduction: 5 x B supine  Ankle Pumps: 15 x B supine  Comments: pt needed time to process instructions. Verbal, visual and tactile cues needed for all exercises. Brief rest breaks provided between exercises. AM-PAC Score     AM-PAC Inpatient Mobility without Stair Climbing Raw Score : 16 (07/08/21 0928)  AM-PAC Inpatient without Stair Climbing T-Scale Score : 45.54 (07/08/21 0070)  Mobility Inpatient CMS 0-100% Score: 40.64 (07/08/21 5491)  Mobility Inpatient without Stair CMS G-Code Modifier : CK (07/08/21 0230)     Goals  Short term goals  Time Frame for Short term goals: 1 week 7/11/21  Short term goal 1: Supine <> sit with min assist.; 7/8 min A  Short term goal 2: Sit <> stand with min assist.; 7/8 min A  New goal to transfer with SBA  Short term goal 3: Bed <> chair with min assist.; 7/8 min assist bed to chair, New goal to transfer to chair CG  Short term goal 4: Ambulate 10 feet with LRAD and min assist.; 7/8 MET pt amb 25 ft min assist RW. New Goal to amb 50 ft CG with RW  Short term goal 5: Patient will tolerate 12-15 reps of her exercises to maximize her strength/endurance;7/8 progressing/ conitnue  Patient Goals   Patient goals : To improve her pain. To become stronger. Plan    Times per week: 3-5/week  Plan weeks: 1 week 7/11/21  Specific instructions for Next Treatment: progress mobility as tolerated  Current Treatment Recommendations: Strengthening, Home Exercise Program, ROM, Safety Education & Training, Balance Training, Endurance Training, Patient/Caregiver Education & Training, Functional Mobility Training, Equipment Evaluation, Education, & procurement, Transfer Training, Gait Training, ADL/Self-care Training, Pain Management, Positioning  Safety Devices  Type of devices:  All fall risk precautions in place, Gait belt, Bed alarm in place, Patient at risk for falls, Nurse notified, Call light within reach, Left in bed     Therapy Time   Individual Concurrent Group Co-treatment   Time In 0187 Time Out 0920         Minutes [place], [place]

## 2021-07-08 NOTE — PLAN OF CARE
Nutrition Problem #1: Inadequate oral intake  Intervention: Food and/or Nutrient Delivery: Continue NPO (Advance per MD)  Nutritional Goals:  Tolerate diet and have po intakes 50% or greater this admission

## 2021-07-08 NOTE — CARE COORDINATION
7/8/21 Call placed to [facility] to enquire about acceptance of referral and to possibly start precert. Will follow.

## 2021-07-08 NOTE — PROGRESS NOTES
Hospitalist Progress Note      PCP: SUMA Grimaldo - CNP    Date of Admission: 6/27/2021    Chief Complaint on Admission:   Fatigue (Patient arrives via walk in with c/o fatigue and abd pain x1 week. Patient states dx with Eye CA 1 week ago but has been fatigued and with abdominal pain since then. Has not been for a PET scan. )        HISTORY OF PRESENT ILLNESS:    Raymond Claude is a 80 y.o. female.     She developed left eye soreness and headaches in May. She saw an ophthalmologist, Dr. Sam Presley at [facility], on 5/19/21. 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 She has been on a tapering regimen since then. She has had problems with glycemic control. She has also not responded as expected to treatment. Her ophthalmologist became concerned about an intracranial neoplasm. It was recommended that patient undergo additional imaging including MRI brain and maxillofacial/sinus w and w/o contrast.  She was also recommended to undergo CT chest w/o contrast.  The MRI did not reveal a tumor but was notable for lobular left retinal thickening and appparent left optic nerve compression. The CT chest was notable for mild mediastinal lymphadenopathy. She was referred to oncology but has not had an office visit yet. She abruptly stopped the prednisone several days ago. She had tapered by that time to 60mg daily.     She has been having abdominal pain for roughly a month. She though this was from taking prednisone so tried to ignore it or treat it with OTC remedies. She presents tonight for progressive pain and persistent nausea. She cannot say for how long though. She does relate she is having bowel movements and passing flatus although she may go 3 or 4 days between bowel movements. She denies any difficulty urinating, but relates that when she wakes up every morning she is leaking urine and leaks urine all the way to the restroom.   She recalls being hospitalized for pneumonia in February and having urinary retention at the time which was treated with a short term chandra catheter.         Pt Seen/Examined and Chart Reviewed. Admitting dx : LANA Obstructive uropathy    SUBJECTIVE:   Patient continues to complain of abdominal pain. Reports having to take both IV and oral narcotics for adequate pain control. Kidney continues to improve status post stent placement. Overall patient has very poor prognosis    S/P ERCP 7/2/21 with negative pathology for acute malignancy. Plan for bone bx of pelvis 7/8 to assist with identification of primary disease. Discharge planning: Patient reports she is not able to go home. Patient felt her abdominal pain was not well controlled to be discharged. Again patient hesitant of home DC. She herself is unclear as to what direction she is interested in taking, treatment vs hospice. Tentative plan for SNF rehab to assist her if she wants to continue with treatment.      OBJECTIVE:     Allergies  Acetaminophen, Codeine, Oxycodone hcl, Oxycodone-acetaminophen, Tramadol hcl, Ultram [tramadol], and Percocet [oxycodone-acetaminophen]    Medications      Scheduled Meds:   insulin lispro  7 Units Subcutaneous TID WC    IV infusion builder  100 mL/hr Intravenous Once    insulin glargine  60 Units Subcutaneous Nightly    dicyclomine  10 mg Oral 4x Daily    primidone  150 mg Oral Nightly    insulin lispro  0-12 Units Subcutaneous TID WC    insulin lispro  0-6 Units Subcutaneous Nightly    lactulose  20 g Oral TID    metoprolol tartrate  50 mg Oral BID    buPROPion  150 mg Oral Daily    DULoxetine  60 mg Oral Daily    levothyroxine  75 mcg Oral Daily    Vitamin D  4,000 Units Oral Daily    ursodiol  300 mg Oral BID    [Held by provider] heparin (porcine)  5,000 Units Subcutaneous 3 times per day       Infusions:   dextrose      sodium chloride 25 mL (07/03/21 1040)       PRN Meds:  HYDROmorphone, hydrALAZINE, oxyCODONE-acetaminophen, labetalol, glucose, dextrose, glucagon (rDNA), dextrose, sodium chloride flush, sodium chloride, acetaminophen **OR** acetaminophen, melatonin, diphenhydrAMINE, hydrOXYzine, ondansetron, ondansetron, promethazine, promethazine    Intake and Output     Intake/Output Summary (Last 24 hours) at 7/8/2021 1256  Last data filed at 7/8/2021 1117  Gross per 24 hour   Intake 480 ml   Output --   Net 480 ml       Vitals    BP (!) 147/78   Pulse 71   Temp 98.5 °F (36.9 °C) (Oral)   Resp 14   Ht 5' 6.5\" (1.689 m)   Wt 194 lb 8 oz (88.2 kg)   LMP  (LMP Unknown)   SpO2 93%   BMI 30.92 kg/m²     Exam:    Gen: Well, mild discomfort abdomen, Alert, Oriented x 3  Lungs: Diffuse scattered rhonchi to anterior fields. Diminished to bases bilaterally, Good respiratory effort. Easy and unlabored  Cor: RRR, S1S2, w/out M/R/G, non-displaced PMI  Abdomen: Soft, ND, generally tender throughout, w/out R/G, w/ +BSx4  Ext: No C/C/E Bilaterally. Full ROM w/ out deformity   Neuro: Neurovascularly intact w/ Sensory/Motor intact UE/LE Bilaterally. Skin: Skin color, texture, turgor normal. Without Rashes/Lesions. Musculoskeletal: No evidence of joint instability, strength normal, no evidence of muscle atrophy, no deformities present    Data    Recent Labs     07/06/21  0634 07/07/21  0725 07/08/21  0553   WBC 9.8 9.1 7.9   HGB 11.5* 11.1* 10.6*   HCT 34.1* 34.0* 32.6*    309 279      Recent Labs     07/06/21  0634 07/07/21  0725 07/08/21  0553   * 134* 135*   K 5.0 4.9 4.9   CL 95* 97* 98*   CO2 29 30 29   BUN 20 23* 23*   CREATININE 1.0 1.0 1.1     Recent Labs     07/06/21  0634 07/07/21  0725 07/08/21  0553   AST 31 29 27   ALT 63* 51* 40   BILITOT 1.1* 1.0 0.9   ALKPHOS 467* 386* 351*     No results for input(s): INR in the last 72 hours. No results for input(s): CKTOTAL, CKMB, CKMBINDEX, TROPONINI in the last 72 hours.     Consults:     IP CONSULT TO HOSPITALIST  IP CONSULT TO HEM/ONC  IP CONSULT TO UROLOGY  IP CONSULT TO SPIRITUAL SERVICES  IP CONSULT TO GI  IP CONSULT TO PALLIATIVE CARE  IP CONSULT TO NEPHROLOGY  IP CONSULT TO ORTHOPEDIC SURGERY    ASSESSMENT AND PLAN      Active Hospital Problems    Diagnosis Date Noted    Itching [L29.9]     Abdominal distention [R14.0]     Metastatic malignant neoplasm ([facility] [C79.9] 06/28/2021    Abdominal lymphadenopathy [R59.0] 06/28/2021    Transaminitis [R74.01] 06/28/2021    Moderate malnutrition ([address] [E44.0] 06/28/2021    Elevated bilirubin [R17]     Nausea and vomiting [R11.2]     Bile duct obstruction [K83.1]     LANA (acute kidney injury) ([facility] [N17.9] 06/27/2021    Obstructive nephropathy [N13.8] 06/27/2021    DM2 (diabetes mellitus, type 2) ([facility] [E11.9] 06/19/2013    Generalized weakness [R53.1] 04/21/2011     Assessment and Plan:   1. Metastatic carcinoma:  s/p ERCP (7/2) with negative pathology.   Unclear primary site. Pelvic bone biopsy to assist with identification 7/8. Path pending. .    2. Widespread bone metastases, initial myeloma studies are negative.  Broad differential, plan for pelvic bone biopsy per Hem/Onc.   3. Acute kidney injury with hyperkalemia (eGFR 49) Resolving after stent placement. .    4. Hydronephrosis from obstructive nephropathy, bilateral hydroureteronephrosis without significant bladder distention s/p cystoscopy and bilateral double-J ureteral stent placement (6/29).  To be exchanged in 4 months. 5. Hepatic encephalopathy with elevated ammonia: Mentation at baseline.  Treated with oral lactulose 20 gram TID. 6. Left proximal femur mass, per Ortho no surgery planned for pathologic fracture. 7. Hypomagnesemia:  Unclear etiology.  Replace with IV magnesium sulfate to Mg > 2.0.    8.  Type 2 Diabetes (uncontrolled, HgbA1c 10.4%):  Cover with a \"sliding scale\" lispro moderate scale prandial correction insulin.   Add basal glargine insulin 60 units nightly.   She is normally on Toujeo glargine insulin 70 units nightly in addition to glimepiride and metformin. 9. Benign positional tremor:  Restart primidone 150 mg nightly. 10. Puritis: Maintain ursodiol for cholestatic etiology. 11. Essential HTN: Increase Metoprolol 50 mg BID  12. HLD: Hold statin due to transaminitis  13. Depression: Maintain Wellbutrin and duloxetine. 14. Hypothyroid: Maintain synthroid and titrate as outpt to TFT.      Advance Directive: Full Code  DVT prophylaxis with enoxaparin 40 mg sub-Q daily. Discharge planning: Poor prognosis overall.  Plan for Palliative care to help patient determine hospice vs further treatment. She is hesitant to go home due to persistent pain. Patient to tentatively go to SNF at discharge.  Pre Cert out to Mirtha Sanchez MD

## 2021-07-08 NOTE — CARE COORDINATION
7/8/21 [facility] has accepted this pt and started precert.  Just spoke with Sasha

## 2021-07-08 NOTE — CARE COORDINATION
7/8/21 Left  omayra Sharp at [facility] to inquire about referral and precert. Will wait for a return phone call.

## 2021-07-08 NOTE — BRIEF OP NOTE
Brief Postoperative Note    Manan Monae  YOB: 1950  [id number]    Pre-operative Diagnosis: bone lesions    Post-operative Diagnosis: Same    Procedure: CT-guided biopsy of left iliac bone lesion     Anesthesia: Moderate Sedation    Surgeons: Skyler Helton MD    Estimated Blood Loss: Less than 5 mL    Complications: None    Specimens: Was Obtained: 2 bone specimens    Findings: Successful CT-guided biopsy of left iliac bone lesion .     Electronically signed by Skyler Helton MD on 7/8/2021 at 12:14 PM

## 2021-07-08 NOTE — PROGRESS NOTES
Patient off floor earlier for bone bx  Son Dock Shouts at bedside. Updated on care.    Await repeat path from L iliac bone lesion

## 2021-07-08 NOTE — SEDATION DOCUMENTATION
Pt arrived for image guided Bone nodule biopsy. Procedure explained including the risk and benefits of the procedure. All questions answered. Pt verbalizes understanding of the of procedure and states no more questions. Consent signed. Vital signs stable, labs, allergies, medications, and code status reviewed. No contraindications noted. Time out completed prior to procedure. Pt was place prone on the CT table. Pt was placed on all cardiac monitoring.  Oxygen available if needed        Vitals:    07/08/21 1154   BP: (!) 174/84   Pulse: 72   Resp: 14   Temp:    SpO2: 94%    (vital signs in table format)    Adams Score  2 - Able to move 4 extremities voluntarily on command  2 - BP+/- 20mmHg of normal  2 - Fully awake  2 - Able to maintain oxygen saturation >92% on room air  2 - Able to breathe deeply and cough freely    Allergies  Acetaminophen, Codeine, Oxycodone hcl, Oxycodone-acetaminophen, Tramadol hcl, Ultram [tramadol], and Percocet [oxycodone-acetaminophen]    Labs  Lab Results   Component Value Date    INR 1.10 06/29/2021    PROTIME 12.8 06/29/2021       Lab Results   Component Value Date    CREATININE 1.1 07/08/2021    BUN 23 (H) 07/08/2021    GFR >60 11/05/2011     (L) 07/08/2021    K 4.9 07/08/2021    CL 98 (L) 07/08/2021    CO2 29 07/08/2021       Lab Results   Component Value Date    WBC 7.9 07/08/2021    HGB 10.6 (L) 07/08/2021    HCT 32.6 (L) 07/08/2021    MCV 80.6 07/08/2021     07/08/2021

## 2021-07-08 NOTE — PROGRESS NOTES
Occupational Therapy  OT follow up attempted, pt declining participation at this time. Pt and son reporting continued hallucinations and confusion, pt seems to be overwhelmed by confusion and reports difficulty keeping things straight in her head. Will continue to follow per POC.   KERMIT Carmen/KENNEDI

## 2021-07-08 NOTE — PROGRESS NOTES
since admission. Wounds:  None       Current Nutrition Therapies:    Diet NPO Exceptions are: Ice Chips, Sips of Water with Meds    Anthropometric Measures:  · Height: 5' 6.5\" (168.9 cm)  · Current Body Weight: 191 lb (86.6 kg)   · Admission Body Weight: 200 lb (90.7 kg)   · Ideal Body Weight: 133 lbs; % Ideal Body Weight     · BMI: 30.4  · BMI Categories: Obese Class 1 (BMI 30.0-34. 9)       Nutrition Diagnosis:   · Inadequate oral intake related to inadequate protein-energy intake as evidenced by weight loss, poor intake prior to admission    Nutrition Interventions:   Food and/or Nutrient Delivery:  Continue NPO (Advance per MD)  Nutrition Education/Counseling:  No recommendation at this time   Coordination of Nutrition Care:  Continue to monitor while inpatient, Speech Therapy    Goals: Tolerate diet and have po intakes 50% or greater this admission       Nutrition Monitoring and Evaluation:   Behavioral-Environmental Outcomes:  None Identified   Food/Nutrient Intake Outcomes:  Diet Advancement/Tolerance, Food and Nutrient Intake, Supplement Intake  Physical Signs/Symptoms Outcomes:  Weight, Nausea or Vomiting     Discharge Planning:     Too soon to determine     Electronically signed by Criselda De La Cruz MS, RD, LD on 7/8/21 at 11:06 AM EDT    Contact: Office: [phone]; [address]: [phone]

## 2021-07-08 NOTE — PRE SEDATION
Sedation Pre-Procedure Note    Patient Name: Nadiya Escoto   YOB: 1950  Room/Bed: 5264/4472-60  Medical Record Number: [id number]  Date: 7/8/2021   Time: 12:12 PM       Indication:  Bone lesions here for biopsy. Consent: I have discussed with the patient and/or the patient representative the indication, alternatives, and the possible risks and/or complications of the planned procedure and the anesthesia methods. The patient and/or patient representative appear to understand and agree to proceed. Vital Signs:   Vitals:    07/08/21 1209   BP: (!) 155/89   Pulse: 75   Resp: 13   Temp:    SpO2: 92%       Past Medical History:   has a past medical history of Anemia, Anesthesia, Arthritis, CAD (coronary artery disease), CVA (cerebral infarction), Diabetes mellitus ([address] Diastolic heart failure ([address] Fibromyalgia, Hyperlipidemia, Hypertension, Hypothyroidism, LFT's abnormal, CHAMBERS (nonalcoholic steatohepatitis), Nausea & vomiting, Pancreatic cyst, PNA (pneumonia), Pneumonia, Rectocele, Sleep apnea, Tremor, and Vitamin D deficiency. Past Surgical History:   has a past surgical history that includes Cholecystectomy; eye surgery (Right); Ectopic pregnancy surgery (1985); Posterior cruciate ligament repair; other surgical history (11-4-2011); Diagnostic Cardiac Cath Lab Procedure; Colonoscopy (01/10/2018); Cystoscopy (Bilateral, 6/29/2021); ERCP (N/A, 7/1/2021); ERCP (7/1/2021); ERCP (7/1/2021); ERCP (N/A, 7/2/2021); ERCP (7/2/2021); ERCP (7/2/2021); and ERCP (7/2/2021).     Medications:   Scheduled Meds:    insulin lispro  7 Units Subcutaneous TID WC    IV infusion builder  100 mL/hr Intravenous Once    insulin glargine  60 Units Subcutaneous Nightly    dicyclomine  10 mg Oral 4x Daily    primidone  150 mg Oral Nightly    insulin lispro  0-12 Units Subcutaneous TID WC    insulin lispro  0-6 Units Subcutaneous Nightly    lactulose  20 g Oral TID    metoprolol tartrate  50 mg Oral BID    buPROPion  150 mg Oral Daily    DULoxetine  60 mg Oral Daily    levothyroxine  75 mcg Oral Daily    Vitamin D  4,000 Units Oral Daily    ursodiol  300 mg Oral BID    [Held by provider] heparin (porcine)  5,000 Units Subcutaneous 3 times per day     Continuous Infusions:    dextrose      sodium chloride 25 mL (07/03/21 1040)     PRN Meds: HYDROmorphone, hydrALAZINE, oxyCODONE-acetaminophen, labetalol, glucose, dextrose, glucagon (rDNA), dextrose, sodium chloride flush, sodium chloride, acetaminophen **OR** acetaminophen, melatonin, diphenhydrAMINE, hydrOXYzine, ondansetron, ondansetron, promethazine, promethazine  Home Meds:   Prior to Admission medications    Medication Sig Start Date End Date Taking? Authorizing Provider   buPROPion (WELLBUTRIN XL) 150 MG extended release tablet TAKE 1 TABLET EVERY MORNING 5/27/21  Yes SUMA Hughes CNP   atorvastatin (LIPITOR) 40 MG tablet TAKE 1 TABLET NIGHTLY 5/27/21  Yes SUMA Briscoe CNP   metFORMIN (GLUCOPHAGE-XR) 500 MG extended release tablet TAKE 1 TABLET TWICE A DAY 5/25/21  Yes SUMA Hughes CNP   lisinopril (PRINIVIL;ZESTRIL) 5 MG tablet TAKE 1 TABLET DAILY 5/4/21  Yes SUMA Hughes CNP   alendronate (FOSAMAX) 70 MG tablet TAKE 1 TABLET EVERY 7 DAYS  Patient taking differently: 70 mg TAKE 1 TABLET EVERY 7 DAYS.   Takes on Fridays 5/3/21  Yes SUMA Mehta CNP   glimepiride (AMARYL) 4 MG tablet TAKE 1 TABLET TWICE A DAY 5/3/21  Yes SUMA Mehta CNP   metoprolol tartrate (LOPRESSOR) 25 MG tablet TAKE 1 TABLET TWICE A DAY 5/3/21  Yes SUMA Mehta CNP   levothyroxine (SYNTHROID) 75 MCG tablet TAKE 1 TABLET DAILY 4/19/21  Yes Haja Hernandez MD   blood glucose test strips (ONETOUCH ULTRA) strip USE TO TEST BLOOD SUGAR TWOTIMES A DAY 2/8/21  Yes SUMA Haywood CNP   primidone (MYSOLINE) 50 MG tablet TAKE 3 TABLETS NIGHTLY 2/2/21  Yes Franci eRid, APRN - CNP Cyanocobalamin (VITAMIN B 12 PO) Take by mouth daily    Yes Historical Provider, MD   Blood Glucose Monitoring Suppl (MM EASY TOUCH GLUCOSE METER) w/Device KIT 1 kit by Does not apply route 2 times daily 3/15/18  Yes SUMA Tai CNP   vitamin D (CHOLECALCIFEROL) 1000 UNIT TABS tablet Take 4,000 Units by mouth daily   Yes Historical Provider, MD   magnesium 30 MG tablet Take 30 mg by mouth daily   Yes Historical Provider, MD   aspirin 325 MG tablet Take 1 tablet by mouth daily. 3/18/14  Yes Hilda Jesus,    DULoxetine (CYMBALTA) 60 MG extended release capsule Take 1 capsule by mouth 2 times daily 7/6/21   SUMA Tai CNP   Insulin Glargine, 1 Unit Dial, (TOUJEO SOLOSTAR) 300 UNIT/ML SOPN INJECT 70 UNITS            SUBCUTANEOUSLY NIGHTLY 7/6/21   SUMA Tai CNP   Insulin Pen Needle (B-D ULTRAFINE III SHORT PEN) 31G X 8 MM MISC USE AND DISCARD 1 PEN      NEEDLE DAILY 7/6/21   SUMA Rosado CNP   UNABLE TO FIND Tumeric    Historical Provider, MD   ONE TOUCH ULTRASOFT LANCETS MISC Test blood sugar 2 times a day 4/6/17   Abel Dave MD     Coumadin Use Last 7 Days:  no  Antiplatelet drug therapy use last 7 days: no  Other anticoagulant use last 7 days: no  Additional Medication Information:  n/a      Pre-Sedation Documentation and Exam:   I have reviewed the patient's history and review of systems.     Mallampati Airway Assessment:  Mallampati Class II - (soft palate, fauces & uvula are visible)    Prior History of Anesthesia Complications:   none    ASA Classification:  Class 2 - A normal healthy patient with mild systemic disease    Sedation/ Anesthesia Plan:   intravenous sedation    Medications Planned:   midazolam (Versed) intravenously and fentanyl intravenously    Patient is an appropriate candidate for plan of sedation: yes    Electronically signed by Chucky Pimentel MD on 7/8/2021 at 12:12 PM

## 2021-07-08 NOTE — SEDATION DOCUMENTATION
Image guided Bone biopsy completed by Dr. Renetta Loja. Pt tolerated procedure without any signs or symptoms of distress. Vital signs stable. Report given  to [facility] SERVICE RN. Pt transported back to [facility] in stable condition via stretcher. Total Biopsy: 2  Received: Versed: .5 mg       Fentanyl: 25 mcg  Bandage to LOWER MIDDLE BACK  that is clean dry and intact.        Vital Signs  Vitals:    07/08/21 1209   BP: (!) 155/89   Pulse: 75   Resp: 13   Temp:    SpO2: 92%    (vital signs in table format)    Post Adams  2 - Able to move 4 extremities voluntarily on command  2 - BP+/- 20mmHg of normal  2 - Fully awake  2 - Able to maintain oxygen saturation >92% on room air  2 - Able to breathe deeply and cough freely

## 2021-07-09 NOTE — PROGRESS NOTES
Occupational Therapy  Facility/Department: [facility] C5 - MED SURG/ORTHO  Daily Treatment Note  NAME: Aston MeeksB: 1950  MRN: [id number]    Date of Service: 2021    Discharge Recommendations:  Subacute/Skilled Nursing Facility  OT Equipment Recommendations  Other: defer    Assessment   Performance deficits / Impairments: Decreased functional mobility ; Decreased safe awareness;Decreased ADL status; Decreased strength;Decreased high-level IADLs;Decreased cognition;Decreased balance;Decreased endurance  Assessment: Pt continues to be limited by fatigue and decreased motivation for tasks. Pt completed amb with RW bed> chair with Min A for 1 LOB and CGA for remaining amb. Pt needs increased motivation to complete BUE exercises and seated ADLS (grooming hair and face). Pt denies need/want to use the toilet or complete standing ADLs at sink. Pt would benefit from continued acute therapy and d/c to SNF. Prognosis: Fair  OT Education: OT Role;Plan of Care;Transfer Training;Home Exercise Program  Patient Education: disease specific: importance of OOB to chair for meals & up to bathroom & transfers with A by staff  REQUIRES OT FOLLOW UP: Yes  Activity Tolerance  Activity Tolerance: Patient limited by fatigue;Patient limited by pain  Activity Tolerance: /89, HR 85, SPO2 94%  Safety Devices  Safety Devices in place: Yes  Type of devices: Left in chair;Chair alarm in place;Call light within reach;Nurse notified;Gait belt       Disease Specific Education: Pt educated on importance of OOB mobility, prevention of complications of bedrest, and general safety during hospitalization. Pt verbalized understanding    Patient Diagnosis(es): The primary encounter diagnosis was Generalized weakness.  Diagnoses of Nausea and vomiting, intractability of vomiting not specified, unspecified vomiting type, Transaminitis, LANA (acute kidney injury) ([facility] [address] Metastatic malignant neoplasm, unspecified site [facility]), and Elevated bilirubin were also pertinent to this visit. has a past medical history of Anemia, Anesthesia, Arthritis, CAD (coronary artery disease), CVA (cerebral infarction), Diabetes mellitus ([address] Diastolic heart failure ([address] Fibromyalgia, Hyperlipidemia, Hypertension, Hypothyroidism, LFT's abnormal, CHAMBERS (nonalcoholic steatohepatitis), Nausea & vomiting, Pancreatic cyst, PNA (pneumonia), Pneumonia, Rectocele, Sleep apnea, Tremor, and Vitamin D deficiency. has a past surgical history that includes Cholecystectomy; eye surgery (Right); Ectopic pregnancy surgery (1985); Posterior cruciate ligament repair; other surgical history (11-4-2011); Diagnostic Cardiac Cath Lab Procedure; Colonoscopy (01/10/2018); Cystoscopy (Bilateral, 6/29/2021); ERCP (N/A, 7/1/2021); ERCP (7/1/2021); ERCP (7/1/2021); ERCP (N/A, 7/2/2021); ERCP (7/2/2021); ERCP (7/2/2021); ERCP (7/2/2021); and CT BIOPSY DEEP BONE PERCUTANEOUS (7/8/2021). Restrictions  Restrictions/Precautions  Restrictions/Precautions: General Precautions, Fall Risk  Position Activity Restriction  Other position/activity restrictions: Up with assist  Subjective   General  Chart Reviewed: Yes  Patient assessed for rehabilitation services?: Yes  Additional Pertinent Hx: Metastatic carcinoma  Response to previous treatment: Patient with no complaints from previous session  Family / Caregiver Present: No  Referring Practitioner: Maria Del Carmen Tomlinson MD  Diagnosis: Acute Kidney injury, bilateral ureteral obstruction with CYSTOSCOPY BILATERAL URETERAL STENT INSERTION (6/29), Dilated CBD with ENDOSCOPIC RETROGRADE CHOLANGIOPANCREATOGRAPHYER, CP SPHINCTER/PAPILLOTOMYER, CP STENT INSERTION (7/1), ERCP ENDOSCOPIC RETROGRADE CHOLANGIOPANCREATOGRAPHY (7/2)  Subjective  Subjective: Pt resting in bed, agreeable to OT treatment however refusal for ADL tasks stating \"I'm too weak\"  General Comment  Comments: RN approved therapy.   Pain Assessment  Pain Assessment: 0-10  Pain Level: 4  Pain Type: Chronic pain  Pain Location: Back  Pain Orientation: Mid;Upper  Pain Descriptors: Aching  Pain Frequency: Continuous  Non-Pharmaceutical Pain Intervention(s): Ambulation/Increased Activity; Therapeutic presence;Repositioned  Vital Signs  Patient Currently in Pain: Yes   Orientation  Orientation  Overall Orientation Status: Within Functional Limits  Objective    ADL  Grooming: Supervision;Setup (seated in chair to brush hair and wash face)  Additional Comments: Pt declined need for ADLs at this time, needed Max encouragement for participation  Balance  Sitting Balance: Stand by assistance  Standing Balance: Contact guard assistance (RW)  Standing Balance  Time: 30 sec  Activity: functional mobility in room with RW  Functional Mobility  Functional - Mobility Device: Rolling Walker  Activity: Other (in room)  Assist Level: Contact guard assistance  Bed mobility  Supine to Sit: Stand by assistance (increased time and use of bed rails, HOB elevated)  Sit to Supine: Unable to assess (pt left up  in chair)  Transfers  Stand Step Transfers: Contact guard assistance (RW)  Sit to stand: Contact guard assistance  Stand to sit: Contact guard assistance     Cognition  Overall Cognitive Status: Exceptions  Arousal/Alertness: Appropriate responses to stimuli  Following Commands: Follows one step commands with increased time; Follows one step commands with repetition  Attention Span: Attends with cues to redirect  Memory: Decreased recall of recent events;Decreased short term memory  Safety Judgement: Decreased awareness of need for safety;Decreased awareness of need for assistance  Problem Solving: Assistance required to identify errors made  Insights: Decreased awareness of deficits  Initiation: Requires cues for some  Sequencing: Does not require cues     Type of ROM/Therapeutic Exercise  Type of ROM/Therapeutic Exercise: AROM  Comment: BUE seated in chair, need for max encouragement for completion and VCs for direction  Exercises  Shoulder Flexion: x15  Shoulder Extension: x15  Horizontal ABduction: x15  Horizontal ADduction: x15  Elbow Flexion: 15x  Elbow Extension: 15x  Finger Flexion: 15x  Finger Extension: 15x  Other: Glute flexion x15    Plan   Plan  Times per week: 3-5x/week  Current Treatment Recommendations: Strengthening, Balance Training, Functional Mobility Training, Endurance Training, Patient/Caregiver Education & Training, Safety Education & Training, Self-Care / ADL, Equipment Evaluation, Education, & procurement, Positioning    AM-PAC Score  AM-PAC Inpatient Daily Activity Raw Score: 14 (07/09/21 1204)  AM-PAC Inpatient ADL T-Scale Score : 33.39 (07/09/21 1204)  ADL Inpatient CMS 0-100% Score: 59.67 (07/09/21 1204)  ADL Inpatient CMS G-Code Modifier : CK (07/09/21 1204)    Goals  Short term goals  Time Frame for Short term goals: 1 week (7/10) unless stated otherwise. Short term goal 1: Pt will perform functional/toilet t/fs with min A and AD PRN.; 7/05 STG met; New STG: SBA with functional/toilet transfers by 7--- ongoing 7/9/21  Short term goal 2: Pt will perform bathroom mobility with min A and AD PRN.; 7/05 STG met pt CGA with RW; New STG: SBA with bathroom mobility with RW by 7-10-21-- ongoing 7/9/21  Short term goal 3: Pt will verbalize at least 2 energy conservation strategies to use during ADLS (7/8). -- ongoing 7/9/21  Short term goal 4: Pt will UB dress with mod I.-- ongoing 7/9/21  Short term goal 5: Pt will perform BUE ther ex x15 to increase endurance for ADLS and functional mobility. -- continue for c onsistency 7/9/21  Patient Goals   Patient goals : \"to feel better\"       Therapy Time   Individual Concurrent Group Co-treatment   Time In 1028         Time Out 1052         Minutes 24         Timed Code Treatment Minutes: 24 Minutes       Aminta Collado OTR/L  If pt is unable to be seen after this session, please let this note serve as discharge summary.   Please see case management note for discharge disposition. Thank you.

## 2021-07-09 NOTE — PROGRESS NOTES
Nephrology Progress  Note                                                                                                                                                                                                                                                                                                                                                               Office : [phone]     Fax :[phone]              Patient's Name: Piedad Joshua  9:44 PM  7/8/2021    Reason for Consult:  LANA, Hyperkalemia      Requesting Physician:  Gerri [facility], SUMA - CNP      Chief Complaint:  Fatigue       Interval History :      Schedule for bone biopsy today        Not in acute distress    No diarrhea      S/p Mg replacement      S/p ERCP on 7/2 : s/p stent placement     S/p biopsy  : awaiting pathology report    Serum cr improved    K improved    No diarrhea  No vomiting    History of Present Ilness:    Piedad Joshua is a 80 y.o. female with PMH of HTN , DLP , DM type 2    Presented with c/o fatigue     Was diagnosed with uveitis in 5/21 and treated with prednisone    CT chest s/o mediastinal LNE ----> referred to oncology    C/o abdominal pain       C/o urinary retention    Denies sob or chest pain or diarrhea    At present being worked up for metastatic cancer , primary unknown              Past Medical History:   Diagnosis Date    Anemia     Anesthesia     PONV    Arthritis     CAD (coronary artery disease)     CVA (cerebral infarction)     Diabetes mellitus ([address]     Diastolic heart failure ([address]     Fibromyalgia     Hyperlipidemia     Hypertension     Hypothyroidism     LFT's abnormal     CHAMBERS (nonalcoholic steatohepatitis)     Nausea & vomiting     Pancreatic cyst     PNA (pneumonia) 2/23/2021    Pneumonia     Rectocele     Sleep apnea     does not use c-pap    Tremor     Vitamin D deficiency        Past Surgical History:   Procedure Laterality Date    CHOLECYSTECTOMY  COLONOSCOPY  01/10/2018    CT BIOPSY PERCUTANEOUS DEEP BONE  7/8/2021    CT BIOPSY PERCUTANEOUS DEEP BONE 7/8/2021 Dorene Carlisle MD [facility] [place] CT SCAN    CYSTOSCOPY Bilateral 6/29/2021    CYSTOSCOPY BILATERAL URETERAL STENT INSERTION performed by Javier Jensen MD at [address] [place] [address] 605    ERCP N/A 7/1/2021    ENDOSCOPIC RETROGRADE CHOLANGIOPANCREATOGRAPHY performed by Germain Franklin MD at [facility] ERCP  7/1/2021    ERCP SPHINCTER/PAPILLOTOMY performed by Germain Franklin MD at [facility] ERCP  7/1/2021    ERCP STENT INSERTION performed by Germain Franklin MD at [facility] ERCP N/A 7/2/2021    ERCP PAPILLOTOMY performed by Germain Franklin MD at [facility] ERCP  7/2/2021    ERCP BIOPSY performed by Germain Franklin MD at [facility] ERCP  7/2/2021    ERCP STENT INSERTION performed by Germain Franklin MD at [facility] ERCP  7/2/2021    ERCP ENDOSCOPIC RETROGRADE CHOLANGIOPANCREATOGRAPHY performed by Germain Franklin MD at [address] [place] [address] Right     cataract    OTHER SURGICAL HISTORY  11-4-2011    Posterior repair    POSTERIOR CRUCIATE LIGAMENT REPAIR         Family History   Problem Relation Age of Onset    Diabetes Mother     Stroke Mother     Heart Disease Father     Cancer Father         prostate    Heart Disease Brother         multiple stents    Heart Disease Brother     Heart Disease Sister         carotid artery surgery    Heart Disease Brother     Heart Attack Brother     Heart Disease Brother     Asthma Neg Hx     Emphysema Neg Hx     Heart Failure Neg Hx         reports that she quit smoking about 30 years ago. Her smoking use included cigarettes. She has a 0.50 pack-year smoking history. She has never used smokeless tobacco. She reports that she does not drink alcohol and does not use drugs.     Allergies:  Acetaminophen, Codeine, Oxycodone hcl, Oxycodone-acetaminophen, Tramadol hcl, Ultram [tramadol], and Percocet [oxycodone-acetaminophen]    Current Medications:    insulin lispro (HUMALOG) injection vial 7 Units, TID WC  magnesium sulfate 1,000 mg in dextrose 5 % 250 mL infusion, Once  insulin glargine (LANTUS) injection vial 60 Units, Nightly  HYDROmorphone (DILAUDID) injection 1 mg, Q3H PRN  dicyclomine (BENTYL) capsule 10 mg, 4x Daily  primidone (MYSOLINE) tablet 150 mg, Nightly  insulin lispro (HUMALOG) injection vial 0-12 Units, TID WC  insulin lispro (HUMALOG) injection vial 0-6 Units, Nightly  lactulose (CHRONULAC) 10 GM/15ML solution 20 g, TID  hydrALAZINE (APRESOLINE) injection 20 mg, Q4H PRN  metoprolol tartrate (LOPRESSOR) tablet 50 mg, BID  oxyCODONE-acetaminophen (PERCOCET) 5-325 MG per tablet 1 tablet, Q6H PRN  labetalol (NORMODYNE;TRANDATE) injection 10 mg, Q6H PRN  buPROPion (WELLBUTRIN XL) extended release tablet 150 mg, Daily  DULoxetine (CYMBALTA) extended release capsule 60 mg, Daily  levothyroxine (SYNTHROID) tablet 75 mcg, Daily  vitamin D (CHOLECALCIFEROL) tablet 4,000 Units, Daily  glucose (GLUTOSE) 40 % oral gel 15 g, PRN  dextrose 50 % IV solution, PRN  glucagon (rDNA) injection 1 mg, PRN  dextrose 5 % solution, PRN  sodium chloride flush 0.9 % injection 10 mL, PRN  0.9 % sodium chloride infusion, PRN  acetaminophen (TYLENOL) tablet 650 mg, Q6H PRN   Or  acetaminophen (TYLENOL) suppository 650 mg, Q6H PRN  melatonin tablet 3 mg, Nightly PRN  diphenhydrAMINE (BENADRYL) tablet 50 mg, Q6H PRN  hydrOXYzine (ATARAX) tablet 10 mg, TID PRN  ursodiol (ACTIGALL) capsule 300 mg, BID  [Held by provider] heparin (porcine) injection 5,000 Units, 3 times per day  ondansetron (ZOFRAN-ODT) disintegrating tablet 4 mg, Q4H PRN  ondansetron (ZOFRAN) injection 4 mg, Q4H PRN  promethazine (PHENERGAN) injection 12.5 mg, Q4H PRN  promethazine (PHENERGAN) tablet 12.5 mg, Q4H PRN        Review of Systems:   14 point ROS obtained but were negative except mentioned in HPI      Physical exam:     Vitals:  /80   Pulse 74   Temp 97.8 °F (36.6 °C) (Axillary)   Resp 16   Ht 5' 6.5\" (1.689 m)   Wt 194 lb 8 oz (88.2 kg)   LMP  (LMP Unknown)   SpO2 92%   BMI 30.92 kg/m²   Constitutional:  OAA X3 NAD  Skin: no rash, turgor wnl  Heent:  eomi, mmm  Neck: no bruits or jvd noted  Cardiovascular:  S1, S2 without m/r/g  Respiratory: CTA B without w/r/r  Abdomen:  +bs, soft, nt, nd  Ext: no lower extremity edema  Psychiatric: mood and affect appropriate  Musculoskeletal:  Rom, muscular strength intact    Data:   Labs:  CBC:   Recent Labs     07/06/21 0634 07/07/21 0725 07/08/21  0553   WBC 9.8 9.1 7.9   HGB 11.5* 11.1* 10.6*    309 279     BMP:    Recent Labs     07/06/21  0634 07/07/21 0725 07/08/21  0553   * 134* 135*   K 5.0 4.9 4.9   CL 95* 97* 98*   CO2 29 30 29   BUN 20 23* 23*   CREATININE 1.0 1.0 1.1   GLUCOSE 185* 143* 180*     Ca/Mg/Phos:   Recent Labs     07/06/21  0634 07/07/21  0725 07/08/21  0553   CALCIUM 9.7 9.5 9.2   MG 1.70* 1.90 1.80     Hepatic:   Recent Labs     07/06/21 0634 07/07/21 0725 07/08/21  0553   AST 31 29 27   ALT 63* 51* 40   BILITOT 1.1* 1.0 0.9   ALKPHOS 467* 386* 351*     Troponin:   No results for input(s): TROPONINI in the last 72 hours. BNP: No results for input(s): BNP in the last 72 hours. Lipids: No results for input(s): CHOL, TRIG, HDL, LDLCALC, LABVLDL in the last 72 hours. ABGs:   No results for input(s): PHART, PO2ART, TWH5TQZ in the last 72 hours. INR:   No results for input(s): INR in the last 72 hours. UA:  No results for input(s): Vasiliy Saner, GLUCOSEU, BILIRUBINUR, KETUA, SPECGRAV, BLOODU, PHUR, PROTEINU, UROBILINOGEN, NITRU, LEUKOCYTESUR, Rebekah Lawn in the last 72 hours. Urine Microscopic:   No results for input(s): LABCAST, BACTERIA, COMU, HYALCAST, WBCUA, RBCUA, EPIU in the last 72 hours. Urine Culture: No results for input(s): LABURIN in the last 72 hours.   Urine Chemistry: No results for input(s): CLUR, LABCREA, PROTEINUR, NAUR in the last 72 hours. IMAGING:  CT GUIDED NEEDLE PLACEMENT   Final Result   Successful CT guided biopsy of a left iliac bone lesion. CT BIOPSY DEEP BONE PERCUTANEOUS   Final Result   Successful CT guided biopsy of a left iliac bone lesion. XR ABDOMEN (KUB) (SINGLE AP VIEW)   Final Result   No obstruction, ileus or fecal impaction. 1 biliary stent and 2 bilateral ureteral stents were noted. FL ERCP BILIARY AND PANCREATIC S&I   Final Result   Intraoperative fluoroscopy provided for ERCP. Please refer to the procedure   report for further details. XR KNEE RIGHT (1-2 VIEWS)   Final Result   No acute osseous abnormality. FL ERCP BILIARY AND PANCREATIC S&I   Final Result   ERCP images as above      52 52         XR FEMUR LEFT (MIN 2 VIEWS)   Final Result   Focal area of mixed lucency/sclerosis in the greater   trochanteric/intertrochanteric region of the left proximal femur is   consistent with area of suspected metastatic disease with no evidence of   superimposed pathologic fracture as described above. NM BONE SCAN WHOLE BODY   Final Result   Widespread osseous metastatic disease throughout the axial and appendicular   skeleton. MRI ABDOMEN WO CONTRAST MRCP   Final Result   1. Severe intrahepatic and extrahepatic biliary dilation and up to moderate   dilation of the main pancreatic duct with abrupt caliber changes in the   common bile duct and main pancreatic duct near the pancreatic head. No   definite underlying lesion is identified, although one is not excluded on the   basis of this study. Considerations include ductal adenocarcinoma or   postinflammatory strictures from prior pancreatitis. Consider further   evaluation with pancreas protocol abdomen CT or MRI versus ERCP. 2. Sequelae of chronic pancreatitis best demonstrated on CT with potential   for superimposed mild acute pancreatitis. However, the visualized   peripancreatic stranding could be related to minimal anasarca seen elsewhere. 3. Innumerable simple and minimally complicated cystic lesions measuring up   to 1.8 cm throughout the pancreas could represent dilated side branches from   chronic pancreatitis and/or branch duct intraductal papillary mucinous   neoplasms. Recommend follow-up with pancreas protocol and MRI (preferred) or   CT in 1 year. 4. Severe bilateral hydronephrosis, moderate to severe left hydroureter (in   the setting of a partially duplicated collecting system), and mild right   hydroureter. The right ureter changes caliber proximal to the pelvic brim,   while the conjoint left ureter changes caliber near the pelvic brim. Underline postinflammatory or malignant strictures are not excluded. Consider further evaluation with CT urography. 5. Trace to small bilateral pleural effusions, trace ascites, and minimal   anasarca suggestive of volume overload. 6. Suboptimally seen mesenteric lymph nodes better demonstrated on CT could   be reactive but could also be related to mesenteric panniculitis or   neoplastic involvement. Recommend attention on follow-up imaging. US GALLBLADDER RUQ   Final Result   1. Right-sided hydroureteronephrosis. 2. Common duct dilation status post cholecystectomy. CT CHEST ABDOMEN PELVIS WO CONTRAST   Final Result   Somewhat limited study in the absence of contrast material.      Numerous sclerotic metastases throughout all bones of the visualized axial   and appendicular skeleton with a possible lytic lesion within the upper   aspect of the right scapula and a small soft tissue nodule within the   anterior left chest, possibly metastatic. These findings are new from prior. A healing nondisplaced subacute fracture of the left L2 transverse process is   also new, possibly pathologic.       There are numerous mildly enlarged lymph nodes throughout the mediastinum as well as the retroperitoneum in the abdominal mesentery. Moderate to severe bilateral hydronephrosis and hydroureter, also new from   prior, without appreciable obstructing stone. It is unclear whether this is   related to metastatic cervical cancer or an underlying lymphoproliferative   disorder such as lymphoma. Lymph node sampling is recommended. Minimal bibasilar atelectasis. Otherwise, clear lungs. XR CHEST PORTABLE   Final Result   Stable chronic changes with no acute abnormality seen. FLUORO FOR SURGICAL PROCEDURES    (Results Pending)   FLUORO FOR SURGICAL PROCEDURES    (Results Pending)       Assessment/Plan     1. Hyperkalemia in setting of LANA and obstructive uropathy       Serum K :  5.6 -------> 6 ---> 5.4 -----> 4.8        S/p BL stents placement       LANA imprved          Plan    Hyperkalemia improved        Low K die           2. LANA : improved     Etiology seems obstructive uropathy     S/p cystoscopy and BL stent placement      Encourage PO intake of fluid to thirst      3.  Metabolic acidosis in setting of LANA    improved    4. Acid- base/ Electrolyte imbalance     moniter      5 BL hydronephrosis    S/p stent    Marroquin cath    6 DM 2    7 metastatic cancer , unknown primary     8 Hypomagnesemia     Replace with IV Mg    Thank you for allowing us to participate in care of Ese Claire MD  Feel free to contact me   [facility] [address]  Office : [phone]  Fax :[phone]

## 2021-07-09 NOTE — PROGRESS NOTES
conjunctiva normal  ENT: Normocephalic, without obvious abnormality, atraumatic  NECK: supple, symmetrical, no jugular venous distension and no carotid bruits   HEMATOLOGIC/LYMPHATIC: no cervical, supraclavicular or axillary lymphadenopathy   LUNGS: no increased work of breathing and clear to auscultation   CARDIOVASCULAR: regular rate and rhythm, normal S1 and S2, no murmur noted  ABDOMEN: normal bowel sounds x 4, soft, non-distended, non-tender, no masses palpated, no hepatosplenomgaly   MUSCULOSKELETAL: full range of motion noted, tone is normal  NEUROLOGIC: awake, alert, oriented to name, place and time. Motor skills grossly intact. SKIN: Normal skin color, texture, turgor and  Jaundice nearly resolved. appears intact. 2 cm minimally erythematous raised mass on the right upper back.   EXTREMITIES: no LE edema       Data      Recent Labs     07/07/21  0725 07/08/21  0553 07/09/21  0926   WBC 9.1 7.9 7.8   HGB 11.1* 10.6* 11.2*   HCT 34.0* 32.6* 34.3*    279 260   MCV 80.6 80.6 81.3        Recent Labs     07/07/21  0725 07/08/21  0553 07/09/21  0710   * 135* 137   K 4.9 4.9 4.6   CL 97* 98* 100   CO2 30 29 24   BUN 23* 23* 17   CREATININE 1.0 1.1 1.0     Recent Labs     07/07/21  0725 07/08/21  0553 07/09/21  0710   AST 29 27 25   ALT 51* 40 34   BILITOT 1.0 0.9 0.9   ALKPHOS 386* 351* 351*       Magnesium:    Lab Results   Component Value Date    MG 2.00 07/09/2021    MG 1.80 07/08/2021    MG 1.90 07/07/2021         Problem List  Patient Active Problem List   Diagnosis    CAD (coronary artery disease)    Sleep apnea    Generalized weakness    Nonalcoholic steatohepatitis    Rectocele    [facility]2 [facility] mellitus, type 2) [facility]    HTN (hypertension), benign    Cerebral infarction ([facility]    Right shoulder pain    Insomnia    SUNIL (obstructive sleep apnea)    Coarse tremor    Dizziness    Palpitations    Essential tremor    Dyslipidemia    PNA (pneumonia)    Obesity    Bilateral primary osteoarthritis of knee    Chronic bilateral low back pain without sciatica    DDD (degenerative disc disease), lumbar    Facet arthritis of lumbar region    Primary osteoarthritis of right knee    Abnormal finding on thyroid function test    LANA (acute kidney injury) ([address]    Obstructive nephropathy    Metastatic malignant neoplasm (HCC)    Abdominal lymphadenopathy    Transaminitis    Elevated bilirubin    Nausea and vomiting    Bile duct obstruction    Moderate malnutrition (HCC)    Itching    Abdominal distention       ASSESSMENT AND PLAN:    Metastatic carcinoma (Possible pancreatic carcinoma):  -She appears to have choroidal mets. -She has bone lesions, possible intrahepatic lesion, and omental metastasis  -CA-125 is over thousand  -CA 19-9 is Over 54,000 on 6/28/2021 and then a repeat was 510,000.  -Initial myeloma studies are negative  -The differential is quite broad. -She is going to have an ERCP to look for pancreatic carcinoma. This was done 7/2/21. -If this is negative, I would consider a bone biopsy to help with the differential.  -She has a very poor prognosis. - Very widespread bone mets   -s/p ERCP 7/2/21 - path negative for malignancy ; due to chronic pancreatitis   - Will not plan for any aggressive chemo until out of SNF stay     Elevated liver functions/chronic pancreatitis/CBD blockage :  -s/p stent  -bilirubin down to 1.1     Hydronephrosis   - Bilateral stents in place   - to be exchanged in 4 months     Back lesions:  -She has a 2 cm area in the upper back  -Is very difficult to tell whether this is a skin metastasis. It looks more like a mild subcutaneous abscess.     Left proximal femur:  -Possible pinning due to him pending a fracture  - ortho following  - XRAY no pathological fracture  - Will need RADIATION outpatient     LANA  - nephrology following      ONCOLOGIC DISPOSITION:    - Bone bx of pelvis needed for pathological confirmation of disease; possible GYN primary with bone mets   - Likely needs SNF on dc if she desires aggressive care   - Bone bx results are pending     Phyllistine Shape, APRN - CNP

## 2021-07-09 NOTE — PROGRESS NOTES
Hospitalist Progress Note      PCP: Lawrence Masterson, APRN - [facility]    Date of Admission: 6/27/2021    Chief Complaint on Admission:   Fatigue (Patient arrives via walk in with c/o fatigue and abd pain x1 week. Patient states dx with Eye CA 1 week ago but has been fatigued and with abdominal pain since then. Has not been for a PET scan. )        HISTORY OF PRESENT ILLNESS:    Andrea Correa is a 80 y.o. female.     She developed left eye soreness and headaches in May. She saw an ophthalmologist, Dr. Konstantin Greer at [facility], on 5/19/21. 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 She has been on a tapering regimen since then. She has had problems with glycemic control. She has also not responded as expected to treatment. Her ophthalmologist became concerned about an intracranial neoplasm. It was recommended that patient undergo additional imaging including MRI brain and maxillofacial/sinus w and w/o contrast.  She was also recommended to undergo CT chest w/o contrast.  The MRI did not reveal a tumor but was notable for lobular left retinal thickening and appparent left optic nerve compression. The CT chest was notable for mild mediastinal lymphadenopathy. She was referred to oncology but has not had an office visit yet. She abruptly stopped the prednisone several days ago. She had tapered by that time to 60mg daily.     She has been having abdominal pain for roughly a month. She though this was from taking prednisone so tried to ignore it or treat it with OTC remedies. She presents tonight for progressive pain and persistent nausea. She cannot say for how long though. She does relate she is having bowel movements and passing flatus although she may go 3 or 4 days between bowel movements. She denies any difficulty urinating, but relates that when she wakes up every morning she is leaking urine and leaks urine all the way to the restroom.   She recalls being hospitalized mL (07/03/21 1040)       PRN Meds:  HYDROmorphone, hydrALAZINE, oxyCODONE-acetaminophen, labetalol, glucose, dextrose, glucagon (rDNA), dextrose, sodium chloride flush, sodium chloride, acetaminophen **OR** acetaminophen, melatonin, diphenhydrAMINE, hydrOXYzine, ondansetron, ondansetron, promethazine, promethazine    Intake and Output     Intake/Output Summary (Last 24 hours) at 7/9/2021 0938  Last data filed at 7/8/2021 1117  Gross per 24 hour   Intake 0 ml   Output --   Net 0 ml       Vitals    BP (!) 162/83   Pulse 67   Temp 98.6 °F (37 °C) (Oral)   Resp 16   Ht 5' 6.5\" (1.689 m)   Wt 184 lb (83.5 kg)   LMP  (LMP Unknown)   SpO2 96%   BMI 29.25 kg/m²     Exam:    Gen: Well, mild discomfort abdomen, Alert, Oriented x 3  Lungs: Diffuse scattered rhonchi to anterior fields. Diminished to bases bilaterally, Good respiratory effort. Easy and unlabored  Cor: RRR, S1S2, w/out M/R/G, non-displaced PMI  Abdomen: Soft, ND, generally tender throughout, w/out R/G, w/ +BSx4  Ext: No C/C/E Bilaterally. Full ROM w/ out deformity   Neuro: Neurovascularly intact w/ Sensory/Motor intact UE/LE Bilaterally. Skin: Skin color, texture, turgor normal. Without Rashes/Lesions. Musculoskeletal: No evidence of joint instability, strength normal, no evidence of muscle atrophy, no deformities present    Data    Recent Labs     07/07/21  0725 07/08/21  0553   WBC 9.1 7.9   HGB 11.1* 10.6*   HCT 34.0* 32.6*    279      Recent Labs     07/07/21  0725 07/08/21  0553 07/09/21  0710   * 135* 137   K 4.9 4.9 4.6   CL 97* 98* 100   CO2 30 29 24   BUN 23* 23* 17   CREATININE 1.0 1.1 1.0     Recent Labs     07/07/21  0725 07/08/21  0553 07/09/21  0710   AST 29 27 25   ALT 51* 40 34   BILITOT 1.0 0.9 0.9   ALKPHOS 386* 351* 351*     No results for input(s): INR in the last 72 hours. No results for input(s): CKTOTAL, CKMB, CKMBINDEX, TROPONINI in the last 72 hours.     Consults:     IP CONSULT TO HOSPITALIST  IP CONSULT TO HEM/ONC  IP CONSULT TO UROLOGY  IP CONSULT TO SPIRITUAL SERVICES  IP CONSULT TO GI  IP CONSULT TO PALLIATIVE CARE  IP CONSULT TO NEPHROLOGY  IP CONSULT TO ORTHOPEDIC SURGERY    ASSESSMENT AND PLAN      Active Hospital Problems    Diagnosis Date Noted    Itching [L29.9]     Abdominal distention [R14.0]     Metastatic malignant neoplasm ([facility] [C79.9] 06/28/2021    Abdominal lymphadenopathy [R59.0] 06/28/2021    Transaminitis [R74.01] 06/28/2021    Moderate malnutrition ([address] [E44.0] 06/28/2021    Elevated bilirubin [R17]     Nausea and vomiting [R11.2]     Bile duct obstruction [K83.1]     LANA (acute kidney injury) ([facility] [N17.9] 06/27/2021    Obstructive nephropathy [N13.8] 06/27/2021    DM2 (diabetes mellitus, type 2) ([facility] [E11.9] 06/19/2013    Generalized weakness [R53.1] 04/21/2011     Assessment and Plan:   1. Metastatic carcinoma:  s/p ERCP (7/2) with negative pathology.   Unclear primary site. Pelvic bone biopsy to assist with identification 7/8. Path pending. .    2. Widespread bone metastases, initial myeloma studies are negative.  Broad differential, S/P pelvic bone biopsy per Hem/Onc on 7/8. Path pending. 3. Acute kidney injury with hyperkalemia (eGFR 49) Resolving after stent placement. .    4. Hydronephrosis from obstructive nephropathy, bilateral hydroureteronephrosis without significant bladder distention s/p cystoscopy and bilateral double-J ureteral stent placement (6/29).  To be exchanged in 4 months. 5. Hepatic encephalopathy with elevated ammonia: Mentation at baseline.  Treated with oral lactulose 20 gram TID. 6. Left proximal femur mass, per Ortho no surgery planned for pathologic fracture. 7. Hypomagnesemia:  Unclear etiology.  Replace with IV magnesium sulfate to Mg > 2.0.    8.  Type 2 Diabetes (uncontrolled, HgbA1c 10.4%):  Cover with a \"sliding scale\" lispro moderate scale prandial correction insulin.   Add basal glargine insulin 60 units nightly.   She is normally on Toujeo glargine insulin 70 units nightly in addition to glimepiride and metformin. 9. Benign positional tremor:  Restart primidone 150 mg nightly. 10. Puritis: Maintain ursodiol for cholestatic etiology. 11. Essential HTN: Increase Metoprolol 50 mg BID. Often Hypertensive with pain. Monitor closely. 12. HLD: Hold statin due to transaminitis  13. Depression: Maintain Wellbutrin and duloxetine. 14. Hypothyroid: Maintain synthroid and titrate as outpt to TFT.      Advance Directive: Full Code  DVT prophylaxis with enoxaparin 40 mg sub-Q daily. Discharge planning: Poor prognosis overall.  Plan for Palliative care to help patient determine hospice vs further treatment. She is hesitant to go home due to persistent pain. Patient to tentatively go to SNF at discharge.  Pre Cert out to Augustus Rebolledo MD

## 2021-07-09 NOTE — CARE COORDINATION
Case Management/Follow up:    Chart reviewed for length of stay. Hospital day 12  Unit:  C5  Diagnosis and current status as per MD progress: CA with mets, s/p ERCP  Anticipated d/c date: 7/12-7/13  Expected plan for discharge: to SNF at [address]. Potential barriers:medical complications, await precert  Precert required/date [facility]:3/5/45  Confirmed plan with patient and/or family:yes  Comments: Continue to plan for for SNF on d/c. Precert initiated. Per MD anticipate d/c 7/12. Will continue to follow for needs.   Electronically signed by LEONARD Torres LISW-S on 7/9/2021 at 12:50 PM

## 2021-07-10 NOTE — CARE COORDINATION
Patient now wants to return to home with hospice. No agency preference. Referral made to [address].

## 2021-07-10 NOTE — PROGRESS NOTES
Physical Therapy  Facility/Department: [facility] C5 - MED SURG/ORTHO  Daily Treatment Note  NAME: Jonathan Barrow  : 1950  MRN: [id number]    Date of Service: 7/10/2021    Discharge Recommendations:  Subacute/Skilled Nursing Facility   PT Equipment Recommendations  Equipment Needed: No  Other: defer to patient's facility    Assessment   Body structures, Functions, Activity limitations: Decreased functional mobility ; Decreased ADL status; Decreased strength;Decreased endurance; Increased pain;Decreased high-level IADLs;Decreased posture;Decreased balance  Assessment: Treatment again limited by fatigue. Pt continues slow to process instructions and slow in movement today. Pt amb with RW 50 ft cg assist and participated in 12-15reps LE Ex. Pt will benefit from skilled PT to address deficits. Recommend SNF at discharge. Treatment Diagnosis: Decreased independence with functional mobility  Prognosis: Fair  Decision Making: Medium Complexity  Patient Education: Patient educated on the benefits of increased activity, use of call bell, safety with mobility. Patient verbalized understanding. Barriers to Learning: none  REQUIRES PT FOLLOW UP: Yes  Activity Tolerance  Activity Tolerance: Patient Tolerated treatment well;Patient limited by fatigue;Patient limited by endurance  Activity Tolerance: /74  HR 72  O2 95% RA     Patient Diagnosis(es): The primary encounter diagnosis was Generalized weakness. Diagnoses of Nausea and vomiting, intractability of vomiting not specified, unspecified vomiting type, Transaminitis, LANA (acute kidney injury) ([address] Metastatic malignant neoplasm, unspecified site ([address] and Elevated bilirubin were also pertinent to this visit.      has a past medical history of Anemia, Anesthesia, Arthritis, CAD (coronary artery disease), CVA (cerebral infarction), Diabetes mellitus ([address] Diastolic heart failure ([address] Fibromyalgia, Hyperlipidemia, Hypertension, Hypothyroidism, LFT's abnormal, CHAMBERS (nonalcoholic steatohepatitis), Nausea & vomiting, Pancreatic cyst, PNA (pneumonia), Pneumonia, Rectocele, Sleep apnea, Tremor, and Vitamin D deficiency. has a past surgical history that includes Cholecystectomy; eye surgery (Right); Ectopic pregnancy surgery (1985); Posterior cruciate ligament repair; other surgical history (11-4-2011); Diagnostic Cardiac Cath Lab Procedure; Colonoscopy (01/10/2018); Cystoscopy (Bilateral, 6/29/2021); ERCP (N/A, 7/1/2021); ERCP (7/1/2021); ERCP (7/1/2021); ERCP (N/A, 7/2/2021); ERCP (7/2/2021); ERCP (7/2/2021); ERCP (7/2/2021); and CT BIOPSY DEEP BONE PERCUTANEOUS (7/8/2021). Restrictions  Restrictions/Precautions  Restrictions/Precautions: General Precautions, Fall Risk  Position Activity Restriction  Other position/activity restrictions: Up with assist  Subjective   General  Chart Reviewed: Yes  Additional Pertinent Hx: HPI per chart, \"admitted with acute kidney injury from obstructive nephropathy, bilateral hydroureteronephrosis without significant bladder distention s/p cystoscopy and bilateral double-J ureteral stentplacement (6/29). Metastatic carcinoma (possible pancreatic carcinoma) with apparent choroidal mets, sclerotic lesions widespread throughout the axial skeleton. Widespread lymphadenopathy in the abdomen, retroperitoneal space, and mediastinum. Elevated CA-125 and CA 19-9. ERCP with stent (7/2) and biopsy for pancreatic carcinoma. Right proximal femur mass that may require pinning due to pending pathologic fracture. Initial myeloma studies are negative. \"  Response To Previous Treatment: Patient with no complaints from previous session.   Family / Caregiver Present: No  Referring Practitioner: Noelle Houser MD  Subjective  Subjective: pt agrees to therapy, reports fatigue  General Comment  Comments: pt resting in bed on approach  Pain Screening  Patient Currently in Pain: No (at rest)  Vital Signs  Patient Currently in Pain: No (at rest) Orientation  Orientation  Overall Orientation Status: Impaired (Pt unsure why she is in hospital)  Orientation Level: Oriented to place; Disoriented to situation;Oriented to time;Oriented to person  Cognition      Objective   Bed mobility  Supine to Sit: Contact guard assistance  Transfers  Sit to Stand: Contact guard assistance;Minimal Assistance (cues hand placement)  Stand to sit: Contact guard assistance;Minimal Assistance (cues hand placement)  Ambulation  Ambulation?: Yes  Ambulation 1  Surface: level tile  Device: Rolling Walker  Assistance: Contact guard assistance  Quality of Gait: shortened stride, decreased foot clearance, assist for walker navigation, decreased safety awarness when going to sit down  Gait Deviations: Slow Alysa;Decreased step length;Decreased step height  Distance: 15', 25', 50'  Comments: Distance limited by fatigue     Balance  Posture: Fair  Sitting - Static: Good;-  Sitting - Dynamic: Good;-  Standing - Static: Fair;+  Standing - Dynamic: Fair (rw)  Exercises  Straight Leg Raise: 10 x B  Quad Sets: 10 B  Heelslides: 10 B  Gluteal Sets: 10 x B  Hip Flexion: 10 B  Hip Abduction: 10 B  Knee Long Arc Quad: X 15 BLE  Ankle Pumps: 15 x B supine         Comment: tolet transf cga/min A.   Standing balance at sink sba         AM-PAC Score     AM-PAC Inpatient Mobility without Stair Climbing Raw Score : 16 (07/10/21 1436)  AM-PAC Inpatient without Stair Climbing T-Scale Score : 45.54 (07/10/21 1436)  Mobility Inpatient CMS 0-100% Score: 40.64 (07/10/21 1436)  Mobility Inpatient without Stair CMS G-Code Modifier : CK (07/10/21 1436)       Goals  Short term goals  Time Frame for Short term goals: 1 week 7/11/21  Short term goal 1: Supine <> sit with min assist.; 7/8 min A  -7/10 cga  Short term goal 2: Sit <> stand with min assist.; 7/8 min A  New goal to transfer with SBA  -7/10 cga/min A  Short term goal 3: Bed <> chair with min assist.; 7/8 min assist bed to chair, New goal to transfer to Never

## 2021-07-10 NOTE — PROGRESS NOTES
Hospitalist Progress Note      PCP: Melvi Castro, APRN - CNP    Date of Admission: 6/27/2021    Chief Complaint on Admission:   Progressive fatigue. Hospital course  Presented with progressive weakness. Noted diagnosis of posterior uveitis in May, and treated with prednisone at the time at the [facility].  Patient's ocular complaints did not respond properly to the treatment. Work-up was started for possible underlying cause of uveitis, which could include malignancy. CT scan of the chest showed some enlarged lymph nodes. Patient was referred to oncology as outpatient did not have time to go, when she came to the emergency room with progressive weakness. Found to have acute renal failure. Imaging showed metastatic masses in the abdomen and obstructive nephropathy. The metastatic masses were biopsied. Patient also had ERCP. Overall, the picture is consistent with metastatic pancreatic cancer, end-stage. Shortly before I saw the patient today, oncology recommended hospice and hospice consult was placed with patient's agreement.     SUBJECTIVE:   Abdominal pain, bone pain on the hip joints. No chest pain, no shortness of breath, no nausea or vomiting. No abdominal pain.     OBJECTIVE:     Allergies  Acetaminophen, Codeine, Oxycodone hcl, Oxycodone-acetaminophen, Tramadol hcl, Ultram [tramadol], and Percocet [oxycodone-acetaminophen]    Medications      Scheduled Meds:   morphine  30 mg Oral 2 times per day    insulin lispro  7 Units Subcutaneous TID WC    IV infusion builder  100 mL/hr Intravenous Once    insulin glargine  60 Units Subcutaneous Nightly    dicyclomine  10 mg Oral 4x Daily    primidone  150 mg Oral Nightly    insulin lispro  0-12 Units Subcutaneous TID WC    insulin lispro  0-6 Units Subcutaneous Nightly    lactulose  20 g Oral TID    metoprolol tartrate  50 mg Oral BID    buPROPion  150 mg Oral Daily    DULoxetine  60 mg Oral Daily    levothyroxine  75 mcg Oral Daily    Vitamin D  4,000 Units Oral Daily    ursodiol  300 mg Oral BID    [Held by provider] heparin (porcine)  5,000 Units Subcutaneous 3 times per day       Infusions:   dextrose      sodium chloride 25 mL (07/03/21 1040)       PRN Meds:  HYDROmorphone, hydrALAZINE, oxyCODONE-acetaminophen, labetalol, glucose, dextrose, glucagon (rDNA), dextrose, sodium chloride flush, sodium chloride, acetaminophen **OR** acetaminophen, melatonin, diphenhydrAMINE, hydrOXYzine, ondansetron, ondansetron, promethazine, promethazine    Intake and Output     Intake/Output Summary (Last 24 hours) at 7/10/2021 1354  Last data filed at 7/10/2021 0956  Gross per 24 hour   Intake 837 ml   Output --   Net 837 ml       Vitals    BP (!) 158/74   Pulse 72   Temp 97.5 °F (36.4 °C) (Oral)   Resp 18   Ht 5' 6.5\" (1.689 m)   Wt 184 lb (83.5 kg)   LMP  (LMP Unknown)   SpO2 97%   BMI 29.25 kg/m²     Exam:    Physical Examination:   General appearance - alert, well appearing, and in no distress  Mental status - alert, oriented to person, place, and time  Eyes - pupils equal and reactive, extraocular eye movements intact  Nose - normal and patent, no erythema, discharge or polyps  Mouth - mucous membranes moist, pharynx normal without lesions  Neck - supple, no significant adenopathy  Chest - clear to auscultation, no wheezes, rales or rhonchi, symmetric air entry  Heart - normal rate, regular rhythm, normal S1, S2, no murmurs, rubs, clicks or gallops  Abdomen - soft, nontender, nondistended, no masses or organomegaly  Neurological - alert, oriented, normal speech, no focal findings or movement disorder noted  Musculoskeletal - no joint tenderness, deformity or swelling  Extremities - peripheral pulses normal, no pedal edema, no clubbing or cyanosis  Skin - normal coloration and turgor, no rashes, no suspicious skin lesions noted       Data    Recent Labs     07/08/21  0553 07/09/21  0926 07/10/21  0621   WBC 7.9 7.8 7.5   HGB 10.6* 11.2* 11.3*   HCT 32.6* 34.3* 33.8*    260 263      Recent Labs     07/08/21  0553 07/09/21  0710 07/10/21  0621   * 137 135*   K 4.9 4.6 4.8   CL 98* 100 98*   CO2 29 24 25   BUN 23* 17 18   CREATININE 1.1 1.0 1.0     Recent Labs     07/08/21  0553 07/09/21  0710 07/10/21  0621   AST 27 25 25   ALT 40 34 31   BILITOT 0.9 0.9 0.8   ALKPHOS 351* 351* 344*     No results for input(s): INR in the last 72 hours. No results for input(s): CKTOTAL, CKMB, CKMBINDEX, TROPONINI in the last 72 hours. Consults:     IP CONSULT TO HOSPITALIST  IP CONSULT TO HEM/ONC  IP CONSULT TO UROLOGY  IP CONSULT TO SPIRITUAL SERVICES  IP CONSULT TO GI  IP CONSULT TO PALLIATIVE CARE  IP CONSULT TO NEPHROLOGY  IP CONSULT TO ORTHOPEDIC SURGERY  IP CONSULT TO HOSPICE    ASSESSMENT AND PLAN      Active Hospital Problems    Diagnosis Date Noted    Itching [L29.9]     Abdominal distention [R14.0]     Metastatic malignant neoplasm ([facility] [C79.9] 06/28/2021    Abdominal lymphadenopathy [R59.0] 06/28/2021    Transaminitis [R74.01] 06/28/2021    Elevated bilirubin [R17]     Nausea and vomiting [R11.2]     Bile duct obstruction [K83.1]     LANA (acute kidney injury) ([facility] [N17.9] 06/27/2021    Obstructive nephropathy [N13.8] 06/27/2021    DM2 (diabetes mellitus, type 2) ([facility] [E11.9] 06/19/2013    Generalized weakness [R53.1] 04/21/2011     PLAN:    Metastatic carcinoma, suspected primary pancreatic. Chemotherapy not requested by the patient. Already seen by oncology. Hospice consult requested. Acute renal failure secondary to post renal obstruction  Resolved after stent placement by urology. Currently creatinine is normal.    Diabetes mellitus type 2  Continue Lantus and sliding scale insulin. Hypertension  Acceptable blood pressure with current management. I will not increase blood pressure medications. Hepatic encephalopathy  Most likely secondary to malignancy. Continue lactulose. Mental status back to normal today. Discussed with patient and son. Questions answered    DVT prophylaxis: Not needed given poor prognosis and hospice referral  Diet: ADULT DIET;  Regular  Adult Oral Nutrition Supplement; Diabetic Oral Supplement   Code status: Full Code  PT OT not needed      Disposition: Hospice consult requested        Caitlin Wallace MD

## 2021-07-10 NOTE — PROGRESS NOTES
Pt stating that her pain is not well controlled today. States that she feels like she can not move. Pain is in her abdomen and her back. Pt is very tearful this am. Gave po pain medication and IV Zofran.

## 2021-07-10 NOTE — PROGRESS NOTES
CYSTOSCOPY Bilateral 6/29/2021    CYSTOSCOPY BILATERAL URETERAL STENT INSERTION performed by Jose Ortega MD at [address] CATH LAB [address]    ERCP N/A 7/1/2021    ENDOSCOPIC RETROGRADE CHOLANGIOPANCREATOGRAPHY performed by Javan Washington MD at [facility] ERCP  7/1/2021    ERCP SPHINCTER/PAPILLOTOMY performed by Javan Washington MD at [facility] ERCP  7/1/2021    ERCP STENT INSERTION performed by Javan Washington MD at [facility] ERCP N/A 7/2/2021    ERCP PAPILLOTOMY performed by Javan Washington MD at [facility] ERCP  7/2/2021    ERCP BIOPSY performed by Javan Washington MD at [facility] ERCP  7/2/2021    ERCP STENT INSERTION performed by Javan Washington MD at [facility] ERCP  7/2/2021    ERCP ENDOSCOPIC RETROGRADE CHOLANGIOPANCREATOGRAPHY performed by Javan Washington MD at [facility] Right     cataract    OTHER SURGICAL HISTORY  11-4-2011    Posterior repair    POSTERIOR CRUCIATE LIGAMENT REPAIR         Family History   Problem Relation Age of Onset    Diabetes Mother     Stroke Mother     Heart Disease Father     Cancer Father         prostate    Heart Disease Brother         multiple stents    Heart Disease Brother     Heart Disease Sister         carotid artery surgery    Heart Disease Brother     Heart Attack Brother     Heart Disease Brother     Asthma Neg Hx     Emphysema Neg Hx     Heart Failure Neg Hx         reports that she quit smoking about 30 years ago. Her smoking use included cigarettes. She has a 0.50 pack-year smoking history. She has never used smokeless tobacco. She reports that she does not drink alcohol and does not use drugs.     Allergies:  Acetaminophen, Codeine, Oxycodone hcl, Oxycodone-acetaminophen, Tramadol hcl, Ultram [tramadol], and Percocet [oxycodone-acetaminophen]    Current Medications:    morphine (MS CONTIN) extended release tablet 30 mg, 2 times per day  insulin lispro (HUMALOG) injection vial 7 Units, TID WC  magnesium sulfate 1,000 mg in dextrose 5 % 250 mL infusion, Once  insulin glargine (LANTUS) injection vial 60 Units, Nightly  HYDROmorphone (DILAUDID) injection 1 mg, Q3H PRN  dicyclomine (BENTYL) capsule 10 mg, 4x Daily  primidone (MYSOLINE) tablet 150 mg, Nightly  insulin lispro (HUMALOG) injection vial 0-12 Units, TID WC  insulin lispro (HUMALOG) injection vial 0-6 Units, Nightly  lactulose (CHRONULAC) 10 GM/15ML solution 20 g, TID  hydrALAZINE (APRESOLINE) injection 20 mg, Q4H PRN  metoprolol tartrate (LOPRESSOR) tablet 50 mg, BID  oxyCODONE-acetaminophen (PERCOCET) 5-325 MG per tablet 1 tablet, Q6H PRN  labetalol (NORMODYNE;TRANDATE) injection 10 mg, Q6H PRN  buPROPion (WELLBUTRIN XL) extended release tablet 150 mg, Daily  DULoxetine (CYMBALTA) extended release capsule 60 mg, Daily  levothyroxine (SYNTHROID) tablet 75 mcg, Daily  vitamin D (CHOLECALCIFEROL) tablet 4,000 Units, Daily  glucose (GLUTOSE) 40 % oral gel 15 g, PRN  dextrose 50 % IV solution, PRN  glucagon (rDNA) injection 1 mg, PRN  dextrose 5 % solution, PRN  sodium chloride flush 0.9 % injection 10 mL, PRN  0.9 % sodium chloride infusion, PRN  acetaminophen (TYLENOL) tablet 650 mg, Q6H PRN   Or  acetaminophen (TYLENOL) suppository 650 mg, Q6H PRN  melatonin tablet 3 mg, Nightly PRN  diphenhydrAMINE (BENADRYL) tablet 50 mg, Q6H PRN  hydrOXYzine (ATARAX) tablet 10 mg, TID PRN  ursodiol (ACTIGALL) capsule 300 mg, BID  [Held by provider] heparin (porcine) injection 5,000 Units, 3 times per day  ondansetron (ZOFRAN-ODT) disintegrating tablet 4 mg, Q4H PRN  ondansetron (ZOFRAN) injection 4 mg, Q4H PRN  promethazine (PHENERGAN) injection 12.5 mg, Q4H PRN  promethazine (PHENERGAN) tablet 12.5 mg, Q4H PRN          Physical exam:     Vitals:  BP (!) 158/74   Pulse 72   Temp 97.5 °F (36.4 °C) (Oral)   Resp 18   Ht 5' 6.5\" (1.689 m)   Wt 184 lb (83.5 kg)   LMP  (LMP Unknown)   SpO2 97%   BMI 29.25 kg/m²   Constitutional:  OAA X3 NAD  Skin: no rash, turgor wnl  Heent:  eomi, mmm  Neck: no bruits or jvd noted  Cardiovascular:  S1, S2 without m/r/g  Respiratory: CTA B without w/r/r  Abdomen:  +bs, soft, nt, nd  Ext: no lower extremity edema      Data:   Labs:  CBC:   Recent Labs     07/08/21  0553 07/09/21  0926 07/10/21  0621   WBC 7.9 7.8 7.5   HGB 10.6* 11.2* 11.3*    260 263     BMP:    Recent Labs     07/08/21  0553 07/09/21  0710 07/10/21  0621   * 137 135*   K 4.9 4.6 4.8   CL 98* 100 98*   CO2 29 24 25   BUN 23* 17 18   CREATININE 1.1 1.0 1.0   GLUCOSE 180* 145* 222*     Ca/Mg/Phos:   Recent Labs     07/08/21  0553 07/09/21  0710 07/10/21  0621   CALCIUM 9.2 8.8 9.0   MG 1.80 2.00 2.10     Hepatic:   Recent Labs     07/08/21  0553 07/09/21  0710 07/10/21  0621   AST 27 25 25   ALT 40 34 31   BILITOT 0.9 0.9 0.8   ALKPHOS 351* 351* 344*     Troponin:   No results for input(s): TROPONINI in the last 72 hours. BNP: No results for input(s): BNP in the last 72 hours. Lipids: No results for input(s): CHOL, TRIG, HDL, LDLCALC, LABVLDL in the last 72 hours. ABGs:   No results for input(s): PHART, PO2ART, DDC2PLI in the last 72 hours. INR:   No results for input(s): INR in the last 72 hours. UA:  No results for input(s): Twin Reji, GLUCOSEU, BILIRUBINUR, KETUA, SPECGRAV, BLOODU, PHUR, PROTEINU, UROBILINOGEN, NITRU, LEUKOCYTESUR, Hassell Heckle in the last 72 hours. Urine Microscopic:   No results for input(s): LABCAST, BACTERIA, COMU, HYALCAST, WBCUA, RBCUA, EPIU in the last 72 hours. Urine Culture: No results for input(s): LABURIN in the last 72 hours. Urine Chemistry: No results for input(s): Sharp Gitelman, PROTEINUR, NAUR in the last 72 hours. IMAGING:  CT GUIDED NEEDLE PLACEMENT   Final Result   Successful CT guided biopsy of a left iliac bone lesion.          CT BIOPSY DEEP BONE PERCUTANEOUS   Final Result   Successful CT guided biopsy of a left iliac bone lesion. XR ABDOMEN (KUB) (SINGLE AP VIEW)   Final Result   No obstruction, ileus or fecal impaction. 1 biliary stent and 2 bilateral ureteral stents were noted. FL ERCP BILIARY AND PANCREATIC S&I   Final Result   Intraoperative fluoroscopy provided for ERCP. Please refer to the procedure   report for further details. XR KNEE RIGHT (1-2 VIEWS)   Final Result   No acute osseous abnormality. FL ERCP BILIARY AND PANCREATIC S&I   Final Result   ERCP images as above      52 52         XR FEMUR LEFT (MIN 2 VIEWS)   Final Result   Focal area of mixed lucency/sclerosis in the greater   trochanteric/intertrochanteric region of the left proximal femur is   consistent with area of suspected metastatic disease with no evidence of   superimposed pathologic fracture as described above. NM BONE SCAN WHOLE BODY   Final Result   Widespread osseous metastatic disease throughout the axial and appendicular   skeleton. MRI ABDOMEN WO CONTRAST MRCP   Final Result   1. Severe intrahepatic and extrahepatic biliary dilation and up to moderate   dilation of the main pancreatic duct with abrupt caliber changes in the   common bile duct and main pancreatic duct near the pancreatic head. No   definite underlying lesion is identified, although one is not excluded on the   basis of this study. Considerations include ductal adenocarcinoma or   postinflammatory strictures from prior pancreatitis. Consider further   evaluation with pancreas protocol abdomen CT or MRI versus ERCP. 2. Sequelae of chronic pancreatitis best demonstrated on CT with potential   for superimposed mild acute pancreatitis. However, the visualized   peripancreatic stranding could be related to minimal anasarca seen elsewhere.    3. Innumerable simple and minimally complicated cystic lesions measuring up   to 1.8 cm throughout the pancreas could represent dilated side branches from   chronic pancreatitis and/or branch duct intraductal papillary mucinous   neoplasms. Recommend follow-up with pancreas protocol and MRI (preferred) or   CT in 1 year. 4. Severe bilateral hydronephrosis, moderate to severe left hydroureter (in   the setting of a partially duplicated collecting system), and mild right   hydroureter. The right ureter changes caliber proximal to the pelvic brim,   while the conjoint left ureter changes caliber near the pelvic brim. Underline postinflammatory or malignant strictures are not excluded. Consider further evaluation with CT urography. 5. Trace to small bilateral pleural effusions, trace ascites, and minimal   anasarca suggestive of volume overload. 6. Suboptimally seen mesenteric lymph nodes better demonstrated on CT could   be reactive but could also be related to mesenteric panniculitis or   neoplastic involvement. Recommend attention on follow-up imaging. US GALLBLADDER RUQ   Final Result   1. Right-sided hydroureteronephrosis. 2. Common duct dilation status post cholecystectomy. CT CHEST ABDOMEN PELVIS WO CONTRAST   Final Result   Somewhat limited study in the absence of contrast material.      Numerous sclerotic metastases throughout all bones of the visualized axial   and appendicular skeleton with a possible lytic lesion within the upper   aspect of the right scapula and a small soft tissue nodule within the   anterior left chest, possibly metastatic. These findings are new from prior. A healing nondisplaced subacute fracture of the left L2 transverse process is   also new, possibly pathologic. There are numerous mildly enlarged lymph nodes throughout the mediastinum as   well as the retroperitoneum in the abdominal mesentery. Moderate to severe bilateral hydronephrosis and hydroureter, also new from   prior, without appreciable obstructing stone.   It is unclear whether this is   related to metastatic cervical cancer or an underlying lymphoproliferative   disorder such as lymphoma. Lymph node sampling is recommended. Minimal bibasilar atelectasis. Otherwise, clear lungs. XR CHEST PORTABLE   Final Result   Stable chronic changes with no acute abnormality seen. FLUORO FOR SURGICAL PROCEDURES    (Results Pending)   FLUORO FOR SURGICAL PROCEDURES    (Results Pending)       Assessment/Plan     1. Hyperkalemia in setting of LANA and obstructive uropathy       Serum K :  5.6 -------> 6 ---> 5.4 -----> 4.8        S/p BL stents placement       LANA resolved             Low K diet           2. LANA : improved     Etiology seems obstructive uropathy     S/p cystoscopy and BL stent placement      Encourage PO intake of fluid to thirst      3.  Metabolic acidosis in setting of LANA    improved    4. Acid- base/ Electrolyte imbalance     moniter      5 BL hydronephrosis    S/p stent        6 DM 2    7 metastatic cancer , unknown primary     S/p bone biopsy  : f/u results    S/p ERCP and biopsy :  No malignancy found in ERCP biopsy    8 Hypomagnesemia         Thank you for allowing us to participate in care of Ying Bernstein MD  Feel free to contact me   [facility] [address]  Office : [phone]  Fax :[phone]

## 2021-07-11 NOTE — DISCHARGE INSTR - COC
Continuity of Care Form    Patient Name: Dharmesh Torres   :  1950  MRN:  [id number]    Admit date:  2021  Discharge date:  ***    Code Status Order: Full Code   Advance Directives:   Advance Care Flowsheet Documentation       Date/Time Healthcare Directive Type of Healthcare Directive Copy in [address] Agent's Name Healthcare Agent's Phone Number    21 1129  Yes, patient has an advance directive for healthcare treatment  --  Yes, copy in chart  --  --  --    21 0243  Yes, patient has an advance directive for healthcare treatment  Living will  Yes, copy in chart  Healthcare power of   Russ Dose  [phone]    21 0049  No, patient does not have an advance directive for healthcare treatment  --  --  --  --  --            Admitting Physician:  Adrienne Moseley MD  PCP: SUMA Welch CNP    Discharging Nurse: [facility] Unit/Room#: 9918/1646-73  Discharging Unit Phone Number: ***    Emergency Contact:   Extended Emergency Contact Information  Primary Emergency Contact: Alessio Ramirez [address] Phone: [phone]  Work Phone: [phone]  Relation: Child    Past Surgical History:  Past Surgical History:   Procedure Laterality Date    CHOLECYSTECTOMY      COLONOSCOPY  01/10/2018    CT BIOPSY PERCUTANEOUS DEEP BONE  2021    CT BIOPSY PERCUTANEOUS DEEP BONE 2021 Mihaela Mchugh MD [facility] CT SCAN    CYSTOSCOPY Bilateral 2021    CYSTOSCOPY BILATERAL URETERAL STENT INSERTION performed by Lei Duran MD at [address] [place] [address] [address] CATH LAB [facility]    ERCP N/A 2021    ENDOSCOPIC RETROGRADE CHOLANGIOPANCREATOGRAPHY performed by Kathy Amador MD at [address]    ERCP  2021    ERCP SPHINCTER/PAPILLOTOMY performed by Kathy Amador MD at [address]    ERCP  2021    ERCP STENT INSERTION performed by Isacc Mathis MD at [address] [place] [address]    ERCP N/A 7/2/2021    ERCP PAPILLOTOMY performed by Isacc Mathis MD at [address] [place] [address]    ERCP  7/2/2021    ERCP BIOPSY performed by Isacc Mathis MD at [address] [place] [address]    ERCP  7/2/2021    ERCP STENT INSERTION performed by Isacc Mathis MD at [address] [place] [address]    ERCP  7/2/2021    ERCP ENDOSCOPIC RETROGRADE CHOLANGIOPANCREATOGRAPHY performed by Isacc Mathis MD at [address] [place] [address] Right     cataract    OTHER SURGICAL HISTORY  11-4-2011    Posterior repair    POSTERIOR CRUCIATE LIGAMENT REPAIR         Immunization History:   Immunization History   Administered Date(s) Administered    Influenza A (A1I5-47) Vaccine PF IM 10/22/2009    Influenza Virus Vaccine 10/12/2013, 09/25/2014, 10/22/2016    Influenza Whole 10/12/2013    Influenza, High Dose (Fluzone 65 yrs and older) 11/12/2020    Influenza, Triv, inactivated, subunit, adjuvanted, IM (Fluad 65 yrs and older) 10/21/2019    Pneumococcal Conjugate 13-valent ([id number]) 10/25/2016    Pneumococcal Polysaccharide ([id number]) 05/31/2018    Tdap (Boostrix, Adacel) 10/25/2016       Active Problems:  Patient Active Problem List   Diagnosis Code    CAD (coronary artery disease) I25.10    Sleep apnea G47.30    Generalized weakness N34.1    Nonalcoholic steatohepatitis L22.87    Rectocele N81.6    DM2 (diabetes mellitus, type 2) ([facility] E11.9    HTN (hypertension), benign I10    Cerebral infarction ([facility] I63.9    Right shoulder pain M25.511    Insomnia G47.00    SUNIL (obstructive sleep apnea) G47.33    Coarse tremor G25.2    Dizziness R42    Palpitations R00.2    Essential tremor G25.0    Dyslipidemia E78.5    PNA (pneumonia) J18.9    Obesity E66.9    Bilateral primary osteoarthritis of knee M17.0    Chronic bilateral low back pain without sciatica M54.5, G89.29    DDD (degenerative disc disease), lumbar M51.36    Facet arthritis of lumbar region M47.816    Primary osteoarthritis of right knee M17.11    Abnormal finding on thyroid function test R94.6    LANA (acute kidney injury) ([facility] N17.9    Obstructive nephropathy N13.8    Metastatic malignant neoplasm (HCC) C79.9    Abdominal lymphadenopathy R59.0    Transaminitis R74.01    Elevated bilirubin R17    Nausea and vomiting R11.2    Bile duct obstruction K83.1    Itching L29.9    Abdominal distention R14.0       Isolation/Infection:   Isolation            No Isolation          Patient Infection Status       Infection Onset Added Last Indicated Last Indicated By Review Planned Expiration Resolved Resolved By    None active    Resolved    COVID-19 Rule Out 02/23/21 02/23/21 02/23/21 COVID-19 (Ordered)   02/24/21 Rule-Out Test Resulted    COVID-19 Rule Out 02/23/21 02/23/21 02/23/21 COVID-19, Rapid (Ordered)   02/23/21 Rule-Out Test Resulted            Nurse Assessment:  Last Vital Signs: /68   Pulse 73   Temp 97.7 °F (36.5 °C) (Oral)   Resp 18   Ht 5' 6.5\" (1.689 m)   Wt 181 lb (82.1 kg)   LMP  (LMP Unknown)   SpO2 95%   BMI 28.78 kg/m²     Last documented pain score (0-10 scale): Pain Level: 9  Last Weight:   Wt Readings from Last 1 Encounters:   07/11/21 181 lb (82.1 kg)     Mental Status:  {IP PT MENTAL STATUS:20030:::0}    IV Access:  { KANA IV ACCESS:668501743:::0}    Nursing Mobility/ADLs:  Walking   {CHP DME ADLs:850582498:::0}  Transfer  {CHP DME ADLs:229536516:::0}  Bathing  {CHP DME ADLs:428094158:::0}  Dressing  {CHP DME ADLs:800056233:::0}  Toileting  {CHP DME ADLs:302269944:::0}  Feeding  {CHP DME ADLs:566388569:::0}  Med Admin  {CHP DME ADLs:500202252:::0}  Med Delivery   { KANA MED Delivery:920282374:::0}    Wound Care Documentation and Therapy:        Elimination:  Continence:    Bowel: {YES / GY:46939}  Bladder: {YES / KB:48235}  Urinary Catheter: {Urinary Catheter:438793509:::0}   Colostomy/Ileostomy/Ileal Conduit: {YES / DL:20522}       Date of Last BM: ***    Intake/Output Summary (Last 24 hours) at 7/11/2021 5088  Last data filed at 2021 1110  Gross per 24 hour   Intake 1777 ml   Output 200 ml   Net 1577 ml     I/O last 3 completed shifts:   In: 1295 [P.O.:1777]  Out: 200 [Urine:200]    Safety Concerns:     [address] Safety Concerns:997629066:::0}    Impairments/Disabilities:      [address] Impairments/Disabilities:078823132:::0}    Nutrition Therapy:  Current Nutrition Therapy:   [address] KANA Diet List:804628590:::0}    Routes of Feeding: {CHP DME Other Feedings:905569098:::0}  Liquids: {Slp liquid thickness:70974}  Daily Fluid Restriction: {CHP DME Yes amt example:989507094:::0}  Last Modified Barium Swallow with Video (Video Swallowing Test): {Done Not Done KADW:990393731:::7}    Treatments at the Time of Hospital Discharge:   Respiratory Treatments: ***  Oxygen Therapy:  {Therapy; copd oxygen:57241:::0}  Ventilator:    {[facility] Vent List:561096828:::0}    Rehab Therapies: {THERAPEUTIC INTERVENTION:3486657630}  Weight Bearing Status/Restrictions: [address]  Weight Bearin:::0}  Other Medical Equipment (for information only, NOT a DME order):  {EQUIPMENT:281306268}  Other Treatments: ***    Patient's personal belongings (please select all that are sent with patient):  {[facility] DME Belongings:102893560:::0}    RN SIGNATURE:  {Esignature:268041594:::0}    CASE MANAGEMENT/SOCIAL WORK SECTION    Inpatient Status Date: ***    Readmission Risk Assessment Score:  Readmission Risk              Risk of Unplanned Readmission:  27           Discharging to Facility/ Agency   Name:   Address:  Phone:  Fax:    Dialysis Facility (if applicable)   Name:  Address:  Dialysis Schedule:  Phone:  Fax:    / signature: {Esignature:818882235:::0}    PHYSICIAN SECTION    Prognosis: Poor    Condition at Discharge: Terminal    Rehab Potential (if transferring to Rehab): poor    Recommended Labs or Other Treatments After Discharge: Inpatient hospice    Physician Certification: I certify the above information and transfer of Alia Hernandez  is

## 2021-07-11 NOTE — PROGRESS NOTES
Reinforced with pt to call to go the bathroom that we don't want her to fall bedcheck on for safety call light with in reach

## 2021-07-11 NOTE — PROGRESS NOTES
SCAN    CYSTOSCOPY Bilateral 6/29/2021    CYSTOSCOPY BILATERAL URETERAL STENT INSERTION performed by Amanda Lopez MD at [address] [facility] CATH LAB [address]    ERCP N/A 7/1/2021    ENDOSCOPIC RETROGRADE CHOLANGIOPANCREATOGRAPHY performed by Delvin Fowler MD at [facility] ERCP  7/1/2021    ERCP SPHINCTER/PAPILLOTOMY performed by Delvin Fowler MD at [facility] ERCP  7/1/2021    ERCP STENT INSERTION performed by Delvin Fowler MD at [facility] ERCP N/A 7/2/2021    ERCP PAPILLOTOMY performed by Delvin Fowler MD at [facility] ERCP  7/2/2021    ERCP BIOPSY performed by Delvin Fowler MD at [facility] ERCP  7/2/2021    ERCP STENT INSERTION performed by Delvin Fowler MD at [facility] ERCP  7/2/2021    ERCP ENDOSCOPIC RETROGRADE CHOLANGIOPANCREATOGRAPHY performed by Delvin Fowler MD at [address] [place] [address] Right     cataract    OTHER SURGICAL HISTORY  11-4-2011    Posterior repair    POSTERIOR CRUCIATE LIGAMENT REPAIR         Family History   Problem Relation Age of Onset    Diabetes Mother     Stroke Mother     Heart Disease Father     Cancer Father         prostate    Heart Disease Brother         multiple stents    Heart Disease Brother     Heart Disease Sister         carotid artery surgery    Heart Disease Brother     Heart Attack Brother     Heart Disease Brother     Asthma Neg Hx     Emphysema Neg Hx     Heart Failure Neg Hx         reports that she quit smoking about 30 years ago. Her smoking use included cigarettes. She has a 0.50 pack-year smoking history. She has never used smokeless tobacco. She reports that she does not drink alcohol and does not use drugs.     Allergies:  Acetaminophen, Codeine, Oxycodone hcl, Oxycodone-acetaminophen, Tramadol hcl, Ultram [tramadol], and Percocet [oxycodone-acetaminophen]    Current Medications:    morphine (MS CONTIN) extended release tablet 30 mg, 2 times per day  insulin lispro (HUMALOG) injection vial 7 Units, TID WC  magnesium sulfate 1,000 mg in dextrose 5 % 250 mL infusion, Once  insulin glargine (LANTUS) injection vial 60 Units, Nightly  HYDROmorphone (DILAUDID) injection 1 mg, Q3H PRN  dicyclomine (BENTYL) capsule 10 mg, 4x Daily  primidone (MYSOLINE) tablet 150 mg, Nightly  insulin lispro (HUMALOG) injection vial 0-12 Units, TID WC  insulin lispro (HUMALOG) injection vial 0-6 Units, Nightly  lactulose (CHRONULAC) 10 GM/15ML solution 20 g, TID  hydrALAZINE (APRESOLINE) injection 20 mg, Q4H PRN  metoprolol tartrate (LOPRESSOR) tablet 50 mg, BID  oxyCODONE-acetaminophen (PERCOCET) 5-325 MG per tablet 1 tablet, Q6H PRN  labetalol (NORMODYNE;TRANDATE) injection 10 mg, Q6H PRN  buPROPion (WELLBUTRIN XL) extended release tablet 150 mg, Daily  DULoxetine (CYMBALTA) extended release capsule 60 mg, Daily  levothyroxine (SYNTHROID) tablet 75 mcg, Daily  vitamin D (CHOLECALCIFEROL) tablet 4,000 Units, Daily  glucose (GLUTOSE) 40 % oral gel 15 g, PRN  dextrose 50 % IV solution, PRN  glucagon (rDNA) injection 1 mg, PRN  dextrose 5 % solution, PRN  sodium chloride flush 0.9 % injection 10 mL, PRN  0.9 % sodium chloride infusion, PRN  acetaminophen (TYLENOL) tablet 650 mg, Q6H PRN   Or  acetaminophen (TYLENOL) suppository 650 mg, Q6H PRN  melatonin tablet 3 mg, Nightly PRN  diphenhydrAMINE (BENADRYL) tablet 50 mg, Q6H PRN  hydrOXYzine (ATARAX) tablet 10 mg, TID PRN  ursodiol (ACTIGALL) capsule 300 mg, BID  [Held by provider] heparin (porcine) injection 5,000 Units, 3 times per day  ondansetron (ZOFRAN-ODT) disintegrating tablet 4 mg, Q4H PRN  ondansetron (ZOFRAN) injection 4 mg, Q4H PRN  promethazine (PHENERGAN) injection 12.5 mg, Q4H PRN  promethazine (PHENERGAN) tablet 12.5 mg, Q4H PRN          Physical exam:     Vitals:  BP (!) 146/81   Pulse 74   Temp 98 °F (36.7 °C) (Oral)   Resp 20   Ht 5' 6.5\" (1.689 m)   Wt 181 lb (82.1 kg)   LMP (LMP Unknown)   SpO2 95%   BMI 28.78 kg/m²   Constitutional:  OAA X3 NAD  Skin: no rash, turgor wnl  Heent:  eomi, mmm  Neck: no bruits or jvd noted  Cardiovascular:  S1, S2 without m/r/g  Respiratory: CTA B without w/r/r  Abdomen:  +bs, soft, nt, nd  Ext: no lower extremity edema      Data:   Labs:  CBC:   Recent Labs     07/09/21  0926 07/10/21  0621 07/11/21  0552   WBC 7.8 7.5 8.9   HGB 11.2* 11.3* 10.7*    263 269     BMP:    Recent Labs     07/09/21  0710 07/10/21  0621 07/11/21  0552    135* 138   K 4.6 4.8 5.4*    98* 100   CO2 24 25 27   BUN 17 18 16   CREATININE 1.0 1.0 1.1   GLUCOSE 145* 222* 112*     Ca/Mg/Phos:   Recent Labs     07/09/21  0710 07/10/21  0621 07/11/21  0552   CALCIUM 8.8 9.0 9.1   MG 2.00 2.10 2.10     Hepatic:   Recent Labs     07/09/21  0710 07/10/21  0621 07/11/21  0552   AST 25 25 24   ALT 34 31 28   BILITOT 0.9 0.8 0.8   ALKPHOS 351* 344* 323*     Troponin:   No results for input(s): TROPONINI in the last 72 hours. BNP: No results for input(s): BNP in the last 72 hours. Lipids: No results for input(s): CHOL, TRIG, HDL, LDLCALC, LABVLDL in the last 72 hours. ABGs:   No results for input(s): PHART, PO2ART, ESZ6EEU in the last 72 hours. INR:   No results for input(s): INR in the last 72 hours. UA:  No results for input(s): Homer Sesser, GLUCOSEU, BILIRUBINUR, KETUA, SPECGRAV, BLOODU, PHUR, PROTEINU, UROBILINOGEN, NITRU, LEUKOCYTESUR, Braydon Alderman in the last 72 hours. Urine Microscopic:   No results for input(s): LABCAST, BACTERIA, COMU, HYALCAST, WBCUA, RBCUA, EPIU in the last 72 hours. Urine Culture: No results for input(s): LABURIN in the last 72 hours. Urine Chemistry: No results for input(s): Elizabeth Mayra, PROTEINUR, NAUR in the last 72 hours. IMAGING:  CT GUIDED NEEDLE PLACEMENT   Final Result   Successful CT guided biopsy of a left iliac bone lesion.          CT BIOPSY DEEP BONE PERCUTANEOUS   Final Result   Successful CT guided biopsy of a left iliac bone lesion. XR ABDOMEN (KUB) (SINGLE AP VIEW)   Final Result   No obstruction, ileus or fecal impaction. 1 biliary stent and 2 bilateral ureteral stents were noted. FL ERCP BILIARY AND PANCREATIC S&I   Final Result   Intraoperative fluoroscopy provided for ERCP. Please refer to the procedure   report for further details. XR KNEE RIGHT (1-2 VIEWS)   Final Result   No acute osseous abnormality. FL ERCP BILIARY AND PANCREATIC S&I   Final Result   ERCP images as above      52 52         XR FEMUR LEFT (MIN 2 VIEWS)   Final Result   Focal area of mixed lucency/sclerosis in the greater   trochanteric/intertrochanteric region of the left proximal femur is   consistent with area of suspected metastatic disease with no evidence of   superimposed pathologic fracture as described above. NM BONE SCAN WHOLE BODY   Final Result   Widespread osseous metastatic disease throughout the axial and appendicular   skeleton. MRI ABDOMEN WO CONTRAST MRCP   Final Result   1. Severe intrahepatic and extrahepatic biliary dilation and up to moderate   dilation of the main pancreatic duct with abrupt caliber changes in the   common bile duct and main pancreatic duct near the pancreatic head. No   definite underlying lesion is identified, although one is not excluded on the   basis of this study. Considerations include ductal adenocarcinoma or   postinflammatory strictures from prior pancreatitis. Consider further   evaluation with pancreas protocol abdomen CT or MRI versus ERCP. 2. Sequelae of chronic pancreatitis best demonstrated on CT with potential   for superimposed mild acute pancreatitis. However, the visualized   peripancreatic stranding could be related to minimal anasarca seen elsewhere.    3. Innumerable simple and minimally complicated cystic lesions measuring up   to 1.8 cm throughout the pancreas could represent dilated side branches from   chronic pancreatitis and/or branch duct intraductal papillary mucinous   neoplasms. Recommend follow-up with pancreas protocol and MRI (preferred) or   CT in 1 year. 4. Severe bilateral hydronephrosis, moderate to severe left hydroureter (in   the setting of a partially duplicated collecting system), and mild right   hydroureter. The right ureter changes caliber proximal to the pelvic brim,   while the conjoint left ureter changes caliber near the pelvic brim. Underline postinflammatory or malignant strictures are not excluded. Consider further evaluation with CT urography. 5. Trace to small bilateral pleural effusions, trace ascites, and minimal   anasarca suggestive of volume overload. 6. Suboptimally seen mesenteric lymph nodes better demonstrated on CT could   be reactive but could also be related to mesenteric panniculitis or   neoplastic involvement. Recommend attention on follow-up imaging. US GALLBLADDER RUQ   Final Result   1. Right-sided hydroureteronephrosis. 2. Common duct dilation status post cholecystectomy. CT CHEST ABDOMEN PELVIS WO CONTRAST   Final Result   Somewhat limited study in the absence of contrast material.      Numerous sclerotic metastases throughout all bones of the visualized axial   and appendicular skeleton with a possible lytic lesion within the upper   aspect of the right scapula and a small soft tissue nodule within the   anterior left chest, possibly metastatic. These findings are new from prior. A healing nondisplaced subacute fracture of the left L2 transverse process is   also new, possibly pathologic. There are numerous mildly enlarged lymph nodes throughout the mediastinum as   well as the retroperitoneum in the abdominal mesentery. Moderate to severe bilateral hydronephrosis and hydroureter, also new from   prior, without appreciable obstructing stone.   It is unclear whether this is   related to metastatic cervical cancer or an underlying lymphoproliferative   disorder such as lymphoma. Lymph node sampling is recommended. Minimal bibasilar atelectasis. Otherwise, clear lungs. XR CHEST PORTABLE   Final Result   Stable chronic changes with no acute abnormality seen. FLUORO FOR SURGICAL PROCEDURES    (Results Pending)   FLUORO FOR SURGICAL PROCEDURES    (Results Pending)       Assessment/Plan     1. Hyperkalemia in setting of LANA and obstructive uropathy       Serum K :  5.6 -------> 6 ---> 5.4 -----> 4.8        S/p BL stents placement       LANA resolved             Low K diet           2. LANA : improved     Etiology seems obstructive uropathy     S/p cystoscopy and BL stent placement      Encourage PO intake of fluid to thirst      3.  Metabolic acidosis in setting of LANA    improved    4. Acid- base/ Electrolyte imbalance     moniter      5 BL hydronephrosis    S/p stent        6 DM 2    7 metastatic cancer , adenocarcinoma suspected pancreatic origin        S/p ERCP and biopsy :  No malignancy found in ERCP biopsy    8 Hypomagnesemia     Pt/family opting for Hospice care    Thank you for allowing us to participate in care of Rodrigo Ovalle MD  Feel free to contact me   [facility] [address]  Office : [phone]  Fax :[phone]

## 2021-07-11 NOTE — FLOWSHEET NOTE
07/11/21 1503   Encounter Summary   Services provided to: Patient and family together   Referral/Consult From: Nurse   Support System Family members   Continue Visiting   (7/11 follow up, sppt and prayer)   Complexity of Encounter Moderate   Length of Encounter 30 minutes   Spiritual/Lutheran   Type Spiritual support   Assessment Approachable;Tearful; Anxious; Coping; Anticipatory grief;Concerns with suffering   Intervention Active listening;Explored feelings, thoughts, concerns;Prayer;Sustaining presence/ Ministry of presence; Discussed death;Discussed afterlife; Discussed relationship with God;Discussed illness/injury and it's impact   Outcome Expressed gratitude;Engaged in conversation;Expressed feelings/needs/concerns; Shared reminiscences

## 2021-07-11 NOTE — PROGRESS NOTES
ONCOLOGY HEMATOLOGY CARE PROGRESS NOTE      SUBJECTIVE:     Patient states that her pain is better after starting MS Contin. Furthermore, she states that she does not want chemotherapy. ROS:     Constitutional:  No weight loss, No fever, No chills, No night sweats. Energy level good. Eyes:  No impairment or change in vision  ENT / Mouth:  No pain, abnormal ulceration, bleeding, nasal drip or change in voice or hearing  Cardiovascular:  No chest pain, palpitations, new edema, or calf discomfort  Respiratory:  No pain, hemoptysis, change to breathing  Breast:  No pain, discharge, change in appearance or texture  Gastrointestinal:  No pain, cramping, jaundice, change to eating and bowel habits  Urinary:  No pain, bleeding or change in continence  Genitalia: No pain, bleeding or discharge  Musculoskeletal:  No redness, pain, edema or weakness  Skin:  itchy , dry skin   Neurologic:  No discomfort, change in mental status, speech, sensory or motor activity  Psychiatric:  No change in concentration or change to affect or mood  Endocrine:  No hot flashes, increased thirst, or change to urine production  Hematologic: No petechiae, ecchymosis or bleeding  Lymphatic:  No lymphadenopathy or lymphedema  Allergy / Immunologic:  No eczema, hives, frequent or recurrent infections    OBJECTIVE        Physical    VITALS:  /68   Pulse 73   Temp 97.7 °F (36.5 °C) (Oral)   Resp 18   Ht 5' 6.5\" (1.689 m)   Wt 181 lb (82.1 kg)   LMP  (LMP Unknown)   SpO2 95%   BMI 28.78 kg/m²   TEMPERATURE:  Current - Temp: 97.7 °F (36.5 °C);  Max - Temp  Av.1 °F (36.7 °C)  Min: 97.7 °F (36.5 °C)  Max: 98.3 °F (36.8 °C)  PULSE OXIMETRY RANGE: SpO2  Av.8 %  Min: 92 %  Max: 95 %  24HR INTAKE/OUTPUT:      Intake/Output Summary (Last 24 hours) at 2021 1227  Last data filed at 2021 1110  Gross per 24 hour   Intake 1777 ml   Output 200 ml   Net 1577 ml       CONSTITUTIONAL: awake, alert, cooperative, no apparent distress   EYES: pupils equal, round and reactive to light, sclera icteric improving and conjunctiva normal  ENT: Normocephalic, without obvious abnormality, atraumatic  NECK: supple, symmetrical, no jugular venous distension and no carotid bruits   HEMATOLOGIC/LYMPHATIC: no cervical, supraclavicular or axillary lymphadenopathy   LUNGS: no increased work of breathing and clear to auscultation   CARDIOVASCULAR: regular rate and rhythm, normal S1 and S2, no murmur noted  ABDOMEN: normal bowel sounds x 4, soft, non-distended, non-tender, no masses palpated, no hepatosplenomgaly   MUSCULOSKELETAL: full range of motion noted, tone is normal  NEUROLOGIC: awake, alert, oriented to name, place and time. Motor skills grossly intact. SKIN: Normal skin color, texture, turgor and  Jaundice nearly resolved. appears intact. 2 cm minimally erythematous raised mass on the right upper back.   EXTREMITIES: no LE edema       Data      Recent Labs     07/09/21  0926 07/10/21  0621 07/11/21  0552   WBC 7.8 7.5 8.9   HGB 11.2* 11.3* 10.7*   HCT 34.3* 33.8* 31.6*    263 269   MCV 81.3 78.9* 79.4*        Recent Labs     07/09/21  0710 07/10/21  0621 07/11/21  0552    135* 138   K 4.6 4.8 5.4*    98* 100   CO2 24 25 27   BUN 17 18 16   CREATININE 1.0 1.0 1.1     Recent Labs     07/09/21  0710 07/10/21  0621 07/11/21  0552   AST 25 25 24   ALT 34 31 28   BILITOT 0.9 0.8 0.8   ALKPHOS 351* 344* 323*       Magnesium:    Lab Results   Component Value Date    MG 2.10 07/11/2021    MG 2.10 07/10/2021    MG 2.00 07/09/2021         Problem List  Patient Active Problem List   Diagnosis    CAD (coronary artery disease)    Sleep apnea    Generalized weakness    Nonalcoholic steatohepatitis    Rectocele    DM2 (diabetes mellitus, type 2) ([facility]    HTN (hypertension), benign    Cerebral infarction ([facility]    Right shoulder pain    Insomnia    SUNIL (obstructive sleep apnea)    Coarse tremor    Dizziness    Palpitations    Essential tremor    Dyslipidemia    PNA (pneumonia)    Obesity    Bilateral primary osteoarthritis of knee    Chronic bilateral low back pain without sciatica    DDD (degenerative disc disease), lumbar    Facet arthritis of lumbar region    Primary osteoarthritis of right knee    Abnormal finding on thyroid function test    [facility]) [facility]    Obstructive nephropathy    Metastatic malignant neoplasm (HCC)    Abdominal lymphadenopathy    Transaminitis    Elevated bilirubin    Nausea and vomiting    Bile duct obstruction    Itching    Abdominal distention       ASSESSMENT AND PLAN:    Metastatic carcinoma (Possible pancreatic carcinoma):  -She appears to have choroidal mets. -She has bone lesions, possible intrahepatic lesion, and omental metastasis  -CA-125 is over thousand  -CA 19-9 is Over 54,000 on 6/28/2021 and then a repeat was 510,000.  -Bone biopsy from 7/8/2021 demonstrates an adenocarcinoma  -More than likely this is pancreatic carcinoma. I doubt she has 2 primaries, but this is possible.  -After having time to think about it, the patient states that she does not want chemotherapy. She realizes this is a terminal illness and we just like to be comfortable. She is agreeing to enroll in hospice.  -She has a very poor prognosis and I think this is reasonable. Pain:  -MS Contin 30 mg p.o. every 12 was started this morning  -She will continue to receive her other as needed pain medications. Elevated liver functions/chronic pancreatitis/CBD blockage :  -s/p stent  -Bilirubin is now normal    Hydronephrosis   - Bilateral stents in place   - to be exchanged in 4 months     Back lesions:  -She has a 2 cm area in the upper back  -Is very difficult to tell whether this is a skin metastasis. It looks more like a mild subcutaneous abscess.     Left proximal femur:  -Possible pinning due to him pending a fracture  - ortho following  - XRAY no pathological fracture  - Will need RADIATION outpatient     LANA  - nephrology following      ONCOLOGIC DISPOSITION:    -The patient has elected to enroll in hospice    Ashwin Sanchez MD  May be reached through [address] [place] [address]

## 2021-07-11 NOTE — PROGRESS NOTES
Pt awake asked if she had to go to the bathroom . Pt states she got up by herself earlier, reinforced to call for help.  States she unplugged the bed check and got up, call light with in reach bedcheck plugged in for safety

## 2021-07-11 NOTE — DISCHARGE SUMMARY
Hospital Medicine Discharge Summary    Patient ID: Fauzia Clark      Patient's PCP: Ramos Huffman, APRN - CNP    Admit Date: 6/27/2021     Discharge Date:   07/11/21     Admitting Physician: Johanna Bryan MD     Discharge Physician: Veronika Esquivel MD     Discharge Diagnoses: Active Hospital Problems    Diagnosis     Itching [L29.9]     Abdominal distention [R14.0]     Metastatic malignant neoplasm (HCC) [C79.9]     Abdominal lymphadenopathy [R59.0]     Transaminitis [R74.01]     Elevated bilirubin [R17]     Nausea and vomiting [R11.2]     Bile duct obstruction [K83.1]     LANA (acute kidney injury) ([facility] [address] [N17.9]     Obstructive nephropathy [N13.8]     DM2 (diabetes mellitus, type 2) ([facility] [address] [E11.9]     Generalized weakness [R53.1]        The patient was seen and examined on day of discharge and this discharge summary is in conjunction with any daily progress note from day of discharge. Hospital Course:     Patient presented with progressive weakness. 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  Patient's ocular complaints did not respond properly to the treatment at that time. Work-up was started for possible underlying cause of uveitis, which could include malignancy. CT scan of the chest showed some enlarged lymph nodes. Patient was referred to oncology as outpatient did not have time to go, when she came to the [facility] emergency room with progressive weakness. In the emergency department, patient was found to have acute renal failure. Abdominal Imaging showed metastatic masses in the abdomen and obstructive nephropathy. The metastatic masses were biopsied. Patient also had ERCP. Overall, the picture is consistent with metastatic pancreatic cancer, end-stage.   Following a discussion wit oncology, patient decided to proceed with hospice and declined chemotherapy or radiation. After today's hospice meeting, patient is being transferred to inpatient hospice. CODE STATUS changed to DNR comfort care. Patient agreeable. Physical Exam Performed:     /68   Pulse 73   Temp 97.7 °F (36.5 °C) (Oral)   Resp 18   Ht 5' 6.5\" (1.689 m)   Wt 181 lb (82.1 kg)   LMP  (LMP Unknown)   SpO2 95%   BMI 28.78 kg/m²       General appearance:  No apparent distress, appears stated age and cooperative. HEENT:  Normal cephalic, atraumatic without obvious deformity. Pupils equal, round, and reactive to light. Extra ocular muscles intact. Conjunctivae/corneas clear. Neck: Supple, with full range of motion. No jugular venous distention. Trachea midline. Respiratory:  Normal respiratory effort. Clear to auscultation, bilaterally without Rales/Wheezes/Rhonchi. Cardiovascular:  Regular rate and rhythm with normal S1/S2 without murmurs, rubs or gallops. Abdomen: Soft, non-tender, non-distended with normal bowel sounds. Musculoskeletal:  No clubbing, cyanosis or edema bilaterally. Full range of motion without deformity. Skin: Skin color, texture, turgor normal.  No rashes or lesions. Neurologic:  Neurovascularly intact without any focal sensory/motor deficits. Cranial nerves: II-XII intact, grossly non-focal.  Psychiatric:  Alert and oriented, thought content appropriate, normal insight  Capillary Refill: Brisk,< 3 seconds   Peripheral Pulses: +2 palpable, equal bilaterally       Labs:  For convenience and continuity at follow-up the following most recent labs are provided:      CBC:    Lab Results   Component Value Date    WBC 8.9 07/11/2021    HGB 10.7 07/11/2021    HCT 31.6 07/11/2021     07/11/2021       Renal:    Lab Results   Component Value Date     07/11/2021    K 5.4 07/11/2021    K 3.6 02/24/2021     07/11/2021    CO2 27 07/11/2021    BUN 16 07/11/2021    CREATININE 1.1 07/11/2021    CALCIUM 9.1 07/11/2021    PHOS 3.5 02/25/2021         Significant be related to minimal anasarca seen elsewhere. 3. Innumerable simple and minimally complicated cystic lesions measuring up   to 1.8 cm throughout the pancreas could represent dilated side branches from   chronic pancreatitis and/or branch duct intraductal papillary mucinous   neoplasms. Recommend follow-up with pancreas protocol and MRI (preferred) or   CT in 1 year. 4. Severe bilateral hydronephrosis, moderate to severe left hydroureter (in   the setting of a partially duplicated collecting system), and mild right   hydroureter. The right ureter changes caliber proximal to the pelvic brim,   while the conjoint left ureter changes caliber near the pelvic brim. Underline postinflammatory or malignant strictures are not excluded. Consider further evaluation with CT urography. 5. Trace to small bilateral pleural effusions, trace ascites, and minimal   anasarca suggestive of volume overload. 6. Suboptimally seen mesenteric lymph nodes better demonstrated on CT could   be reactive but could also be related to mesenteric panniculitis or   neoplastic involvement. Recommend attention on follow-up imaging. US GALLBLADDER RUQ   Final Result   1. Right-sided hydroureteronephrosis. 2. Common duct dilation status post cholecystectomy. CT CHEST ABDOMEN PELVIS WO CONTRAST   Final Result   Somewhat limited study in the absence of contrast material.      Numerous sclerotic metastases throughout all bones of the visualized axial   and appendicular skeleton with a possible lytic lesion within the upper   aspect of the right scapula and a small soft tissue nodule within the   anterior left chest, possibly metastatic. These findings are new from prior. A healing nondisplaced subacute fracture of the left L2 transverse process is   also new, possibly pathologic. There are numerous mildly enlarged lymph nodes throughout the mediastinum as   well as the retroperitoneum in the abdominal mesentery. Moderate to severe bilateral hydronephrosis and hydroureter, also new from   prior, without appreciable obstructing stone. It is unclear whether this is   related to metastatic cervical cancer or an underlying lymphoproliferative   disorder such as lymphoma. Lymph node sampling is recommended. Minimal bibasilar atelectasis. Otherwise, clear lungs. XR CHEST PORTABLE   Final Result   Stable chronic changes with no acute abnormality seen. FLUORO FOR SURGICAL PROCEDURES    (Results Pending)   FLUORO FOR SURGICAL PROCEDURES    (Results Pending)          Consults:     IP CONSULT TO HOSPITALIST  IP CONSULT TO HEM/ONC  IP CONSULT TO UROLOGY  IP CONSULT TO SPIRITUAL SERVICES  IP CONSULT TO GI  IP CONSULT TO PALLIATIVE CARE  IP CONSULT TO NEPHROLOGY  IP CONSULT TO ORTHOPEDIC SURGERY  IP CONSULT TO HOSPICE    Disposition: Inpatient hospice    Condition at Discharge: Stable    Discharge Instructions/Follow-up: Inpatient hospice    Code Status:  Full Code     Activity: activity as tolerated    Diet: regular diet      Discharge Medications:     Current Discharge Medication List          Time Spent on discharge is more than 45 minutes in the examination, evaluation, counseling and review of medications and discharge plan. Signed:    Rama Branch MD   7/11/2021      Thank you SUMA Matthews CNP for the opportunity to be involved in this patient's care. If you have any questions or concerns please feel free to contact me at [phone].

## 2021-07-11 NOTE — PLAN OF CARE
Problem: Falls - Risk of:  Goal: Will remain free from falls  Description: Will remain free from falls  Outcome: Ongoing     Problem: Pain:  Description: Pain management should include both nonpharmacologic and pharmacologic interventions. Goal: Pain level will decrease  Description: Pain level will decrease  Outcome: Ongoing     Problem: Falls - Risk of:  Goal: Absence of physical injury  Description: Absence of physical injury  Outcome: Ongoing     Problem: Pain:  Description: Pain management should include both nonpharmacologic and pharmacologic interventions.   Goal: Control of acute pain  Description: Control of acute pain  Outcome: Ongoing     Problem: Sensory:  Goal: Pain level will decrease  Description: Pain level will decrease  Outcome: Ongoing

## 2021-07-11 NOTE — PROGRESS NOTES
Hospitalist Progress Note      PCP: SUMA Blanchard CNP    Date of Admission: 6/27/2021    Chief Complaint on Admission:   Progressive fatigue. Hospital course  Presented with progressive weakness. 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  Patient's ocular complaints did not respond properly to the treatment. Work-up was started for possible underlying cause of uveitis, which could include malignancy. CT scan of the chest showed some enlarged lymph nodes. Patient was referred to oncology as outpatient did not have time to go, when she came to the emergency room with progressive weakness. Found to have acute renal failure. Imaging showed metastatic masses in the abdomen and obstructive nephropathy. The metastatic masses were biopsied. Patient also had ERCP. Overall, the picture is consistent with metastatic pancreatic cancer, end-stage. Following a discussion wit oncology, patient decided to go home with hospice. Consult was placed. Patient will meet with hospice later today     SUBJECTIVE:   Abdominal pain is better, has some bone pain on the hip joints. No chest pain, no shortness of breath, no nausea or vomiting.    MS contin has helped the pain     OBJECTIVE:     Allergies  Acetaminophen, Codeine, Oxycodone hcl, Oxycodone-acetaminophen, Tramadol hcl, Ultram [tramadol], and Percocet [oxycodone-acetaminophen]    Medications      Scheduled Meds:   morphine  30 mg Oral 2 times per day    insulin lispro  7 Units Subcutaneous TID WC    IV infusion builder  100 mL/hr Intravenous Once    insulin glargine  60 Units Subcutaneous Nightly    dicyclomine  10 mg Oral 4x Daily    primidone  150 mg Oral Nightly    insulin lispro  0-12 Units Subcutaneous TID WC    insulin lispro  0-6 Units Subcutaneous Nightly    lactulose  20 g Oral TID    metoprolol tartrate  50 mg Oral BID    buPROPion  150 mg Oral Daily    DULoxetine  60 mg Oral Daily    levothyroxine  75 mcg Oral Daily    Vitamin D  4,000 Units Oral Daily    ursodiol  300 mg Oral BID    [Held by provider] heparin (porcine)  5,000 Units Subcutaneous 3 times per day       Infusions:   dextrose      sodium chloride 25 mL (07/03/21 1040)       PRN Meds:  HYDROmorphone, hydrALAZINE, oxyCODONE-acetaminophen, labetalol, glucose, dextrose, glucagon (rDNA), dextrose, sodium chloride flush, sodium chloride, acetaminophen **OR** acetaminophen, melatonin, diphenhydrAMINE, hydrOXYzine, ondansetron, ondansetron, promethazine, promethazine    Intake and Output     Intake/Output Summary (Last 24 hours) at 7/11/2021 1257  Last data filed at 7/11/2021 1110  Gross per 24 hour   Intake 1777 ml   Output 200 ml   Net 1577 ml       Vitals    /68   Pulse 73   Temp 97.7 °F (36.5 °C) (Oral)   Resp 18   Ht 5' 6.5\" (1.689 m)   Wt 181 lb (82.1 kg)   LMP  (LMP Unknown)   SpO2 95%   BMI 28.78 kg/m²     Exam:    Physical Examination:   General appearance - alert, well appearing, and in no distress  Mental status - alert, oriented to person, place, and time  Eyes - pupils equal and reactive, extraocular eye movements intact  Nose - normal and patent, no erythema, discharge or polyps  Mouth - mucous membranes moist, pharynx normal without lesions  Neck - supple, no significant adenopathy  Chest - clear to auscultation, no wheezes, rales or rhonchi, symmetric air entry  Heart - normal rate, regular rhythm, normal S1, S2, no murmurs, rubs, clicks or gallops  Abdomen - soft, nontender, nondistended, no masses or organomegaly  Neurological - alert, oriented, normal speech, no focal findings or movement disorder noted  Musculoskeletal - no joint tenderness, deformity or swelling  Extremities - peripheral pulses normal, no pedal edema, no clubbing or cyanosis  Skin - normal coloration and turgor, no rashes, no suspicious skin lesions noted    I examined the patient today (07/11/21). Physical exam is similar to yesterday (7/10)    Data    Recent Labs     07/09/21  0926 07/10/21  0621 07/11/21  0552   WBC 7.8 7.5 8.9   HGB 11.2* 11.3* 10.7*   HCT 34.3* 33.8* 31.6*    263 269      Recent Labs     07/09/21  0710 07/10/21  0621 07/11/21  0552    135* 138   K 4.6 4.8 5.4*    98* 100   CO2 24 25 27   BUN 17 18 16   CREATININE 1.0 1.0 1.1     Recent Labs     07/09/21  0710 07/10/21  0621 07/11/21  0552   AST 25 25 24   ALT 34 31 28   BILITOT 0.9 0.8 0.8   ALKPHOS 351* 344* 323*     No results for input(s): INR in the last 72 hours. No results for input(s): CKTOTAL, CKMB, CKMBINDEX, TROPONINI in the last 72 hours. Consults:     IP CONSULT TO HOSPITALIST  IP CONSULT TO HEM/ONC  IP CONSULT TO UROLOGY  IP CONSULT TO SPIRITUAL SERVICES  IP CONSULT TO GI  IP CONSULT TO PALLIATIVE CARE  IP CONSULT TO NEPHROLOGY  IP CONSULT TO ORTHOPEDIC SURGERY  IP CONSULT TO HOSPICE    ASSESSMENT AND PLAN      Active Hospital Problems    Diagnosis Date Noted    Itching [L29.9]     Abdominal distention [R14.0]     Metastatic malignant neoplasm ([facility] [address] [C79.9] 06/28/2021    Abdominal lymphadenopathy [R59.0] 06/28/2021    Transaminitis [R74.01] 06/28/2021    Elevated bilirubin [R17]     Nausea and vomiting [R11.2]     Bile duct obstruction [K83.1]     LANA (acute kidney injury) ([facility] [address] [N17.9] 06/27/2021    Obstructive nephropathy [N13.8] 06/27/2021    DM2 (diabetes mellitus, type 2) ([facility] [address] [E11.9] 06/19/2013    Generalized weakness [R53.1] 04/21/2011     PLAN:    Metastatic carcinoma, suspected primary pancreatic. Chemotherapy declined by the patient. Already seen by oncology. Hospice consult requested. Meeting later today    Acute renal failure secondary to post renal obstruction  Resolved after stent placement by urology. Currently creatinine is normal and stable. Diabetes mellitus type 2  Continue Lantus and sliding scale insulin.     Hypertension  Normal blood pressure with

## 2021-07-13 PROBLEM — M79.89 LEFT LEG SWELLING: Status: ACTIVE | Noted: 2021-01-01

## 2021-07-14 NOTE — ED PROVIDER NOTES
[facility] C3 TELE/MED SURG/ONC  EMERGENCY DEPARTMENT ENCOUNTER      Pt Name: Francesca Peters  MRN: [id number]  [place] 1950  Date of evaluation: 7/13/2021  Provider: Salina Dunne MD    CHIEF COMPLAINT       Chief Complaint   Patient presents with    Coagulation Disorder     Patient arrives via ems with c/o possible LLL DVT. Patients leg is cold. On inpatient hospice for suspected pancreatic CA         HISTORY OF PRESENT ILLNESS   (Location/Symptom, Timing/Onset,Context/Setting, Quality, Duration, Modifying Factors, Severity)  Note limiting factors. Francesca Peters is a 80 y.o. female who presents to the emergency department for left leg swelling. The patient is unable to provide me with any history as she is heavily medicated on opioids. The patient is on hospice for possible pancreatic cancer. The son tells me that over the past days she has had extreme swelling of her left lower extremity with color change. The morphine has not been helping with her pain. The hospice doctor came in to evaluate her and that she needed to come to the hospital for evaluation. Nursing notes were reviewed.     REVIEW OF SYSTEMS    (2-9 systems for level 4, 10 or more for level 5)     Review of Systems   Unable to perform ROS: Mental status change         PAST MEDICAL HISTORY     Past Medical History:   Diagnosis Date    Anemia     Anesthesia     PONV    Arthritis     CAD (coronary artery disease)     CVA (cerebral infarction)     Diabetes mellitus (HCC)     Diastolic heart failure (HCC)     Fibromyalgia     Hyperlipidemia     Hypertension     Hypothyroidism     LFT's abnormal     CHAMBERS (nonalcoholic steatohepatitis)     Nausea & vomiting     Pancreatic cyst     PNA (pneumonia) 2/23/2021    Pneumonia     Rectocele     Sleep apnea     does not use c-pap    Tremor     Vitamin D deficiency          SURGICALHISTORY       Past Surgical History:   Procedure Laterality Date    CHOLECYSTECTOMY      COLONOSCOPY  01/10/2018    CT BIOPSY PERCUTANEOUS DEEP BONE  7/8/2021    CT BIOPSY PERCUTANEOUS DEEP BONE 7/8/2021 Tip Thompson MD [facility] CT SCAN    CYSTOSCOPY Bilateral 6/29/2021    CYSTOSCOPY BILATERAL URETERAL STENT INSERTION performed by Jenaro Hdz MD at [address] [place] [address] CATH LAB PROCEDURE      ECTOPIC PREGNANCY SURGERY  1985    ERCP N/A 7/1/2021    ENDOSCOPIC RETROGRADE CHOLANGIOPANCREATOGRAPHY performed by Darlene Curry MD at [facility] ERCP  7/1/2021    ERCP SPHINCTER/PAPILLOTOMY performed by Darlene Curry MD at [facility] ERCP  7/1/2021    ERCP STENT INSERTION performed by Darlene Curry MD at [facility] ERCP N/A 7/2/2021    ERCP PAPILLOTOMY performed by Darlene Curry MD at [facility] ERCP  7/2/2021    ERCP BIOPSY performed by Darlene Curry MD at [facility] ERCP  7/2/2021    ERCP STENT INSERTION performed by Darlene Curry MD at [facility] ERCP  7/2/2021    ERCP ENDOSCOPIC RETROGRADE CHOLANGIOPANCREATOGRAPHY performed by Darlene Curry MD at [address] [place] [address] Right     cataract    OTHER SURGICAL HISTORY  11-4-2011    Posterior repair    POSTERIOR CRUCIATE LIGAMENT REPAIR           CURRENT MEDICATIONS       There are no discharge medications for this patient.       ALLERGIES     Acetaminophen, Codeine, Oxycodone hcl, Oxycodone-acetaminophen, Tramadol hcl, Ultram [tramadol], and Percocet [oxycodone-acetaminophen]    FAMILY HISTORY       Family History   Problem Relation Age of Onset    Diabetes Mother    [facility] Stroke Mother     Heart Disease Father     Cancer Father         prostate    Heart Disease Brother         multiple stents    Heart Disease Brother     Heart Disease Sister         carotid artery surgery    Heart Disease Brother     Heart Attack Brother     Heart Disease Brother     Asthma Neg Hx     Emphysema Neg Hx     Heart Failure Neg Hx           SOCIAL HISTORY       Social History Socioeconomic History    Marital status:      Spouse name: Maureen Valdivia Number of children: 2    Years of education: 15    Highest education level: Not on file   Occupational History    Occupation: [employer]     Comment: school   Tobacco Use    Smoking status: Former Smoker     Packs/day: 0.25     Years: 2.00     Pack years: 0.50     Types: Cigarettes     Quit date: 1991     Years since quittin.2    Smokeless tobacco: Never Used   Vaping Use    Vaping Use: Never used   Substance and Sexual Activity    Alcohol use: No     Alcohol/week: 0.0 standard drinks    Drug use: No    Sexual activity: Yes     Partners: Male     Comment:    Other Topics Concern    Not on file   Social History Narrative    Not on file     Social Determinants of Health     Financial Resource Strain:     Difficulty of Paying Living Expenses:    Food Insecurity:     Worried About Running Out of Food in the Last Year:     Ran Out of Food in the Last Year:    Transportation Needs:     Lack of Transportation (Medical):  Lack of Transportation (Non-Medical):    Physical Activity:     Days of Exercise per Week:     Minutes of Exercise per Session:    Stress:     Feeling of Stress :    Social Connections:     Frequency of Communication with Friends and Family:     Frequency of Social Gatherings with Friends and Family:     Attends Yarsanism Services:     Active Member of Clubs or Organizations:     Attends Club or Organization Meetings:     Marital Status:    Intimate Partner Violence:     Fear of Current or Ex-Partner:     Emotionally Abused:     Physically Abused:     Sexually Abused:        SCREENINGS             PHYSICAL EXAM    (up to 7 for level 4, 8 or more for level 5)     ED Triage Vitals [21]   BP Temp Temp src Pulse Resp SpO2 Height Weight   98/67 -- -- 112 14 98 % -- --       Physical Exam  Vitals and nursing note reviewed. Constitutional:       Appearance: Normal appearance. She is well-developed. She is not ill-appearing. Comments: Patient is somnolent barely arousable   HENT:      Head: Normocephalic and atraumatic. Right Ear: External ear normal.      Left Ear: External ear normal.      Nose: Nose normal.      Mouth/Throat:      Mouth: Mucous membranes are dry. Eyes:      General: No scleral icterus. Right eye: No discharge. Left eye: No discharge. Conjunctiva/sclera: Conjunctivae normal.   Cardiovascular:      Rate and Rhythm: Regular rhythm. Tachycardia present. Heart sounds: Normal heart sounds. Comments: Diminished pulses bilaterally. Pulmonary:      Effort: Pulmonary effort is normal. No respiratory distress. Breath sounds: Normal breath sounds. No wheezing or rales. Abdominal:      General: Bowel sounds are normal.   Musculoskeletal:      Cervical back: Neck supple. Comments: The left lower extremity is at least twice the size of the right lower extremity all the way from the foot to the hip. No deformity. Skin:     Coloration: Skin is not pale. Comments: There is purplish mottling discoloration of the left lower extremity. The left lower extremity is cold. Capillary refill is delayed on the left.    Psychiatric:         Mood and Affect: Mood normal.         Behavior: Behavior normal.             DIAGNOSTIC RESULTS     EKG: All EKG's are interpreted by the Emergency Department Physician who either signs or Co-signs this chart in the absence of a cardiologist.    12 lead EKG shows     RADIOLOGY:   Non-plain film images such as CT, Ultrasound and MRI are read by the radiologist. Plain radiographic images are visualized and preliminarily interpreted by the emergency physician with the below findings:        Interpretation per the Radiologist below, if available at the time of this note:    VL Extremity Venous Left    (Results Pending)         ED BEDSIDE ULTRASOUND:   Performed by ED Physician - none    LABS:  Labs Reviewed   CBC WITH AUTO DIFFERENTIAL - Abnormal; Notable for the following components:       Result Value    WBC 15.9 (*)     Hemoglobin 10.8 (*)     Hematocrit 33.7 (*)     MCV 79.5 (*)     MCH 25.6 (*)     RDW 16.9 (*)     Neutrophils Absolute 13.5 (*)     Metamyelocytes Relative 1 (*)     Myelocyte Percent 1 (*)     All other components within normal limits    Narrative:     Performed at:  [place] [address] [employer]   Phone [phone]   COMPREHENSIVE METABOLIC PANEL W/ REFLEX TO MG FOR LOW K - Abnormal; Notable for the following components:    Sodium 127 (*)     Potassium reflex Magnesium 5.5 (*)     Chloride 93 (*)     CO2 20 (*)     Glucose 388 (*)     BUN 28 (*)     CREATININE 2.0 (*)     GFR Non- 25 (*)     GFR  30 (*)     Albumin 3.1 (*)     Albumin/Globulin Ratio 0.8 (*)     Alkaline Phosphatase 290 (*)     All other components within normal limits    Narrative:     Performed at:  [address], [facility] [employer]   Phone [phone]   POCT GLUCOSE - Abnormal; Notable for the following components:    POC Glucose 296 (*)     All other components within normal limits    Narrative:     Performed at:  [address], [facility] [employer]   Phone [phone]   APTT    Narrative:     Performed at:  [place] [address] [employer]   Phone [phone]   PROTIME-INR    Narrative:     Performed at:  [place] [address] [employer]   Phone [phone]   APTT   BASIC METABOLIC PANEL W/ REFLEX TO MG FOR LOW K   POCT GLUCOSE   POCT GLUCOSE       All other labs were within normal range or not returned as of this dictation.     EMERGENCY DEPARTMENT COURSE and DIFFERENTIAL DIAGNOSIS/MDM:   Vitals:    Vitals:    07/14/21 0034 07/14/21 0042 07/14/21 0115 07/14/21 0142   BP: 111/67  89/60    Pulse: 118 118 116 110   Resp: 24 24 18    Temp:   98.4 °F (36.9 °C)    TempSrc:   Oral    SpO2: 97% 98% 97% 91%   Weight:           Adult female who comes in of pain of her left lower extremity with obvious asymmetry. I am concerned for phlegmasia cerulea dolens. Laboratory studies and a CT scan obtained. The patient is given IV fluids as she is tachycardic. I will not give any pain medications at this time as the patient received a dose of morphine just prior to what leaving hospice. Laboratory studies reviewed. The patient has leukocytosis. Stable anemia. Platelets are normal.  She has acute kidney injury with an elevated BUN and creatinine. Her blood glucose is elevated and she has mild hyperkalemia. There is hyponatremia and hypochloremia. Anion gap is normal.  Alkaline phosphatase is elevated. I initially plan to obtain a CT of the lower extremity with contrast to evaluate for venous occlusion however because of the patient's renal function I am unable to do so. I discussed this with the patient and her son. Given the high likelihood of phlegmasia cerulea dolens I will treat her with high-dose heparin and will consider admitting her to the hospital for further evaluation. I discussed this with the hospitalist on duty who is also agreeable with this plan. CRITICAL CARE TIME   None       CONSULTS:  IP CONSULT TO HOSPITALIST  IP CONSULT TO VASCULAR SURGERY    PROCEDURES:       Procedures    FINAL IMPRESSION      1. 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 Abnormal blood electrolyte level    3. LANA (acute kidney injury) [facility])          DISPOSITION/PLAN   DISPOSITION Admitted 07/13/2021 11:42:44 PM      PATIENT REFERREDTO:  No follow-up provider specified. DISCHARGEMEDICATIONS:  There are no discharge medications for this patient.          (Please note that portions of this note were completed

## 2021-07-14 NOTE — ACP (ADVANCE CARE PLANNING)
ADVANCED CARE PLANNING    Name:Pallavi Jackson       :  1950              MRN:  [id number]  Admission Date: 2021  8:55 PM  Date of Discussion:  21       Purpose of Encounter: Advanced care planning in light of pancreatic cancer. Parties in attendance: :Moises Rodriguez MD, Family member. Decisional Capacity: No      Objective/Medical Story:  Recently diagnosed with pancreatic cancer. Metastatic. Patient failing to thrive, went to hospice. Family still wants hospice, comfort care. They were having trouble at [facility], so they came here. This is quite a tough time for the patient and family. They want pure comfort care at this point. No surgeries, no anticoagulation, no labs, no IVFs. I told the family she likely has 3 days to 3 weeks to live. Plan is to return to [facility] tomorrow pending family processing the situation. Family would like very aggressive pain control because patient is suffering. Reviewed the specific doses and frequencies with them, and explained that this is quite an aggressive regimen. Active Diagnoses: Active Hospital Problems    Diagnosis Date Noted    Left leg swelling [M79.89] 2021    Metastatic malignant neoplasm ([facility] [address] [C79.9] 2021    LANA (acute kidney injury) ([facility] [address] [N17.9] 2021    DM2 (diabetes mellitus, type 2) ([facility] [address] [E11.9] 2013       These active diagnoses are of sufficient risk that focused discussion on advance care planning is indicated in order to allow the patient to thoughtfully consider personal goals of care; and if situations arise that prevent the ability to personally give input, to ensure appropriate representation of their personal desires for different levels and agressiveness of care. Goals of Care Determinations: Patient wishes to focus on comfort. Code Status: At this time patient wishes to be DNR-CC         Time Spent on Advanced Planning Documents: 16 mins.     The following items were considered in medical decision making:  Independent review of images , Review / order clinical lab tests. Review / order radiology tests, Decision to obtain old records. Advanced Care Planning Documents:  Completed advances directives, advanced directives in chart. Electronically signed by Stanley Williamson MD on 7/14/2021 at 9:15 AM    Thank you SUMA Salgado CNP for the opportunity to be involved in this patient's care. If you have any questions or concerns please feel free to contact me at [phone].

## 2021-07-14 NOTE — FLOWSHEET NOTE
Pt was alert, engaged in talking about the lenora henry and especially her mother who was a [employer]. Pt was also interested in talking about  options and wishes, additional family arrived at this time, including three young grandsons.   Spiritual Care will continue to follow-up.       21 [id number]   Encounter Summary   Services provided to: Patient and family together   Referral/Consult From: Other    Support System Children;Family members;Friends/neighbors   Continue Visiting Yes   Complexity of Encounter Moderate   Length of Encounter 30 minutes

## 2021-07-14 NOTE — CONSULTS
Pharmacy to Manage Heparin Infusion per [facility] CLIN[facility]    Dx: DVT  Pt AdjBW = 69.2 Kg  Baseline aPTT = 31.4    High Dose Heparin Infusion  Heparin 5500 units IVP bolus followed by Heparin infusion at 1250 units/hr (recommended initial max dose 2100 units/hr). Recheck aPTT in 6 hours. Goal aPTT = 60-90 seconds. aPTT < 45 Heparin 80 units/kg bolus  Increase infusion by 4 units/kg/hr  aPTT 45 - 59.9   Heparin 40 units/kg bolus Increase infusion by 2 units/kg/hr  aPTT 60 - 90 No bolus No change   aPTT 90.1 - 97.5   No bolus Decrease infusion by 1 units/kg/hr  aPTT 97.6-105    Hold heparin for 1 hour Decrease infusion by 2 units/kg/hr  aPTT > 105  Hold heparin for 1 hour Decrease infusion by 3 units/kg/hr    Obtain aPTT 6 hours after bolus and 6 hours after any dose change until two consecutive therapeutic aPTT are achieved- then daily.     Jeni Eason, [address] [facility] PharmD  7/13/2021 at 10:50 PM

## 2021-07-14 NOTE — PROGRESS NOTES
sensory/motor deficits. Cranial nerves: II-XII intact, grossly non-focal.  Psychiatric:  Somnolent, intermittently awake, little insight  Capillary Refill: Brisk,3 seconds, normal   Peripheral Pulses: +2 palpable, equal bilaterally       Labs:   Recent Labs     07/13/21 2139   WBC 15.9*   HGB 10.8*   HCT 33.7*        Recent Labs     07/13/21 2139 07/14/21  0610   * 133*   K 5.5* 5.3*   CL 93* 97*   CO2 20* 22   BUN 28* 32*   CREATININE 2.0* 2.7*   CALCIUM 9.3 8.8     Recent Labs     07/13/21 2139   AST 16   ALT 18   BILITOT 0.9   ALKPHOS 290*     Recent Labs     07/13/21 2139   INR 1.09     No results for input(s): Cristina Marti in the last 72 hours. Urinalysis:      Lab Results   Component Value Date    NITRU Negative 06/27/2021    WBCUA 10-20 06/27/2021    BACTERIA Rare 06/27/2021    RBCUA None seen 06/27/2021    BLOODU Negative 06/27/2021    SPECGRAV 1.020 06/27/2021    GLUCOSEU 500 06/27/2021       Radiology:  No orders to display           Assessment/Plan:    Active Hospital Problems    Diagnosis     Left leg swelling [M79.89]     Metastatic malignant neoplasm (HCC) [C79.9]     LANA (acute kidney injury) ([facility] [address] [N17.9]     DM2 (diabetes mellitus, type 2) ([facility] [address] [E11.9]        The patient is a 80 Y F who recently had a prolonged admission here from late May through 7/11. She was diagnosed with metastatic pancreatic cancer at that time, and oncology recommended hospice. She went to the [place] [address] in [facility] which [facility]. After being there for couple days she developed severe pain and edema of the LLE, presumably from a DVT. It sounds like the family was unsatisfied with her comfort care at the facility, so they had her sent to our ED to see if we had options to control her pain better. She was admitted overnight.   The following morning it was decided that they really didn't want any aggressive intervention for the DVT, they just wanted her pain controlled. Her comfort regimen was increased. The plan is to return to [facility] after the family has time to process the situation a little more, hopefully 7/15. DVT Prophylaxis: not indicated  Diet: ADULT DIET; Dysphagia - Pureed  Code Status: DNR-CC    PT/OT Eval Status: not indicated    Dispo - hopefully back to [facility] 7/15 if the family feels comfortable with the [address] [place] [address] institution.         Jabari Mckeon MD

## 2021-07-14 NOTE — FLOWSHEET NOTE
provided support and prayer for patient and her son Tanika Judd). Pt's son requested prayer for her comfort, which writer provided, along with a prayer quilt. Pt's mother was a Jennifer Bowden, so this was particularly meaningful for them. Spiritual Care will continue to follow for end-of-life support and prayer. 07/14/21 0942   Encounter Summary   Services provided to: Patient and family together   Referral/Consult From: Nurse   Support System Children   Continue Visiting Yes   Complexity of Encounter Moderate   Length of Encounter 30 minutes   Grief and Life Adjustment   Type End of life   Assessment Approachable;Peaceful   Intervention Active listening;Prayer;Sustaining presence/ Ministry of presence; Discussed relationship with God  (Provided prayer quilt.)   Outcome Expressed gratitude;Engaged in conversation;Coping;Receptive

## 2021-07-14 NOTE — PROGRESS NOTES
4 Eyes Skin Assessment     The patient is being assess for  admission    I agree that 2 RN's have performed a thorough Head to Toe Skin Assessment on the patient. ALL assessment sites listed below have been assessed.        Areas assessed by both nurses: Jeanna Proctor RN and Rosa Bourne RN  [x]   Head, Face, and Ears   [x]   Shoulders, Back, and Chest  [x]   Arms, Elbows, and Hands   [x]   Coccyx, Sacrum, and Ischum  [x]   Legs, Feet, and Heels        Does the Patient have Skin Breakdown?  yes  Mook Prevention initiated:  Yes  Wound Care Orders initiated: no      WOC nurse consulted for Pressure Injury (Stage 3,4, Unstageable, DTI, NWPT, and Complex wounds):  no    Coccyx redden nonblanchable   Right toe dry brown scab      Nurse 1 eSignature: Electronically signed by Jeanna Proctor RN on 7/14/21 at 6:14 AM EDT    **SHARE this note so that the co-signing nurse is able to place an eSignature**    Nurse 2 eSignature: Electronically signed by Valentin Rosa RN on 7/14/21 at 6:25 AM EDT

## 2021-07-14 NOTE — PROGRESS NOTES
Was in pt room doing vitals and passing meds. Son was at bedside, pt and son were talking. Nurse handed pt her meds and noticed that she was no longer responding to son or nurse. Called out to pt, tried stimulation, pt vitals stable, called charge nurse and Doctor to bed side.  reviewed chart and spoke with son about pts condition and wishes for care. Son wishes for pt to return to Hospice care. Pt then opened her eyes and asked to talk to her sister. Pt continued to talk with son, expressed desire to speak with spiritual care.

## 2021-07-14 NOTE — H&P
Hospital Medicine History & Physical      PCP: Bernabe Stein, SUMA - CNP    Date of Admission: 7/13/2021    Date of Service: Pt seen/examined on 7/13/2021 and Admitted to Inpatient with expected LOS greater than two midnights due to medical therapy. Chief Complaint: Left leg swelling      History Of Present Illness:   80 y.o. female who presented to [facility] with above complaints  Patient with recently diagnosed metastatic carcinoma, likely suspected pancreatic CA, currently on inpatient hospice presented to the ED for left leg swelling. Patient unable to provide much history as she is sedated and sleepy on opioids being treated for pain. Son accompanied her is present at bedside. Reports that she developed left leg swelling about 2 days back has gradually progressed, and reports the left leg has changed color. He reports the hospice MD evaluated the patient earlier today and recommended that she come to the hospital for evaluation of this, as the morphine that they were giving her orally was not helping her pain much. Son does report that she has not eaten or consumed any liquids much today with her being so somnolent.     Past Medical History:          Diagnosis Date    Anemia     Anesthesia     PONV    Arthritis     CAD (coronary artery disease)     CVA (cerebral infarction)     Diabetes mellitus (HCC)     Diastolic heart failure (HCC)     Fibromyalgia     Hyperlipidemia     Hypertension     Hypothyroidism     LFT's abnormal     CHAMBERS (nonalcoholic steatohepatitis)     Nausea & vomiting     Pancreatic cyst     PNA (pneumonia) 2/23/2021    Pneumonia     Rectocele     Sleep apnea     does not use c-pap    Tremor     Vitamin D deficiency        Past Surgical History:          Procedure Laterality Date    CHOLECYSTECTOMY      COLONOSCOPY  01/10/2018    CT BIOPSY PERCUTANEOUS DEEP BONE  7/8/2021    CT BIOPSY PERCUTANEOUS DEEP BONE 7/8/2021 Jose Batres MD [facility] CT SCAN    CYSTOSCOPY Bilateral 6/29/2021    CYSTOSCOPY BILATERAL URETERAL STENT INSERTION performed by Alisson Henderson MD at [address] CATH LAB PROCEDURE      ECTOPIC PREGNANCY SURGERY  1985    ERCP N/A 7/1/2021    ENDOSCOPIC RETROGRADE CHOLANGIOPANCREATOGRAPHY performed by Ernesto Marion MD at [facility] ERCP  7/1/2021    ERCP SPHINCTER/PAPILLOTOMY performed by Ernesto Marion MD at [facility] ERCP  7/1/2021    ERCP STENT INSERTION performed by Ernesto Marion MD at [facility] ERCP N/A 7/2/2021    ERCP PAPILLOTOMY performed by Ernesto Marion MD at [facility] ERCP  7/2/2021    ERCP BIOPSY performed by Ernesto Marion MD at [facility] ERCP  7/2/2021    ERCP STENT INSERTION performed by Ernesto Marion MD at [facility] ERCP  7/2/2021    ERCP ENDOSCOPIC RETROGRADE CHOLANGIOPANCREATOGRAPHY performed by Ernesto Marion MD at [address] [facility] Right     cataract    OTHER SURGICAL HISTORY  11-4-2011    Posterior repair    POSTERIOR CRUCIATE LIGAMENT REPAIR         Medications Prior to Admission:      Prior to Admission medications    Not on File       Allergies:  Acetaminophen, Codeine, Oxycodone hcl, Oxycodone-acetaminophen, Tramadol hcl, Ultram [tramadol], and Percocet [oxycodone-acetaminophen]    Social History:      The patient currently lives inpatient hospice    TOBACCO:   reports that she quit smoking about 30 years ago. Her smoking use included cigarettes. She has a 0.50 pack-year smoking history. She has never used smokeless tobacco.  ETOH:   reports no history of alcohol use.   E-Cigarettes/Vaping Use     Questions Responses    E-Cigarette/Vaping Use Never User    Start Date     Passive Exposure     Quit Date     Counseling Given     Comments             Family History:  Positive as follows:        Problem Relation Age of Onset    Diabetes Mother     Stroke Mother     Heart Disease Father     Cancer Father         prostate  Heart Disease Brother         multiple stents    Heart Disease Brother     Heart Disease Sister         carotid artery surgery    Heart Disease Brother     Heart Attack Brother     Heart Disease Brother     Asthma Neg Hx     Emphysema Neg Hx     Heart Failure Neg Hx        REVIEW OF SYSTEMS COMPLETED:   Pertinent positives as noted in the HPI. All other systems reviewed and negative. PHYSICAL EXAM PERFORMED:    BP 89/60   Pulse 116   Temp 98.4 °F (36.9 °C) (Oral)   Resp 18   Wt 181 lb (82.1 kg)   LMP  (LMP Unknown)   SpO2 97%   BMI 28.78 kg/m²     General appearance:  No apparent distress, appears stated age and cooperative. HEENT:  Normal cephalic, atraumatic without obvious deformity. Pupils equal, round, and reactive to light. Extra ocular muscles intact. Conjunctivae/corneas clear. Very dry oral mucosa  Neck: Supple, with full range of motion. No jugular venous distention. Trachea midline. Respiratory:  Normal respiratory effort. Clear to auscultation, bilaterally without Rales/Wheezes/Rhonchi. Cardiovascular: Tachycardic, regular rhythm with normal S1/S2 without murmurs, rubs or gallops. Abdomen: Soft, non-tender, non-distended with normal bowel sounds. Musculoskeletal:  No clubbing, cyanosis  bilaterally. Full range of motion without deformity. Diffuse left lower extremity edema extending up to the proximal thigh, pitting type  Discoloration of left leg  Capillary refill > 3s in left lower extremity  DP PT and popliteal pulses only felt through Doppler, not palpable in LLE  Left lower extremity cold to touch, with similar to right lower extremity  Skin: Skin color, texture, turgor normal.  No rashes or lesions. Neurologic: Somnolent, neurovascularly intact without any focal sensory/motor deficits.  Cranial nerves: II-XII intact, grossly non-focal.  Psychiatric: Calm and cooperative        Labs:     Recent Labs     07/11/21  0552 07/13/21  2139   WBC 8.9 15.9*   HGB 10.7* 10.8* DNR-CC    PT/OT Eval Status: Not indicated    Dispo -IP stay       Nickolas Wills MD    Thank you SUMA Berg - HAYLEY for the opportunity to be involved in this patient's care. If you have any questions or concerns please feel free to contact me at [phone].

## 2021-07-14 NOTE — CARE COORDINATION
Patient readmitted to hospital re pain management. Spoke with Clement 10 reports signed paperwork to revoke care this am, open to returning if family agreeable. Patient will require new consult for hospice. Spoke with MD reports will place in am if family in agreement.  CHRISTIAN Vail

## 2021-07-15 NOTE — DISCHARGE SUMMARY
equal, round, and reactive to light. Conjunctivae/corneas clear. Neck: Supple, with full range of motion. No jugular venous distention. Trachea midline. Respiratory:  Normal respiratory effort. Clear to auscultation, bilaterally without Rales/Wheezes/Rhonchi. Cardiovascular: Regular rate and rhythm with normal S1/S2 without murmurs, rubs or gallops. Abdomen: Soft, mild tenderness, non-distended with normal bowel sounds. Musculoskeletal: No clubbing, cyanosis. Severe edema of LLE, foot is dusky. Full range of motion without deformity. Skin: Skin texture, turgor normal.  No rashes or lesions. Neurologic:  Neurovascularly intact without any focal sensory/motor deficits. Cranial nerves: II-XII intact, grossly non-focal.  Psychiatric:  Somnolent, does not have insight  Capillary Refill: Brisk,3 seconds, normal   Peripheral Pulses: +2 palpable, equal bilaterally       Labs: For convenience and continuity at follow-up the following most recent labs are provided:      CBC:    Lab Results   Component Value Date    WBC 15.9 07/13/2021    HGB 10.8 07/13/2021    HCT 33.7 07/13/2021     07/13/2021       Renal:    Lab Results   Component Value Date     07/14/2021    K 5.3 07/14/2021    CL 97 07/14/2021    CO2 22 07/14/2021    BUN 32 07/14/2021    CREATININE 2.7 07/14/2021    CALCIUM 8.8 07/14/2021    PHOS 3.5 02/25/2021         Significant Diagnostic Studies    Radiology:   No orders to display          Consults:     IP CONSULT TO HOSPITALIST  IP CONSULT TO HOSPICE    Disposition:  Hospice, either at home or at [facility] at Discharge: Stable    Discharge Instructions/Follow-up:  Hospice care    Code Status:  DNR-CC     Activity: activity as tolerated    Diet: don't feed her if she cannot safely swallow. otherwise she can eat whatever she'd like.       Discharge Medications:     Current Discharge Medication List           Details   Morphine Sulfate (MORPHINE 20MG/ML) SOLN concentrated solution Take 1.5 mLs by mouth every 2 hours as needed for Pain for up to 10 days. 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 Signed:    Claire Moore MD   7/15/2021      Thank you SUMA Finch - HAYLEY for the opportunity to be involved in this patient's care. If you have any questions or concerns please feel free to contact me at [phone].

## 2021-07-15 NOTE — FLOWSHEET NOTE
assisted family with arrangements for [facility] service to be led by patient's . Service will take place in the C3 Day Room at 2:00 pm.  Family is gathered at bedside, with more to come prior to the service.  set up Day Room for service; Spiritual Care will restore room to its prior set up following the service. Spiritual Care continuing to follow for support of patient and family. 07/15/21 1145   Encounter Summary   Services provided to: Family   Referral/Consult From: Other ;Nurse   Support System Spouse; Children;Family members   Continue Visiting Yes   Complexity of Encounter High   Length of Encounter 1 hour   Spiritual/Mu-ism   Type Ritual   Assessment Approachable;Coping   Intervention Active listening  (Assisted with arrangements for service in C3 Day Room)   Outcome Expressed gratitude;Engaged in conversation;Receptive

## 2021-07-15 NOTE — PROGRESS NOTES
Shift assessments completed and charted. Pt sleeping in bed, minimally responsive. Family at bedside, plan is to d/c home with hospice.

## 2021-07-15 NOTE — CARE COORDINATION
Writer placed call to son this date reports visiting with his father whom is also in the hospital and will be up to the floor shortly to review care options. Courtney Mayer, PRAMODW  1000: Spoke with son re plan of care and goals. Son expressed wishes to transition home with hospice reporting feels like could manage care at home as input/output decreased. Son reports has multiple out of town family members in. Son reports concerns with [facility] re number of visitors per day and feels like family support out ways inpatient care. Writer did edu the pain is currently beening managed with IV pain medication and such administration could not occur in the home setting. Son hopeful to transition alternate means. Son also needs to review home setting with Patient spouse whom is currently a patient here. SW to review with clinical team.Zuleima Kelly, PRAMODW  1140: Writer met with POA reviewed MD okay to transition to oral meds, agreeable to re-consult [address] referral placed. Current plan is to dc home with [facility] on 07/16 to ensure time to deliver DME and prepare home, MD and RN aware. CHRISTIAN Panda  1320: [address] met with family signed consent for [address]. Plan is to have DME delivered before 11am on 07/16 thru [address] and [facility] will transport 1pm . CHRISTIAN Panda

## 2021-07-15 NOTE — PROGRESS NOTES
[facility]    Met with patient's family to discuss hospice philosophy and services. They are agreeable to hospice and plan is:    DME to be delivered tomorrow before 11 am.  Transport set up at [facility] with [address] [place] [address] Dr Braden  Recommendations:  Please have MD sign [facility] which is located in patient's soft chart  Please have MD place discharge order and complete the discharge task per hospital procedure  Prescription request if MD is agreeable:    Updated staff nurse on plan. Notified Stacey Farley. Thank you for the opportunity to serve this patient and family. Peggy Banegas RN, [address], [address] [place] [address]   O: [phone]  C: [phone]  F: [phone]   Main & Referrals: [phone]   [email or web address]

## 2021-07-15 NOTE — PLAN OF CARE
Problem: Falls - Risk of:  Goal: Will remain free from falls  Description: Will remain free from falls  7/15/2021 0207 by Elizabeth Mascorro RN  Outcome: Ongoing  7/14/2021 1649 by Kadie Huerta RN  Outcome: Ongoing     Problem: Pain:  Goal: Control of acute pain  Description: Control of acute pain  7/15/2021 0207 by Elizabeth Mascorro RN  Outcome: Ongoing  7/14/2021 1649 by Kadie Huerta RN  Outcome: Ongoing

## 2021-07-16 NOTE — PROGRESS NOTES
Patient assessed by myself, and Jessica WINKLER. Patient passed at 5, family is at bedside. Clinical, Security,  and Admitting notified. Also contacted Dr. Lisha Amaro via [facility].

## 2021-07-16 NOTE — PROGRESS NOTES
Patient assessment complete and documented. Patients HR tachycardic last vitals sign check 135, and a temp of 99.3 axillary. Patient currently on 5 L of O2 via nasal canula and her O2 SpO2 is at 90%. Patient is alert to self only at this time. Patient is a complete, Q2 turn check and change. Marroquin catheter in place and draining appropriately. Bed is in lowest locked position with call light within reach, and family at bedside.  Will continue to monitor situation

## 2021-07-16 NOTE — DISCHARGE SUMMARY
Hospital Medicine Discharge Summary    Patient ID: [place] Bak[place]      Patient's PCP: SUMA Blanchard CNP    Admitting Physician: Yarelis Kuo MD  Discharge Physician: Calvin Fernandez MD     Admit Date: 2021     Disposition:     Discharge Diagnoses: Active Hospital Problems    Diagnosis     Left leg swelling [M79.89]     Metastatic malignant neoplasm (HCC) [C79.9]     LANA (acute kidney injury) ([facility] [N17.9]     DM2 (diabetes mellitus, type 2) ([facility] [E11.9]        Date of Death: 21   Time of Death: 05:09    Immediate Cause of Death: pancreatic cancer  Underlying Conditions: Other Contributing Conditions:       Hospital Course: The patient is a 80 Y F who recently had a prolonged admission here from late May through . Lucas Little was diagnosed with metastatic pancreatic cancer at that time, and oncology recommended hospice.  She went to the [facility] [address] in [place] which seems to be associated with the [address] [place] [address] Avenue being there for couple days she developed severe pain and edema of the LLE, presumably from a DVT.  It sounds like the family was unsatisfied with her comfort care at the facility, so they had her sent to our ED to see if we had options to control her pain better. Lucas Little was admitted overnight.  The following morning it was decided that they really didn't want any aggressive intervention for the DVT, they just wanted her pain controlled.  Her comfort regimen was increased.  The plan was to return to hospice care, either at home or at an [facility]. Hospice was consulted to help sort this out. Ultimately the patient passed away here while family was preparing for home hospice.     LANA was due to above issues. Consults:  IP CONSULT TO HOSPITALIST  IP CONSULT TO HOSPICE    Signed:  Calvin Fernandez MD MD   2021    Thank you SUMA Blanchard CNP for the opportunity to be involved in this patient's care.  If you have any questions or concerns please feel free to contact me at [phone].

## 2021-07-18 ENCOUNTER — CLINICAL DOCUMENTATION (OUTPATIENT)
Dept: SPIRITUAL SERVICES | Age: 71
End: 2021-07-18

## 2021-07-18 NOTE — PROGRESS NOTES
Bereavement packet prepared, sent to family.     Uli Nascimento  2-0525       07/18/21 1047   Encounter Summary   Services provided to: Family   Continue Visiting   (7/18: bereavement packet prepared, sent for grief support)   Complexity of Encounter Low   Length of Encounter 15 minutes   Routine   Type Follow up   Grief and Life Adjustment   Type Hospice;Death   Intervention Grief care

## 2022-01-27 NOTE — PROGRESS NOTES
Immunization chart prep completed.  Immunization records verified.  Otoniel Medina Naranjo due for Boostrix (Tdap) and Influenza   primary encounter diagnosis was Generalized weakness. Diagnoses of Nausea and vomiting, intractability of vomiting not specified, unspecified vomiting type, Transaminitis, LANA (acute kidney injury) ([address] Metastatic malignant neoplasm, unspecified site ([address] and Elevated bilirubin were also pertinent to this visit. has a past medical history of Anemia, Anesthesia, Arthritis, CAD (coronary artery disease), CVA (cerebral infarction), Diabetes mellitus ([address] Diastolic heart failure ([address] Fibromyalgia, Hyperlipidemia, Hypertension, Hypothyroidism, LFT's abnormal, CHAMBERS (nonalcoholic steatohepatitis), Nausea & vomiting, Pancreatic cyst, PNA (pneumonia), Pneumonia, Rectocele, Sleep apnea, Tremor, and Vitamin D deficiency. has a past surgical history that includes Cholecystectomy; eye surgery (Right); Ectopic pregnancy surgery (1985); Posterior cruciate ligament repair; other surgical history (11-4-2011); Diagnostic Cardiac Cath Lab Procedure; Colonoscopy (01/10/2018); and Cystoscopy (Bilateral, 6/29/2021). Restrictions  Restrictions/Precautions  Restrictions/Precautions: General Precautions, Fall Risk  Position Activity Restriction  Other position/activity restrictions: Up with assistcorazon    Subjective   General  Chart Reviewed: Yes  Patient assessed for rehabilitation services?: Yes  Additional Pertinent Hx: Metastatic carcinoma  Response to previous treatment: Patient with no complaints from previous session  Family / Caregiver Present: Yes (Son for last part of session.)  Referring Practitioner: Noelle Houser MD  Diagnosis: Acute Kidney injury, bilateral ureteral obstruction with CYSTOSCOPY BILATERAL URETERAL STENT INSERTION (6/29), Dilated CBD with ENDOSCOPIC RETROGRADE CHOLANGIOPANCREATOGRAPHYER, CP SPHINCTER/PAPILLOTOMYER, CP STENT INSERTION (7/1), ERCP ENDOSCOPIC RETROGRADE CHOLANGIOPANCREATOGRAPHY (7/2)  Subjective  Subjective: Pt agreeable therapy.   General Comment  Comments: RN approved therapy. Patient Currently in Pain: Yes  Pain Assessment  Pain Assessment:  (0/10 at rest, 10/10 with movement)  Pain Type: Acute pain  Pain Location: Shoulder  Pain Orientation: Right  Non-Pharmaceutical Pain Intervention(s): Ambulation/Increased Activity;Repositioned; Therapeutic presence; Rest  Response to Pain Intervention: Patient Satisfied  Pre Treatment Pain Screening  Intervention List: Patient able to continue with treatment  Vital Signs  Temp: 97.8 °F (36.6 °C)  Temp Source: Oral  Pulse: 70  Heart Rate Source: Monitor  Resp: 20  BP: (!) 193/99  BP Location: Left upper arm  MAP (mmHg): 131  Patient Position: Semi fowlers  Patient Currently in Pain: Yes  Oxygen Therapy  SpO2: 92 %  Pulse Oximeter Device Mode: Intermittent  Pulse Oximeter Device Location: Finger  O2 Device: None (Room air)  Social/Functional History  Social/Functional History  Lives With: Spouse (, but  is in a nursing home right now for rehabilitation. grandson, who works)  Type of Home: [facility] [employer] Layout: Two level, Bed/Bath upstairs  Home Access: Stairs to enter with rails  Entrance Stairs - Number of Steps: 2 WALE. 5 steps to 2nd floor bed/bath. Entrance Stairs - Rails: Right  Bathroom Shower/Tub: Walk-in shower, Tub/Shower unit  Bathroom Toilet: Handicap height  Bathroom Equipment: Shower chair, Grab bars in shower  Home Equipment: Quad cane, Grab bars, [employer], Reacher  Receives Help From: Family  ADL Assistance: Needs assistance  Bath: Modified independent (however patient said that she did not have the energy to do a lot of her ADLs)  Dressing: Modified independent  Grooming: Modified independent   Feeding: Modified independent   Homemaking Assistance: Needs assistance (patient said that she was able to cook and clean before her cancer diagnosis. however since then she has not been able to clean. \"you should look at my house! \" \"my grandson has been doing the shopping and cleaning. \")  Meal Prep: Total  Laundry: Total  Vacuuming: Total  Cleaning: Total  Shopping: Total  Homemaking Responsibilities: No  Ambulation Assistance: Independent  Transfer Assistance: Independent  Active : No  Patient's  Info: patient's grandson has been driving  Occupation: Retired  Additional Comments: Patient had 2 falls in the past 6 months. No serious injuries with those falls. Objective   Vision: Impaired  Vision Exceptions: Wears glasses for reading  Hearing: Within functional limits    Orientation  Overall Orientation Status: Within Functional Limits  Observation/Palpation  Posture: Fair  Balance  Sitting Balance: Stand by assistance (SBA-supervision)  Standing Balance: Moderate assistance  Standing Balance  Time: ~1min  Activity: side stepping up towards [facility]. Comment: SW used. Functional Mobility  Functional - Mobility Device: Standard Walker  Activity: Other (side stepping up towards [facility]  Assist Level: Moderate assistance  Functional Mobility Comments: mod vc's for positioning and safety. Pt fatiguing quickly and stating that she is dizzy. ADL  Feeding: Beverage management;Setup;Supervision  Grooming: Supervision;Setup (combing hair at EOB)  UE Dressing: Minimal assistance (donning/doffing [facility] gown)  Toileting: Dependent/Total (chandra)  Tone RUE  RUE Tone: Normotonic  Tone LUE  LUE Tone: Normotonic  Coordination  Movements Are Fluid And Coordinated: No  Coordination and Movement description: Fine motor impairments;Tremors;Right UE;Left UE     Bed mobility  Supine to Sit: Moderate assistance;2 Person assistance  Sit to Supine: Moderate assistance;2 Person assistance  Scooting: Maximal assistance;2 Person assistance;Dependent/Total  Comment: HOB elevated. Transfers  Sit to stand:  Moderate assistance  Stand to sit: Moderate assistance  Transfer Comments: with SW and vc's for safe t/fs     Cognition  Overall Cognitive Status: Exceptions  Arousal/Alertness: Appropriate responses to stimuli  Following Commands: Follows one step commands with increased time  Attention Span: Attends with cues to redirect  Memory: Appears intact  Problem Solving: Assistance required to identify errors made  Initiation: Does not require cues  Sequencing: Does not require cues        Sensation  Overall Sensation Status: WFL        LUE AROM (degrees)  LUE AROM : WFL  Left Hand AROM (degrees)  Left Hand AROM: WFL  RUE AROM (degrees)  RUE AROM : WFL  Right Hand AROM (degrees)  Right Hand AROM: WFL  LUE Strength  Gross LUE Strength: WFL  RUE Strength  Gross RUE Strength: WFL                   Plan   Plan  Times per week: 3-5x/week  Current Treatment Recommendations: Strengthening, Balance Training, Functional Mobility Training, Endurance Training, Patient/Caregiver Education & Training, Safety Education & Training, Self-Care / ADL, Equipment Evaluation, Education, & procurement, Positioning    AM-PAC Score        AM-PAC Inpatient Daily Activity Raw Score: 14 (07/04/21 1452)  AM-PAC Inpatient ADL T-Scale Score : 33.39 (07/04/21 1452)  ADL Inpatient CMS 0-100% Score: 59.67 (07/04/21 1452)  ADL Inpatient CMS G-Code Modifier : CK (07/04/21 1452)    Goals  Short term goals  Time Frame for Short term goals: 1 week (7/10) unless stated otherwise. Short term goal 1: Pt will perform functional/toilet t/fs with min A and AD PRN. Short term goal 2: Pt will perform bathroom mobility with min A and AD PRN. Short term goal 3: Pt will verbalize at least 2 energy conservation strategies to use during ADLS (7/8). Short term goal 4: Pt will UB dress with mod I. Short term goal 5: Pt will perform BUE ther ex x15 to increase endurance for ADLS and functional mobility.   Patient Goals   Patient goals : \"to feel better\"       Therapy Time   Individual Concurrent Group Co-treatment   Time In 0537         Time Out 1433         Minutes 39         Timed Code Treatment Minutes: 28 Minutes (10min eval)       Daphney Jeffery, OTR/L  If pt discharges prior to next session, this note will serve as discharge summary. See case management note for discharge disposition.

## 2022-04-29 NOTE — PROGRESS NOTES
TABLET DAILY 90 tablet 1    DULoxetine (CYMBALTA) 30 MG extended release capsule Take 1 capsule by mouth daily Take with 60mg total dose of 90mg 90 capsule 0    alendronate (FOSAMAX) 70 MG tablet TAKE 1 TABLET EVERY 7 DAYS 12 tablet 3    Cyanocobalamin (VITAMIN B 12 PO) Take by mouth      insulin glargine (TOUJEO SOLOSTAR) 300 UNIT/ML injection pen Inject 60 Units into the skin nightly 15 pen 1    ondansetron (ZOFRAN) 4 MG tablet Take 1 tablet by mouth daily as needed for Nausea or Vomiting 30 tablet 0    DULoxetine (CYMBALTA) 60 MG extended release capsule TAKE 1 CAPSULE DAILY 90 capsule 2    lisinopril (PRINIVIL;ZESTRIL) 5 MG tablet TAKE 1 TABLET DAILY 90 tablet 1    Insulin Pen Needle (B-D ULTRAFINE III SHORT PEN) 31G X 8 MM MISC USE 1 DAILY 90 each 3    furosemide (LASIX) 40 MG tablet Take 1 tablet by mouth daily prn 270 tablet 1    blood glucose test strips (ONE TOUCH ULTRA TEST) strip TEST BLOOD SUGAR TWICE A  each 2    glimepiride (AMARYL) 4 MG tablet TAKE 1 TABLET TWICE A  tablet 2    Blood Glucose Monitoring Suppl (MM EASY TOUCH GLUCOSE METER) w/Device KIT 1 kit by Does not apply route 2 times daily 1 kit 0    magnesium 30 MG tablet Take 30 mg by mouth daily      ONE TOUCH ULTRASOFT LANCETS MISC Test blood sugar 2 times a day 200 each 5    UNABLE TO FIND Tumeric      diclofenac sodium 1 % GEL       vitamin D (CHOLECALCIFEROL) 1000 UNIT TABS tablet Take 4,000 Units by mouth daily      aspirin 325 MG tablet Take 1 tablet by mouth daily. (Patient not taking: Reported on 2/14/2020) 30 tablet 3     No current facility-administered medications for this visit.           Allergies:  Codeine; Ultram [tramadol]; and Percocet [oxycodone-acetaminophen]  History of allergic reaction to anesthesia:  No but has PONV     Social History     Tobacco Use   Smoking Status Former Smoker    Packs/day: 0.25    Years: 2.00    Pack years: 0.50    Types: Cigarettes    Last attempt to quit: oriented to name, place and time. Cranial nerves II-XII are grossly intact. Motor is 5 out of 5 bilaterally. DATA:  EKG:  Date:  N/A  I have reviewed EKG with the following interpretation:  Impression:  N/A      ASSESSMENT AND PLAN:    1. Patient is a 72 y.o. female with above specified procedure planned on 3/4/20 with Dr. Mary Zurita at [facility]. They will not require cardiology evaluation prior to procedure. 2. Stop asa/tylenol medications 7-10 days prior to procedure.   3.  Patient cleared for surgery    Elba Aschoff, M.D    [address] [place] [address],  [address] [place] [address]  [phone] Carac Counseling:  I discussed with the patient the risks of Carac including but not limited to erythema, scaling, itching, weeping, crusting, and pain.

## 2024-10-02 NOTE — DISCHARGE INSTR - COC
Continuity of Care Form    Patient Name: Ashley Tan   :  1950  MRN:  [id number]    Admit date:  2021  Discharge date:  ***    Code Status Order: DNR-CC   Advance Directives:      Admitting Physician:  Giuliana Martínez MD  PCP: SUMA Quan - CNP    Discharging Nurse: [facility] Unit/Room#: 0339/1491-99  Discharging Unit Phone Number: ***    Emergency Contact:   Extended Emergency Contact Information  Primary Emergency Contact: Samuel Car [address] Phone: [phone]  Work Phone: [phone]  Relation: Child    Past Surgical History:  Past Surgical History:   Procedure Laterality Date    CHOLECYSTECTOMY      COLONOSCOPY  01/10/2018    CT BIOPSY PERCUTANEOUS DEEP BONE  2021    CT BIOPSY PERCUTANEOUS DEEP BONE 2021 Kemi Martinez MD [facility] CT SCAN    CYSTOSCOPY Bilateral 2021    CYSTOSCOPY BILATERAL URETERAL STENT INSERTION performed by Maricruz Zhang MD at [address] [place] [address] CATH LAB 30 [facility] ERCP N/A 2021    ENDOSCOPIC RETROGRADE CHOLANGIOPANCREATOGRAPHY performed by Carlo Rooney MD at [facility] ERCP  2021    ERCP SPHINCTER/PAPILLOTOMY performed by Carlo Rooney MD at [facility] ERCP  2021    ERCP STENT INSERTION performed by Carlo Rooney MD at [facility] ERCP N/A 2021    ERCP PAPILLOTOMY performed by Carlo Rooney MD at [facility] ERCP  2021    ERCP BIOPSY performed by Carlo Rooney MD at [facility] ERCP  2021    ERCP STENT INSERTION performed by Carlo Rooney MD at [facility] ERCP  2021    ERCP ENDOSCOPIC RETROGRADE CHOLANGIOPANCREATOGRAPHY performed by Carlo Rooney MD at [address] [place] [address] Right     cataract    OTHER SURGICAL HISTORY  2011    Posterior repair    POSTERIOR CRUCIATE LIGAMENT REPAIR         Immunization History: Immunization History   Administered Date(s) Administered    Influenza A (L1D0-00) Vaccine PF IM 10/22/2009    Influenza Virus Vaccine 10/12/2013, 09/25/2014, 10/22/2016    Influenza Whole 10/12/2013    Influenza, High Dose (Fluzone 65 yrs and older) 11/12/2020    Influenza, Triv, inactivated, subunit, adjuvanted, IM (Fluad 65 yrs and older) 10/21/2019    Pneumococcal Conjugate 13-valent ([id number]) 10/25/2016    Pneumococcal Polysaccharide ([id number]) 05/31/2018    Tdap (Boostrix, Adacel) 10/25/2016       Active Problems:  Patient Active Problem List   Diagnosis Code    CAD (coronary artery disease) I25.10    Sleep apnea G47.30    Generalized weakness G90.3    Nonalcoholic steatohepatitis U04.58    Rectocele N81.6    DM2 (diabetes mellitus, type 2) (HCC) E11.9    HTN (hypertension), benign I10    Cerebral infarction ([facility] I63.9    Right shoulder pain M25.511    Insomnia G47.00    SUNIL (obstructive sleep apnea) G47.33    Coarse tremor G25.2    Dizziness R42    Palpitations R00.2    Essential tremor G25.0    Dyslipidemia E78.5    PNA (pneumonia) J18.9    Obesity E66.9    Bilateral primary osteoarthritis of knee M17.0    Chronic bilateral low back pain without sciatica M54.5, G89.29    DDD (degenerative disc disease), lumbar M51.36    Facet arthritis of lumbar region M47.816    Primary osteoarthritis of right knee M17.11    Abnormal finding on thyroid function test R94.6    LANA (acute kidney injury) ([facility] N17.9    Obstructive nephropathy N13.8    Metastatic malignant neoplasm (HCC) C79.9    Abdominal lymphadenopathy R59.0    Transaminitis R74.01    Elevated bilirubin R17    Nausea and vomiting R11.2    Bile duct obstruction K83.1    Itching L29.9    Abdominal distention R14.0    Left leg swelling M79.89       Isolation/Infection:   Isolation            No Isolation          Patient Infection Status       Infection Onset Added Last Indicated Last Indicated By Review Planned Expiration Resolved Resolved By    None active    Resolved    COVID-19 Rule Out 02/23/21 02/23/21 02/23/21 COVID-19 (Ordered)   02/24/21 Rule-Out Test Resulted    COVID-19 Rule Out 02/23/21 02/23/21 02/23/21 COVID-19, Rapid (Ordered)   02/23/21 Rule-Out Test Resulted            Nurse Assessment:  Last Vital Signs: BP (!) 106/50   Pulse 134   Temp 99.9 °F (37.7 °C) (Axillary)   Resp 16   Wt 181 lb (82.1 kg)   LMP  (LMP Unknown)   SpO2 95%   BMI 28.78 kg/m²     Last documented pain score (0-10 scale): Pain Level: 8  Last Weight:   Wt Readings from Last 1 Encounters:   07/13/21 181 lb (82.1 kg)     Mental Status:  {IP PT MENTAL STATUS:20030:::0}    IV Access:  { KANA IV ACCESS:926791125:::0}    Nursing Mobility/ADLs:  Walking   {CHP DME ADLs:731208282:::0}  Transfer  {CHP DME ADLs:054245023:::0}  Bathing  {CHP DME ADLs:669090887:::0}  Dressing  {CHP DME ADLs:333725072:::0}  Toileting  {CHP DME ADLs:499063722:::0}  Feeding  {CHP DME ADLs:689407878:::0}  Med Admin  {CHP DME ADLs:845696594:::0}  Med Delivery   { KANA MED Delivery:083209216:::0}    Wound Care Documentation and Therapy:        Elimination:  Continence:   · Bowel: {YES / RC:79351}  · Bladder: {YES / MP:12338}  Urinary Catheter: {Urinary Catheter:134713197:::0}   Colostomy/Ileostomy/Ileal Conduit: {YES / DP:88335}       Date of Last BM: ***    Intake/Output Summary (Last 24 hours) at 7/15/2021 1054  Last data filed at 7/15/2021 1003  Gross per 24 hour   Intake 699.71 ml   Output 50 ml   Net 649.71 ml     I/O last 3 completed shifts:   In: 689.7 [I.V.:689.7]  Out: 50 [Urine:50]    Safety Concerns:     [address] Safety Concerns:120343893:::0}    Impairments/Disabilities:      [address] Impairments/Disabilities:484937770:::0}    Nutrition Therapy:  Current Nutrition Therapy:   [address] Diet List:608604188:::0}    Routes of Feeding: {CHP DME Other Feedings:558580362:::0}  Liquids: {Slp liquid thickness:16701}  Daily Fluid Restriction: {CHP DME Yes amt example:474709798:::0}  Last Modified Barium Swallow with Video (Video Swallowing Test): {Done Not Done [facility]:666758524:::0}    Treatments at the Time of Hospital Discharge:   Respiratory Treatments: ***  Oxygen Therapy:  {Therapy; copd oxygen:58101:::0}  Ventilator:    { CC Vent List:208435303:::0}    Rehab Therapies: {THERAPEUTIC INTERVENTION:1639680019}  Weight Bearing Status/Restrictions: [facility] Weight Bearin:::0}  Other Medical Equipment (for information only, NOT a DME order):  {EQUIPMENT:369454793}  Other Treatments: ***    Patient's personal belongings (please select all that are sent with patient):  {CHP DME Belongings:308655381:::0}    RN SIGNATURE:  {Esignature:818312851:::0}    CASE MANAGEMENT/SOCIAL WORK SECTION    Inpatient Status Date: 2021    Readmission Risk Assessment Score:  Readmission Risk              Risk of Unplanned Readmission:  25         Discharging to Facility/ Agency   · Name: [facility] [place] [address]  · Phone:[phone]  · 21 [phone]    / signature: Electronically signed by CHRISTIAN Parks on 7/15/21 at 2:18 PM EDT    PHYSICIAN SECTION    Prognosis: Poor    Condition at Discharge: Terminal    Rehab Potential (if transferring to Rehab): Poor    Recommended Labs or Other Treatments After Discharge: hospice    Physician Certification: I certify the above information and transfer of Alia Hernandez  is necessary for the continuing treatment of the diagnosis listed and that she requires Hospice for less 30 days.      Update Admission H&P: No change in H&P    PHYSICIAN SIGNATURE:  Electronically signed by Heather Jung MD on 7/15/21 at 10:54 AM EDT yes

## (undated) DEVICE — GUIDEWIRE URO L150CM DIA0.035IN TAPR 8CM STR TIP STD SHFT

## (undated) DEVICE — FORCEPS BX L240CM JAW DIA2.8MM L CAP W/ NDL MIC MESH TOOTH

## (undated) DEVICE — GLOVE ORANGE PI 7 1/2   MSG9075

## (undated) DEVICE — BAG DRNGE 2000ML ROUNDED TEARDROP W/ ANTIREFLX CHMBR DISP

## (undated) DEVICE — ERCP CANNULA - 5-4-3 TIP: Brand: CONTOUR ERCP CANNULA

## (undated) DEVICE — MEDIA CONTRAST OPTIRAY 320 50ML VIAL

## (undated) DEVICE — CYSTO: Brand: MEDLINE INDUSTRIES, INC.

## (undated) DEVICE — BOWL 32OZ-LF: Brand: MEDLINE INDUSTRIES, INC.

## (undated) DEVICE — STANDARD HYPODERMIC NEEDLE,POLYPROPYLENE HUB: Brand: MONOJECT

## (undated) DEVICE — ENDOSCOPIC KIT 2 12 FT OP4 DE2 GWN SYR

## (undated) DEVICE — SYRINGE MED 10ML LUERLOCK TIP W/O SFTY DISP

## (undated) DEVICE — SYSTEM BX CAP BILI RAP EXCHG CAP LOK DEV COMPATIBLE W/ OLY

## (undated) DEVICE — SOLUTION IV IRRIG WATER 1000ML POUR BRL 2F7114

## (undated) DEVICE — RETRIEVAL BALLOON CATHETER: Brand: EXTRACTOR™ PRO RX

## (undated) DEVICE — BAG DRNGE COMB PK

## (undated) DEVICE — Z DUP USE 2139333 GUIDEWIRE UROLOGICAL STR STD 0.035 IN X150 CM REG ZIPWIRE LF

## (undated) DEVICE — SYRINGE LL 10CC

## (undated) DEVICE — ENDO CARRY-ON PROCEDURE KIT INCLUDES SUCTION TUBING, LUBRICANT, GAUZE, BIOHAZARD STICKER, TRANSPORT PAD AND INTERCEPT BEDSIDE KIT.: Brand: ENDO CARRY-ON PROCEDURE KIT

## (undated) DEVICE — Z DISCONTINUED USE 2276105 GOWN PROTCT UNIV CHST W28IN L49IN SL 24IN BLU SPUNBOND FLM

## (undated) DEVICE — ELECTRODE ECG MONITR FOAM TEAR DROP ADLT RED

## (undated) DEVICE — CANNULATING SPHINCTEROTOME: Brand: JAGTOME™ REVOLUTION RX

## (undated) DEVICE — SPHINCTEROTOME: Brand: JAGTOME RX 44

## (undated) DEVICE — SYRINGE MED 50ML LUERSLIP TIP

## (undated) DEVICE — SINGLE-USE BIOPSY FORCEPS: Brand: RADIAL JAW 4

## (undated) DEVICE — SOLUTION IRRIG 2000ML STRL H2O UROMATIC PLAS CONT USP

## (undated) DEVICE — TWIST & GO, HPCT, COEX, 60''(ANGIO - ART, HPCT, TWIST & GO, CO-EX, 60")(60766876): Brand: MEDRAD® TWIST & GO STERILE HIGH PRESSURE CONNECTOR TUBING, 150CM (60IN)

## (undated) DEVICE — ELECTRODE PT RET AD L9FT HI MOIST COND ADH HYDRGEL CORDED

## (undated) DEVICE — SOLUTION IV IRRIG 500ML 0.9% SODIUM CHL 2F7123

## (undated) DEVICE — COVER,TABLE,44X90,STERILE: Brand: MEDLINE

## (undated) DEVICE — KIT INF CTRL 2OZ LUB TBNG L12FT DBL END BRSH SYR OP4

## (undated) DEVICE — FORCEPS ENDOSCP L230CM WRK CHN 2.8CM SHTH 2.4MM JAW OPN

## (undated) DEVICE — CONMED SCOPE SAVER BITE BLOCK, 20X27 MM: Brand: SCOPE SAVER

## (undated) DEVICE — CATHETER URETH 16FR 5CC BLLN SIL ELASTMR F 2 W